# Patient Record
Sex: MALE | Race: WHITE | NOT HISPANIC OR LATINO | Employment: OTHER | URBAN - METROPOLITAN AREA
[De-identification: names, ages, dates, MRNs, and addresses within clinical notes are randomized per-mention and may not be internally consistent; named-entity substitution may affect disease eponyms.]

---

## 2019-03-12 PROBLEM — M81.8 IDIOPATHIC OSTEOPOROSIS: Status: ACTIVE | Noted: 2019-03-12

## 2019-08-06 PROBLEM — R26.9 ABNORMAL GAIT: Chronic | Status: ACTIVE | Noted: 2019-01-01

## 2019-08-06 PROBLEM — R74.8 ELEVATED ALKALINE PHOSPHATASE LEVEL: Status: ACTIVE | Noted: 2019-01-01

## 2019-08-06 PROBLEM — R07.89 MID STERNAL CHEST PAIN: Status: ACTIVE | Noted: 2019-01-01

## 2019-08-06 PROBLEM — R55 SYNCOPE: Status: ACTIVE | Noted: 2019-01-01

## 2019-09-30 PROBLEM — R07.89 MID STERNAL CHEST PAIN: Status: RESOLVED | Noted: 2019-01-01 | Resolved: 2019-01-01

## 2019-09-30 PROBLEM — S43.005A DISLOCATION OF LEFT SHOULDER JOINT: Status: ACTIVE | Noted: 2019-01-01

## 2019-09-30 PROBLEM — R25.1 TREMOR: Status: ACTIVE | Noted: 2019-01-01

## 2019-09-30 PROBLEM — M48.02 CERVICAL STENOSIS OF SPINAL CANAL: Status: ACTIVE | Noted: 2019-01-01

## 2019-09-30 PROBLEM — R29.6 FALLS FREQUENTLY: Status: ACTIVE | Noted: 2019-01-01

## 2019-09-30 PROBLEM — S06.5XAA SUBDURAL HEMATOMA, POST-TRAUMATIC (HCC): Status: ACTIVE | Noted: 2019-01-01

## 2019-10-01 PROBLEM — R09.02 HYPOXIA: Status: ACTIVE | Noted: 2019-01-01

## 2019-10-01 PROBLEM — R09.89 CHOKING EPISODE: Status: ACTIVE | Noted: 2019-01-01

## 2019-10-01 PROBLEM — N35.919 URETHRAL STRICTURE: Status: ACTIVE | Noted: 2019-01-01

## 2019-10-01 PROBLEM — B19.20 HEPATITIS C: Status: ACTIVE | Noted: 2019-01-01

## 2019-10-29 PROBLEM — R35.0 FREQUENCY OF MICTURITION: Status: ACTIVE | Noted: 2019-01-01

## 2020-01-01 ENCOUNTER — HOSPITAL ENCOUNTER (OUTPATIENT)
Dept: RADIOLOGY | Facility: MEDICAL CENTER | Age: 55
End: 2020-06-04
Attending: INTERNAL MEDICINE
Payer: MEDICARE

## 2020-01-01 ENCOUNTER — HOSPITAL ENCOUNTER (OUTPATIENT)
Dept: RADIOLOGY | Facility: MEDICAL CENTER | Age: 55
End: 2020-05-28
Attending: RADIOLOGY
Payer: MEDICARE

## 2020-01-01 ENCOUNTER — APPOINTMENT (OUTPATIENT)
Dept: RADIOLOGY | Facility: MEDICAL CENTER | Age: 55
DRG: 003 | End: 2020-01-01
Attending: INTERNAL MEDICINE
Payer: MEDICARE

## 2020-01-01 ENCOUNTER — ANESTHESIA (OUTPATIENT)
Dept: SURGERY | Facility: MEDICAL CENTER | Age: 55
DRG: 003 | End: 2020-01-01
Payer: MEDICARE

## 2020-01-01 ENCOUNTER — HOSPITAL ENCOUNTER (OUTPATIENT)
Facility: MEDICAL CENTER | Age: 55
DRG: 003 | End: 2020-05-31
Payer: MEDICARE

## 2020-01-01 ENCOUNTER — HOSPITAL ENCOUNTER (OUTPATIENT)
Dept: RADIOLOGY | Facility: MEDICAL CENTER | Age: 55
End: 2020-06-09
Attending: INTERNAL MEDICINE
Payer: MEDICARE

## 2020-01-01 ENCOUNTER — ANESTHESIA EVENT (OUTPATIENT)
Dept: SURGERY | Facility: MEDICAL CENTER | Age: 55
DRG: 003 | End: 2020-01-01
Payer: MEDICARE

## 2020-01-01 ENCOUNTER — APPOINTMENT (OUTPATIENT)
Dept: CARDIOLOGY | Facility: MEDICAL CENTER | Age: 55
DRG: 003 | End: 2020-01-01
Attending: INTERNAL MEDICINE
Payer: MEDICARE

## 2020-01-01 ENCOUNTER — HOSPITAL ENCOUNTER (OUTPATIENT)
Dept: RADIOLOGY | Facility: MEDICAL CENTER | Age: 55
End: 2020-06-01
Attending: PSYCHIATRY & NEUROLOGY
Payer: MEDICARE

## 2020-01-01 ENCOUNTER — HOSPITAL ENCOUNTER (INPATIENT)
Facility: MEDICAL CENTER | Age: 55
LOS: 15 days | DRG: 003 | End: 2020-06-15
Admitting: HOSPITALIST
Payer: MEDICARE

## 2020-01-01 VITALS
BODY MASS INDEX: 29.88 KG/M2 | WEIGHT: 175.04 LBS | HEIGHT: 64 IN | SYSTOLIC BLOOD PRESSURE: 59 MMHG | OXYGEN SATURATION: 61 % | HEART RATE: 87 BPM | DIASTOLIC BLOOD PRESSURE: 33 MMHG | RESPIRATION RATE: 25 BRPM | TEMPERATURE: 98.8 F

## 2020-01-01 DIAGNOSIS — J96.01 ACUTE RESPIRATORY FAILURE WITH HYPOXIA (HCC): ICD-10-CM

## 2020-01-01 DIAGNOSIS — J90 RECURRENT RIGHT PLEURAL EFFUSION: ICD-10-CM

## 2020-01-01 LAB
ABO GROUP BLD: NORMAL
ACTION RANGE TRIGGERED IACRT: NO
ACTION RANGE TRIGGERED IACRT: YES
ALBUMIN SERPL BCP-MCNC: 1.8 G/DL (ref 3.2–4.9)
ALBUMIN SERPL BCP-MCNC: 1.8 G/DL (ref 3.2–4.9)
ALBUMIN/GLOB SERPL: 0.5 G/DL
ALBUMIN/GLOB SERPL: 0.5 G/DL
ALP SERPL-CCNC: 65 U/L (ref 30–99)
ALP SERPL-CCNC: 66 U/L (ref 30–99)
ALT SERPL-CCNC: 10 U/L (ref 2–50)
ALT SERPL-CCNC: 12 U/L (ref 2–50)
AMYLASE FLD-CCNC: 44 U/L
AMYLASE FLD-CCNC: 82 U/L
ANION GAP SERPL CALC-SCNC: 10 MMOL/L (ref 7–16)
ANION GAP SERPL CALC-SCNC: 11 MMOL/L (ref 7–16)
ANION GAP SERPL CALC-SCNC: 12 MMOL/L (ref 7–16)
ANION GAP SERPL CALC-SCNC: 13 MMOL/L (ref 7–16)
ANION GAP SERPL CALC-SCNC: 13 MMOL/L (ref 7–16)
ANION GAP SERPL CALC-SCNC: 16 MMOL/L (ref 7–16)
ANION GAP SERPL CALC-SCNC: 5 MMOL/L (ref 7–16)
ANION GAP SERPL CALC-SCNC: 6 MMOL/L (ref 7–16)
ANION GAP SERPL CALC-SCNC: 7 MMOL/L (ref 7–16)
ANION GAP SERPL CALC-SCNC: 7 MMOL/L (ref 7–16)
ANION GAP SERPL CALC-SCNC: 9 MMOL/L (ref 7–16)
ANISOCYTOSIS BLD QL SMEAR: ABNORMAL
APPEARANCE FLD: NORMAL
APPEARANCE FLD: NORMAL
APTT PPP: 30.4 SEC (ref 24.7–36)
AST SERPL-CCNC: 16 U/L (ref 12–45)
AST SERPL-CCNC: 20 U/L (ref 12–45)
BACTERIA BLD CULT: NORMAL
BACTERIA BLD CULT: NORMAL
BACTERIA FLD AEROBE CULT: NORMAL
BACTERIA SPEC ANAEROBE CULT: NORMAL
BACTERIA SPEC RESP CULT: NORMAL
BACTERIA TISS AEROBE CULT: ABNORMAL
BARCODED ABORH UBTYP: 5100
BARCODED ABORH UBTYP: 9500
BARCODED PRD CODE UBPRD: NORMAL
BARCODED UNIT NUM UBUNT: NORMAL
BASE EXCESS BLDA CALC-SCNC: 1 MMOL/L (ref -4–3)
BASE EXCESS BLDA CALC-SCNC: 1 MMOL/L (ref -4–3)
BASE EXCESS BLDA CALC-SCNC: 13 MMOL/L (ref -4–3)
BASE EXCESS BLDA CALC-SCNC: 2 MMOL/L (ref -4–3)
BASE EXCESS BLDA CALC-SCNC: 3 MMOL/L (ref -4–3)
BASE EXCESS BLDA CALC-SCNC: 4 MMOL/L (ref -4–3)
BASE EXCESS BLDA CALC-SCNC: 4 MMOL/L (ref -4–3)
BASE EXCESS BLDA CALC-SCNC: 6 MMOL/L (ref -4–3)
BASE EXCESS BLDA CALC-SCNC: 6 MMOL/L (ref -4–3)
BASE EXCESS BLDA CALC-SCNC: 7 MMOL/L (ref -4–3)
BASE EXCESS BLDA CALC-SCNC: 8 MMOL/L (ref -4–3)
BASE EXCESS BLDA CALC-SCNC: 9 MMOL/L (ref -4–3)
BASE EXCESS BLDA CALC-SCNC: 9 MMOL/L (ref -4–3)
BASE EXCESS BLDV CALC-SCNC: 12 MMOL/L (ref -4–3)
BASO STIPL BLD QL SMEAR: NORMAL
BASOPHILS # BLD AUTO: 0.3 % (ref 0–1.8)
BASOPHILS # BLD AUTO: 0.4 % (ref 0–1.8)
BASOPHILS # BLD AUTO: 0.5 % (ref 0–1.8)
BASOPHILS # BLD AUTO: 0.6 % (ref 0–1.8)
BASOPHILS # BLD AUTO: 0.9 % (ref 0–1.8)
BASOPHILS # BLD AUTO: 1.7 % (ref 0–1.8)
BASOPHILS # BLD AUTO: 1.7 % (ref 0–1.8)
BASOPHILS # BLD AUTO: 2.6 % (ref 0–1.8)
BASOPHILS # BLD AUTO: 2.6 % (ref 0–1.8)
BASOPHILS # BLD: 0.03 K/UL (ref 0–0.12)
BASOPHILS # BLD: 0.05 K/UL (ref 0–0.12)
BASOPHILS # BLD: 0.06 K/UL (ref 0–0.12)
BASOPHILS # BLD: 0.07 K/UL (ref 0–0.12)
BASOPHILS # BLD: 0.08 K/UL (ref 0–0.12)
BASOPHILS # BLD: 0.1 K/UL (ref 0–0.12)
BASOPHILS # BLD: 0.11 K/UL (ref 0–0.12)
BASOPHILS # BLD: 0.11 K/UL (ref 0–0.12)
BASOPHILS # BLD: 0.13 K/UL (ref 0–0.12)
BASOPHILS # BLD: 0.18 K/UL (ref 0–0.12)
BASOPHILS # BLD: 0.2 K/UL (ref 0–0.12)
BASOPHILS NFR FLD: 1 %
BASOPHILS NFR FLD: 1 %
BILIRUB SERPL-MCNC: 0.3 MG/DL (ref 0.1–1.5)
BILIRUB SERPL-MCNC: 0.3 MG/DL (ref 0.1–1.5)
BLD GP AB SCN SERPL QL: NORMAL
BODY FLD TYPE: NORMAL
BODY TEMPERATURE: ABNORMAL DEGREES
BUN SERPL-MCNC: 11 MG/DL (ref 8–22)
BUN SERPL-MCNC: 12 MG/DL (ref 8–22)
BUN SERPL-MCNC: 15 MG/DL (ref 8–22)
BUN SERPL-MCNC: 19 MG/DL (ref 8–22)
BUN SERPL-MCNC: 25 MG/DL (ref 8–22)
BUN SERPL-MCNC: 31 MG/DL (ref 8–22)
BUN SERPL-MCNC: 31 MG/DL (ref 8–22)
BUN SERPL-MCNC: 33 MG/DL (ref 8–22)
BUN SERPL-MCNC: 33 MG/DL (ref 8–22)
BUN SERPL-MCNC: 34 MG/DL (ref 8–22)
BUN SERPL-MCNC: 37 MG/DL (ref 8–22)
BUN SERPL-MCNC: 37 MG/DL (ref 8–22)
BUN SERPL-MCNC: 39 MG/DL (ref 8–22)
BUN SERPL-MCNC: 43 MG/DL (ref 8–22)
BUN SERPL-MCNC: 8 MG/DL (ref 8–22)
BUN SERPL-MCNC: 8 MG/DL (ref 8–22)
BURR CELLS BLD QL SMEAR: NORMAL
CALCIUM SERPL-MCNC: 7.4 MG/DL (ref 8.5–10.5)
CALCIUM SERPL-MCNC: 7.6 MG/DL (ref 8.5–10.5)
CALCIUM SERPL-MCNC: 7.7 MG/DL (ref 8.5–10.5)
CALCIUM SERPL-MCNC: 7.8 MG/DL (ref 8.5–10.5)
CALCIUM SERPL-MCNC: 7.8 MG/DL (ref 8.5–10.5)
CALCIUM SERPL-MCNC: 8 MG/DL (ref 8.5–10.5)
CALCIUM SERPL-MCNC: 8.1 MG/DL (ref 8.5–10.5)
CALCIUM SERPL-MCNC: 8.2 MG/DL (ref 8.5–10.5)
CALCIUM SERPL-MCNC: 8.3 MG/DL (ref 8.5–10.5)
CALCIUM SERPL-MCNC: 8.3 MG/DL (ref 8.5–10.5)
CALCIUM SERPL-MCNC: 8.4 MG/DL (ref 8.5–10.5)
CALCIUM SERPL-MCNC: 8.4 MG/DL (ref 8.5–10.5)
CHLORIDE SERPL-SCNC: 104 MMOL/L (ref 96–112)
CHLORIDE SERPL-SCNC: 104 MMOL/L (ref 96–112)
CHLORIDE SERPL-SCNC: 105 MMOL/L (ref 96–112)
CHLORIDE SERPL-SCNC: 106 MMOL/L (ref 96–112)
CHLORIDE SERPL-SCNC: 109 MMOL/L (ref 96–112)
CHLORIDE SERPL-SCNC: 110 MMOL/L (ref 96–112)
CHLORIDE SERPL-SCNC: 111 MMOL/L (ref 96–112)
CHLORIDE SERPL-SCNC: 113 MMOL/L (ref 96–112)
CHLORIDE SERPL-SCNC: 113 MMOL/L (ref 96–112)
CO2 BLDA-SCNC: 26 MMOL/L (ref 20–33)
CO2 BLDA-SCNC: 28 MMOL/L (ref 20–33)
CO2 BLDA-SCNC: 29 MMOL/L (ref 20–33)
CO2 BLDA-SCNC: 30 MMOL/L (ref 20–33)
CO2 BLDA-SCNC: 31 MMOL/L (ref 20–33)
CO2 BLDA-SCNC: 33 MMOL/L (ref 20–33)
CO2 BLDA-SCNC: 35 MMOL/L (ref 20–33)
CO2 BLDA-SCNC: 35 MMOL/L (ref 20–33)
CO2 BLDA-SCNC: 41 MMOL/L (ref 20–33)
CO2 BLDV-SCNC: 37 MMOL/L (ref 20–33)
CO2 SERPL-SCNC: 25 MMOL/L (ref 20–33)
CO2 SERPL-SCNC: 26 MMOL/L (ref 20–33)
CO2 SERPL-SCNC: 27 MMOL/L (ref 20–33)
CO2 SERPL-SCNC: 27 MMOL/L (ref 20–33)
CO2 SERPL-SCNC: 28 MMOL/L (ref 20–33)
CO2 SERPL-SCNC: 29 MMOL/L (ref 20–33)
CO2 SERPL-SCNC: 30 MMOL/L (ref 20–33)
CO2 SERPL-SCNC: 30 MMOL/L (ref 20–33)
CO2 SERPL-SCNC: 32 MMOL/L (ref 20–33)
CO2 SERPL-SCNC: 33 MMOL/L (ref 20–33)
CO2 SERPL-SCNC: 35 MMOL/L (ref 20–33)
COLOR FLD: NORMAL
COLOR FLD: NORMAL
COMMENT 1642: NORMAL
COMPONENT R 8504R: NORMAL
CORTIS SERPL-MCNC: 11.4 UG/DL (ref 0–23)
COVID ORDER STATUS COVID19: NORMAL
CREAT SERPL-MCNC: 1 MG/DL (ref 0.5–1.4)
CREAT SERPL-MCNC: 1.06 MG/DL (ref 0.5–1.4)
CREAT SERPL-MCNC: 1.12 MG/DL (ref 0.5–1.4)
CREAT SERPL-MCNC: 1.14 MG/DL (ref 0.5–1.4)
CREAT SERPL-MCNC: 1.14 MG/DL (ref 0.5–1.4)
CREAT SERPL-MCNC: 1.3 MG/DL (ref 0.5–1.4)
CREAT SERPL-MCNC: 1.35 MG/DL (ref 0.5–1.4)
CREAT SERPL-MCNC: 1.35 MG/DL (ref 0.5–1.4)
CREAT SERPL-MCNC: 1.37 MG/DL (ref 0.5–1.4)
CREAT SERPL-MCNC: 1.49 MG/DL (ref 0.5–1.4)
CREAT SERPL-MCNC: 1.53 MG/DL (ref 0.5–1.4)
CREAT SERPL-MCNC: 1.58 MG/DL (ref 0.5–1.4)
CREAT SERPL-MCNC: 1.62 MG/DL (ref 0.5–1.4)
CREAT SERPL-MCNC: 1.63 MG/DL (ref 0.5–1.4)
CREAT SERPL-MCNC: 1.7 MG/DL (ref 0.5–1.4)
CREAT SERPL-MCNC: 1.86 MG/DL (ref 0.5–1.4)
CRP SERPL HS-MCNC: 23.02 MG/DL (ref 0–0.75)
CRP SERPL HS-MCNC: 9.31 MG/DL (ref 0–0.75)
CSF COMMENTS 1658: NORMAL
CSF COMMENTS 1658: NORMAL
CYTOLOGY REG CYTOL: NORMAL
CYTOLOGY REG CYTOL: NORMAL
EKG IMPRESSION: NORMAL
EKG IMPRESSION: NORMAL
EOSINOPHIL # BLD AUTO: 0.01 K/UL (ref 0–0.51)
EOSINOPHIL # BLD AUTO: 0.06 K/UL (ref 0–0.51)
EOSINOPHIL # BLD AUTO: 0.07 K/UL (ref 0–0.51)
EOSINOPHIL # BLD AUTO: 0.1 K/UL (ref 0–0.51)
EOSINOPHIL # BLD AUTO: 0.13 K/UL (ref 0–0.51)
EOSINOPHIL # BLD AUTO: 0.18 K/UL (ref 0–0.51)
EOSINOPHIL # BLD AUTO: 0.22 K/UL (ref 0–0.51)
EOSINOPHIL # BLD AUTO: 0.24 K/UL (ref 0–0.51)
EOSINOPHIL # BLD AUTO: 0.24 K/UL (ref 0–0.51)
EOSINOPHIL # BLD AUTO: 0.27 K/UL (ref 0–0.51)
EOSINOPHIL # BLD AUTO: 0.48 K/UL (ref 0–0.51)
EOSINOPHIL # BLD AUTO: 0.64 K/UL (ref 0–0.51)
EOSINOPHIL # BLD AUTO: 0.69 K/UL (ref 0–0.51)
EOSINOPHIL NFR BLD: 0.1 % (ref 0–6.9)
EOSINOPHIL NFR BLD: 0.8 % (ref 0–6.9)
EOSINOPHIL NFR BLD: 0.9 % (ref 0–6.9)
EOSINOPHIL NFR BLD: 0.9 % (ref 0–6.9)
EOSINOPHIL NFR BLD: 1.4 % (ref 0–6.9)
EOSINOPHIL NFR BLD: 1.5 % (ref 0–6.9)
EOSINOPHIL NFR BLD: 1.7 % (ref 0–6.9)
EOSINOPHIL NFR BLD: 1.9 % (ref 0–6.9)
EOSINOPHIL NFR BLD: 2.6 % (ref 0–6.9)
EOSINOPHIL NFR BLD: 2.7 % (ref 0–6.9)
EOSINOPHIL NFR BLD: 3.8 % (ref 0–6.9)
EOSINOPHIL NFR BLD: 3.9 % (ref 0–6.9)
EOSINOPHIL NFR FLD: 1 %
EOSINOPHIL NFR FLD: 3 %
ERYTHROCYTE [DISTWIDTH] IN BLOOD BY AUTOMATED COUNT: 60.7 FL (ref 35.9–50)
ERYTHROCYTE [DISTWIDTH] IN BLOOD BY AUTOMATED COUNT: 62.1 FL (ref 35.9–50)
ERYTHROCYTE [DISTWIDTH] IN BLOOD BY AUTOMATED COUNT: 63.5 FL (ref 35.9–50)
ERYTHROCYTE [DISTWIDTH] IN BLOOD BY AUTOMATED COUNT: 63.7 FL (ref 35.9–50)
ERYTHROCYTE [DISTWIDTH] IN BLOOD BY AUTOMATED COUNT: 63.8 FL (ref 35.9–50)
ERYTHROCYTE [DISTWIDTH] IN BLOOD BY AUTOMATED COUNT: 63.8 FL (ref 35.9–50)
ERYTHROCYTE [DISTWIDTH] IN BLOOD BY AUTOMATED COUNT: 63.9 FL (ref 35.9–50)
ERYTHROCYTE [DISTWIDTH] IN BLOOD BY AUTOMATED COUNT: 64.6 FL (ref 35.9–50)
ERYTHROCYTE [DISTWIDTH] IN BLOOD BY AUTOMATED COUNT: 65.1 FL (ref 35.9–50)
ERYTHROCYTE [DISTWIDTH] IN BLOOD BY AUTOMATED COUNT: 65.1 FL (ref 35.9–50)
ERYTHROCYTE [DISTWIDTH] IN BLOOD BY AUTOMATED COUNT: 65.5 FL (ref 35.9–50)
ERYTHROCYTE [DISTWIDTH] IN BLOOD BY AUTOMATED COUNT: 65.7 FL (ref 35.9–50)
ERYTHROCYTE [DISTWIDTH] IN BLOOD BY AUTOMATED COUNT: 65.9 FL (ref 35.9–50)
ERYTHROCYTE [DISTWIDTH] IN BLOOD BY AUTOMATED COUNT: 74.3 FL (ref 35.9–50)
ERYTHROCYTE [DISTWIDTH] IN BLOOD BY AUTOMATED COUNT: 77.3 FL (ref 35.9–50)
GLOBULIN SER CALC-MCNC: 3.5 G/DL (ref 1.9–3.5)
GLOBULIN SER CALC-MCNC: 3.7 G/DL (ref 1.9–3.5)
GLUCOSE BLD-MCNC: 103 MG/DL (ref 65–99)
GLUCOSE BLD-MCNC: 105 MG/DL (ref 65–99)
GLUCOSE BLD-MCNC: 105 MG/DL (ref 65–99)
GLUCOSE BLD-MCNC: 111 MG/DL (ref 65–99)
GLUCOSE BLD-MCNC: 115 MG/DL (ref 65–99)
GLUCOSE BLD-MCNC: 115 MG/DL (ref 65–99)
GLUCOSE BLD-MCNC: 119 MG/DL (ref 65–99)
GLUCOSE BLD-MCNC: 122 MG/DL (ref 65–99)
GLUCOSE BLD-MCNC: 134 MG/DL (ref 65–99)
GLUCOSE BLD-MCNC: 143 MG/DL (ref 65–99)
GLUCOSE BLD-MCNC: 147 MG/DL (ref 65–99)
GLUCOSE BLD-MCNC: 151 MG/DL (ref 65–99)
GLUCOSE BLD-MCNC: 157 MG/DL (ref 65–99)
GLUCOSE BLD-MCNC: 166 MG/DL (ref 65–99)
GLUCOSE BLD-MCNC: 219 MG/DL (ref 65–99)
GLUCOSE BLD-MCNC: 69 MG/DL (ref 65–99)
GLUCOSE BLD-MCNC: 81 MG/DL (ref 65–99)
GLUCOSE BLD-MCNC: 89 MG/DL (ref 65–99)
GLUCOSE BLD-MCNC: 98 MG/DL (ref 65–99)
GLUCOSE FLD-MCNC: 136 MG/DL
GLUCOSE FLD-MCNC: 84 MG/DL
GLUCOSE SERPL-MCNC: 102 MG/DL (ref 65–99)
GLUCOSE SERPL-MCNC: 106 MG/DL (ref 65–99)
GLUCOSE SERPL-MCNC: 109 MG/DL (ref 65–99)
GLUCOSE SERPL-MCNC: 109 MG/DL (ref 65–99)
GLUCOSE SERPL-MCNC: 115 MG/DL (ref 65–99)
GLUCOSE SERPL-MCNC: 116 MG/DL (ref 65–99)
GLUCOSE SERPL-MCNC: 119 MG/DL (ref 65–99)
GLUCOSE SERPL-MCNC: 120 MG/DL (ref 65–99)
GLUCOSE SERPL-MCNC: 128 MG/DL (ref 65–99)
GLUCOSE SERPL-MCNC: 140 MG/DL (ref 65–99)
GLUCOSE SERPL-MCNC: 142 MG/DL (ref 65–99)
GLUCOSE SERPL-MCNC: 149 MG/DL (ref 65–99)
GLUCOSE SERPL-MCNC: 168 MG/DL (ref 65–99)
GLUCOSE SERPL-MCNC: 196 MG/DL (ref 65–99)
GLUCOSE SERPL-MCNC: 201 MG/DL (ref 65–99)
GLUCOSE SERPL-MCNC: 85 MG/DL (ref 65–99)
GLUCOSE SERPL-MCNC: 95 MG/DL (ref 65–99)
GRAM STN SPEC: ABNORMAL
GRAM STN SPEC: NORMAL
HCO3 BLDA-SCNC: 25.3 MMOL/L (ref 17–25)
HCO3 BLDA-SCNC: 26.3 MMOL/L (ref 17–25)
HCO3 BLDA-SCNC: 27.1 MMOL/L (ref 17–25)
HCO3 BLDA-SCNC: 27.1 MMOL/L (ref 17–25)
HCO3 BLDA-SCNC: 27.8 MMOL/L (ref 17–25)
HCO3 BLDA-SCNC: 28.3 MMOL/L (ref 17–25)
HCO3 BLDA-SCNC: 29.3 MMOL/L (ref 17–25)
HCO3 BLDA-SCNC: 29.6 MMOL/L (ref 17–25)
HCO3 BLDA-SCNC: 29.8 MMOL/L (ref 17–25)
HCO3 BLDA-SCNC: 31.3 MMOL/L (ref 17–25)
HCO3 BLDA-SCNC: 32.1 MMOL/L (ref 17–25)
HCO3 BLDA-SCNC: 32.3 MMOL/L (ref 17–25)
HCO3 BLDA-SCNC: 33.4 MMOL/L (ref 17–25)
HCO3 BLDA-SCNC: 34 MMOL/L (ref 17–25)
HCO3 BLDA-SCNC: 39.2 MMOL/L (ref 17–25)
HCO3 BLDV-SCNC: 36.1 MMOL/L (ref 24–28)
HCT VFR BLD AUTO: 23.3 % (ref 42–52)
HCT VFR BLD AUTO: 23.9 % (ref 42–52)
HCT VFR BLD AUTO: 24 % (ref 42–52)
HCT VFR BLD AUTO: 24.5 % (ref 42–52)
HCT VFR BLD AUTO: 24.8 % (ref 42–52)
HCT VFR BLD AUTO: 25 % (ref 42–52)
HCT VFR BLD AUTO: 25.3 % (ref 42–52)
HCT VFR BLD AUTO: 25.6 % (ref 42–52)
HCT VFR BLD AUTO: 25.8 % (ref 42–52)
HCT VFR BLD AUTO: 26 % (ref 42–52)
HCT VFR BLD AUTO: 26.5 % (ref 42–52)
HCT VFR BLD AUTO: 27.6 % (ref 42–52)
HCT VFR BLD AUTO: 30.1 % (ref 42–52)
HCT VFR BLD AUTO: 31.7 % (ref 42–52)
HCT VFR BLD AUTO: 31.8 % (ref 42–52)
HGB BLD-MCNC: 10 G/DL (ref 14–18)
HGB BLD-MCNC: 10.2 G/DL (ref 14–18)
HGB BLD-MCNC: 6.9 G/DL (ref 14–18)
HGB BLD-MCNC: 7.1 G/DL (ref 14–18)
HGB BLD-MCNC: 7.2 G/DL (ref 14–18)
HGB BLD-MCNC: 7.3 G/DL (ref 14–18)
HGB BLD-MCNC: 7.4 G/DL (ref 14–18)
HGB BLD-MCNC: 7.5 G/DL (ref 14–18)
HGB BLD-MCNC: 7.5 G/DL (ref 14–18)
HGB BLD-MCNC: 7.6 G/DL (ref 14–18)
HGB BLD-MCNC: 7.7 G/DL (ref 14–18)
HGB BLD-MCNC: 7.9 G/DL (ref 14–18)
HGB BLD-MCNC: 7.9 G/DL (ref 14–18)
HGB BLD-MCNC: 8.3 G/DL (ref 14–18)
HGB BLD-MCNC: 9.6 G/DL (ref 14–18)
HISTIOCYTES NFR FLD: 1 %
HOROWITZ INDEX BLDA+IHG-RTO: 180 MM[HG]
HOROWITZ INDEX BLDA+IHG-RTO: 193 MM[HG]
HOROWITZ INDEX BLDA+IHG-RTO: 195 MM[HG]
HOROWITZ INDEX BLDA+IHG-RTO: 200 MM[HG]
HOROWITZ INDEX BLDA+IHG-RTO: 207 MM[HG]
HOROWITZ INDEX BLDA+IHG-RTO: 208 MM[HG]
HOROWITZ INDEX BLDA+IHG-RTO: 220 MM[HG]
HOROWITZ INDEX BLDA+IHG-RTO: 225 MM[HG]
HOROWITZ INDEX BLDA+IHG-RTO: 240 MM[HG]
HOROWITZ INDEX BLDA+IHG-RTO: 243 MM[HG]
HOROWITZ INDEX BLDA+IHG-RTO: 247 MM[HG]
HOROWITZ INDEX BLDA+IHG-RTO: 250 MM[HG]
HOROWITZ INDEX BLDA+IHG-RTO: 257 MM[HG]
HOROWITZ INDEX BLDA+IHG-RTO: 263 MM[HG]
HOROWITZ INDEX BLDA+IHG-RTO: 290 MM[HG]
HOROWITZ INDEX BLDV+IHG-RTO: 93 MM[HG]
HYPOCHROMIA BLD QL SMEAR: ABNORMAL
IMM GRANULOCYTES # BLD AUTO: 0.05 K/UL (ref 0–0.11)
IMM GRANULOCYTES # BLD AUTO: 0.08 K/UL (ref 0–0.11)
IMM GRANULOCYTES # BLD AUTO: 0.08 K/UL (ref 0–0.11)
IMM GRANULOCYTES # BLD AUTO: 0.09 K/UL (ref 0–0.11)
IMM GRANULOCYTES # BLD AUTO: 0.09 K/UL (ref 0–0.11)
IMM GRANULOCYTES # BLD AUTO: 0.11 K/UL (ref 0–0.11)
IMM GRANULOCYTES # BLD AUTO: 0.16 K/UL (ref 0–0.11)
IMM GRANULOCYTES # BLD AUTO: 0.16 K/UL (ref 0–0.11)
IMM GRANULOCYTES NFR BLD AUTO: 0.5 % (ref 0–0.9)
IMM GRANULOCYTES NFR BLD AUTO: 0.6 % (ref 0–0.9)
IMM GRANULOCYTES NFR BLD AUTO: 0.7 % (ref 0–0.9)
IMM GRANULOCYTES NFR BLD AUTO: 0.7 % (ref 0–0.9)
IMM GRANULOCYTES NFR BLD AUTO: 0.9 % (ref 0–0.9)
IMM GRANULOCYTES NFR BLD AUTO: 0.9 % (ref 0–0.9)
INR PPP: 1.03 (ref 0.87–1.13)
INST. QUALIFIED PATIENT IIQPT: YES
LDH FLD L TO P-CCNC: 123 U/L
LDH FLD L TO P-CCNC: 126 U/L
LV EJECT FRACT  99904: 65
LV EJECT FRACT MOD 2C 99903: 62.23
LV EJECT FRACT MOD 4C 99902: 63.77
LV EJECT FRACT MOD BP 99901: 62.86
LYMPHOCYTES # BLD AUTO: 0.59 K/UL (ref 1–4.8)
LYMPHOCYTES # BLD AUTO: 0.74 K/UL (ref 1–4.8)
LYMPHOCYTES # BLD AUTO: 0.88 K/UL (ref 1–4.8)
LYMPHOCYTES # BLD AUTO: 1.07 K/UL (ref 1–4.8)
LYMPHOCYTES # BLD AUTO: 1.08 K/UL (ref 1–4.8)
LYMPHOCYTES # BLD AUTO: 1.22 K/UL (ref 1–4.8)
LYMPHOCYTES # BLD AUTO: 1.25 K/UL (ref 1–4.8)
LYMPHOCYTES # BLD AUTO: 1.36 K/UL (ref 1–4.8)
LYMPHOCYTES # BLD AUTO: 1.37 K/UL (ref 1–4.8)
LYMPHOCYTES # BLD AUTO: 1.44 K/UL (ref 1–4.8)
LYMPHOCYTES # BLD AUTO: 1.5 K/UL (ref 1–4.8)
LYMPHOCYTES # BLD AUTO: 1.57 K/UL (ref 1–4.8)
LYMPHOCYTES # BLD AUTO: 1.68 K/UL (ref 1–4.8)
LYMPHOCYTES # BLD AUTO: 1.7 K/UL (ref 1–4.8)
LYMPHOCYTES # BLD AUTO: 1.77 K/UL (ref 1–4.8)
LYMPHOCYTES NFR BLD: 10.4 % (ref 22–41)
LYMPHOCYTES NFR BLD: 11.3 % (ref 22–41)
LYMPHOCYTES NFR BLD: 11.4 % (ref 22–41)
LYMPHOCYTES NFR BLD: 13.9 % (ref 22–41)
LYMPHOCYTES NFR BLD: 13.9 % (ref 22–41)
LYMPHOCYTES NFR BLD: 15.7 % (ref 22–41)
LYMPHOCYTES NFR BLD: 16 % (ref 22–41)
LYMPHOCYTES NFR BLD: 16.5 % (ref 22–41)
LYMPHOCYTES NFR BLD: 18.4 % (ref 22–41)
LYMPHOCYTES NFR BLD: 7.9 % (ref 22–41)
LYMPHOCYTES NFR BLD: 8.7 % (ref 22–41)
LYMPHOCYTES NFR BLD: 9.3 % (ref 22–41)
LYMPHOCYTES NFR BLD: 9.3 % (ref 22–41)
LYMPHOCYTES NFR BLD: 9.4 % (ref 22–41)
LYMPHOCYTES NFR BLD: 9.6 % (ref 22–41)
LYMPHOCYTES NFR FLD: 59 %
LYMPHOCYTES NFR FLD: 89 %
MACROCYTES BLD QL SMEAR: ABNORMAL
MAGNESIUM SERPL-MCNC: 1.8 MG/DL (ref 1.5–2.5)
MAGNESIUM SERPL-MCNC: 1.9 MG/DL (ref 1.5–2.5)
MAGNESIUM SERPL-MCNC: 2 MG/DL (ref 1.5–2.5)
MAGNESIUM SERPL-MCNC: 2.1 MG/DL (ref 1.5–2.5)
MAGNESIUM SERPL-MCNC: 2.1 MG/DL (ref 1.5–2.5)
MAGNESIUM SERPL-MCNC: 2.2 MG/DL (ref 1.5–2.5)
MAGNESIUM SERPL-MCNC: 2.2 MG/DL (ref 1.5–2.5)
MANUAL DIFF BLD: NORMAL
MCH RBC QN AUTO: 31.4 PG (ref 27–33)
MCH RBC QN AUTO: 31.5 PG (ref 27–33)
MCH RBC QN AUTO: 31.7 PG (ref 27–33)
MCH RBC QN AUTO: 31.7 PG (ref 27–33)
MCH RBC QN AUTO: 31.8 PG (ref 27–33)
MCH RBC QN AUTO: 31.9 PG (ref 27–33)
MCH RBC QN AUTO: 31.9 PG (ref 27–33)
MCH RBC QN AUTO: 32.1 PG (ref 27–33)
MCH RBC QN AUTO: 32.3 PG (ref 27–33)
MCH RBC QN AUTO: 32.4 PG (ref 27–33)
MCH RBC QN AUTO: 32.5 PG (ref 27–33)
MCH RBC QN AUTO: 32.6 PG (ref 27–33)
MCH RBC QN AUTO: 32.9 PG (ref 27–33)
MCHC RBC AUTO-ENTMCNC: 29.1 G/DL (ref 33.7–35.3)
MCHC RBC AUTO-ENTMCNC: 29.2 G/DL (ref 33.7–35.3)
MCHC RBC AUTO-ENTMCNC: 29.6 G/DL (ref 33.7–35.3)
MCHC RBC AUTO-ENTMCNC: 29.7 G/DL (ref 33.7–35.3)
MCHC RBC AUTO-ENTMCNC: 29.8 G/DL (ref 33.7–35.3)
MCHC RBC AUTO-ENTMCNC: 29.8 G/DL (ref 33.7–35.3)
MCHC RBC AUTO-ENTMCNC: 30 G/DL (ref 33.7–35.3)
MCHC RBC AUTO-ENTMCNC: 30 G/DL (ref 33.7–35.3)
MCHC RBC AUTO-ENTMCNC: 30.1 G/DL (ref 33.7–35.3)
MCHC RBC AUTO-ENTMCNC: 30.1 G/DL (ref 33.7–35.3)
MCHC RBC AUTO-ENTMCNC: 30.4 G/DL (ref 33.7–35.3)
MCHC RBC AUTO-ENTMCNC: 30.6 G/DL (ref 33.7–35.3)
MCHC RBC AUTO-ENTMCNC: 31.5 G/DL (ref 33.7–35.3)
MCHC RBC AUTO-ENTMCNC: 31.9 G/DL (ref 33.7–35.3)
MCHC RBC AUTO-ENTMCNC: 32.1 G/DL (ref 33.7–35.3)
MCV RBC AUTO: 105.6 FL (ref 81.4–97.8)
MCV RBC AUTO: 105.6 FL (ref 81.4–97.8)
MCV RBC AUTO: 105.7 FL (ref 81.4–97.8)
MCV RBC AUTO: 105.7 FL (ref 81.4–97.8)
MCV RBC AUTO: 106.9 FL (ref 81.4–97.8)
MCV RBC AUTO: 108 FL (ref 81.4–97.8)
MCV RBC AUTO: 108.3 FL (ref 81.4–97.8)
MCV RBC AUTO: 108.4 FL (ref 81.4–97.8)
MCV RBC AUTO: 108.6 FL (ref 81.4–97.8)
MCV RBC AUTO: 108.7 FL (ref 81.4–97.8)
MCV RBC AUTO: 109.1 FL (ref 81.4–97.8)
MCV RBC AUTO: 109.3 FL (ref 81.4–97.8)
MCV RBC AUTO: 99.4 FL (ref 81.4–97.8)
MCV RBC AUTO: 99.7 FL (ref 81.4–97.8)
MCV RBC AUTO: 99.7 FL (ref 81.4–97.8)
MONOCYTES # BLD AUTO: 0.18 K/UL (ref 0–0.85)
MONOCYTES # BLD AUTO: 0.29 K/UL (ref 0–0.85)
MONOCYTES # BLD AUTO: 0.34 K/UL (ref 0–0.85)
MONOCYTES # BLD AUTO: 0.36 K/UL (ref 0–0.85)
MONOCYTES # BLD AUTO: 0.38 K/UL (ref 0–0.85)
MONOCYTES # BLD AUTO: 0.5 K/UL (ref 0–0.85)
MONOCYTES # BLD AUTO: 0.59 K/UL (ref 0–0.85)
MONOCYTES # BLD AUTO: 0.6 K/UL (ref 0–0.85)
MONOCYTES # BLD AUTO: 0.88 K/UL (ref 0–0.85)
MONOCYTES # BLD AUTO: 0.93 K/UL (ref 0–0.85)
MONOCYTES # BLD AUTO: 1 K/UL (ref 0–0.85)
MONOCYTES # BLD AUTO: 1 K/UL (ref 0–0.85)
MONOCYTES # BLD AUTO: 1.07 K/UL (ref 0–0.85)
MONOCYTES # BLD AUTO: 1.09 K/UL (ref 0–0.85)
MONOCYTES # BLD AUTO: 1.14 K/UL (ref 0–0.85)
MONOCYTES NFR BLD AUTO: 2.6 % (ref 0–13.4)
MONOCYTES NFR BLD AUTO: 4.3 % (ref 0–13.4)
MONOCYTES NFR BLD AUTO: 4.3 % (ref 0–13.4)
MONOCYTES NFR BLD AUTO: 5.2 % (ref 0–13.4)
MONOCYTES NFR BLD AUTO: 5.3 % (ref 0–13.4)
MONOCYTES NFR BLD AUTO: 5.3 % (ref 0–13.4)
MONOCYTES NFR BLD AUTO: 5.4 % (ref 0–13.4)
MONOCYTES NFR BLD AUTO: 5.9 % (ref 0–13.4)
MONOCYTES NFR BLD AUTO: 6 % (ref 0–13.4)
MONOCYTES NFR BLD AUTO: 6 % (ref 0–13.4)
MONOCYTES NFR BLD AUTO: 6.9 % (ref 0–13.4)
MONOCYTES NFR BLD AUTO: 7 % (ref 0–13.4)
MONOCYTES NFR BLD AUTO: 7.3 % (ref 0–13.4)
MONOCYTES NFR BLD AUTO: 8.3 % (ref 0–13.4)
MONOCYTES NFR BLD AUTO: 9.6 % (ref 0–13.4)
MONONUC CELLS NFR FLD: 4 %
MONONUC CELLS NFR FLD: 7 %
MORPHOLOGY BLD-IMP: NORMAL
MRSA DNA SPEC QL NAA+PROBE: NORMAL
MYELOCYTES NFR BLD MANUAL: 0.9 %
NEUTROPHILS # BLD AUTO: 11.44 K/UL (ref 1.82–7.42)
NEUTROPHILS # BLD AUTO: 12.27 K/UL (ref 1.82–7.42)
NEUTROPHILS # BLD AUTO: 12.7 K/UL (ref 1.82–7.42)
NEUTROPHILS # BLD AUTO: 13.01 K/UL (ref 1.82–7.42)
NEUTROPHILS # BLD AUTO: 14.34 K/UL (ref 1.82–7.42)
NEUTROPHILS # BLD AUTO: 14.55 K/UL (ref 1.82–7.42)
NEUTROPHILS # BLD AUTO: 4.66 K/UL (ref 1.82–7.42)
NEUTROPHILS # BLD AUTO: 4.77 K/UL (ref 1.82–7.42)
NEUTROPHILS # BLD AUTO: 5.32 K/UL (ref 1.82–7.42)
NEUTROPHILS # BLD AUTO: 5.47 K/UL (ref 1.82–7.42)
NEUTROPHILS # BLD AUTO: 5.62 K/UL (ref 1.82–7.42)
NEUTROPHILS # BLD AUTO: 6.04 K/UL (ref 1.82–7.42)
NEUTROPHILS # BLD AUTO: 6.5 K/UL (ref 1.82–7.42)
NEUTROPHILS # BLD AUTO: 7.83 K/UL (ref 1.82–7.42)
NEUTROPHILS # BLD AUTO: 9.43 K/UL (ref 1.82–7.42)
NEUTROPHILS NFR BLD: 70.2 % (ref 44–72)
NEUTROPHILS NFR BLD: 73.9 % (ref 44–72)
NEUTROPHILS NFR BLD: 73.9 % (ref 44–72)
NEUTROPHILS NFR BLD: 75.7 % (ref 44–72)
NEUTROPHILS NFR BLD: 75.8 % (ref 44–72)
NEUTROPHILS NFR BLD: 78.3 % (ref 44–72)
NEUTROPHILS NFR BLD: 78.6 % (ref 44–72)
NEUTROPHILS NFR BLD: 78.9 % (ref 44–72)
NEUTROPHILS NFR BLD: 79.5 % (ref 44–72)
NEUTROPHILS NFR BLD: 79.6 % (ref 44–72)
NEUTROPHILS NFR BLD: 80 % (ref 44–72)
NEUTROPHILS NFR BLD: 80.4 % (ref 44–72)
NEUTROPHILS NFR BLD: 82.5 % (ref 44–72)
NEUTROPHILS NFR BLD: 82.6 % (ref 44–72)
NEUTROPHILS NFR BLD: 83.3 % (ref 44–72)
NEUTROPHILS NFR FLD: 3 %
NEUTROPHILS NFR FLD: 30 %
NEUTS BAND NFR BLD MANUAL: 1.7 % (ref 0–10)
NRBC # BLD AUTO: 0 K/UL
NRBC # BLD AUTO: 0.02 K/UL
NRBC BLD-RTO: 0 /100 WBC
NRBC BLD-RTO: 0.2 /100 WBC
O2/TOTAL GAS SETTING VFR VENT: 100 %
O2/TOTAL GAS SETTING VFR VENT: 30 %
O2/TOTAL GAS SETTING VFR VENT: 40 %
OVALOCYTES BLD QL SMEAR: NORMAL
PCO2 BLDA: 38.3 MMHG (ref 26–37)
PCO2 BLDA: 38.6 MMHG (ref 26–37)
PCO2 BLDA: 38.8 MMHG (ref 26–37)
PCO2 BLDA: 41.2 MMHG (ref 26–37)
PCO2 BLDA: 41.4 MMHG (ref 26–37)
PCO2 BLDA: 43 MMHG (ref 26–37)
PCO2 BLDA: 43.1 MMHG (ref 26–37)
PCO2 BLDA: 43.1 MMHG (ref 26–37)
PCO2 BLDA: 43.4 MMHG (ref 26–37)
PCO2 BLDA: 46.8 MMHG (ref 26–37)
PCO2 BLDA: 48.1 MMHG (ref 26–37)
PCO2 BLDA: 49.1 MMHG (ref 26–37)
PCO2 BLDA: 55.6 MMHG (ref 26–37)
PCO2 BLDA: 57.6 MMHG (ref 26–37)
PCO2 BLDA: 64.3 MMHG (ref 26–37)
PCO2 BLDV: 45.7 MMHG (ref 41–51)
PCO2 TEMP ADJ BLDA: 35.4 MMHG (ref 26–37)
PCO2 TEMP ADJ BLDA: 38.1 MMHG (ref 26–37)
PCO2 TEMP ADJ BLDA: 38.9 MMHG (ref 26–37)
PCO2 TEMP ADJ BLDA: 39.7 MMHG (ref 26–37)
PCO2 TEMP ADJ BLDA: 40.7 MMHG (ref 26–37)
PCO2 TEMP ADJ BLDA: 41.2 MMHG (ref 26–37)
PCO2 TEMP ADJ BLDA: 41.5 MMHG (ref 26–37)
PCO2 TEMP ADJ BLDA: 43.6 MMHG (ref 26–37)
PCO2 TEMP ADJ BLDA: 43.6 MMHG (ref 26–37)
PCO2 TEMP ADJ BLDA: 45.6 MMHG (ref 26–37)
PCO2 TEMP ADJ BLDA: 46.8 MMHG (ref 26–37)
PCO2 TEMP ADJ BLDA: 49.1 MMHG (ref 26–37)
PCO2 TEMP ADJ BLDA: 56.8 MMHG (ref 26–37)
PCO2 TEMP ADJ BLDA: 58.9 MMHG (ref 26–37)
PCO2 TEMP ADJ BLDA: 63.6 MMHG (ref 26–37)
PCO2 TEMP ADJ BLDV: 46.5 MMHG (ref 41–51)
PH BLDA: 7.32 [PH] (ref 7.4–7.5)
PH BLDA: 7.38 [PH] (ref 7.4–7.5)
PH BLDA: 7.39 [PH] (ref 7.4–7.5)
PH BLDA: 7.41 [PH] (ref 7.4–7.5)
PH BLDA: 7.42 [PH] (ref 7.4–7.5)
PH BLDA: 7.43 [PH] (ref 7.4–7.5)
PH BLDA: 7.45 [PH] (ref 7.4–7.5)
PH BLDA: 7.47 [PH] (ref 7.4–7.5)
PH BLDA: 7.47 [PH] (ref 7.4–7.5)
PH BLDA: 7.5 [PH] (ref 7.4–7.5)
PH BLDA: 7.53 [PH] (ref 7.4–7.5)
PH BLDV: 7.5 [PH] (ref 7.31–7.45)
PH FLD: 7 [PH]
PH FLD: 7 [PH]
PH TEMP ADJ BLDA: 7.31 [PH] (ref 7.4–7.5)
PH TEMP ADJ BLDA: 7.38 [PH] (ref 7.4–7.5)
PH TEMP ADJ BLDA: 7.38 [PH] (ref 7.4–7.5)
PH TEMP ADJ BLDA: 7.39 [PH] (ref 7.4–7.5)
PH TEMP ADJ BLDA: 7.4 [PH] (ref 7.4–7.5)
PH TEMP ADJ BLDA: 7.4 [PH] (ref 7.4–7.5)
PH TEMP ADJ BLDA: 7.42 [PH] (ref 7.4–7.5)
PH TEMP ADJ BLDA: 7.43 [PH] (ref 7.4–7.5)
PH TEMP ADJ BLDA: 7.44 [PH] (ref 7.4–7.5)
PH TEMP ADJ BLDA: 7.44 [PH] (ref 7.4–7.5)
PH TEMP ADJ BLDA: 7.47 [PH] (ref 7.4–7.5)
PH TEMP ADJ BLDA: 7.47 [PH] (ref 7.4–7.5)
PH TEMP ADJ BLDA: 7.48 [PH] (ref 7.4–7.5)
PH TEMP ADJ BLDA: 7.51 [PH] (ref 7.4–7.5)
PH TEMP ADJ BLDA: 7.54 [PH] (ref 7.4–7.5)
PH TEMP ADJ BLDV: 7.5 [PH] (ref 7.31–7.45)
PHENYTOIN SERPL-MCNC: 26.2 UG/ML (ref 10–20)
PHENYTOIN SERPL-MCNC: 27.2 UG/ML (ref 10–20)
PHENYTOIN SERPL-MCNC: 29.4 UG/ML (ref 10–20)
PHOSPHATE SERPL-MCNC: 2 MG/DL (ref 2.5–4.5)
PHOSPHATE SERPL-MCNC: 2.1 MG/DL (ref 2.5–4.5)
PHOSPHATE SERPL-MCNC: 2.2 MG/DL (ref 2.5–4.5)
PHOSPHATE SERPL-MCNC: 2.4 MG/DL (ref 2.5–4.5)
PHOSPHATE SERPL-MCNC: 2.6 MG/DL (ref 2.5–4.5)
PHOSPHATE SERPL-MCNC: 2.7 MG/DL (ref 2.5–4.5)
PHOSPHATE SERPL-MCNC: 2.9 MG/DL (ref 2.5–4.5)
PHOSPHATE SERPL-MCNC: 3.2 MG/DL (ref 2.5–4.5)
PHOSPHATE SERPL-MCNC: 3.2 MG/DL (ref 2.5–4.5)
PHOSPHATE SERPL-MCNC: 3.4 MG/DL (ref 2.5–4.5)
PHOSPHATE SERPL-MCNC: 4 MG/DL (ref 2.5–4.5)
PLATELET # BLD AUTO: 128 K/UL (ref 164–446)
PLATELET # BLD AUTO: 143 K/UL (ref 164–446)
PLATELET # BLD AUTO: 163 K/UL (ref 164–446)
PLATELET # BLD AUTO: 165 K/UL (ref 164–446)
PLATELET # BLD AUTO: 178 K/UL (ref 164–446)
PLATELET # BLD AUTO: 182 K/UL (ref 164–446)
PLATELET # BLD AUTO: 233 K/UL (ref 164–446)
PLATELET # BLD AUTO: 298 K/UL (ref 164–446)
PLATELET # BLD AUTO: 306 K/UL (ref 164–446)
PLATELET # BLD AUTO: 314 K/UL (ref 164–446)
PLATELET # BLD AUTO: 316 K/UL (ref 164–446)
PLATELET # BLD AUTO: 323 K/UL (ref 164–446)
PLATELET # BLD AUTO: 374 K/UL (ref 164–446)
PLATELET BLD QL SMEAR: NORMAL
PMV BLD AUTO: 10 FL (ref 9–12.9)
PMV BLD AUTO: 10.1 FL (ref 9–12.9)
PMV BLD AUTO: 10.3 FL (ref 9–12.9)
PMV BLD AUTO: 10.4 FL (ref 9–12.9)
PMV BLD AUTO: 10.5 FL (ref 9–12.9)
PMV BLD AUTO: 10.6 FL (ref 9–12.9)
PMV BLD AUTO: 10.6 FL (ref 9–12.9)
PMV BLD AUTO: 9.4 FL (ref 9–12.9)
PMV BLD AUTO: 9.6 FL (ref 9–12.9)
PMV BLD AUTO: 9.8 FL (ref 9–12.9)
PMV BLD AUTO: 9.8 FL (ref 9–12.9)
PMV BLD AUTO: 9.9 FL (ref 9–12.9)
PO2 BLDA: 105 MMHG (ref 64–87)
PO2 BLDA: 257 MMHG (ref 64–87)
PO2 BLDA: 58 MMHG (ref 64–87)
PO2 BLDA: 62 MMHG (ref 64–87)
PO2 BLDA: 72 MMHG (ref 64–87)
PO2 BLDA: 72 MMHG (ref 64–87)
PO2 BLDA: 74 MMHG (ref 64–87)
PO2 BLDA: 75 MMHG (ref 64–87)
PO2 BLDA: 78 MMHG (ref 64–87)
PO2 BLDA: 80 MMHG (ref 64–87)
PO2 BLDA: 83 MMHG (ref 64–87)
PO2 BLDA: 87 MMHG (ref 64–87)
PO2 BLDA: 88 MMHG (ref 64–87)
PO2 BLDA: 90 MMHG (ref 64–87)
PO2 BLDA: 97 MMHG (ref 64–87)
PO2 BLDV: 28 MMHG (ref 25–40)
PO2 TEMP ADJ BLDA: 255 MMHG (ref 64–87)
PO2 TEMP ADJ BLDA: 55 MMHG (ref 64–87)
PO2 TEMP ADJ BLDA: 62 MMHG (ref 64–87)
PO2 TEMP ADJ BLDA: 70 MMHG (ref 64–87)
PO2 TEMP ADJ BLDA: 70 MMHG (ref 64–87)
PO2 TEMP ADJ BLDA: 73 MMHG (ref 64–87)
PO2 TEMP ADJ BLDA: 73 MMHG (ref 64–87)
PO2 TEMP ADJ BLDA: 74 MMHG (ref 64–87)
PO2 TEMP ADJ BLDA: 80 MMHG (ref 64–87)
PO2 TEMP ADJ BLDA: 81 MMHG (ref 64–87)
PO2 TEMP ADJ BLDA: 83 MMHG (ref 64–87)
PO2 TEMP ADJ BLDA: 89 MMHG (ref 64–87)
PO2 TEMP ADJ BLDA: 91 MMHG (ref 64–87)
PO2 TEMP ADJ BLDA: 97 MMHG (ref 64–87)
PO2 TEMP ADJ BLDA: 97 MMHG (ref 64–87)
PO2 TEMP ADJ BLDV: 29 MMHG (ref 25–40)
POIKILOCYTOSIS BLD QL SMEAR: NORMAL
POLYCHROMASIA BLD QL SMEAR: NORMAL
POTASSIUM SERPL-SCNC: 2.7 MMOL/L (ref 3.6–5.5)
POTASSIUM SERPL-SCNC: 2.8 MMOL/L (ref 3.6–5.5)
POTASSIUM SERPL-SCNC: 3.2 MMOL/L (ref 3.6–5.5)
POTASSIUM SERPL-SCNC: 3.2 MMOL/L (ref 3.6–5.5)
POTASSIUM SERPL-SCNC: 3.4 MMOL/L (ref 3.6–5.5)
POTASSIUM SERPL-SCNC: 3.5 MMOL/L (ref 3.6–5.5)
POTASSIUM SERPL-SCNC: 3.5 MMOL/L (ref 3.6–5.5)
POTASSIUM SERPL-SCNC: 3.6 MMOL/L (ref 3.6–5.5)
POTASSIUM SERPL-SCNC: 3.7 MMOL/L (ref 3.6–5.5)
POTASSIUM SERPL-SCNC: 3.8 MMOL/L (ref 3.6–5.5)
POTASSIUM SERPL-SCNC: 3.9 MMOL/L (ref 3.6–5.5)
POTASSIUM SERPL-SCNC: 4 MMOL/L (ref 3.6–5.5)
POTASSIUM SERPL-SCNC: 4.1 MMOL/L (ref 3.6–5.5)
PREALB SERPL-MCNC: 3.8 MG/DL (ref 18–38)
PRODUCT TYPE UPROD: NORMAL
PROMYELOCYTES NFR BLD MANUAL: 0.9 %
PROT FLD-MCNC: 1.6 G/DL
PROT FLD-MCNC: 2.1 G/DL
PROT SERPL-MCNC: 5.3 G/DL (ref 6–8.2)
PROT SERPL-MCNC: 5.5 G/DL (ref 6–8.2)
PROTHROMBIN TIME: 13.7 SEC (ref 12–14.6)
RBC # BLD AUTO: 2.15 M/UL (ref 4.7–6.1)
RBC # BLD AUTO: 2.19 M/UL (ref 4.7–6.1)
RBC # BLD AUTO: 2.27 M/UL (ref 4.7–6.1)
RBC # BLD AUTO: 2.3 M/UL (ref 4.7–6.1)
RBC # BLD AUTO: 2.32 M/UL (ref 4.7–6.1)
RBC # BLD AUTO: 2.32 M/UL (ref 4.7–6.1)
RBC # BLD AUTO: 2.33 M/UL (ref 4.7–6.1)
RBC # BLD AUTO: 2.36 M/UL (ref 4.7–6.1)
RBC # BLD AUTO: 2.37 M/UL (ref 4.7–6.1)
RBC # BLD AUTO: 2.4 M/UL (ref 4.7–6.1)
RBC # BLD AUTO: 2.51 M/UL (ref 4.7–6.1)
RBC # BLD AUTO: 2.61 M/UL (ref 4.7–6.1)
RBC # BLD AUTO: 3.02 M/UL (ref 4.7–6.1)
RBC # BLD AUTO: 3.18 M/UL (ref 4.7–6.1)
RBC # BLD AUTO: 3.2 M/UL (ref 4.7–6.1)
RBC # FLD: NORMAL CELLS/UL
RBC # FLD: NORMAL CELLS/UL
RBC BLD AUTO: PRESENT
RH BLD: NORMAL
RHODAMINE-AURAMINE STN SPEC: NORMAL
SAO2 % BLDA: 100 % (ref 93–99)
SAO2 % BLDA: 90 % (ref 93–99)
SAO2 % BLDA: 92 % (ref 93–99)
SAO2 % BLDA: 94 % (ref 93–99)
SAO2 % BLDA: 94 % (ref 93–99)
SAO2 % BLDA: 95 % (ref 93–99)
SAO2 % BLDA: 96 % (ref 93–99)
SAO2 % BLDA: 97 % (ref 93–99)
SAO2 % BLDA: 98 % (ref 93–99)
SAO2 % BLDV: 59 %
SARS-COV-2 RNA RESP QL NAA+PROBE: NOTDETECTED
SIGNIFICANT IND 70042: ABNORMAL
SIGNIFICANT IND 70042: NORMAL
SITE SITE: ABNORMAL
SITE SITE: NORMAL
SODIUM SERPL-SCNC: 141 MMOL/L (ref 135–145)
SODIUM SERPL-SCNC: 142 MMOL/L (ref 135–145)
SODIUM SERPL-SCNC: 143 MMOL/L (ref 135–145)
SODIUM SERPL-SCNC: 143 MMOL/L (ref 135–145)
SODIUM SERPL-SCNC: 144 MMOL/L (ref 135–145)
SODIUM SERPL-SCNC: 146 MMOL/L (ref 135–145)
SODIUM SERPL-SCNC: 146 MMOL/L (ref 135–145)
SODIUM SERPL-SCNC: 148 MMOL/L (ref 135–145)
SODIUM SERPL-SCNC: 149 MMOL/L (ref 135–145)
SODIUM SERPL-SCNC: 150 MMOL/L (ref 135–145)
SOURCE SOURCE: ABNORMAL
SOURCE SOURCE: NORMAL
SPECIMEN DRAWN FROM PATIENT: ABNORMAL
SPECIMEN SOURCE: NORMAL
TARGETS BLD QL SMEAR: NORMAL
TRIGL SERPL-MCNC: 125 MG/DL (ref 0–149)
TRIGL SERPL-MCNC: 139 MG/DL (ref 0–149)
UNIT STATUS USTAT: NORMAL
VANCOMYCIN SERPL-MCNC: 32.9 UG/ML
VANCOMYCIN TROUGH SERPL-MCNC: 29.1 UG/ML (ref 10–20)
WBC # BLD AUTO: 12 K/UL (ref 4.8–10.8)
WBC # BLD AUTO: 14.4 K/UL (ref 4.8–10.8)
WBC # BLD AUTO: 15.4 K/UL (ref 4.8–10.8)
WBC # BLD AUTO: 15.6 K/UL (ref 4.8–10.8)
WBC # BLD AUTO: 16.3 K/UL (ref 4.8–10.8)
WBC # BLD AUTO: 18.1 K/UL (ref 4.8–10.8)
WBC # BLD AUTO: 18.1 K/UL (ref 4.8–10.8)
WBC # BLD AUTO: 6.3 K/UL (ref 4.8–10.8)
WBC # BLD AUTO: 6.8 K/UL (ref 4.8–10.8)
WBC # BLD AUTO: 7.4 K/UL (ref 4.8–10.8)
WBC # BLD AUTO: 7.8 K/UL (ref 4.8–10.8)
WBC # BLD AUTO: 8.6 K/UL (ref 4.8–10.8)
WBC # BLD AUTO: 9.4 K/UL (ref 4.8–10.8)
WBC # FLD: 121 CELLS/UL
WBC # FLD: 138 CELLS/UL
WBC OTHER NFR FLD: 1 %

## 2020-01-01 PROCEDURE — 94003 VENT MGMT INPAT SUBQ DAY: CPT

## 2020-01-01 PROCEDURE — 87040 BLOOD CULTURE FOR BACTERIA: CPT | Mod: 91

## 2020-01-01 PROCEDURE — 0W993ZZ DRAINAGE OF RIGHT PLEURAL CAVITY, PERCUTANEOUS APPROACH: ICD-10-PCS | Performed by: RADIOLOGY

## 2020-01-01 PROCEDURE — 770022 HCHG ROOM/CARE - ICU (200)

## 2020-01-01 PROCEDURE — 94640 AIRWAY INHALATION TREATMENT: CPT

## 2020-01-01 PROCEDURE — 700102 HCHG RX REV CODE 250 W/ 637 OVERRIDE(OP): Performed by: INTERNAL MEDICINE

## 2020-01-01 PROCEDURE — 82803 BLOOD GASES ANY COMBINATION: CPT

## 2020-01-01 PROCEDURE — 31500 INSERT EMERGENCY AIRWAY: CPT

## 2020-01-01 PROCEDURE — 700105 HCHG RX REV CODE 258: Performed by: INTERNAL MEDICINE

## 2020-01-01 PROCEDURE — 85007 BL SMEAR W/DIFF WBC COUNT: CPT

## 2020-01-01 PROCEDURE — 93930 UPPER EXTREMITY STUDY: CPT | Mod: GZ | Performed by: INTERNAL MEDICINE

## 2020-01-01 PROCEDURE — A9270 NON-COVERED ITEM OR SERVICE: HCPCS | Performed by: INTERNAL MEDICINE

## 2020-01-01 PROCEDURE — 95714 VEEG EA 12-26 HR UNMNTR: CPT | Performed by: PSYCHIATRY & NEUROLOGY

## 2020-01-01 PROCEDURE — 83735 ASSAY OF MAGNESIUM: CPT

## 2020-01-01 PROCEDURE — 82945 GLUCOSE OTHER FLUID: CPT

## 2020-01-01 PROCEDURE — 94770 HCHG CO2 EXPIRED GAS DETERMINATION: CPT

## 2020-01-01 PROCEDURE — 87102 FUNGUS ISOLATION CULTURE: CPT

## 2020-01-01 PROCEDURE — 80048 BASIC METABOLIC PNL TOTAL CA: CPT

## 2020-01-01 PROCEDURE — 84100 ASSAY OF PHOSPHORUS: CPT

## 2020-01-01 PROCEDURE — 700101 HCHG RX REV CODE 250: Performed by: INTERNAL MEDICINE

## 2020-01-01 PROCEDURE — 82947 ASSAY GLUCOSE BLOOD QUANT: CPT

## 2020-01-01 PROCEDURE — 80185 ASSAY OF PHENYTOIN TOTAL: CPT

## 2020-01-01 PROCEDURE — 3E0T3BZ INTRODUCTION OF ANESTHETIC AGENT INTO PERIPHERAL NERVES AND PLEXI, PERCUTANEOUS APPROACH: ICD-10-PCS | Performed by: SURGERY

## 2020-01-01 PROCEDURE — 160041 HCHG SURGERY MINUTES - EA ADDL 1 MIN LEVEL 4: Performed by: SURGERY

## 2020-01-01 PROCEDURE — 85025 COMPLETE CBC W/AUTO DIFF WBC: CPT

## 2020-01-01 PROCEDURE — 700111 HCHG RX REV CODE 636 W/ 250 OVERRIDE (IP): Performed by: INTERNAL MEDICINE

## 2020-01-01 PROCEDURE — 99152 MOD SED SAME PHYS/QHP 5/>YRS: CPT

## 2020-01-01 PROCEDURE — 0BH18EZ INSERTION OF ENDOTRACHEAL AIRWAY INTO TRACHEA, VIA NATURAL OR ARTIFICIAL OPENING ENDOSCOPIC: ICD-10-PCS | Performed by: INTERNAL MEDICINE

## 2020-01-01 PROCEDURE — 93010 ELECTROCARDIOGRAM REPORT: CPT | Performed by: INTERNAL MEDICINE

## 2020-01-01 PROCEDURE — 71045 X-RAY EXAM CHEST 1 VIEW: CPT

## 2020-01-01 PROCEDURE — 700117 HCHG RX CONTRAST REV CODE 255: Performed by: INTERNAL MEDICINE

## 2020-01-01 PROCEDURE — 93306 TTE W/DOPPLER COMPLETE: CPT

## 2020-01-01 PROCEDURE — 0B968ZZ DRAINAGE OF RIGHT LOWER LOBE BRONCHUS, VIA NATURAL OR ARTIFICIAL OPENING ENDOSCOPIC: ICD-10-PCS | Performed by: SURGERY

## 2020-01-01 PROCEDURE — 0NP004Z REMOVAL OF INTERNAL FIXATION DEVICE FROM SKULL, OPEN APPROACH: ICD-10-PCS | Performed by: NEUROLOGICAL SURGERY

## 2020-01-01 PROCEDURE — 83986 ASSAY PH BODY FLUID NOS: CPT

## 2020-01-01 PROCEDURE — 36430 TRANSFUSION BLD/BLD COMPNT: CPT

## 2020-01-01 PROCEDURE — 0B113F4 BYPASS TRACHEA TO CUTANEOUS WITH TRACHEOSTOMY DEVICE, PERCUTANEOUS APPROACH: ICD-10-PCS | Performed by: INTERNAL MEDICINE

## 2020-01-01 PROCEDURE — 87186 SC STD MICRODIL/AGAR DIL: CPT

## 2020-01-01 PROCEDURE — 87070 CULTURE OTHR SPECIMN AEROBIC: CPT

## 2020-01-01 PROCEDURE — A9576 INJ PROHANCE MULTIPACK: HCPCS | Performed by: INTERNAL MEDICINE

## 2020-01-01 PROCEDURE — 99233 SBSQ HOSP IP/OBS HIGH 50: CPT | Performed by: INTERNAL MEDICINE

## 2020-01-01 PROCEDURE — 160009 HCHG ANES TIME/MIN: Performed by: NEUROLOGICAL SURGERY

## 2020-01-01 PROCEDURE — 501488 HCHG SUCTION CANN, WOUNDVAC TRAC: Performed by: NEUROLOGICAL SURGERY

## 2020-01-01 PROCEDURE — 99291 CRITICAL CARE FIRST HOUR: CPT | Performed by: INTERNAL MEDICINE

## 2020-01-01 PROCEDURE — 5A1955Z RESPIRATORY VENTILATION, GREATER THAN 96 CONSECUTIVE HOURS: ICD-10-PCS | Performed by: INTERNAL MEDICINE

## 2020-01-01 PROCEDURE — 86850 RBC ANTIBODY SCREEN: CPT

## 2020-01-01 PROCEDURE — 87205 SMEAR GRAM STAIN: CPT

## 2020-01-01 PROCEDURE — 84132 ASSAY OF SERUM POTASSIUM: CPT

## 2020-01-01 PROCEDURE — 4A10X4Z MONITORING OF CENTRAL NERVOUS ELECTRICAL ACTIVITY, EXTERNAL APPROACH: ICD-10-PCS | Performed by: PSYCHIATRY & NEUROLOGY

## 2020-01-01 PROCEDURE — 83735 ASSAY OF MAGNESIUM: CPT | Mod: 91

## 2020-01-01 PROCEDURE — 99232 SBSQ HOSP IP/OBS MODERATE 35: CPT | Performed by: PSYCHIATRY & NEUROLOGY

## 2020-01-01 PROCEDURE — A6550 NEG PRES WOUND THER DRSG SET: HCPCS | Performed by: NEUROLOGICAL SURGERY

## 2020-01-01 PROCEDURE — 84157 ASSAY OF PROTEIN OTHER: CPT

## 2020-01-01 PROCEDURE — 70553 MRI BRAIN STEM W/O & W/DYE: CPT

## 2020-01-01 PROCEDURE — 71250 CT THORAX DX C-: CPT

## 2020-01-01 PROCEDURE — 500331 HCHG COTTONOID, SURG PATTIE: Performed by: NEUROLOGICAL SURGERY

## 2020-01-01 PROCEDURE — 94150 VITAL CAPACITY TEST: CPT

## 2020-01-01 PROCEDURE — 501837 HCHG SUTURE CV: Performed by: SURGERY

## 2020-01-01 PROCEDURE — 86140 C-REACTIVE PROTEIN: CPT

## 2020-01-01 PROCEDURE — 31600 PLANNED TRACHEOSTOMY: CPT

## 2020-01-01 PROCEDURE — 700105 HCHG RX REV CODE 258: Performed by: PSYCHIATRY & NEUROLOGY

## 2020-01-01 PROCEDURE — 32555 ASPIRATE PLEURA W/ IMAGING: CPT | Mod: RT | Performed by: INTERNAL MEDICINE

## 2020-01-01 PROCEDURE — 89051 BODY FLUID CELL COUNT: CPT

## 2020-01-01 PROCEDURE — 500075 HCHG BLADE, CLIPPER NEURO: Performed by: NEUROLOGICAL SURGERY

## 2020-01-01 PROCEDURE — 86901 BLOOD TYPING SEROLOGIC RH(D): CPT

## 2020-01-01 PROCEDURE — 85027 COMPLETE CBC AUTOMATED: CPT

## 2020-01-01 PROCEDURE — 306802 SYSTEM FECAL MANAGEMENT CATHETER KIT FLEXI - SEAL: Performed by: INTERNAL MEDICINE

## 2020-01-01 PROCEDURE — 82533 TOTAL CORTISOL: CPT

## 2020-01-01 PROCEDURE — 4410569 US-THORACENTESIS PUNCTURE

## 2020-01-01 PROCEDURE — 36600 WITHDRAWAL OF ARTERIAL BLOOD: CPT

## 2020-01-01 PROCEDURE — 99292 CRITICAL CARE ADDL 30 MIN: CPT | Performed by: INTERNAL MEDICINE

## 2020-01-01 PROCEDURE — 86900 BLOOD TYPING SEROLOGIC ABO: CPT

## 2020-01-01 PROCEDURE — 86923 COMPATIBILITY TEST ELECTRIC: CPT | Mod: 91

## 2020-01-01 PROCEDURE — 80202 ASSAY OF VANCOMYCIN: CPT

## 2020-01-01 PROCEDURE — 97605 NEG PRS WND THER DME<=50SQCM: CPT

## 2020-01-01 PROCEDURE — 700101 HCHG RX REV CODE 250: Performed by: ANESTHESIOLOGY

## 2020-01-01 PROCEDURE — 82962 GLUCOSE BLOOD TEST: CPT | Mod: 91

## 2020-01-01 PROCEDURE — 80053 COMPREHEN METABOLIC PANEL: CPT | Mod: 91

## 2020-01-01 PROCEDURE — 32554 ASPIRATE PLEURA W/O IMAGING: CPT

## 2020-01-01 PROCEDURE — 85730 THROMBOPLASTIN TIME PARTIAL: CPT

## 2020-01-01 PROCEDURE — 31624 DX BRONCHOSCOPE/LAVAGE: CPT | Performed by: SURGERY

## 2020-01-01 PROCEDURE — 88305 TISSUE EXAM BY PATHOLOGIST: CPT

## 2020-01-01 PROCEDURE — 99292 CRITICAL CARE ADDL 30 MIN: CPT | Mod: 25 | Performed by: INTERNAL MEDICINE

## 2020-01-01 PROCEDURE — 93005 ELECTROCARDIOGRAM TRACING: CPT | Performed by: INTERNAL MEDICINE

## 2020-01-01 PROCEDURE — 82803 BLOOD GASES ANY COMBINATION: CPT | Mod: 91

## 2020-01-01 PROCEDURE — 88112 CYTOPATH CELL ENHANCE TECH: CPT

## 2020-01-01 PROCEDURE — 87015 SPECIMEN INFECT AGNT CONCNTJ: CPT

## 2020-01-01 PROCEDURE — 82150 ASSAY OF AMYLASE: CPT

## 2020-01-01 PROCEDURE — 83615 LACTATE (LD) (LDH) ENZYME: CPT

## 2020-01-01 PROCEDURE — 700111 HCHG RX REV CODE 636 W/ 250 OVERRIDE (IP): Performed by: ANESTHESIOLOGY

## 2020-01-01 PROCEDURE — 99291 CRITICAL CARE FIRST HOUR: CPT | Mod: 25 | Performed by: INTERNAL MEDICINE

## 2020-01-01 PROCEDURE — 304255 HCHG KIT,PERC TRACHE

## 2020-01-01 PROCEDURE — 87077 CULTURE AEROBIC IDENTIFY: CPT

## 2020-01-01 PROCEDURE — 30233N1 TRANSFUSION OF NONAUTOLOGOUS RED BLOOD CELLS INTO PERIPHERAL VEIN, PERCUTANEOUS APPROACH: ICD-10-PCS | Performed by: NEUROLOGICAL SURGERY

## 2020-01-01 PROCEDURE — A4314 CATH W/DRAINAGE 2-WAY LATEX: HCPCS | Performed by: NEUROLOGICAL SURGERY

## 2020-01-01 PROCEDURE — 87116 MYCOBACTERIA CULTURE: CPT

## 2020-01-01 PROCEDURE — 700111 HCHG RX REV CODE 636 W/ 250 OVERRIDE (IP): Performed by: PSYCHIATRY & NEUROLOGY

## 2020-01-01 PROCEDURE — 87075 CULTR BACTERIA EXCEPT BLOOD: CPT

## 2020-01-01 PROCEDURE — 500445 HCHG HEMOSTAT, SURGICEL 4X8: Performed by: NEUROLOGICAL SURGERY

## 2020-01-01 PROCEDURE — 0W993ZZ DRAINAGE OF RIGHT PLEURAL CAVITY, PERCUTANEOUS APPROACH: ICD-10-PCS | Performed by: INTERNAL MEDICINE

## 2020-01-01 PROCEDURE — 700105 HCHG RX REV CODE 258: Performed by: ANESTHESIOLOGY

## 2020-01-01 PROCEDURE — 74018 RADEX ABDOMEN 1 VIEW: CPT

## 2020-01-01 PROCEDURE — 80053 COMPREHEN METABOLIC PANEL: CPT

## 2020-01-01 PROCEDURE — 94002 VENT MGMT INPAT INIT DAY: CPT

## 2020-01-01 PROCEDURE — 87206 SMEAR FLUORESCENT/ACID STAI: CPT

## 2020-01-01 PROCEDURE — 500452 HCHG DRESSING, WOUND VAC MED.: Performed by: NEUROLOGICAL SURGERY

## 2020-01-01 PROCEDURE — 700102 HCHG RX REV CODE 250 W/ 637 OVERRIDE(OP): Performed by: NEUROLOGICAL SURGERY

## 2020-01-01 PROCEDURE — 0W9930Z DRAINAGE OF RIGHT PLEURAL CAVITY WITH DRAINAGE DEVICE, PERCUTANEOUS APPROACH: ICD-10-PCS | Performed by: SURGERY

## 2020-01-01 PROCEDURE — 82962 GLUCOSE BLOOD TEST: CPT

## 2020-01-01 PROCEDURE — 84134 ASSAY OF PREALBUMIN: CPT

## 2020-01-01 PROCEDURE — P9045 ALBUMIN (HUMAN), 5%, 250 ML: HCPCS | Mod: JG | Performed by: INTERNAL MEDICINE

## 2020-01-01 PROCEDURE — 700111 HCHG RX REV CODE 636 W/ 250 OVERRIDE (IP): Mod: JG | Performed by: INTERNAL MEDICINE

## 2020-01-01 PROCEDURE — 95700 EEG CONT REC W/VID EEG TECH: CPT | Performed by: PSYCHIATRY & NEUROLOGY

## 2020-01-01 PROCEDURE — 160048 HCHG OR STATISTICAL LEVEL 1-5: Performed by: SURGERY

## 2020-01-01 PROCEDURE — A6222 GAUZE <=16 IN NO W/SAL W/O B: HCPCS | Performed by: NEUROLOGICAL SURGERY

## 2020-01-01 PROCEDURE — 86923 COMPATIBILITY TEST ELECTRIC: CPT

## 2020-01-01 PROCEDURE — 501838 HCHG SUTURE GENERAL: Performed by: SURGERY

## 2020-01-01 PROCEDURE — 85027 COMPLETE CBC AUTOMATED: CPT | Mod: 91

## 2020-01-01 PROCEDURE — 110454 HCHG SHELL REV 250: Performed by: NEUROLOGICAL SURGERY

## 2020-01-01 PROCEDURE — C9803 HOPD COVID-19 SPEC COLLECT: HCPCS | Performed by: NEUROLOGICAL SURGERY

## 2020-01-01 PROCEDURE — A9270 NON-COVERED ITEM OR SERVICE: HCPCS | Performed by: NEUROLOGICAL SURGERY

## 2020-01-01 PROCEDURE — 501445 HCHG STAPLER, SKIN DISP: Performed by: SURGERY

## 2020-01-01 PROCEDURE — 160041 HCHG SURGERY MINUTES - EA ADDL 1 MIN LEVEL 4: Performed by: NEUROLOGICAL SURGERY

## 2020-01-01 PROCEDURE — 302214 INTUBATION BOX: Performed by: INTERNAL MEDICINE

## 2020-01-01 PROCEDURE — 84478 ASSAY OF TRIGLYCERIDES: CPT

## 2020-01-01 PROCEDURE — 87641 MR-STAPH DNA AMP PROBE: CPT

## 2020-01-01 PROCEDURE — P9016 RBC LEUKOCYTES REDUCED: HCPCS

## 2020-01-01 PROCEDURE — 87015 SPECIMEN INFECT AGNT CONCNTJ: CPT | Mod: 91

## 2020-01-01 PROCEDURE — 99153 MOD SED SAME PHYS/QHP EA: CPT

## 2020-01-01 PROCEDURE — 31500 INSERT EMERGENCY AIRWAY: CPT | Performed by: INTERNAL MEDICINE

## 2020-01-01 PROCEDURE — 700101 HCHG RX REV CODE 250: Performed by: NEUROLOGICAL SURGERY

## 2020-01-01 PROCEDURE — 31600 PLANNED TRACHEOSTOMY: CPT | Performed by: INTERNAL MEDICINE

## 2020-01-01 PROCEDURE — 160029 HCHG SURGERY MINUTES - 1ST 30 MINS LEVEL 4: Performed by: NEUROLOGICAL SURGERY

## 2020-01-01 PROCEDURE — 0ND00ZZ EXTRACTION OF SKULL, OPEN APPROACH: ICD-10-PCS | Performed by: NEUROLOGICAL SURGERY

## 2020-01-01 PROCEDURE — 74176 CT ABD & PELVIS W/O CONTRAST: CPT

## 2020-01-01 PROCEDURE — 70450 CT HEAD/BRAIN W/O DYE: CPT

## 2020-01-01 PROCEDURE — 99215 OFFICE O/P EST HI 40 MIN: CPT | Performed by: INTERNAL MEDICINE

## 2020-01-01 PROCEDURE — 94760 N-INVAS EAR/PLS OXIMETRY 1: CPT

## 2020-01-01 PROCEDURE — 160048 HCHG OR STATISTICAL LEVEL 1-5: Performed by: NEUROLOGICAL SURGERY

## 2020-01-01 PROCEDURE — 0BNF0ZZ RELEASE RIGHT LOWER LUNG LOBE, OPEN APPROACH: ICD-10-PCS | Performed by: SURGERY

## 2020-01-01 PROCEDURE — 160029 HCHG SURGERY MINUTES - 1ST 30 MINS LEVEL 4: Performed by: SURGERY

## 2020-01-01 PROCEDURE — 501838 HCHG SUTURE GENERAL: Performed by: NEUROLOGICAL SURGERY

## 2020-01-01 PROCEDURE — 0BNC0ZZ RELEASE RIGHT UPPER LUNG LOBE, OPEN APPROACH: ICD-10-PCS | Performed by: SURGERY

## 2020-01-01 PROCEDURE — 160009 HCHG ANES TIME/MIN: Performed by: SURGERY

## 2020-01-01 PROCEDURE — 85610 PROTHROMBIN TIME: CPT

## 2020-01-01 PROCEDURE — 93306 TTE W/DOPPLER COMPLETE: CPT | Mod: 26 | Performed by: INTERNAL MEDICINE

## 2020-01-01 PROCEDURE — 500889 HCHG PACK, NEURO: Performed by: NEUROLOGICAL SURGERY

## 2020-01-01 PROCEDURE — 0BND0ZZ RELEASE RIGHT MIDDLE LUNG LOBE, OPEN APPROACH: ICD-10-PCS | Performed by: SURGERY

## 2020-01-01 PROCEDURE — 95720 EEG PHY/QHP EA INCR W/VEEG: CPT | Performed by: PSYCHIATRY & NEUROLOGY

## 2020-01-01 PROCEDURE — U0004 COV-19 TEST NON-CDC HGH THRU: HCPCS

## 2020-01-01 PROCEDURE — 99223 1ST HOSP IP/OBS HIGH 75: CPT | Performed by: INTERNAL MEDICINE

## 2020-01-01 PROCEDURE — 93930 UPPER EXTREMITY STUDY: CPT

## 2020-01-01 PROCEDURE — 302977 HCHG BRONCHOSCOPY PROC-THERAPEUTIC

## 2020-01-01 PROCEDURE — 99221 1ST HOSP IP/OBS SF/LOW 40: CPT | Mod: 25 | Performed by: PSYCHIATRY & NEUROLOGY

## 2020-01-01 PROCEDURE — 92950 HEART/LUNG RESUSCITATION CPR: CPT

## 2020-01-01 RX ORDER — ETOMIDATE 2 MG/ML
20 INJECTION INTRAVENOUS ONCE
Status: COMPLETED | OUTPATIENT
Start: 2020-01-01 | End: 2020-01-01

## 2020-01-01 RX ORDER — METRONIDAZOLE 500 MG/1
500 TABLET ORAL EVERY 8 HOURS
Status: DISCONTINUED | OUTPATIENT
Start: 2020-01-01 | End: 2020-01-01

## 2020-01-01 RX ORDER — ACETAMINOPHEN 325 MG/1
650 TABLET ORAL EVERY 6 HOURS PRN
Status: DISCONTINUED | OUTPATIENT
Start: 2020-01-01 | End: 2020-01-01 | Stop reason: HOSPADM

## 2020-01-01 RX ORDER — ALBUMIN, HUMAN INJ 5% 5 %
25 SOLUTION INTRAVENOUS ONCE
Status: COMPLETED | OUTPATIENT
Start: 2020-01-01 | End: 2020-01-01

## 2020-01-01 RX ORDER — MIDAZOLAM HYDROCHLORIDE 1 MG/ML
5 INJECTION INTRAMUSCULAR; INTRAVENOUS ONCE
Status: ACTIVE | OUTPATIENT
Start: 2020-01-01 | End: 2020-01-01

## 2020-01-01 RX ORDER — MIDAZOLAM HYDROCHLORIDE 1 MG/ML
INJECTION INTRAMUSCULAR; INTRAVENOUS
Status: ACTIVE
Start: 2020-01-01 | End: 2020-01-01

## 2020-01-01 RX ORDER — NOREPINEPHRINE BITARTRATE 0.03 MG/ML
0-30 INJECTION, SOLUTION INTRAVENOUS CONTINUOUS
Status: DISCONTINUED | OUTPATIENT
Start: 2020-01-01 | End: 2020-01-01

## 2020-01-01 RX ORDER — DIPHENHYDRAMINE HYDROCHLORIDE 50 MG/ML
12.5 INJECTION INTRAMUSCULAR; INTRAVENOUS
Status: DISCONTINUED | OUTPATIENT
Start: 2020-01-01 | End: 2020-01-01

## 2020-01-01 RX ORDER — POLYETHYLENE GLYCOL 3350 17 G/17G
1 POWDER, FOR SOLUTION ORAL
Status: DISCONTINUED | OUTPATIENT
Start: 2020-01-01 | End: 2020-01-01

## 2020-01-01 RX ORDER — POTASSIUM CHLORIDE 20 MEQ/1
40 TABLET, EXTENDED RELEASE ORAL ONCE
Status: COMPLETED | OUTPATIENT
Start: 2020-01-01 | End: 2020-01-01

## 2020-01-01 RX ORDER — HALOPERIDOL 5 MG/ML
1 INJECTION INTRAMUSCULAR
Status: DISCONTINUED | OUTPATIENT
Start: 2020-01-01 | End: 2020-01-01

## 2020-01-01 RX ORDER — SODIUM CHLORIDE 9 MG/ML
INJECTION, SOLUTION INTRAVENOUS CONTINUOUS
Status: DISCONTINUED | OUTPATIENT
Start: 2020-01-01 | End: 2020-01-01

## 2020-01-01 RX ORDER — METOCLOPRAMIDE 5 MG/1
5 TABLET ORAL EVERY 12 HOURS
Status: DISCONTINUED | OUTPATIENT
Start: 2020-01-01 | End: 2020-01-01

## 2020-01-01 RX ORDER — PHENYTOIN SODIUM 50 MG/ML
300 INJECTION INTRAMUSCULAR; INTRAVENOUS TWICE DAILY
Status: DISCONTINUED | OUTPATIENT
Start: 2020-01-01 | End: 2020-01-01

## 2020-01-01 RX ORDER — SODIUM CHLORIDE, SODIUM LACTATE, POTASSIUM CHLORIDE, CALCIUM CHLORIDE 600; 310; 30; 20 MG/100ML; MG/100ML; MG/100ML; MG/100ML
1000 INJECTION, SOLUTION INTRAVENOUS CONTINUOUS
Status: DISCONTINUED | OUTPATIENT
Start: 2020-01-01 | End: 2020-01-01

## 2020-01-01 RX ORDER — SODIUM CHLORIDE 450 MG/100ML
INJECTION, SOLUTION INTRAVENOUS ONCE
Status: COMPLETED | OUTPATIENT
Start: 2020-01-01 | End: 2020-01-01

## 2020-01-01 RX ORDER — SODIUM CHLORIDE, SODIUM LACTATE, POTASSIUM CHLORIDE, CALCIUM CHLORIDE 600; 310; 30; 20 MG/100ML; MG/100ML; MG/100ML; MG/100ML
INJECTION, SOLUTION INTRAVENOUS CONTINUOUS
Status: DISCONTINUED | OUTPATIENT
Start: 2020-01-01 | End: 2020-01-01

## 2020-01-01 RX ORDER — POTASSIUM CHLORIDE 29.8 MG/ML
40 INJECTION INTRAVENOUS ONCE
Status: COMPLETED | OUTPATIENT
Start: 2020-01-01 | End: 2020-01-01

## 2020-01-01 RX ORDER — MIDODRINE HYDROCHLORIDE 5 MG/1
10 TABLET ORAL 3 TIMES DAILY
Status: DISCONTINUED | OUTPATIENT
Start: 2020-01-01 | End: 2020-01-01

## 2020-01-01 RX ORDER — PHENYTOIN SODIUM 50 MG/ML
100 INJECTION INTRAMUSCULAR; INTRAVENOUS EVERY 8 HOURS
Status: DISCONTINUED | OUTPATIENT
Start: 2020-01-01 | End: 2020-01-01

## 2020-01-01 RX ORDER — IPRATROPIUM BROMIDE AND ALBUTEROL SULFATE 2.5; .5 MG/3ML; MG/3ML
3 SOLUTION RESPIRATORY (INHALATION)
Status: DISCONTINUED | OUTPATIENT
Start: 2020-01-01 | End: 2020-01-01 | Stop reason: HOSPADM

## 2020-01-01 RX ORDER — SODIUM CHLORIDE 9 MG/ML
INJECTION, SOLUTION INTRAVENOUS CONTINUOUS
Status: ACTIVE | OUTPATIENT
Start: 2020-01-01 | End: 2020-01-01

## 2020-01-01 RX ORDER — SODIUM CHLORIDE, SODIUM LACTATE, POTASSIUM CHLORIDE, CALCIUM CHLORIDE 600; 310; 30; 20 MG/100ML; MG/100ML; MG/100ML; MG/100ML
INJECTION, SOLUTION INTRAVENOUS
Status: DISCONTINUED | OUTPATIENT
Start: 2020-01-01 | End: 2020-01-01 | Stop reason: SURG

## 2020-01-01 RX ORDER — LEVOTHYROXINE SODIUM 0.05 MG/1
50 TABLET ORAL
Status: DISCONTINUED | OUTPATIENT
Start: 2020-01-01 | End: 2020-01-01

## 2020-01-01 RX ORDER — OXYCODONE HYDROCHLORIDE 5 MG/1
5-10 TABLET ORAL EVERY 4 HOURS PRN
Status: DISCONTINUED | OUTPATIENT
Start: 2020-01-01 | End: 2020-01-01

## 2020-01-01 RX ORDER — GABAPENTIN 100 MG/1
100 CAPSULE ORAL 3 TIMES DAILY
Status: DISCONTINUED | OUTPATIENT
Start: 2020-01-01 | End: 2020-01-01

## 2020-01-01 RX ORDER — MIDODRINE HYDROCHLORIDE 5 MG/1
15 TABLET ORAL EVERY 8 HOURS
Status: DISCONTINUED | OUTPATIENT
Start: 2020-01-01 | End: 2020-01-01

## 2020-01-01 RX ORDER — BISACODYL 10 MG
10 SUPPOSITORY, RECTAL RECTAL
Status: DISCONTINUED | OUTPATIENT
Start: 2020-01-01 | End: 2020-01-01

## 2020-01-01 RX ORDER — ROCURONIUM BROMIDE 10 MG/ML
50 INJECTION, SOLUTION INTRAVENOUS ONCE
Status: COMPLETED | OUTPATIENT
Start: 2020-01-01 | End: 2020-01-01

## 2020-01-01 RX ORDER — BACITRACIN ZINC 500 [USP'U]/G
OINTMENT TOPICAL
Status: DISCONTINUED | OUTPATIENT
Start: 2020-01-01 | End: 2020-01-01 | Stop reason: HOSPADM

## 2020-01-01 RX ORDER — ROCURONIUM BROMIDE 10 MG/ML
INJECTION, SOLUTION INTRAVENOUS PRN
Status: DISCONTINUED | OUTPATIENT
Start: 2020-01-01 | End: 2020-01-01 | Stop reason: SURG

## 2020-01-01 RX ORDER — OXYCODONE HYDROCHLORIDE 10 MG/1
10-15 TABLET ORAL EVERY 4 HOURS PRN
Status: DISCONTINUED | OUTPATIENT
Start: 2020-01-01 | End: 2020-01-01 | Stop reason: HOSPADM

## 2020-01-01 RX ORDER — BUPIVACAINE HYDROCHLORIDE AND EPINEPHRINE 5; 5 MG/ML; UG/ML
INJECTION, SOLUTION EPIDURAL; INTRACAUDAL; PERINEURAL
Status: DISCONTINUED | OUTPATIENT
Start: 2020-01-01 | End: 2020-01-01 | Stop reason: HOSPADM

## 2020-01-01 RX ORDER — NALOXONE HYDROCHLORIDE 1 MG/ML
2 INJECTION PARENTERAL EVERY 8 HOURS
Status: DISCONTINUED | OUTPATIENT
Start: 2020-01-01 | End: 2020-01-01

## 2020-01-01 RX ORDER — ONDANSETRON 2 MG/ML
4 INJECTION INTRAMUSCULAR; INTRAVENOUS
Status: DISCONTINUED | OUTPATIENT
Start: 2020-01-01 | End: 2020-01-01

## 2020-01-01 RX ORDER — MIDAZOLAM HYDROCHLORIDE 1 MG/ML
5 INJECTION INTRAMUSCULAR; INTRAVENOUS ONCE
Status: COMPLETED | OUTPATIENT
Start: 2020-01-01 | End: 2020-01-01

## 2020-01-01 RX ORDER — LINEZOLID 2 MG/ML
600 INJECTION, SOLUTION INTRAVENOUS EVERY 12 HOURS
Status: DISCONTINUED | OUTPATIENT
Start: 2020-01-01 | End: 2020-01-01

## 2020-01-01 RX ORDER — LORAZEPAM 2 MG/ML
4 INJECTION INTRAMUSCULAR
Status: DISCONTINUED | OUTPATIENT
Start: 2020-01-01 | End: 2020-01-01

## 2020-01-01 RX ORDER — LORAZEPAM 2 MG/ML
1-2 INJECTION INTRAMUSCULAR
Status: DISCONTINUED | OUTPATIENT
Start: 2020-01-01 | End: 2020-01-01 | Stop reason: HOSPADM

## 2020-01-01 RX ORDER — NEOMYCIN SULFATE, POLYMYXIN B SULFATE, BACITRACIN ZINC, HYDROCORTISONE 3.5; 10000; 400; 1 MG/G; [USP'U]/G; [USP'U]/G; MG/G
OINTMENT OPHTHALMIC
Status: DISCONTINUED | OUTPATIENT
Start: 2020-01-01 | End: 2020-01-01 | Stop reason: HOSPADM

## 2020-01-01 RX ORDER — PHENYTOIN SODIUM 50 MG/ML
200 INJECTION INTRAMUSCULAR; INTRAVENOUS DAILY
Status: DISCONTINUED | OUTPATIENT
Start: 2020-01-01 | End: 2020-01-01

## 2020-01-01 RX ORDER — SODIUM CHLORIDE, SODIUM LACTATE, POTASSIUM CHLORIDE, AND CALCIUM CHLORIDE .6; .31; .03; .02 G/100ML; G/100ML; G/100ML; G/100ML
500 INJECTION, SOLUTION INTRAVENOUS ONCE
Status: COMPLETED | OUTPATIENT
Start: 2020-01-01 | End: 2020-01-01

## 2020-01-01 RX ORDER — MIDODRINE HYDROCHLORIDE 5 MG/1
10 TABLET ORAL EVERY 8 HOURS
Status: DISCONTINUED | OUTPATIENT
Start: 2020-01-01 | End: 2020-01-01

## 2020-01-01 RX ORDER — AMOXICILLIN 250 MG
2 CAPSULE ORAL 2 TIMES DAILY
Status: DISCONTINUED | OUTPATIENT
Start: 2020-01-01 | End: 2020-01-01

## 2020-01-01 RX ORDER — LEVETIRACETAM 500 MG/1
1000 TABLET ORAL 2 TIMES DAILY
Status: DISCONTINUED | OUTPATIENT
Start: 2020-01-01 | End: 2020-01-01

## 2020-01-01 RX ORDER — FUROSEMIDE 10 MG/ML
20 INJECTION INTRAMUSCULAR; INTRAVENOUS
Status: DISCONTINUED | OUTPATIENT
Start: 2020-01-01 | End: 2020-01-01

## 2020-01-01 RX ORDER — POTASSIUM CHLORIDE 7.45 MG/ML
10 INJECTION INTRAVENOUS
Status: COMPLETED | OUTPATIENT
Start: 2020-01-01 | End: 2020-01-01

## 2020-01-01 RX ORDER — PHENYTOIN SODIUM 100 MG/1
200-300 CAPSULE, EXTENDED RELEASE ORAL 3 TIMES DAILY
COMMUNITY

## 2020-01-01 RX ORDER — MAGNESIUM SULFATE HEPTAHYDRATE 40 MG/ML
2 INJECTION, SOLUTION INTRAVENOUS ONCE
Status: COMPLETED | OUTPATIENT
Start: 2020-01-01 | End: 2020-01-01

## 2020-01-01 RX ORDER — LABETALOL HYDROCHLORIDE 5 MG/ML
10 INJECTION, SOLUTION INTRAVENOUS EVERY 4 HOURS PRN
Status: DISCONTINUED | OUTPATIENT
Start: 2020-01-01 | End: 2020-01-01

## 2020-01-01 RX ORDER — LINEZOLID 600 MG/1
600 TABLET, FILM COATED ORAL EVERY 12 HOURS
Status: DISCONTINUED | OUTPATIENT
Start: 2020-01-01 | End: 2020-01-01

## 2020-01-01 RX ORDER — THIAMINE MONONITRATE (VIT B1) 100 MG
100 TABLET ORAL DAILY
Status: DISCONTINUED | OUTPATIENT
Start: 2020-01-01 | End: 2020-01-01

## 2020-01-01 RX ORDER — ATROPINE SULFATE 10 MG/ML
2 SOLUTION/ DROPS OPHTHALMIC EVERY 4 HOURS PRN
Status: DISCONTINUED | OUTPATIENT
Start: 2020-01-01 | End: 2020-01-01 | Stop reason: HOSPADM

## 2020-01-01 RX ORDER — GABAPENTIN 300 MG/1
600-900 CAPSULE ORAL 3 TIMES DAILY
COMMUNITY

## 2020-01-01 RX ORDER — METOCLOPRAMIDE HYDROCHLORIDE 5 MG/5ML
10 SOLUTION ORAL ONCE
Status: COMPLETED | OUTPATIENT
Start: 2020-01-01 | End: 2020-01-01

## 2020-01-01 RX ORDER — PHENYTOIN SODIUM 50 MG/ML
300 INJECTION INTRAMUSCULAR; INTRAVENOUS EVERY 12 HOURS
Status: DISCONTINUED | OUTPATIENT
Start: 2020-01-01 | End: 2020-01-01

## 2020-01-01 RX ORDER — DEXMEDETOMIDINE HYDROCHLORIDE 4 UG/ML
.1-1.5 INJECTION, SOLUTION INTRAVENOUS CONTINUOUS
Status: DISCONTINUED | OUTPATIENT
Start: 2020-01-01 | End: 2020-01-01

## 2020-01-01 RX ORDER — DEXTROSE MONOHYDRATE 50 MG/ML
INJECTION, SOLUTION INTRAVENOUS CONTINUOUS
Status: DISCONTINUED | OUTPATIENT
Start: 2020-01-01 | End: 2020-01-01

## 2020-01-01 RX ORDER — FAMOTIDINE 20 MG/1
20 TABLET, FILM COATED ORAL EVERY 12 HOURS
Status: DISCONTINUED | OUTPATIENT
Start: 2020-01-01 | End: 2020-01-01

## 2020-01-01 RX ORDER — FLUCONAZOLE 2 MG/ML
200 INJECTION, SOLUTION INTRAVENOUS EVERY 24 HOURS
Status: DISCONTINUED | OUTPATIENT
Start: 2020-01-01 | End: 2020-01-01

## 2020-01-01 RX ORDER — DEXTROSE MONOHYDRATE 25 G/50ML
50 INJECTION, SOLUTION INTRAVENOUS
Status: DISCONTINUED | OUTPATIENT
Start: 2020-01-01 | End: 2020-01-01

## 2020-01-01 RX ADMIN — SODIUM CHLORIDE: 9 INJECTION, SOLUTION INTRAVENOUS at 07:45

## 2020-01-01 RX ADMIN — POTASSIUM BICARBONATE 25 MEQ: 978 TABLET, EFFERVESCENT ORAL at 12:07

## 2020-01-01 RX ADMIN — PHENYTOIN SODIUM 300 MG: 50 INJECTION INTRAMUSCULAR; INTRAVENOUS at 04:55

## 2020-01-01 RX ADMIN — DEXMEDETOMIDINE HYDROCHLORIDE 0.3 MCG/KG/HR: 100 INJECTION, SOLUTION INTRAVENOUS at 08:53

## 2020-01-01 RX ADMIN — FENTANYL CITRATE 50 MCG: 50 INJECTION INTRAMUSCULAR; INTRAVENOUS at 17:00

## 2020-01-01 RX ADMIN — FENTANYL CITRATE 50 MCG: 50 INJECTION INTRAMUSCULAR; INTRAVENOUS at 15:43

## 2020-01-01 RX ADMIN — PHENYTOIN SODIUM 300 MG: 50 INJECTION INTRAMUSCULAR; INTRAVENOUS at 21:04

## 2020-01-01 RX ADMIN — Medication 100 MG: at 04:38

## 2020-01-01 RX ADMIN — HYDROCORTISONE SODIUM SUCCINATE 50 MG: 100 INJECTION, POWDER, FOR SOLUTION INTRAMUSCULAR; INTRAVENOUS at 04:40

## 2020-01-01 RX ADMIN — PHENYTOIN SODIUM 300 MG: 50 INJECTION INTRAMUSCULAR; INTRAVENOUS at 17:32

## 2020-01-01 RX ADMIN — CEFEPIME 2 G: 2 INJECTION, POWDER, FOR SOLUTION INTRAVENOUS at 21:53

## 2020-01-01 RX ADMIN — POTASSIUM CHLORIDE 40 MEQ: 29.8 INJECTION, SOLUTION INTRAVENOUS at 10:32

## 2020-01-01 RX ADMIN — NOREPINEPHRINE BITARTRATE 4 MCG/MIN: 1 INJECTION INTRAVENOUS at 01:32

## 2020-01-01 RX ADMIN — PROPOFOL 20 MCG/KG/MIN: 10 INJECTION, EMULSION INTRAVENOUS at 04:30

## 2020-01-01 RX ADMIN — LEVETIRACETAM 1000 MG: 100 INJECTION, SOLUTION INTRAVENOUS at 05:30

## 2020-01-01 RX ADMIN — Medication 100 MG: at 04:50

## 2020-01-01 RX ADMIN — HYDROCORTISONE SODIUM SUCCINATE 50 MG: 100 INJECTION, POWDER, FOR SOLUTION INTRAMUSCULAR; INTRAVENOUS at 04:49

## 2020-01-01 RX ADMIN — METOCLOPRAMIDE HYDROCHLORIDE 5 MG: 5 TABLET ORAL at 06:58

## 2020-01-01 RX ADMIN — Medication 100 MG: at 05:09

## 2020-01-01 RX ADMIN — HYDROCORTISONE SODIUM SUCCINATE 50 MG: 100 INJECTION, POWDER, FOR SOLUTION INTRAMUSCULAR; INTRAVENOUS at 18:12

## 2020-01-01 RX ADMIN — HYDROCORTISONE SODIUM SUCCINATE 50 MG: 100 INJECTION, POWDER, FOR SOLUTION INTRAMUSCULAR; INTRAVENOUS at 04:08

## 2020-01-01 RX ADMIN — POTASSIUM CHLORIDE 40 MEQ: 29.8 INJECTION, SOLUTION INTRAVENOUS at 09:27

## 2020-01-01 RX ADMIN — THERA TABS 1 TABLET: TAB at 04:40

## 2020-01-01 RX ADMIN — LINEZOLID 600 MG: 600 TABLET, FILM COATED ORAL at 04:55

## 2020-01-01 RX ADMIN — PHENYTOIN SODIUM 300 MG: 50 INJECTION INTRAMUSCULAR; INTRAVENOUS at 23:15

## 2020-01-01 RX ADMIN — LINEZOLID 600 MG: 600 TABLET, FILM COATED ORAL at 04:50

## 2020-01-01 RX ADMIN — MIDODRINE HYDROCHLORIDE 10 MG: 5 TABLET ORAL at 23:03

## 2020-01-01 RX ADMIN — CEFEPIME 2 G: 2 INJECTION, POWDER, FOR SOLUTION INTRAVENOUS at 05:54

## 2020-01-01 RX ADMIN — GABAPENTIN 100 MG: 100 CAPSULE ORAL at 09:27

## 2020-01-01 RX ADMIN — POTASSIUM BICARBONATE 25 MEQ: 978 TABLET, EFFERVESCENT ORAL at 17:47

## 2020-01-01 RX ADMIN — Medication 100 MG: at 05:50

## 2020-01-01 RX ADMIN — METRONIDAZOLE 500 MG: 500 INJECTION, SOLUTION INTRAVENOUS at 20:59

## 2020-01-01 RX ADMIN — NALOXONE HYDROCHLORIDE 2 MG: 1 INJECTION PARENTERAL at 22:35

## 2020-01-01 RX ADMIN — PHENYTOIN SODIUM 300 MG: 50 INJECTION INTRAMUSCULAR; INTRAVENOUS at 04:46

## 2020-01-01 RX ADMIN — MIDODRINE HYDROCHLORIDE 15 MG: 5 TABLET ORAL at 20:59

## 2020-01-01 RX ADMIN — SODIUM CHLORIDE, POTASSIUM CHLORIDE, SODIUM LACTATE AND CALCIUM CHLORIDE 50 ML: 600; 310; 30; 20 INJECTION, SOLUTION INTRAVENOUS at 08:24

## 2020-01-01 RX ADMIN — THERA TABS 1 TABLET: TAB at 05:14

## 2020-01-01 RX ADMIN — POTASSIUM BICARBONATE 25 MEQ: 978 TABLET, EFFERVESCENT ORAL at 05:10

## 2020-01-01 RX ADMIN — OMEPRAZOLE 40 MG: KIT at 05:50

## 2020-01-01 RX ADMIN — DOCUSATE SODIUM 50 MG AND SENNOSIDES 8.6 MG 2 TABLET: 8.6; 5 TABLET, FILM COATED ORAL at 04:45

## 2020-01-01 RX ADMIN — POTASSIUM BICARBONATE 25 MEQ: 978 TABLET, EFFERVESCENT ORAL at 09:35

## 2020-01-01 RX ADMIN — FENTANYL CITRATE 50 MCG: 50 INJECTION INTRAMUSCULAR; INTRAVENOUS at 15:00

## 2020-01-01 RX ADMIN — ROCURONIUM BROMIDE 50 MG: 10 INJECTION, SOLUTION INTRAVENOUS at 13:59

## 2020-01-01 RX ADMIN — PROPOFOL 50 MCG/KG/MIN: 10 INJECTION, EMULSION INTRAVENOUS at 23:52

## 2020-01-01 RX ADMIN — PROPOFOL 50 MCG/KG/MIN: 10 INJECTION, EMULSION INTRAVENOUS at 18:50

## 2020-01-01 RX ADMIN — FAMOTIDINE 20 MG: 10 INJECTION, SOLUTION INTRAVENOUS at 16:47

## 2020-01-01 RX ADMIN — Medication 100 MG: at 04:08

## 2020-01-01 RX ADMIN — OMEPRAZOLE 40 MG: KIT at 04:46

## 2020-01-01 RX ADMIN — PHENYTOIN SODIUM 300 MG: 50 INJECTION INTRAMUSCULAR; INTRAVENOUS at 21:35

## 2020-01-01 RX ADMIN — METRONIDAZOLE 500 MG: 500 INJECTION, SOLUTION INTRAVENOUS at 04:48

## 2020-01-01 RX ADMIN — OMEPRAZOLE 40 MG: KIT at 04:40

## 2020-01-01 RX ADMIN — LEVETIRACETAM 1000 MG: 500 TABLET ORAL at 20:36

## 2020-01-01 RX ADMIN — ENOXAPARIN SODIUM 40 MG: 100 INJECTION SUBCUTANEOUS at 04:45

## 2020-01-01 RX ADMIN — SODIUM CHLORIDE, POTASSIUM CHLORIDE, SODIUM LACTATE AND CALCIUM CHLORIDE: 600; 310; 30; 20 INJECTION, SOLUTION INTRAVENOUS at 09:00

## 2020-01-01 RX ADMIN — PIPERACILLIN AND TAZOBACTAM 4.5 G: 4; .5 INJECTION, POWDER, LYOPHILIZED, FOR SOLUTION INTRAVENOUS; PARENTERAL at 05:30

## 2020-01-01 RX ADMIN — PIPERACILLIN AND TAZOBACTAM 4.5 G: 4; .5 INJECTION, POWDER, LYOPHILIZED, FOR SOLUTION INTRAVENOUS; PARENTERAL at 21:23

## 2020-01-01 RX ADMIN — CEFEPIME 2 G: 2 INJECTION, POWDER, FOR SOLUTION INTRAVENOUS at 05:20

## 2020-01-01 RX ADMIN — FENTANYL CITRATE 50 MCG: 50 INJECTION INTRAMUSCULAR; INTRAVENOUS at 04:48

## 2020-01-01 RX ADMIN — PIPERACILLIN AND TAZOBACTAM 4.5 G: 4; .5 INJECTION, POWDER, LYOPHILIZED, FOR SOLUTION INTRAVENOUS; PARENTERAL at 21:42

## 2020-01-01 RX ADMIN — MIDODRINE HYDROCHLORIDE 10 MG: 5 TABLET ORAL at 23:15

## 2020-01-01 RX ADMIN — DEXMEDETOMIDINE HYDROCHLORIDE 1 MCG/KG/HR: 100 INJECTION, SOLUTION INTRAVENOUS at 02:38

## 2020-01-01 RX ADMIN — DOCUSATE SODIUM 50 MG AND SENNOSIDES 8.6 MG 2 TABLET: 8.6; 5 TABLET, FILM COATED ORAL at 17:30

## 2020-01-01 RX ADMIN — METRONIDAZOLE 500 MG: 500 INJECTION, SOLUTION INTRAVENOUS at 14:03

## 2020-01-01 RX ADMIN — GABAPENTIN 100 MG: 100 CAPSULE ORAL at 04:51

## 2020-01-01 RX ADMIN — MIDODRINE HYDROCHLORIDE 15 MG: 5 TABLET ORAL at 05:52

## 2020-01-01 RX ADMIN — PHENYTOIN SODIUM 300 MG: 50 INJECTION INTRAMUSCULAR; INTRAVENOUS at 06:00

## 2020-01-01 RX ADMIN — METRONIDAZOLE 500 MG: 500 INJECTION, SOLUTION INTRAVENOUS at 13:20

## 2020-01-01 RX ADMIN — LEVETIRACETAM 1000 MG: 500 TABLET ORAL at 04:47

## 2020-01-01 RX ADMIN — CEFEPIME 2 G: 2 INJECTION, POWDER, FOR SOLUTION INTRAVENOUS at 05:00

## 2020-01-01 RX ADMIN — PROPOFOL 30 MCG/KG/MIN: 10 INJECTION, EMULSION INTRAVENOUS at 21:22

## 2020-01-01 RX ADMIN — LINEZOLID 600 MG: 600 TABLET, FILM COATED ORAL at 17:41

## 2020-01-01 RX ADMIN — DEXTROSE MONOHYDRATE: 50 INJECTION, SOLUTION INTRAVENOUS at 07:12

## 2020-01-01 RX ADMIN — FENTANYL CITRATE 50 MCG: 50 INJECTION INTRAMUSCULAR; INTRAVENOUS at 17:30

## 2020-01-01 RX ADMIN — MIDODRINE HYDROCHLORIDE 10 MG: 5 TABLET ORAL at 21:03

## 2020-01-01 RX ADMIN — POTASSIUM BICARBONATE 25 MEQ: 978 TABLET, EFFERVESCENT ORAL at 09:01

## 2020-01-01 RX ADMIN — HYDROCORTISONE SODIUM SUCCINATE 50 MG: 100 INJECTION, POWDER, FOR SOLUTION INTRAMUSCULAR; INTRAVENOUS at 17:47

## 2020-01-01 RX ADMIN — GABAPENTIN 100 MG: 100 CAPSULE ORAL at 17:57

## 2020-01-01 RX ADMIN — PROPOFOL 40 MCG/KG/MIN: 10 INJECTION, EMULSION INTRAVENOUS at 10:00

## 2020-01-01 RX ADMIN — CEFEPIME 2 G: 2 INJECTION, POWDER, FOR SOLUTION INTRAVENOUS at 22:35

## 2020-01-01 RX ADMIN — LEVETIRACETAM 1000 MG: 100 INJECTION, SOLUTION INTRAVENOUS at 22:11

## 2020-01-01 RX ADMIN — HYDROCORTISONE SODIUM SUCCINATE 50 MG: 100 INJECTION, POWDER, FOR SOLUTION INTRAMUSCULAR; INTRAVENOUS at 04:48

## 2020-01-01 RX ADMIN — DOCUSATE SODIUM 50 MG AND SENNOSIDES 8.6 MG 2 TABLET: 8.6; 5 TABLET, FILM COATED ORAL at 04:40

## 2020-01-01 RX ADMIN — PHENYTOIN SODIUM 300 MG: 50 INJECTION INTRAMUSCULAR; INTRAVENOUS at 21:37

## 2020-01-01 RX ADMIN — PROPOFOL 50 MCG/KG/MIN: 10 INJECTION, EMULSION INTRAVENOUS at 17:53

## 2020-01-01 RX ADMIN — POTASSIUM BICARBONATE 50 MEQ: 978 TABLET, EFFERVESCENT ORAL at 08:32

## 2020-01-01 RX ADMIN — POTASSIUM BICARBONATE 25 MEQ: 978 TABLET, EFFERVESCENT ORAL at 04:45

## 2020-01-01 RX ADMIN — NOREPINEPHRINE BITARTRATE 9 MCG/MIN: 1 INJECTION INTRAVENOUS at 03:19

## 2020-01-01 RX ADMIN — OXYCODONE HYDROCHLORIDE 10 MG: 10 TABLET ORAL at 17:41

## 2020-01-01 RX ADMIN — ETOMIDATE 20 MG: 2 INJECTION INTRAVENOUS at 12:01

## 2020-01-01 RX ADMIN — PHENYTOIN SODIUM 300 MG: 50 INJECTION INTRAMUSCULAR; INTRAVENOUS at 05:15

## 2020-01-01 RX ADMIN — HYDROCORTISONE SODIUM SUCCINATE 50 MG: 100 INJECTION, POWDER, FOR SOLUTION INTRAMUSCULAR; INTRAVENOUS at 23:15

## 2020-01-01 RX ADMIN — LEVETIRACETAM 1000 MG: 500 TABLET ORAL at 04:50

## 2020-01-01 RX ADMIN — OXYCODONE HYDROCHLORIDE 5 MG: 5 TABLET ORAL at 21:09

## 2020-01-01 RX ADMIN — MIDODRINE HYDROCHLORIDE 10 MG: 5 TABLET ORAL at 04:21

## 2020-01-01 RX ADMIN — ENOXAPARIN SODIUM 40 MG: 100 INJECTION SUBCUTANEOUS at 05:10

## 2020-01-01 RX ADMIN — OXYCODONE HYDROCHLORIDE 10 MG: 10 TABLET ORAL at 10:00

## 2020-01-01 RX ADMIN — METRONIDAZOLE 500 MG: 500 INJECTION, SOLUTION INTRAVENOUS at 05:00

## 2020-01-01 RX ADMIN — POTASSIUM CHLORIDE 10 MEQ: 7.46 INJECTION, SOLUTION INTRAVENOUS at 23:52

## 2020-01-01 RX ADMIN — ALBUMIN (HUMAN) 25 G: 2.5 SOLUTION INTRAVENOUS at 16:40

## 2020-01-01 RX ADMIN — THERA TABS 1 TABLET: TAB at 06:53

## 2020-01-01 RX ADMIN — PHENYTOIN SODIUM 300 MG: 50 INJECTION INTRAMUSCULAR; INTRAVENOUS at 17:57

## 2020-01-01 RX ADMIN — CEFEPIME 2 G: 2 INJECTION, POWDER, FOR SOLUTION INTRAVENOUS at 13:00

## 2020-01-01 RX ADMIN — OMEPRAZOLE 40 MG: KIT at 04:49

## 2020-01-01 RX ADMIN — OXYCODONE HYDROCHLORIDE 10 MG: 10 TABLET ORAL at 22:38

## 2020-01-01 RX ADMIN — NALOXONE HYDROCHLORIDE 2 MG: 1 INJECTION PARENTERAL at 13:36

## 2020-01-01 RX ADMIN — SODIUM CHLORIDE: 9 INJECTION, SOLUTION INTRAVENOUS at 06:18

## 2020-01-01 RX ADMIN — OXYCODONE HYDROCHLORIDE 10 MG: 10 TABLET ORAL at 04:40

## 2020-01-01 RX ADMIN — OXYCODONE HYDROCHLORIDE 10 MG: 10 TABLET ORAL at 13:04

## 2020-01-01 RX ADMIN — LEVETIRACETAM 1000 MG: 100 INJECTION, SOLUTION INTRAVENOUS at 19:26

## 2020-01-01 RX ADMIN — MIDODRINE HYDROCHLORIDE 15 MG: 5 TABLET ORAL at 21:52

## 2020-01-01 RX ADMIN — ENOXAPARIN SODIUM 40 MG: 100 INJECTION SUBCUTANEOUS at 04:40

## 2020-01-01 RX ADMIN — NOREPINEPHRINE BITARTRATE 8 MCG/MIN: 1 INJECTION INTRAVENOUS at 17:00

## 2020-01-01 RX ADMIN — INSULIN HUMAN 1 UNITS: 100 INJECTION, SOLUTION PARENTERAL at 00:07

## 2020-01-01 RX ADMIN — POTASSIUM BICARBONATE 25 MEQ: 978 TABLET, EFFERVESCENT ORAL at 17:31

## 2020-01-01 RX ADMIN — MIDODRINE HYDROCHLORIDE 15 MG: 5 TABLET ORAL at 14:24

## 2020-01-01 RX ADMIN — THERA TABS 1 TABLET: TAB at 05:21

## 2020-01-01 RX ADMIN — FUROSEMIDE 20 MG: 10 INJECTION, SOLUTION INTRAMUSCULAR; INTRAVENOUS at 05:10

## 2020-01-01 RX ADMIN — METRONIDAZOLE 500 MG: 500 INJECTION, SOLUTION INTRAVENOUS at 21:04

## 2020-01-01 RX ADMIN — POTASSIUM BICARBONATE 25 MEQ: 978 TABLET, EFFERVESCENT ORAL at 09:18

## 2020-01-01 RX ADMIN — POTASSIUM PHOSPHATE, MONOBASIC AND POTASSIUM PHOSPHATE, DIBASIC 15 MMOL: 224; 236 INJECTION, SOLUTION, CONCENTRATE INTRAVENOUS at 08:23

## 2020-01-01 RX ADMIN — OXYCODONE HYDROCHLORIDE 10 MG: 10 TABLET ORAL at 20:59

## 2020-01-01 RX ADMIN — HYDROCORTISONE SODIUM SUCCINATE 50 MG: 100 INJECTION, POWDER, FOR SOLUTION INTRAMUSCULAR; INTRAVENOUS at 09:27

## 2020-01-01 RX ADMIN — OXYCODONE 5 MG: 5 TABLET ORAL at 23:42

## 2020-01-01 RX ADMIN — LEVETIRACETAM 1000 MG: 500 TABLET ORAL at 17:31

## 2020-01-01 RX ADMIN — CEFEPIME 2 G: 2 INJECTION, POWDER, FOR SOLUTION INTRAVENOUS at 17:22

## 2020-01-01 RX ADMIN — PHENYTOIN SODIUM 300 MG: 50 INJECTION INTRAMUSCULAR; INTRAVENOUS at 04:49

## 2020-01-01 RX ADMIN — LEVETIRACETAM 1000 MG: 500 TABLET ORAL at 05:52

## 2020-01-01 RX ADMIN — POTASSIUM BICARBONATE 25 MEQ: 978 TABLET, EFFERVESCENT ORAL at 16:59

## 2020-01-01 RX ADMIN — SODIUM CHLORIDE: 4.5 INJECTION, SOLUTION INTRAVENOUS at 05:08

## 2020-01-01 RX ADMIN — POTASSIUM BICARBONATE 25 MEQ: 978 TABLET, EFFERVESCENT ORAL at 11:03

## 2020-01-01 RX ADMIN — METOCLOPRAMIDE 10 MG: 5 SOLUTION ORAL at 15:21

## 2020-01-01 RX ADMIN — Medication 100 MG: at 05:14

## 2020-01-01 RX ADMIN — OMEPRAZOLE 40 MG: KIT at 05:21

## 2020-01-01 RX ADMIN — LEVETIRACETAM 1000 MG: 500 TABLET ORAL at 04:45

## 2020-01-01 RX ADMIN — LINEZOLID 600 MG: 600 TABLET, FILM COATED ORAL at 17:30

## 2020-01-01 RX ADMIN — PHENYTOIN SODIUM 300 MG: 50 INJECTION INTRAMUSCULAR; INTRAVENOUS at 17:23

## 2020-01-01 RX ADMIN — FAMOTIDINE 20 MG: 10 INJECTION, SOLUTION INTRAVENOUS at 05:29

## 2020-01-01 RX ADMIN — MIDAZOLAM HYDROCHLORIDE 5 MG: 1 INJECTION, SOLUTION INTRAMUSCULAR; INTRAVENOUS at 12:36

## 2020-01-01 RX ADMIN — HYDROCORTISONE SODIUM SUCCINATE 50 MG: 100 INJECTION, POWDER, FOR SOLUTION INTRAMUSCULAR; INTRAVENOUS at 17:57

## 2020-01-01 RX ADMIN — THERA TABS 1 TABLET: TAB at 04:47

## 2020-01-01 RX ADMIN — ENOXAPARIN SODIUM 40 MG: 100 INJECTION SUBCUTANEOUS at 05:15

## 2020-01-01 RX ADMIN — PROPOFOL 15 MCG/KG/MIN: 10 INJECTION, EMULSION INTRAVENOUS at 03:19

## 2020-01-01 RX ADMIN — LINEZOLID 600 MG: 600 TABLET, FILM COATED ORAL at 17:56

## 2020-01-01 RX ADMIN — FUROSEMIDE 20 MG: 10 INJECTION, SOLUTION INTRAMUSCULAR; INTRAVENOUS at 09:38

## 2020-01-01 RX ADMIN — MIDODRINE HYDROCHLORIDE 15 MG: 5 TABLET ORAL at 04:47

## 2020-01-01 RX ADMIN — METOCLOPRAMIDE HYDROCHLORIDE 5 MG: 5 TABLET ORAL at 09:18

## 2020-01-01 RX ADMIN — PIPERACILLIN AND TAZOBACTAM 4.5 G: 4; .5 INJECTION, POWDER, LYOPHILIZED, FOR SOLUTION INTRAVENOUS; PARENTERAL at 03:27

## 2020-01-01 RX ADMIN — LINEZOLID 600 MG: 600 TABLET, FILM COATED ORAL at 18:34

## 2020-01-01 RX ADMIN — POTASSIUM CHLORIDE 10 MEQ: 7.46 INJECTION, SOLUTION INTRAVENOUS at 22:36

## 2020-01-01 RX ADMIN — OXYCODONE HYDROCHLORIDE 15 MG: 10 TABLET ORAL at 02:38

## 2020-01-01 RX ADMIN — LEVETIRACETAM 1000 MG: 100 INJECTION, SOLUTION INTRAVENOUS at 05:15

## 2020-01-01 RX ADMIN — THERA TABS 1 TABLET: TAB at 05:10

## 2020-01-01 RX ADMIN — PHENYTOIN SODIUM 200 MG: 50 INJECTION INTRAMUSCULAR; INTRAVENOUS at 15:20

## 2020-01-01 RX ADMIN — LINEZOLID 600 MG: 600 TABLET, FILM COATED ORAL at 05:52

## 2020-01-01 RX ADMIN — PHENYTOIN SODIUM 100 MG: 50 INJECTION INTRAMUSCULAR; INTRAVENOUS at 12:54

## 2020-01-01 RX ADMIN — DEXMEDETOMIDINE HYDROCHLORIDE 1 MCG/KG/HR: 100 INJECTION, SOLUTION INTRAVENOUS at 14:13

## 2020-01-01 RX ADMIN — OXYCODONE HYDROCHLORIDE 15 MG: 10 TABLET ORAL at 22:17

## 2020-01-01 RX ADMIN — LEVETIRACETAM 1000 MG: 500 TABLET ORAL at 18:32

## 2020-01-01 RX ADMIN — Medication 100 MG: at 04:45

## 2020-01-01 RX ADMIN — LEVOTHYROXINE SODIUM 50 MCG: 50 TABLET ORAL at 04:56

## 2020-01-01 RX ADMIN — OMEPRAZOLE 40 MG: KIT at 05:15

## 2020-01-01 RX ADMIN — LEVETIRACETAM 1000 MG: 500 TABLET ORAL at 06:52

## 2020-01-01 RX ADMIN — GABAPENTIN 100 MG: 100 CAPSULE ORAL at 06:00

## 2020-01-01 RX ADMIN — THERA TABS 1 TABLET: TAB at 04:48

## 2020-01-01 RX ADMIN — FENTANYL CITRATE 100 MCG: 50 INJECTION, SOLUTION INTRAMUSCULAR; INTRAVENOUS at 15:20

## 2020-01-01 RX ADMIN — POTASSIUM CHLORIDE 40 MEQ: 1500 TABLET, EXTENDED RELEASE ORAL at 08:36

## 2020-01-01 RX ADMIN — ROCURONIUM BROMIDE 50 MG: 10 INJECTION, SOLUTION INTRAVENOUS at 12:36

## 2020-01-01 RX ADMIN — METRONIDAZOLE 500 MG: 500 INJECTION, SOLUTION INTRAVENOUS at 21:50

## 2020-01-01 RX ADMIN — LEVETIRACETAM 1000 MG: 500 TABLET ORAL at 17:03

## 2020-01-01 RX ADMIN — PIPERACILLIN SODIUM AND TAZOBACTAM SODIUM 3.38 G: 3; .375 INJECTION, POWDER, FOR SOLUTION INTRAVENOUS at 05:11

## 2020-01-01 RX ADMIN — PIPERACILLIN AND TAZOBACTAM 4.5 G: 4; .5 INJECTION, POWDER, LYOPHILIZED, FOR SOLUTION INTRAVENOUS; PARENTERAL at 05:15

## 2020-01-01 RX ADMIN — PIPERACILLIN SODIUM AND TAZOBACTAM SODIUM 3.38 G: 3; .375 INJECTION, POWDER, FOR SOLUTION INTRAVENOUS at 13:54

## 2020-01-01 RX ADMIN — LINEZOLID 600 MG: 600 TABLET, FILM COATED ORAL at 05:21

## 2020-01-01 RX ADMIN — PIPERACILLIN AND TAZOBACTAM 4.5 G: 4; .5 INJECTION, POWDER, LYOPHILIZED, FOR SOLUTION INTRAVENOUS; PARENTERAL at 23:16

## 2020-01-01 RX ADMIN — PROPOFOL 10 MCG/KG/MIN: 10 INJECTION, EMULSION INTRAVENOUS at 07:53

## 2020-01-01 RX ADMIN — PHENYTOIN SODIUM 300 MG: 50 INJECTION INTRAMUSCULAR; INTRAVENOUS at 05:21

## 2020-01-01 RX ADMIN — ENOXAPARIN SODIUM 40 MG: 40 INJECTION SUBCUTANEOUS at 08:44

## 2020-01-01 RX ADMIN — CEFTRIAXONE SODIUM 1 G: 1 INJECTION, POWDER, FOR SOLUTION INTRAMUSCULAR; INTRAVENOUS at 05:52

## 2020-01-01 RX ADMIN — DEXTROSE MONOHYDRATE 50 ML: 25 INJECTION, SOLUTION INTRAVENOUS at 17:22

## 2020-01-01 RX ADMIN — INSULIN HUMAN 1 UNITS: 100 INJECTION, SOLUTION PARENTERAL at 00:54

## 2020-01-01 RX ADMIN — LEVETIRACETAM 1500 MG: 100 INJECTION, SOLUTION INTRAVENOUS at 21:42

## 2020-01-01 RX ADMIN — SODIUM CHLORIDE, POTASSIUM CHLORIDE, SODIUM LACTATE AND CALCIUM CHLORIDE: 600; 310; 30; 20 INJECTION, SOLUTION INTRAVENOUS at 09:58

## 2020-01-01 RX ADMIN — MAGNESIUM SULFATE IN WATER 2 G: 40 INJECTION, SOLUTION INTRAVENOUS at 09:56

## 2020-01-01 RX ADMIN — MIDODRINE HYDROCHLORIDE 15 MG: 5 TABLET ORAL at 13:36

## 2020-01-01 RX ADMIN — POTASSIUM PHOSPHATE, MONOBASIC AND POTASSIUM PHOSPHATE, DIBASIC 15 MMOL: 224; 236 INJECTION, SOLUTION, CONCENTRATE INTRAVENOUS at 08:44

## 2020-01-01 RX ADMIN — FAMOTIDINE 20 MG: 10 INJECTION, SOLUTION INTRAVENOUS at 20:50

## 2020-01-01 RX ADMIN — GABAPENTIN 100 MG: 100 CAPSULE ORAL at 18:12

## 2020-01-01 RX ADMIN — HYDROCORTISONE SODIUM SUCCINATE 50 MG: 100 INJECTION, POWDER, FOR SOLUTION INTRAMUSCULAR; INTRAVENOUS at 17:21

## 2020-01-01 RX ADMIN — PHENYTOIN SODIUM 200 MG: 50 INJECTION INTRAMUSCULAR; INTRAVENOUS at 14:23

## 2020-01-01 RX ADMIN — PIPERACILLIN AND TAZOBACTAM 4.5 G: 4; .5 INJECTION, POWDER, LYOPHILIZED, FOR SOLUTION INTRAVENOUS; PARENTERAL at 12:54

## 2020-01-01 RX ADMIN — LEVETIRACETAM 1000 MG: 100 INJECTION, SOLUTION INTRAVENOUS at 05:11

## 2020-01-01 RX ADMIN — LEVETIRACETAM 1000 MG: 500 TABLET ORAL at 17:20

## 2020-01-01 RX ADMIN — MIDODRINE HYDROCHLORIDE 15 MG: 5 TABLET ORAL at 14:03

## 2020-01-01 RX ADMIN — VANCOMYCIN HYDROCHLORIDE 1000 MG: 500 INJECTION, POWDER, LYOPHILIZED, FOR SOLUTION INTRAVENOUS at 10:28

## 2020-01-01 RX ADMIN — DEXMEDETOMIDINE HYDROCHLORIDE 0.4 MCG/KG/HR: 100 INJECTION, SOLUTION INTRAVENOUS at 23:32

## 2020-01-01 RX ADMIN — POTASSIUM BICARBONATE 25 MEQ: 978 TABLET, EFFERVESCENT ORAL at 08:53

## 2020-01-01 RX ADMIN — OXYCODONE 5 MG: 5 TABLET ORAL at 16:34

## 2020-01-01 RX ADMIN — LEVETIRACETAM 1000 MG: 500 TABLET ORAL at 17:21

## 2020-01-01 RX ADMIN — OXYCODONE HYDROCHLORIDE 10 MG: 10 TABLET ORAL at 08:53

## 2020-01-01 RX ADMIN — SODIUM CHLORIDE, POTASSIUM CHLORIDE, SODIUM LACTATE AND CALCIUM CHLORIDE 1000 ML: 600; 310; 30; 20 INJECTION, SOLUTION INTRAVENOUS at 19:08

## 2020-01-01 RX ADMIN — MIDODRINE HYDROCHLORIDE 10 MG: 5 TABLET ORAL at 21:41

## 2020-01-01 RX ADMIN — LEVETIRACETAM 1000 MG: 100 INJECTION, SOLUTION INTRAVENOUS at 16:48

## 2020-01-01 RX ADMIN — NALOXONE HYDROCHLORIDE 2 MG: 1 INJECTION PARENTERAL at 14:25

## 2020-01-01 RX ADMIN — MIDODRINE HYDROCHLORIDE 15 MG: 5 TABLET ORAL at 22:38

## 2020-01-01 RX ADMIN — METRONIDAZOLE 500 MG: 500 TABLET ORAL at 13:36

## 2020-01-01 RX ADMIN — OXYCODONE HYDROCHLORIDE 15 MG: 10 TABLET ORAL at 16:12

## 2020-01-01 RX ADMIN — OXYCODONE HYDROCHLORIDE 10 MG: 10 TABLET ORAL at 04:50

## 2020-01-01 RX ADMIN — PHENYTOIN SODIUM 300 MG: 50 INJECTION INTRAMUSCULAR; INTRAVENOUS at 23:04

## 2020-01-01 RX ADMIN — OMEPRAZOLE 40 MG: KIT at 04:48

## 2020-01-01 RX ADMIN — PROPOFOL 50 MCG/KG/MIN: 10 INJECTION, EMULSION INTRAVENOUS at 04:30

## 2020-01-01 RX ADMIN — LEVETIRACETAM 1000 MG: 500 TABLET ORAL at 17:30

## 2020-01-01 RX ADMIN — POTASSIUM PHOSPHATE, MONOBASIC AND POTASSIUM PHOSPHATE, DIBASIC 15 MMOL: 224; 236 INJECTION, SOLUTION, CONCENTRATE INTRAVENOUS at 07:50

## 2020-01-01 RX ADMIN — MIDODRINE HYDROCHLORIDE 10 MG: 5 TABLET ORAL at 04:48

## 2020-01-01 RX ADMIN — DOCUSATE SODIUM 50 MG AND SENNOSIDES 8.6 MG 2 TABLET: 8.6; 5 TABLET, FILM COATED ORAL at 17:21

## 2020-01-01 RX ADMIN — DOCUSATE SODIUM 50 MG AND SENNOSIDES 8.6 MG 2 TABLET: 8.6; 5 TABLET, FILM COATED ORAL at 17:16

## 2020-01-01 RX ADMIN — THERA TABS 1 TABLET: TAB at 04:38

## 2020-01-01 RX ADMIN — CEFEPIME 2 G: 2 INJECTION, POWDER, FOR SOLUTION INTRAVENOUS at 19:43

## 2020-01-01 RX ADMIN — OXYCODONE HYDROCHLORIDE 10 MG: 10 TABLET ORAL at 15:44

## 2020-01-01 RX ADMIN — Medication 100 MG: at 04:48

## 2020-01-01 RX ADMIN — NALOXONE HYDROCHLORIDE 2 MG: 1 INJECTION PARENTERAL at 09:19

## 2020-01-01 RX ADMIN — MIDODRINE HYDROCHLORIDE 10 MG: 5 TABLET ORAL at 13:05

## 2020-01-01 RX ADMIN — SODIUM CHLORIDE, POTASSIUM CHLORIDE, SODIUM LACTATE AND CALCIUM CHLORIDE: 600; 310; 30; 20 INJECTION, SOLUTION INTRAVENOUS at 13:54

## 2020-01-01 RX ADMIN — LEVETIRACETAM 1000 MG: 500 TABLET ORAL at 04:41

## 2020-01-01 RX ADMIN — METRONIDAZOLE 500 MG: 500 TABLET ORAL at 05:54

## 2020-01-01 RX ADMIN — CEFEPIME 2 G: 2 INJECTION, POWDER, FOR SOLUTION INTRAVENOUS at 14:22

## 2020-01-01 RX ADMIN — DOCUSATE SODIUM 50 MG AND SENNOSIDES 8.6 MG 2 TABLET: 8.6; 5 TABLET, FILM COATED ORAL at 17:57

## 2020-01-01 RX ADMIN — GABAPENTIN 100 MG: 100 CAPSULE ORAL at 11:06

## 2020-01-01 RX ADMIN — Medication 100 MG: at 05:21

## 2020-01-01 RX ADMIN — THERA TABS 1 TABLET: TAB at 04:45

## 2020-01-01 RX ADMIN — LEVETIRACETAM 1000 MG: 500 TABLET ORAL at 17:13

## 2020-01-01 RX ADMIN — POTASSIUM BICARBONATE 25 MEQ: 978 TABLET, EFFERVESCENT ORAL at 17:20

## 2020-01-01 RX ADMIN — GABAPENTIN 100 MG: 100 CAPSULE ORAL at 04:21

## 2020-01-01 RX ADMIN — MIDODRINE HYDROCHLORIDE 10 MG: 5 TABLET ORAL at 15:20

## 2020-01-01 RX ADMIN — OMEPRAZOLE 40 MG: KIT at 05:10

## 2020-01-01 RX ADMIN — LEVETIRACETAM 1000 MG: 500 TABLET ORAL at 05:21

## 2020-01-01 RX ADMIN — ENOXAPARIN SODIUM 40 MG: 100 INJECTION SUBCUTANEOUS at 04:09

## 2020-01-01 RX ADMIN — LEVETIRACETAM 1000 MG: 500 TABLET ORAL at 05:40

## 2020-01-01 RX ADMIN — ACETAZOLAMIDE 500 MG: 500 INJECTION, POWDER, LYOPHILIZED, FOR SOLUTION INTRAVENOUS at 09:12

## 2020-01-01 RX ADMIN — DEXMEDETOMIDINE HYDROCHLORIDE 0.4 MCG/KG/HR: 100 INJECTION, SOLUTION INTRAVENOUS at 20:07

## 2020-01-01 RX ADMIN — PHENYTOIN SODIUM 200 MG: 50 INJECTION INTRAMUSCULAR; INTRAVENOUS at 14:09

## 2020-01-01 RX ADMIN — POTASSIUM CHLORIDE 10 MEQ: 7.46 INJECTION, SOLUTION INTRAVENOUS at 02:26

## 2020-01-01 RX ADMIN — PHENYTOIN SODIUM 300 MG: 50 INJECTION INTRAMUSCULAR; INTRAVENOUS at 17:22

## 2020-01-01 RX ADMIN — VANCOMYCIN HYDROCHLORIDE 1900 MG: 500 INJECTION, POWDER, LYOPHILIZED, FOR SOLUTION INTRAVENOUS at 00:10

## 2020-01-01 RX ADMIN — PHENYTOIN SODIUM 300 MG: 50 INJECTION INTRAMUSCULAR; INTRAVENOUS at 20:58

## 2020-01-01 RX ADMIN — MAGNESIUM SULFATE IN WATER 2 G: 40 INJECTION, SOLUTION INTRAVENOUS at 06:15

## 2020-01-01 RX ADMIN — CEFTRIAXONE SODIUM 2 G: 2 INJECTION, POWDER, FOR SOLUTION INTRAMUSCULAR; INTRAVENOUS at 17:03

## 2020-01-01 RX ADMIN — FUROSEMIDE 20 MG: 10 INJECTION, SOLUTION INTRAMUSCULAR; INTRAVENOUS at 16:33

## 2020-01-01 RX ADMIN — ROCURONIUM BROMIDE 50 MG: 10 INJECTION, SOLUTION INTRAVENOUS at 15:20

## 2020-01-01 RX ADMIN — PHENYTOIN SODIUM 300 MG: 50 INJECTION INTRAMUSCULAR; INTRAVENOUS at 05:30

## 2020-01-01 RX ADMIN — FENTANYL CITRATE 50 MCG: 50 INJECTION, SOLUTION INTRAMUSCULAR; INTRAVENOUS at 16:43

## 2020-01-01 RX ADMIN — PHENYTOIN SODIUM 100 MG: 50 INJECTION INTRAMUSCULAR; INTRAVENOUS at 05:19

## 2020-01-01 RX ADMIN — DEXMEDETOMIDINE HYDROCHLORIDE 1.2 MCG/KG/HR: 100 INJECTION, SOLUTION INTRAVENOUS at 21:45

## 2020-01-01 RX ADMIN — HYDROCORTISONE SODIUM SUCCINATE 50 MG: 100 INJECTION, POWDER, FOR SOLUTION INTRAMUSCULAR; INTRAVENOUS at 17:16

## 2020-01-01 RX ADMIN — LINEZOLID 600 MG: 600 TABLET, FILM COATED ORAL at 06:55

## 2020-01-01 RX ADMIN — MIDODRINE HYDROCHLORIDE 10 MG: 5 TABLET ORAL at 14:33

## 2020-01-01 RX ADMIN — POTASSIUM CHLORIDE 10 MEQ: 7.46 INJECTION, SOLUTION INTRAVENOUS at 01:18

## 2020-01-01 RX ADMIN — LEVOTHYROXINE SODIUM 50 MCG: 50 TABLET ORAL at 06:55

## 2020-01-01 RX ADMIN — NOREPINEPHRINE BITARTRATE 4 MCG/MIN: 1 INJECTION INTRAVENOUS at 18:31

## 2020-01-01 RX ADMIN — PHENYTOIN SODIUM 300 MG: 50 INJECTION INTRAMUSCULAR; INTRAVENOUS at 04:20

## 2020-01-01 RX ADMIN — METRONIDAZOLE 500 MG: 500 TABLET ORAL at 14:25

## 2020-01-01 RX ADMIN — DEXMEDETOMIDINE HYDROCHLORIDE 1.5 MCG/KG/HR: 100 INJECTION, SOLUTION INTRAVENOUS at 02:51

## 2020-01-01 RX ADMIN — OXYCODONE HYDROCHLORIDE 15 MG: 10 TABLET ORAL at 21:52

## 2020-01-01 RX ADMIN — MIDODRINE HYDROCHLORIDE 10 MG: 5 TABLET ORAL at 04:43

## 2020-01-01 RX ADMIN — CEFEPIME 2 G: 2 INJECTION, POWDER, FOR SOLUTION INTRAVENOUS at 17:57

## 2020-01-01 RX ADMIN — FENTANYL CITRATE 100 MCG: 50 INJECTION INTRAMUSCULAR; INTRAVENOUS at 12:36

## 2020-01-01 RX ADMIN — OXYCODONE HYDROCHLORIDE 10 MG: 10 TABLET ORAL at 14:04

## 2020-01-01 RX ADMIN — OMEPRAZOLE 40 MG: KIT at 04:38

## 2020-01-01 RX ADMIN — LINEZOLID 600 MG: 600 TABLET, FILM COATED ORAL at 17:49

## 2020-01-01 RX ADMIN — PHENYTOIN SODIUM 300 MG: 50 INJECTION INTRAMUSCULAR; INTRAVENOUS at 05:53

## 2020-01-01 RX ADMIN — MIDODRINE HYDROCHLORIDE 10 MG: 5 TABLET ORAL at 14:23

## 2020-01-01 RX ADMIN — PIPERACILLIN SODIUM AND TAZOBACTAM SODIUM 3.38 G: 3; .375 INJECTION, POWDER, FOR SOLUTION INTRAVENOUS at 13:22

## 2020-01-01 RX ADMIN — LEVOTHYROXINE SODIUM 50 MCG: 50 TABLET ORAL at 05:21

## 2020-01-01 RX ADMIN — MIDODRINE HYDROCHLORIDE 15 MG: 5 TABLET ORAL at 06:53

## 2020-01-01 RX ADMIN — NALOXONE HYDROCHLORIDE 2 MG: 1 INJECTION PARENTERAL at 08:36

## 2020-01-01 RX ADMIN — THERA TABS 1 TABLET: TAB at 04:09

## 2020-01-01 RX ADMIN — OMEPRAZOLE 40 MG: KIT at 06:53

## 2020-01-01 RX ADMIN — GABAPENTIN 100 MG: 100 CAPSULE ORAL at 12:47

## 2020-01-01 RX ADMIN — MIDODRINE HYDROCHLORIDE 10 MG: 5 TABLET ORAL at 04:50

## 2020-01-01 RX ADMIN — LEVOTHYROXINE SODIUM 50 MCG: 50 TABLET ORAL at 05:51

## 2020-01-01 RX ADMIN — INSULIN HUMAN 1 UNITS: 100 INJECTION, SOLUTION PARENTERAL at 00:50

## 2020-01-01 RX ADMIN — THERA TABS 1 TABLET: TAB at 04:51

## 2020-01-01 RX ADMIN — METRONIDAZOLE 500 MG: 500 TABLET ORAL at 21:53

## 2020-01-01 RX ADMIN — HYDROCORTISONE SODIUM SUCCINATE 50 MG: 100 INJECTION, POWDER, FOR SOLUTION INTRAMUSCULAR; INTRAVENOUS at 05:22

## 2020-01-01 RX ADMIN — ENOXAPARIN SODIUM 40 MG: 100 INJECTION SUBCUTANEOUS at 04:39

## 2020-01-01 RX ADMIN — DOCUSATE SODIUM 50 MG AND SENNOSIDES 8.6 MG 2 TABLET: 8.6; 5 TABLET, FILM COATED ORAL at 05:21

## 2020-01-01 RX ADMIN — METRONIDAZOLE 500 MG: 500 INJECTION, SOLUTION INTRAVENOUS at 14:39

## 2020-01-01 RX ADMIN — METOCLOPRAMIDE HYDROCHLORIDE 5 MG: 5 TABLET ORAL at 17:04

## 2020-01-01 RX ADMIN — PROPOFOL 100 MG: 10 INJECTION, EMULSION INTRAVENOUS at 15:19

## 2020-01-01 RX ADMIN — HYDROCORTISONE SODIUM SUCCINATE 50 MG: 100 INJECTION, POWDER, FOR SOLUTION INTRAMUSCULAR; INTRAVENOUS at 00:00

## 2020-01-01 RX ADMIN — OXYCODONE HYDROCHLORIDE 10 MG: 10 TABLET ORAL at 21:41

## 2020-01-01 RX ADMIN — LEVETIRACETAM 1000 MG: 500 TABLET ORAL at 05:14

## 2020-01-01 RX ADMIN — LEVOTHYROXINE SODIUM 50 MCG: 50 TABLET ORAL at 08:24

## 2020-01-01 RX ADMIN — POTASSIUM BICARBONATE 25 MEQ: 978 TABLET, EFFERVESCENT ORAL at 09:56

## 2020-01-01 RX ADMIN — PHENYTOIN SODIUM 300 MG: 50 INJECTION INTRAMUSCULAR; INTRAVENOUS at 04:54

## 2020-01-01 RX ADMIN — POTASSIUM BICARBONATE 25 MEQ: 978 TABLET, EFFERVESCENT ORAL at 05:14

## 2020-01-01 RX ADMIN — DOCUSATE SODIUM 50 MG AND SENNOSIDES 8.6 MG 2 TABLET: 8.6; 5 TABLET, FILM COATED ORAL at 04:50

## 2020-01-01 RX ADMIN — LINEZOLID 600 MG: 600 TABLET, FILM COATED ORAL at 08:24

## 2020-01-01 RX ADMIN — METRONIDAZOLE 500 MG: 500 TABLET ORAL at 22:35

## 2020-01-01 RX ADMIN — OMEPRAZOLE 40 MG: KIT at 04:07

## 2020-01-01 RX ADMIN — MIDODRINE HYDROCHLORIDE 10 MG: 5 TABLET ORAL at 04:38

## 2020-01-01 RX ADMIN — GABAPENTIN 100 MG: 100 CAPSULE ORAL at 17:21

## 2020-01-01 RX ADMIN — POTASSIUM BICARBONATE 25 MEQ: 978 TABLET, EFFERVESCENT ORAL at 13:05

## 2020-01-01 RX ADMIN — FUROSEMIDE 20 MG: 10 INJECTION, SOLUTION INTRAMUSCULAR; INTRAVENOUS at 05:14

## 2020-01-01 RX ADMIN — METRONIDAZOLE 500 MG: 500 TABLET ORAL at 06:54

## 2020-01-01 RX ADMIN — ALTEPLASE 2 MG: 2.2 INJECTION, POWDER, LYOPHILIZED, FOR SOLUTION INTRAVENOUS at 08:58

## 2020-01-01 RX ADMIN — METRONIDAZOLE 500 MG: 500 INJECTION, SOLUTION INTRAVENOUS at 05:22

## 2020-01-01 RX ADMIN — DEXMEDETOMIDINE HYDROCHLORIDE 1.2 MCG/KG/HR: 100 INJECTION, SOLUTION INTRAVENOUS at 07:40

## 2020-01-01 RX ADMIN — FUROSEMIDE 20 MG: 10 INJECTION, SOLUTION INTRAMUSCULAR; INTRAVENOUS at 04:45

## 2020-01-01 RX ADMIN — MIDODRINE HYDROCHLORIDE 10 MG: 5 TABLET ORAL at 09:12

## 2020-01-01 RX ADMIN — DEXMEDETOMIDINE HYDROCHLORIDE 0.4 MCG/KG/HR: 100 INJECTION, SOLUTION INTRAVENOUS at 10:29

## 2020-01-01 RX ADMIN — GABAPENTIN 100 MG: 100 CAPSULE ORAL at 12:09

## 2020-01-01 RX ADMIN — POTASSIUM BICARBONATE 25 MEQ: 978 TABLET, EFFERVESCENT ORAL at 06:57

## 2020-01-01 RX ADMIN — DEXMEDETOMIDINE HYDROCHLORIDE 0.5 MCG/KG/HR: 100 INJECTION, SOLUTION INTRAVENOUS at 19:41

## 2020-01-01 RX ADMIN — OXYCODONE HYDROCHLORIDE 15 MG: 10 TABLET ORAL at 11:53

## 2020-01-01 RX ADMIN — Medication 100 MG: at 06:53

## 2020-01-01 RX ADMIN — GABAPENTIN 100 MG: 100 CAPSULE ORAL at 17:49

## 2020-01-01 RX ADMIN — VANCOMYCIN HYDROCHLORIDE 1000 MG: 500 INJECTION, POWDER, LYOPHILIZED, FOR SOLUTION INTRAVENOUS at 21:47

## 2020-01-01 RX ADMIN — PHENYTOIN SODIUM 200 MG: 50 INJECTION INTRAMUSCULAR; INTRAVENOUS at 13:54

## 2020-01-01 RX ADMIN — PROPOFOL 20 MCG/KG/MIN: 10 INJECTION, EMULSION INTRAVENOUS at 16:54

## 2020-01-01 RX ADMIN — PHENYTOIN SODIUM 300 MG: 50 INJECTION INTRAMUSCULAR; INTRAVENOUS at 05:18

## 2020-01-01 RX ADMIN — CEFEPIME 2 G: 2 INJECTION, POWDER, FOR SOLUTION INTRAVENOUS at 05:16

## 2020-01-01 RX ADMIN — NOREPINEPHRINE BITARTRATE 6 MCG/MIN: 1 INJECTION INTRAVENOUS at 21:06

## 2020-01-01 RX ADMIN — LEVETIRACETAM 1000 MG: 500 TABLET ORAL at 18:12

## 2020-01-01 RX ADMIN — FUROSEMIDE 20 MG: 10 INJECTION, SOLUTION INTRAMUSCULAR; INTRAVENOUS at 15:20

## 2020-01-01 RX ADMIN — POTASSIUM BICARBONATE 25 MEQ: 978 TABLET, EFFERVESCENT ORAL at 08:51

## 2020-01-01 RX ADMIN — THERA TABS 1 TABLET: TAB at 05:52

## 2020-01-01 RX ADMIN — OXYCODONE HYDROCHLORIDE 15 MG: 10 TABLET ORAL at 19:41

## 2020-01-01 RX ADMIN — SODIUM CHLORIDE, POTASSIUM CHLORIDE, SODIUM LACTATE AND CALCIUM CHLORIDE: 600; 310; 30; 20 INJECTION, SOLUTION INTRAVENOUS at 06:12

## 2020-01-01 RX ADMIN — POTASSIUM PHOSPHATE, MONOBASIC AND POTASSIUM PHOSPHATE, DIBASIC 15 MMOL: 224; 236 INJECTION, SOLUTION, CONCENTRATE INTRAVENOUS at 09:24

## 2020-01-01 RX ADMIN — CEFEPIME 2 G: 2 INJECTION, POWDER, FOR SOLUTION INTRAVENOUS at 13:36

## 2020-01-01 RX ADMIN — CEFEPIME 2 G: 2 INJECTION, POWDER, FOR SOLUTION INTRAVENOUS at 04:49

## 2020-01-01 RX ADMIN — MIDODRINE HYDROCHLORIDE 15 MG: 5 TABLET ORAL at 22:35

## 2020-01-01 RX ADMIN — OXYCODONE HYDROCHLORIDE 5 MG: 5 TABLET ORAL at 15:20

## 2020-01-01 RX ADMIN — POTASSIUM CHLORIDE 40 MEQ: 29.8 INJECTION, SOLUTION INTRAVENOUS at 11:16

## 2020-01-01 RX ADMIN — SODIUM CHLORIDE, POTASSIUM CHLORIDE, SODIUM LACTATE AND CALCIUM CHLORIDE 500 ML: 600; 310; 30; 20 INJECTION, SOLUTION INTRAVENOUS at 14:30

## 2020-01-01 RX ADMIN — Medication 100 MG: at 04:40

## 2020-01-01 RX ADMIN — METRONIDAZOLE 500 MG: 500 INJECTION, SOLUTION INTRAVENOUS at 22:17

## 2020-01-01 RX ADMIN — HYDROCORTISONE SODIUM SUCCINATE 50 MG: 100 INJECTION, POWDER, FOR SOLUTION INTRAMUSCULAR; INTRAVENOUS at 12:36

## 2020-01-01 RX ADMIN — PHENYTOIN SODIUM 200 MG: 50 INJECTION INTRAMUSCULAR; INTRAVENOUS at 13:39

## 2020-01-01 RX ADMIN — DOCUSATE SODIUM 50 MG AND SENNOSIDES 8.6 MG 2 TABLET: 8.6; 5 TABLET, FILM COATED ORAL at 05:14

## 2020-01-01 RX ADMIN — OXYCODONE HYDROCHLORIDE 10 MG: 10 TABLET ORAL at 02:34

## 2020-01-01 RX ADMIN — LEVETIRACETAM 1000 MG: 500 TABLET ORAL at 04:38

## 2020-01-01 RX ADMIN — GADOTERIDOL 15 ML: 279.3 INJECTION, SOLUTION INTRAVENOUS at 17:25

## 2020-01-01 RX ADMIN — FENTANYL CITRATE 50 MCG: 50 INJECTION INTRAMUSCULAR; INTRAVENOUS at 02:41

## 2020-01-01 RX ADMIN — LEVETIRACETAM 1000 MG: 500 TABLET ORAL at 17:57

## 2020-01-01 RX ADMIN — GABAPENTIN 100 MG: 100 CAPSULE ORAL at 17:16

## 2020-01-01 RX ADMIN — PIPERACILLIN SODIUM AND TAZOBACTAM SODIUM 3.38 G: 3; .375 INJECTION, POWDER, FOR SOLUTION INTRAVENOUS at 21:05

## 2020-01-01 RX ADMIN — GABAPENTIN 100 MG: 100 CAPSULE ORAL at 04:55

## 2020-01-01 RX ADMIN — ROCURONIUM BROMIDE 50 MG: 10 INJECTION, SOLUTION INTRAVENOUS at 12:02

## 2020-01-01 RX ADMIN — ENOXAPARIN SODIUM 40 MG: 100 INJECTION SUBCUTANEOUS at 04:49

## 2020-01-01 RX ADMIN — LEVOTHYROXINE SODIUM 50 MCG: 50 TABLET ORAL at 10:08

## 2020-01-01 RX ADMIN — MIDODRINE HYDROCHLORIDE 10 MG: 5 TABLET ORAL at 15:44

## 2020-01-01 RX ADMIN — METRONIDAZOLE 500 MG: 500 INJECTION, SOLUTION INTRAVENOUS at 04:49

## 2020-01-01 RX ADMIN — NOREPINEPHRINE BITARTRATE 5 MCG/MIN: 1 INJECTION INTRAVENOUS at 23:32

## 2020-01-01 RX ADMIN — IPRATROPIUM BROMIDE AND ALBUTEROL SULFATE 3 ML: .5; 3 SOLUTION RESPIRATORY (INHALATION) at 18:37

## 2020-01-01 RX ADMIN — FENTANYL CITRATE 25 MCG: 50 INJECTION INTRAMUSCULAR; INTRAVENOUS at 12:22

## 2020-01-01 RX ADMIN — THERA TABS 1 TABLET: TAB at 13:54

## 2020-01-01 RX ADMIN — DEXMEDETOMIDINE HYDROCHLORIDE 0.3 MCG/KG/HR: 100 INJECTION, SOLUTION INTRAVENOUS at 23:17

## 2020-01-01 RX ADMIN — Medication 100 MG: at 13:54

## 2020-01-01 RX ADMIN — LEVETIRACETAM 1000 MG: 500 TABLET ORAL at 04:48

## 2020-01-01 RX ADMIN — FAMOTIDINE 20 MG: 10 INJECTION, SOLUTION INTRAVENOUS at 05:21

## 2020-01-01 RX ADMIN — FENTANYL CITRATE 50 MCG: 50 INJECTION INTRAMUSCULAR; INTRAVENOUS at 10:14

## 2020-01-01 RX ADMIN — SODIUM CHLORIDE, POTASSIUM CHLORIDE, SODIUM LACTATE AND CALCIUM CHLORIDE: 600; 310; 30; 20 INJECTION, SOLUTION INTRAVENOUS at 21:40

## 2020-01-01 RX ADMIN — CEFEPIME 2 G: 2 INJECTION, POWDER, FOR SOLUTION INTRAVENOUS at 17:40

## 2020-01-01 RX ADMIN — LEVOTHYROXINE SODIUM 50 MCG: 50 TABLET ORAL at 04:43

## 2020-01-01 RX ADMIN — LEVOTHYROXINE SODIUM 50 MCG: 50 TABLET ORAL at 04:49

## 2020-01-01 RX ADMIN — MICAFUNGIN SODIUM 100 MG: 20 INJECTION, POWDER, LYOPHILIZED, FOR SOLUTION INTRAVENOUS at 05:47

## 2020-01-01 RX ADMIN — SODIUM CHLORIDE, POTASSIUM CHLORIDE, SODIUM LACTATE AND CALCIUM CHLORIDE: 600; 310; 30; 20 INJECTION, SOLUTION INTRAVENOUS at 21:23

## 2020-01-01 RX ADMIN — LINEZOLID 600 MG: 600 TABLET, FILM COATED ORAL at 17:03

## 2020-01-01 RX ADMIN — LEVETIRACETAM 1000 MG: 500 TABLET ORAL at 17:41

## 2020-01-01 RX ADMIN — MIDODRINE HYDROCHLORIDE 10 MG: 5 TABLET ORAL at 22:16

## 2020-01-01 RX ADMIN — LINEZOLID 600 MG: 600 TABLET, FILM COATED ORAL at 17:16

## 2020-01-01 RX ADMIN — FUROSEMIDE 20 MG: 10 INJECTION, SOLUTION INTRAMUSCULAR; INTRAVENOUS at 16:59

## 2020-01-01 RX ADMIN — PIPERACILLIN AND TAZOBACTAM 4.5 G: 4; .5 INJECTION, POWDER, LYOPHILIZED, FOR SOLUTION INTRAVENOUS; PARENTERAL at 12:50

## 2020-01-01 RX ADMIN — FENTANYL CITRATE 100 MCG: 50 INJECTION, SOLUTION INTRAMUSCULAR; INTRAVENOUS at 16:31

## 2020-01-01 RX ADMIN — DOCUSATE SODIUM 50 MG AND SENNOSIDES 8.6 MG 2 TABLET: 8.6; 5 TABLET, FILM COATED ORAL at 04:07

## 2020-01-01 RX ADMIN — ACETAMINOPHEN 650 MG: 325 TABLET, FILM COATED ORAL at 16:44

## 2020-01-01 RX ADMIN — MIDODRINE HYDROCHLORIDE 10 MG: 5 TABLET ORAL at 14:03

## 2020-01-01 RX ADMIN — MIDODRINE HYDROCHLORIDE 15 MG: 5 TABLET ORAL at 05:21

## 2020-01-01 RX ADMIN — SODIUM CHLORIDE, POTASSIUM CHLORIDE, SODIUM LACTATE AND CALCIUM CHLORIDE: 600; 310; 30; 20 INJECTION, SOLUTION INTRAVENOUS at 21:16

## 2020-01-01 RX ADMIN — METRONIDAZOLE 500 MG: 500 INJECTION, SOLUTION INTRAVENOUS at 04:42

## 2020-01-01 RX ADMIN — PHENYTOIN SODIUM 100 MG: 50 INJECTION INTRAMUSCULAR; INTRAVENOUS at 14:24

## 2020-01-01 RX ADMIN — LINEZOLID 600 MG: 600 TABLET, FILM COATED ORAL at 04:41

## 2020-01-01 RX ADMIN — METRONIDAZOLE 500 MG: 500 INJECTION, SOLUTION INTRAVENOUS at 22:38

## 2020-01-01 ASSESSMENT — FIBROSIS 4 INDEX
FIB4 SCORE: 0.88
FIB4 SCORE: 0.93
FIB4 SCORE: 1.74
FIB4 SCORE: 0.95
FIB4 SCORE: 0.88
FIB4 SCORE: 1.96
FIB4 SCORE: 0.9
FIB4 SCORE: 0.76
FIB4 SCORE: 2.213594362117865532
FIB4 SCORE: 1.56
FIB4 SCORE: 1.59
FIB4 SCORE: 0.88
FIB4 SCORE: 2.52
FIB4 SCORE: 1.56
FIB4 SCORE: 0.93
FIB4 SCORE: 0.88
FIB4 SCORE: 1.22

## 2020-01-01 ASSESSMENT — PAIN SCALES - WONG BAKER
WONGBAKER_NUMERICALRESPONSE: DOESN'T HURT AT ALL
WONGBAKER_NUMERICALRESPONSE: DOESN'T HURT AT ALL
WONGBAKER_NUMERICALRESPONSE: HURTS JUST A LITTLE BIT
WONGBAKER_NUMERICALRESPONSE: DOESN'T HURT AT ALL
WONGBAKER_NUMERICALRESPONSE: HURTS JUST A LITTLE BIT

## 2020-01-01 ASSESSMENT — PULMONARY FUNCTION TESTS
FVC: .7
FVC: .7
FVC: .6
FVC: .8

## 2020-01-01 ASSESSMENT — ENCOUNTER SYMPTOMS
FEVER: 0

## 2020-01-01 ASSESSMENT — PAIN SCALES - GENERAL
PAIN_LEVEL: 0
PAIN_LEVEL: 0

## 2020-01-01 ASSESSMENT — PATIENT HEALTH QUESTIONNAIRE - PHQ9
1. LITTLE INTEREST OR PLEASURE IN DOING THINGS: NOT AT ALL
SUM OF ALL RESPONSES TO PHQ9 QUESTIONS 1 AND 2: 0
2. FEELING DOWN, DEPRESSED, IRRITABLE, OR HOPELESS: NOT AT ALL

## 2020-05-07 PROBLEM — J44.1 COPD WITH ACUTE EXACERBATION (HCC): Status: ACTIVE | Noted: 2020-01-01

## 2020-05-07 PROBLEM — Z20.822 SUSPECTED COVID-19 VIRUS INFECTION: Status: ACTIVE | Noted: 2020-01-01

## 2020-05-07 PROBLEM — R06.09 DYSPNEA ON EXERTION: Status: ACTIVE | Noted: 2020-01-01

## 2020-05-07 PROBLEM — Z78.9 ALCOHOL USE: Status: ACTIVE | Noted: 2020-01-01

## 2020-05-07 PROBLEM — J90 RECURRENT RIGHT PLEURAL EFFUSION: Status: ACTIVE | Noted: 2020-01-01

## 2020-05-11 PROBLEM — J44.1 COPD WITH ACUTE EXACERBATION (HCC): Status: RESOLVED | Noted: 2020-01-01 | Resolved: 2020-01-01

## 2020-05-11 PROBLEM — R06.09 DYSPNEA ON EXERTION: Status: RESOLVED | Noted: 2020-01-01 | Resolved: 2020-01-01

## 2020-05-11 PROBLEM — Z20.822 SUSPECTED COVID-19 VIRUS INFECTION: Status: RESOLVED | Noted: 2020-01-01 | Resolved: 2020-01-01

## 2020-05-11 PROBLEM — R07.89 OTHER CHEST PAIN: Status: RESOLVED | Noted: 2019-01-01 | Resolved: 2020-01-01

## 2020-05-23 PROBLEM — J44.9 COPD (CHRONIC OBSTRUCTIVE PULMONARY DISEASE) (HCC): Status: ACTIVE | Noted: 2020-01-01

## 2020-05-23 PROBLEM — R63.4 UNINTENDED WEIGHT LOSS: Status: ACTIVE | Noted: 2020-01-01

## 2020-05-23 PROBLEM — N30.00 ACUTE CYSTITIS WITHOUT HEMATURIA: Status: ACTIVE | Noted: 2020-01-01

## 2020-05-24 PROBLEM — R56.9 SEIZURE (HCC): Status: ACTIVE | Noted: 2020-01-01

## 2020-05-24 PROBLEM — J96.01 ACUTE RESPIRATORY FAILURE WITH HYPOXIA (HCC): Status: ACTIVE | Noted: 2020-01-01

## 2020-05-25 PROBLEM — J96.02 ACUTE RESPIRATORY FAILURE WITH HYPOXIA AND HYPERCAPNIA (HCC): Status: ACTIVE | Noted: 2020-01-01

## 2020-05-25 PROBLEM — J96.01 ACUTE RESPIRATORY FAILURE WITH HYPOXIA AND HYPERCAPNIA (HCC): Status: ACTIVE | Noted: 2020-01-01

## 2020-05-27 PROBLEM — R09.89 CHOKING EPISODE: Status: RESOLVED | Noted: 2019-01-01 | Resolved: 2020-01-01

## 2020-05-27 PROBLEM — J96.01 ACUTE RESPIRATORY FAILURE WITH HYPOXIA AND HYPERCAPNIA (HCC): Status: RESOLVED | Noted: 2020-01-01 | Resolved: 2020-01-01

## 2020-05-27 PROBLEM — R35.0 FREQUENCY OF MICTURITION: Status: RESOLVED | Noted: 2019-01-01 | Resolved: 2020-01-01

## 2020-05-27 PROBLEM — J96.02 ACUTE RESPIRATORY FAILURE WITH HYPOXIA AND HYPERCAPNIA (HCC): Status: RESOLVED | Noted: 2020-01-01 | Resolved: 2020-01-01

## 2020-05-31 PROBLEM — I46.9 PEA (PULSELESS ELECTRICAL ACTIVITY) (HCC): Status: ACTIVE | Noted: 2020-01-01

## 2020-05-31 PROBLEM — R57.9 SHOCK (HCC): Status: ACTIVE | Noted: 2020-01-01

## 2020-05-31 NOTE — PROGRESS NOTES
Transfer Center:    Received inpt to Direct Admit transfer request from [facility] @ [phone]  Sending Physician:  Arnoldo   Specialist consulted:  Nandini   Diagnosis:  Hypoxic respiratory failure   Patient accepted by:  Cassandra     Patient coming via:  air  ETA:  TBD   Medical records from sending facility scanned into Media tab.  Nursing to notify Direct Admit On-Call hospitalist and/or Specialist when patient arrives.     Plan:  Transfer Center to assist if needed.

## 2020-05-31 NOTE — PROGRESS NOTES
Transfer from Dr Martel, from [facility] ICU     54M, PMHx seizure disorder, COPD, alcohol dependence, and chronic pleural effusion hospitalized for hypoxemic respiratory failure. Re-intubated last night, being transferred for a higher levels of care. Dr Delatorre accepted the patient, wanted hospitalist team to admit and intensive care to consult.     Initially admitted with UTI, COVID Neg 5/6, got a seizure on day two and was intubated for worsening respiratory failure, he was then extubated.     Had thoracentesis on May 13, & 14 reportedly serous fluid.    Yesterday, he coded from bradicardia induced by hypoxemia and was re-intubed, and started on pressors      Imaging shows B/L PNA w loculated pleural effusion.  Intensive care / Pulmonary consulted Dr Delatorre accepted to consulted    Currently on on pressors levo, weaning off   On vancco, & zosyn     Please notify on call hospitalist & intensive care on patient's arrival.

## 2020-05-31 NOTE — PROGRESS NOTES
[facility] Pt intubated and sedated, witg OG and powers in place.     HR 96  BP 91/51  RR 8, vented  Spo2 - 100&+%  Temp 97.5F    2 RN Skin assessment with Valeria RN  - Bilateral lowers red and flaky  - bilateral heels red, blanching  - L knee red and blanching  - L forearm redness and skin tears  - L forearm abrasions  - ROBERT bruise  - L chest abrasion  - R lower lip abrasion  - R forehead abrasion  - R forearm skin tears  - TRAVON bruising  - Bilateral red blanching boggy elbows   - R 2nd toe pink and blanching  - Entire back red and blanching  - Sacrum/coccxy red and blanching    MD notified of pt's arrival

## 2020-06-01 NOTE — PROGRESS NOTES
Neurology Progress Note  Neurohospitalist Service, [facility]    Referring Physician: CASEY Olson Jr.O.    HPI: Refer to initial documented Neurology H&P, as detailed in the patient's chart.    Interval History 6/1/2020 No acute events overnight.  Pending disconnection from [facility] this AM.    Review of systems: In addition to what is detailed in the HPI and/or updated in the interval history, all other systems reviewed and are negative.  Intubated.    Past Medical History:    has a past medical history of Alcoholism ([facility]) (9/24/2012), Allergy, Anemia, Arthritis, At risk for sleep apnea (6/21/16), Back pain, C. difficile colitis (10/1/2016), C. difficile colitis, Chronic cholecystitis (6/2/2015), Closed fracture of right olecranon process (6/21/2016), COPD (chronic obstructive pulmonary disease) ([facility]), Decubitus ulcer of back, stage 3 ([facility]) (4/7/2016), Dental disorder, Depression, Encounter for screening colonoscopy (02/19/2018), Encounter for screening colonoscopy (02/19/2018), Fall, Fracture of right olecranon process (6/2016), Fracture, sternum closed (1/30/2017), GERD (gastroesophageal reflux disease), Head ache, Hepatitis C antibody test positive (3/6/2015), History of subdural hematoma (10/2/2016), Hypertension (9/24/2012), Indigestion, IVDU (intravenous drug user) (9/24/2012), Neuropathy ([facility]) (9/24/2012), Osteoporosis, Pneumonia, Recurrent vertebral fractures (2015), Renal disorder, Seizure disorder ([facility]), Snoring, Stroke ([facility]), Urinary bladder disorder, and Vitamin D deficiency. He also has no past medical history of Addisons disease ([facility]), Adrenal disorder ([facility]), Anxiety, Arrhythmia, Asthma, Blood transfusion without reported diagnosis, Cancer ([facility]), Cataract, CHF (congestive heart failure) ([facility]), Clotting disorder ([facility]), Cushings syndrome ([facility]), Diabetes ([facility]), Diabetic neuropathy ([facility]), Glaucoma, Goiter, Heart attack ([facility]), Heart murmur, HIV (human immunodeficiency virus  infection) (HCC), Hyperlipidemia, Meningitis, Migraine, Muscle disorder, Parathyroid disorder (HCC), Pituitary disease (HCC), Pulmonary emphysema (HCC), Sickle cell disease (HCC), Thyroid disease, Tuberculosis, or Urinary tract infection.    FHx:  family history includes Arthritis in his mother and sister; Cancer in his mother; Diabetes in his mother; Heart Disease in his mother and sister; Hyperlipidemia in his mother; Hypertension in his mother and sister; Other in his father; Other (age of onset: 49) in his sister; Other (age of onset: 60) in his sister.    SHx:   reports that he has been smoking cigarettes. He has a 15.00 pack-year smoking history. He has never used smokeless tobacco. He reports current alcohol use of about 4.8 oz of alcohol per week. He reports that he does not use drugs.    Medications:    Current Facility-Administered Medications:   •  norepinephrine (Levophed) infusion 8 mg/250 mL (premix), 0-30 mcg/min, Intravenous, Continuous, Saurabh Delatorre M.D., Last Rate: 7.5 mL/hr at 05/31/20 2115, 4 mcg/min at 05/31/20 2115  •  propofol (DIPRIVAN) injection, 0-80 mcg/kg/min, Intravenous, Continuous, Last Rate: 21.1 mL/hr at 06/01/20 0430, 50 mcg/kg/min at 06/01/20 0430 **AND** Triglycerides Starting now and then Every 3 Days, , , Every 3 Days (0300), Saurabh Delatorre M.D.  •  senna-docusate (PERICOLACE or SENOKOT S) 8.6-50 MG per tablet 2 Tab, 2 Tab, Enteral Tube, BID, Stopped at 05/31/20 1815 **AND** polyethylene glycol/lytes (MIRALAX) PACKET 1 Packet, 1 Packet, Enteral Tube, QDAY PRN **AND** magnesium hydroxide (MILK OF MAGNESIA) suspension 30 mL, 30 mL, Enteral Tube, QDAY PRN **AND** bisacodyl (DULCOLAX) suppository 10 mg, 10 mg, Rectal, QDAY PRN, Anisa Hernández M.D.  •  lactated ringers infusion, , Intravenous, Continuous, RONNA DooleyD., Last Rate: 100 mL/hr at 05/31/20 0516  •  acetaminophen (TYLENOL) tablet 650 mg, 650 mg, Enteral Tube, Q6HRS PRN, Anisa Hernández,  M.D.  •  labetalol (NORMODYNE/TRANDATE) injection 10 mg, 10 mg, Intravenous, Q4HRS PRN, Anisa Hernández M.D.  •  [DISCONTINUED] piperacillin-tazobactam (ZOSYN) 4.5 g in  mL IVPB, 4.5 g, Intravenous, Once, Stopped at 05/31/20 1815 **AND** piperacillin-tazobactam (ZOSYN) 4.5 g in  mL IVPB, 4.5 g, Intravenous, Q8HRS, Anisa Hernández M.D., Stopped at 06/01/20 0915  •  MD Alert...Vancomycin per Pharmacy, , Other, PHARMACY TO DOSE, Anisa Hernández M.D.  •  Pharmacy Consult: Enteral tube insertion - review meds/change route/product selection, , Other, PHARMACY TO DOSE, Anisa Hernández M.D.  •  micafungin (MYCAMINE) 100 mg in D5W 100 mL ivpb, 100 mg, Intravenous, Q24HRS, Alexia Khan M.D., Stopped at 06/01/20 0647  •  Respiratory Therapy Consult, , Nebulization, Continuous RT, Alexia Khan M.D.  •  ipratropium-albuterol (DUONEB) nebulizer solution, 3 mL, Nebulization, Q2HRS PRN (RT), Alexia Khan M.D.  •  famotidine (PEPCID) tablet 20 mg, 20 mg, Enteral Tube, Q12HRS **OR** famotidine (PEPCID) injection 20 mg, 20 mg, Intravenous, Q12HRS, Alexia Khan M.D., 20 mg at 06/01/20 0521  •  MD Alert...ICU Electrolyte Replacement per Pharmacy, , Other, PHARMACY TO DOSE, Alexia Khan M.D.  •  lidocaine (XYLOCAINE) 1 % injection 1-2 mL, 1-2 mL, Tracheal Tube, Q30 MIN PRN, Alexia Khan M.D.  •  vancomycin (VANCOCIN) 1,000 mg in  mL IVPB, 1,000 mg, Intravenous, Q12HR, Anisa Hernández M.D., Stopped at 05/31/20 0247  •  levETIRAcetam (KEPPRA) 1,000 mg in  mL IVPB, 1,000 mg, Intravenous, Q12HRS, Juan Polk M.D., Stopped at 06/01/20 0530    Physical Examination:     Vitals:    06/01/20 0600 06/01/20 0615 06/01/20 0630 06/01/20 0658   BP: 103/60 104/62 104/64    Pulse: (!) 54 (!) 54 (!) 54 (!) 55   Resp: 16 16 16 13   Temp:       TempSrc: Bladder      SpO2: 100% 100% 100% 100%   Weight:       Height:           General: Patient is obtunded  Eyes:  examination of optic disks not indicated at this time  CV: RRR    NEUROLOGICAL EXAM:     Mental status: does not open eyes to noxious stimulus, does not follow commands  Speech and language: intubated  Cranial nerve exam: Pupils are equal, round and reactive to light, yet sluggishly, bilaterally. Visual fields are not intact; doesn't blink to threat b/l. corneals and VOR intact. Face is symmetric. Cough reflex intact.  Motor exam: does not participate in formal strength testing. Tone is decreased moreso on the right compared to left. No abnormal movements were seen on exam.  Sensory exam: withdraw and grimace to noxious stimuli x4 extremities  Deep tendon reflexes:  1+ and symmetric. Toes mute bilaterally.  Coordination: nonparticipatory  Gait: deferred due to ICU status    Objective Data:    Labs:  Lab Results   Component Value Date/Time    PROTHROMBTM 11.5 05/29/2020 01:54 PM    INR 1.07 05/29/2020 01:54 PM      Lab Results   Component Value Date/Time    WBC 6.3 06/01/2020 05:00 AM    RBC 2.40 (L) 06/01/2020 05:00 AM    HEMOGLOBIN 7.9 (L) 06/01/2020 05:00 AM    HEMATOCRIT 26.0 (L) 06/01/2020 05:00 AM    .3 (H) 06/01/2020 05:00 AM    MCH 32.9 06/01/2020 05:00 AM    MCHC 30.4 (L) 06/01/2020 05:00 AM    MPV 9.6 06/01/2020 05:00 AM    NEUTSPOLYS 73.90 (H) 06/01/2020 05:00 AM    LYMPHOCYTES 13.90 (L) 06/01/2020 05:00 AM    MONOCYTES 9.60 06/01/2020 05:00 AM    EOSINOPHILS 0.90 06/01/2020 05:00 AM    BASOPHILS 1.70 06/01/2020 05:00 AM    HYPOCHROMIA 1+ 06/01/2020 05:00 AM    ANISOCYTOSIS 1+ 06/01/2020 05:00 AM      Lab Results   Component Value Date/Time    SODIUM 141 06/01/2020 05:00 AM    POTASSIUM 3.2 (L) 06/01/2020 05:00 AM    CHLORIDE 105 06/01/2020 05:00 AM    CO2 30 06/01/2020 05:00 AM    GLUCOSE 95 06/01/2020 05:00 AM    BUN 8 06/01/2020 05:00 AM    CREATININE 1.00 06/01/2020 05:00 AM      Lab Results   Component Value Date/Time    CHOLSTRLTOT 114 10/01/2019 05:40 AM    LDL 21 10/01/2019 05:40 AM    HDL  81 10/01/2019 05:40 AM    TRIGLYCERIDE 125 05/31/2020 09:00 PM       Lab Results   Component Value Date/Time    ALKPHOSPHAT 66 06/01/2020 05:00 AM    ASTSGOT 16 06/01/2020 05:00 AM    ALTSGPT 10 06/01/2020 05:00 AM    TBILIRUBIN 0.3 06/01/2020 05:00 AM        Imaging/Testing:    I interpreted and/or reviewed the patient's neuroimaging    DX-CHEST-PORTABLE (1 VIEW)   Final Result         1.  Pulmonary edema and/or infiltrates.   2.  Bilateral pleural effusions, right greater than left   3.  Cardiomegaly      CT-HEAD W/O   Final Result         1.  No acute intracranial abnormality is identified, there are nonspecific white matter changes, commonly associated with small vessel ischemic disease.  Associated mild cerebral atrophy is noted.   2.  Atherosclerosis.        EEG as read by Dr. Momin as of 6/1/20: abnormal video EEG recording in the awake, drowsy, and sleep state(s). The patient was mildly sedated at some point during the study. A mild, toxic / metabolic encephalopathy is suggested. Intermittent, mild bifrontal slowing noted, which suggest underlying structural abnormalities. No seizures captured during this prolonged study.    Assessment and Plan:    Vishnu Lechuga Sr. is a 54 y.o. man with history of traumatic brain injury and subsequent seizure disorder who was transferred from [facility] after he was admitted on 5/23/2020 for evaluation of UTI and pneumonia.  Patient was reported to have waxing and waning of his mentation and had some breakthrough seizure during his admission at [place].  His last brain CT dated 5/24/2020 did not reveal evidence of acute abnormalities however, there is postsurgical changes from his traumatic brain injury and craniotomy.  Continuous EEG monitoring is negative for active seizures; s/p load with Keppra 1500 mg and continuing with 1 g twice a day. Treat underlying infection(s) as this can lower seizure threshold.    Plan:    - discontinue EEG; continue seizure  precautions  - continue Keppra 1g BID  - may wean sedation    No further recommendations or further studies from a neurological standpoint at this time. Please re-consult if you have further questions or there is a change in status.    The evaluation of the patient, and recommended management, was discussed with the resident staff. I have performed a physical exam and reviewed and updated ROS and Plan today (6/1/2020). In review of yesterday's note (5/31/2020), there are no changes except as documented above.    Juan Arias MD  Director, Comprehensive Stroke Center, [facility]  Neurohospitalist, [facility]  Clinical  of Neurology, [facility] [employer]  t) [phone] (f) [phone]

## 2020-06-01 NOTE — ASSESSMENT & PLAN NOTE
Multifactorial:  PEA/CPR, propofol and infection  Continue antimicrobials  Echo: LVEF 65%. Mild concentric LVH. Grade II diastolic dysfunction. Mild mitral regurgitation. Estimated right ventricular systolic pressure is 55 mmHg  Ongoing titration of Levophed for MAP goals >65  Continue midodrine  Cortisol 11.4, continue Solu-Cortef weaned off steroids 6/12  Resolved

## 2020-06-01 NOTE — ASSESSMENT & PLAN NOTE
? Etiology from [place], likely hypoxia  Continue supportive care  Optimize electrolytes  EKG without ischemic changes  Echocardiogram: LVEF 65%. Mild concentric LVH. Grade II diastolic dysfunction. Mild mitral regurgitation. Estimated right ventricular systolic pressure is 55 mmHg

## 2020-06-01 NOTE — ASSESSMENT & PLAN NOTE
Chronic, recurrent  Thoracenteses multiple, Cx negative exudate  Follow chest x-rays, Ct chest reviewed  Discussed with Dr Lance 6/9 s/p decortication  S/p decortication by Dr Lance for 3 lobe trap lung 6/11  May need left sided preformed as well will continue to follow  CT's on on [facility]

## 2020-06-01 NOTE — PROGRESS NOTES
Patient transported to CT with ACLS RN, RT, and CCT. Patient on monitor. EEG wires unhooked as instructed by EEG tech.

## 2020-06-01 NOTE — CARE PLAN
Problem: Respiratory:  Goal: Respiratory status will improve  Outcome: PROGRESSING AS EXPECTED  Intervention: Assess and monitor pulmonary status  Note: Pt tolerating vent well, sedation appropriate,on appropriate antibiotics. Q2 hour oral care in place     Problem: Urinary Elimination:  Goal: Ability to reestablish a normal urinary elimination pattern will improve  Outcome: PROGRESSING AS EXPECTED  Intervention: Evaluate need to continue indwelling urinary catheter  Note: Outside facility powers changed per charge RN, UO low but adequate, yellow with sediment.

## 2020-06-01 NOTE — DISCHARGE PLANNING
"Care Transition Team Assessment     LSW reviewed chart and called pt's sister, Jess Santiago [phone] and obtained the following information used in this assessment. Jess verified information on face sheet.     Per Jess, pt's marital status is single and he has two adult children. Jess states that pt's son, Vishnu Lechuga JR. Was sentenced to life in retirement at the age of 20. USP unknown. Jess reports pt's daughter is Neva Nguyen who lives in [place]. Jess has no contact information for either child. LSW conducted a NOK search which resulted in no records found. Jess to assist LSW with contacting daughter. Jess aware of NOK/decision making role. Pt has a limited support system with his sisters.     No ACP. Booklet inappropriate to give at this time however pt has a POLST which states pt wishes to be DNAR/DNR and wants selective treatment. POLST signed & completed on 08/27/2015. LSW informed bedside RAJAT Alcantar text sent to Dr. Orlando notifying of POLST form. At this time, pt is a full code.     Pt lives in an apartment alone. Prior to current hospitalization, pt was \"kind of independent\" with his ADLS/IADLS. Jess reports pt has a caregiver come 3-5 hrs a day to provide in home care/housekeeping. Jess reports the services are through [employer] (in home support services through the department of  in [place]). Pt has home O2 through [facility] [place]. No financial barriers to dc at this time. Hx of substance abuse. From chart review pt has declined resources in the past. No hx of MH. Pt has a PCP through [facility]. Pt has Medicare A&B and [employer].     Barriers to dc: medical clearance, complex medical needs, unknown dc needs at this time.     Plan: Continue to assist with social/dc needs.     Care Transition Team Assessment    Information Source  Orientation : Unable to Assess  Information Given By: Relative, Other (Comments)(Chart Review)  Who is responsible for making decisions " for patient? : Legal next of kin  Name(s) of Primary Decision Maker: At this time, sisters    Readmission Evaluation  Is this a readmission?: No    Elopement Risk  Legal Hold: No  Ambulatory or Self Mobile in Wheelchair: No-Not an Elopement Risk  Elopement Risk: Not at Risk for Elopement    Discharge Preparedness  What is your plan after discharge?: Uncertain - pending medical team collaboration  What are your discharge supports?: Child, Sibling  Prior Functional Level: Ambulatory, Needs Assist with Activities of Daily Living, Independent with Medication Management, Needs Assist with Medication Management    Functional Assesment  Prior Functional Level: Ambulatory, Needs Assist with Activities of Daily Living, Independent with Medication Management, Needs Assist with Medication Management    Finances  Financial Barriers to Discharge: No  Prescription Coverage: Yes(Medicare A&B - [employer])    Advance Directive  Advance Directive?: None, Clinically incapacitated / Inappropriate  Advance Directive offered?: AD Booklet refused    Psychological Assessment  History of Substance Abuse: Alcohol  Substance Abuse Comments: Chronic ETOH abuse.   History of Psychiatric Problems: No  Non-compliant with Treatment: No  Newly Diagnosed Illness: Yes    Discharge Risks or Barriers  Discharge risks or barriers?: Substance abuse, Complex medical needs  Patient risk factors: Complex medical needs    Anticipated Discharge Information  Anticipated discharge disposition: Acute rehab, Discharge needs currently unknown, LTAC

## 2020-06-01 NOTE — ASSESSMENT & PLAN NOTE
Intubated at [place] on 5/22/2020 after seizure then again on 5/30/20 following cardiac arrest, he was extubated 6/5/2020 here and failed due to encephalopathy and airway protection and addition failed extubation trial requiring trach 6/7  RT/O2 protocols  Daily CXR w/ chest tubes in place  HOB >30 degrees and peridex for VAP prevention  Pepcid for GI prophylaxis  SAT/SBT when able (ABCDEF Bundle)  Early mobility  s/p tracheostomy 6/7   T-piece trials on going

## 2020-06-01 NOTE — CARE PLAN
Ventilator Daily Summary    Vent Day # 2    Ventilator settings changed this shift:  Adjusted Vt to 6cc/kg-360mL    APVCMV  16  360  +8  40%    7.5@25      AM ABG    7.43  45  97  31      Plan: Continue current ventilator settings and wean mechanical ventilation as tolerated per physician orders.

## 2020-06-01 NOTE — CONSULTS
"Referring Physician: Dr. Blue Hernández    Referral Reason: Seizure disorder in light of history of traumatic brain injury    HPI:  Mr. Vishnu Lechuga Sr. is a 54 y.o. male with multiple medical problem as outlined below and in particular history of traumatic brain injury with subdural hematoma and subsequent seizure disorder who was transferred from [facility] after he was initially admitted on 5/23/2020 for evaluation of difficulty urinating and low blood pressure.  He was diagnosed with UTI and hypoxic respiratory failure and apparently was intubated due to encephalopathy and breakthrough seizure and subsequently extubated but again was reintubated due to hypoxic respiratory failure with chest CT revealing bilateral pneumonia with loculated pleural effusion.  At present time he is intubated and not able to provide meaningful history and this history obtained from reviewing his medical record and discharge summary from [place].  It appears he has had breakthrough seizure as well as waxing and waning of his mentation throughout his hospitalization.  His last brain CT dated 5/24/20 revealed postsurgical changes of bilateral craniotomy with subcutaneous air surrounds the right muscle of mastication, however, there was no evidence of mass, hemorrhage or extra-axial fluid collection and no midline shift.  It appears he is on Dilantin and Neurontin for seizure.    ROS:   Unable to obtain    Past Medical History:   Past Medical History:   Diagnosis Date   • Alcoholism (Formerly [facility]) 9/24/2012   • Allergy    • Anemia    • Arthritis     back and knees   • At risk for sleep apnea 6/21/16    stopbang 4, intermediate risk   • Back pain    • C. difficile colitis 10/1/2016   • C. difficile colitis    • Chronic cholecystitis 6/2/2015   • Closed fracture of right olecranon process 6/21/2016   • COPD (chronic obstructive pulmonary disease) (Formerly [facility])    • Decubitus ulcer of back, stage 3 (Formerly [facility]) 4/7/2016   • Dental disorder     \"i have a " "bunch of decayed teeth and a cracked tooth\"   • Depression    • Encounter for screening colonoscopy 02/19/2018   • Encounter for screening colonoscopy 02/19/2018    Polyp   • Fall     Standing and fell backwards-elbow injury(2016), Fell down flight of stairs-took 10 mins to get back up (Nov. 2015), standing on box and fell onto deck (2015)    • Fracture of right olecranon process 6/2016    tripped in home   • Fracture, sternum closed 1/30/2017    fell in home & landed on arm of chair   • GERD (gastroesophageal reflux disease)    • Head ache    • Hepatitis C antibody test positive 3/6/2015   • History of subdural hematoma 10/2/2016   • Hypertension 9/24/2012    Past, pt states he does not have HTN   • Indigestion     intermittently   • IVDU (intravenous drug user) 9/24/2012   • Neuropathy (HCC) 9/24/2012   • Osteoporosis    • Pneumonia     2015   • Recurrent vertebral fractures 2015   • Renal disorder    • Seizure disorder (HCC)     Last 2014-grand mal states still has petit mal seizures, 2017 petit mal seizure   • Snoring    • Stroke ([facility])    • Urinary bladder disorder     urinary retention. had a powers at home for 1 month.   • Vitamin D deficiency        Past Surgical History:   Past Surgical History:   Procedure Laterality Date   • CYSTOSCOPY N/A 12/9/2019    Procedure: CYSTOSCOPY WITH URETHRAL DILATION;  Surgeon: Eulalio Pichardo M.D.;  Location: [facility];  Service: Urology   • CERVICAL DECOMPRESSION POSTERIOR  10/14/2019    Procedure: POSTERIOR CERVICAL LAMINECTOMY, C3-7;  Surgeon: Du Li M.D.;  Location: SURGERY [facility];  Service: Spine   • ENDOSCOPY PROCEDURE  10/11/2019    Procedure: ENDOSCOPY;  Surgeon: Baljinder Goncalves M.D.;  Location: SURGERY [facility];  Service: Gastroenterology   • ENDOSCOPY PROCEDURE  3/1/2019    Procedure: ENDOSCOPY PROCEDURE;  Surgeon: Baljinder Goncalves M.D.;  Location: SURGERY [facility];  Service: Gastroenterology   • COLONOSCOPY - ENDO  2/19/2018 "    Procedure: COLONOSCOPY - ENDO;  Surgeon: Feng Douglas M.D.;  Location: SURGERY [facility];  Service: Gastroenterology   • EVACUATION OF HEMATOMA Bilateral 10/1/2016    Procedure: EVACUATION OF SUBDURAL HEMATOMA;  Surgeon: Mian Vallejo M.D.;  Location: SURGERY [facility];  Service:    • CRANIOTOMY Bilateral 10/1/2016    Procedure: CRANIOTOMY;  Surgeon: Mian Vallejo M.D.;  Location: SURGERY [facility];  Service:    • ELBOW ORIF Right 6/21/2016    Procedure: OPEN REDUCTION INTERNAL FIXATION RIGHT OLECRANON FRACTURE ;  Surgeon: Keny Arana M.D.;  Location: SURGERY [facility];  Service:    • LUMBAR FUSION POSTERIOR  8/7/2015    Procedure: T7-8-9 ANTERIOR CORPECTOMIES ABSCESS REMOVAL CAGE/ALLOGRAFT, POSTERIIOR THORACIC FUSION, INSTRUMENTATION ALLOGRAFT;  Surgeon: Vishnu Mallory M.D.;  Location: [facility];  Service:    • THORACOTOMY Left 8/7/2015    Procedure: THORACOTOMY, LEFT WITH REMOVAL OF 6TH RIB;  Surgeon: Atilio Latham M.D.;  Location: SURGERY [facility];  Service:    • RIB RESECTION  8/7/2015    Procedure: NA;  Surgeon: Atilio Latham M.D.;  Location: SURGERY [facility];  Service:    • IR RECOVERY ROOM  7/24/2015    Procedure: IR RECOVERY ROOM;  Surgeon: Ir-Recovery Surgery;  Location: [facility];  Service:    • BONI BY LAPAROSCOPY  6/2/2015    Procedure: LAPAROSCOPIC CHOLECYSTECTOMY ;  Surgeon: Atilio Latham M.D.;  Location: [facility];  Service:    • ENDOSCOPY PROCEDURE  3/24/2014    Performed by Feng Douglas M.D. at [facility]   • OTHER ORTHOPEDIC SURGERY     • PB ECHO,SCROTUM & CONTENTS      when i was 15 years old       Social History:   Social History     Socioeconomic History   • Marital status: Single     Spouse name: Not on file   • Number of children: Not on file   • Years of education: Not on file   • Highest education level: Not on file   Occupational History   • Not on file   Social Needs   • Financial resource  strain: Not on file   • Food insecurity     Worry: Never true     Inability: Never true   • Transportation needs     Medical: No     Non-medical: No   Tobacco Use   • Smoking status: Current Every Day Smoker     Packs/day: 0.50     Years: 30.00     Pack years: 15.00     Types: Cigarettes   • Smokeless tobacco: Never Used   Substance and Sexual Activity   • Alcohol use: Yes     Alcohol/week: 4.8 oz     Types: 8 Cans of beer per week     Frequency: 4 or more times a week     Drinks per session: 5 or 6     Binge frequency: Daily or almost daily     Comment: 5-6 beers a day reported   • Drug use: No     Comment: Hx IVDU   • Sexual activity: Not Currently   Lifestyle   • Physical activity     Days per week: Not on file     Minutes per session: Not on file   • Stress: Not on file   Relationships   • Social connections     Talks on phone: Not on file     Gets together: Not on file     Attends [employer] service: Not on file     Active member of club or organization: Not on file     Attends meetings of clubs or organizations: Not on file     Relationship status: Not on file   • Intimate partner violence     Fear of current or ex partner: Not on file     Emotionally abused: Not on file     Physically abused: Not on file     Forced sexual activity: Not on file   Other Topics Concern   • Not on file   Social History Narrative    Was living with his sister but is now staying at his mom's house while his mom stays with his sister so that he can be isolated from her during the coronavirus, has been isolating himself and the only contact he has is that his family brings him the items that he needs       Family Hx:   Family History   Problem Relation Age of Onset   • Diabetes Mother    • Hypertension Mother    • Hyperlipidemia Mother    • Cancer Mother         skin   • Arthritis Mother    • Heart Disease Mother    • Other Father         CAR ACCIDENT   • Other Sister 49        AIDS   • Arthritis Sister    • Heart Disease Sister    •  Hypertension Sister    • Other Sister 60        2 MI's with stent placement       Current Medications:   Current Facility-Administered Medications   Medication Dose Route Frequency Provider Last Rate Last Dose   • norepinephrine (Levophed) infusion 8 mg/250 mL (premix)  0-30 mcg/min Intravenous Continuous Saurabh Delatorre M.D. 15 mL/hr at 05/31/20 1700 8 mcg/min at 05/31/20 1700   • propofol (DIPRIVAN) injection  0-80 mcg/kg/min Intravenous Continuous Saurabh Delatorre M.D. 21.1 mL/hr at 05/31/20 1753 50 mcg/kg/min at 05/31/20 1753   • senna-docusate (PERICOLACE or SENOKOT S) 8.6-50 MG per tablet 2 Tab  2 Tab Enteral Tube BID Anisa Hernández M.D.   Stopped at 05/31/20 1815    And   • polyethylene glycol/lytes (MIRALAX) PACKET 1 Packet  1 Packet Enteral Tube QDAY PRN Anisa Hernández M.D.        And   • magnesium hydroxide (MILK OF MAGNESIA) suspension 30 mL  30 mL Enteral Tube QDAY PRN Anisa Hernández M.D.        And   • bisacodyl (DULCOLAX) suppository 10 mg  10 mg Rectal QDAY PRN Anisa Hernández M.D.       • Respiratory Therapy Consult   Nebulization Continuous RT Anisa Hernández M.D.       • lactated ringers infusion   Intravenous Continuous Anisa Hernández M.D.       • acetaminophen (TYLENOL) tablet 650 mg  650 mg Enteral Tube Q6HRS PRN Anisa Hernández M.D.       • labetalol (NORMODYNE/TRANDATE) injection 10 mg  10 mg Intravenous Q4HRS PRN Anisa Hernández M.D.       • piperacillin-tazobactam (ZOSYN) 4.5 g in  mL IVPB  4.5 g Intravenous Q8HRS Anisa Hernández M.D.       • MD Alert...Vancomycin per Pharmacy   Other PHARMACY TO DOSE Anisa Hernández M.D.       • Pharmacy Consult: Enteral tube insertion - review meds/change route/product selection   Other PHARMACY TO DOSE Anisa Hernández M.D.       • [START ON 6/1/2020] micafungin (MYCAMINE) 100 mg in D5W 100 mL ivpb  100 mg Intravenous Q24HRS Alexia Khan M.D.       • Respiratory  Therapy Consult   Nebulization Continuous RT Alexia Khan M.D.       • ipratropium-albuterol (DUONEB) nebulizer solution  3 mL Nebulization Q2HRS PRN (RT) Alexia Khan M.D.       • famotidine (PEPCID) tablet 20 mg  20 mg Enteral Tube Q12HRS Alexia Khan M.D.        Or   • famotidine (PEPCID) injection 20 mg  20 mg Intravenous Q12HRS Alexia Khan M.D.       • MD Alert...ICU Electrolyte Replacement per Pharmacy   Other PHARMACY TO DOSE Alexia Khan M.D.       • lidocaine (XYLOCAINE) 1 % injection 1-2 mL  1-2 mL Tracheal Tube Q30 MIN PRN Alexia Khan M.D.       • MD Alert...PROPOFOL CRITICAL CARE PROTOCOL 1 Each  1 Each Other PRN Alexia Khan M.D.           Allergies:   Allergies   Allergen Reactions   • Codeine Nausea     Tolerates Dilaudid  Sweat and cold       Physical Exam:   Vitals:    05/31/20 1700 05/31/20 1715 05/31/20 1800 05/31/20 1815   BP:  111/72 131/66 128/74   Pulse:  76 72 71   Resp:  (!) 5 16 16   Temp:  36.4 °C (97.5 °F)     TempSrc:  Temporal     SpO2:  98% 100% 100%   Weight: 70.4 kg (155 lb 3.3 oz)          Physical Exam   GENERAL:  Lying in the hospital bed, sedated and intubated  Head: Normocephalic and with evidence of craniotomy in the past.  Eyes: Pupils are equal, round, and reactive to light.   Cardiovascular: Normal rate and regular rhythm.    Pulmonary/Chest: Breath sounds normal.   Abdominal: Soft. Bowel sounds are normal. He exhibits no distension.  Skin: Skin is warm and dry. No rash noted. No erythema.  Neuro Exam  His neurological examination is limited as patient is intubated and sedated.  His pupils are equal and sluggishly reactive.  I do not appreciate facial asymmetry, facial sensation cannot be tested.  He has significant rigidity in all 4 extremity and passive movement and with a minor stimulation he has tremulousness of both upper extremity.  He withdraws to physical stimulation in all 4 extremity.  His deep tendon reflexes are exaggerated in  all 4 extremity.  Sensory and coordination cannot be tested.  GAIT:  Deferred     Labs:  Recent Labs     05/29/20 1354 05/30/20 2015 05/31/20  0525   WBC 8.7 15.2* 9.9   RBC 2.57* 3.03* 2.51*   HEMOGLOBIN 8.3* 9.9* 8.2*   HEMATOCRIT 27.7* 34.3* 27.4*   .8* 113.2* 109.2*   MCH 32.3 32.7 32.7   MCHC 30.0* 28.9* 29.9*   RDW 16.7* 17.0* 17.0*   PLATELETCT 152 177 149   MPV 9.4 10.0 9.5     Recent Labs     05/30/20 0447 05/30/20 2015 05/31/20  0525   SODIUM 142 145 143   POTASSIUM 3.0* 3.4* 2.8*   CHLORIDE 108* 109* 109*   CO2 33* 31* 34*   GLUCOSE 140* 166* 167*   BUN 4* 5* 6*   CREATININE 0.9 1.0 1.0   CALCIUM 8.3* 8.9 8.4*     Recent Labs     05/29/20  1354   INR 1.07         Recent Labs     05/30/20 2015   TROPONINI <0.01     Recent Labs     05/29/20  1354   TRIGLYCERIDE 168*     Recent Labs     05/30/20 0447 05/30/20 2015 05/31/20  0525   SODIUM 142 145 143   POTASSIUM 3.0* 3.4* 2.8*   CHLORIDE 108* 109* 109*   CO2 33* 31* 34*   GLUCOSE 140* 166* 167*   BUN 4* 5* 6*     Recent Labs     05/29/20  1354 05/30/20 0447 05/30/20 2015 05/31/20  0525   SODIUM 139 142 145 143   POTASSIUM 3.2* 3.0* 3.4* 2.8*   CHLORIDE 106 108* 109* 109*   CO2 32* 33* 31* 34*   BUN 5* 4* 5* 6*   CREATININE 0.7 0.9 1.0 1.0   MAGNESIUM 1.6 2.0 2.6* 2.4*   PHOSPHORUS 2.8 2.0*  --  3.0   CALCIUM 8.2* 8.3* 8.9 8.4*     Recent Labs     05/29/20  1354   INR 1.07     No results found for this or any previous visit.      Imaging reviewed:    No orders to display          Assessment/Plan:  54 y.o. male with history of traumatic brain injury and subsequent seizure disorder, currently on Dilantin who was transferred from [facility] after he was admitted on 5/23/2020 for evaluation of UTI and pneumonia.  Patient is reported to have waxing and waning of his mentation and has had some breakthrough seizure throughout his admission at [place].  His last brain CT dated 5/24/2020 did not reveal evidence of acute abnormalities however, there  is postsurgical changes from his traumatic brain injury and craniotomy.  At present time he does not have any clinical seizure, however, is not clear whether he has subclinical seizure for which I will order continuous EEG monitoring.  I will load him with Keppra 1500 mg and continue with 1 g twice a day.  For now we will continue with Dilantin.  I will order a repeat brain CT.  Continue with seizure precaution.  Continue with ventilatory support.  Further recommendation after reviewing EEG and brain CT.  Neurology will continue to follow.  Total critical care time spent was 50 minutes.

## 2020-06-01 NOTE — PROGRESS NOTES
Patient transported back to [address] ACLS RN, RT, and CCT. Patient on monitor. EEG wires hooked back up as instructed by eeg tech.

## 2020-06-01 NOTE — ASSESSMENT & PLAN NOTE
He underwent CT thorax and he found to have finding of pulmonary  I continue vancomycin and dose adjustment as per vancomycin trough level.  Continue Zosyn.  Admit to ICU for close monitoring.  For acute respiratory failure with hypoxia continue ventilator management.

## 2020-06-01 NOTE — ASSESSMENT & PLAN NOTE
He found to have acute respiratory failure with hypoxia.  He had bradycardia at outside facility and he was reintubated again.  Admit to ICU for ventilator management  I discussed with intensivist.

## 2020-06-01 NOTE — PROGRESS NOTES
"Critical Care Progress Note    Date of admission  5/31/2020    Chief Complaint  54 y.o. male admitted 5/31/2020 with Respiratory failure, seizure    Hospital Course    \"All history was obtained from old notes/charts from [facility] as the patient is intubated at the time of my evaluation.     Mr. Lechuga is a 54 year old male with the past medical history of urethral stricture with urinary retention, recurrent right-sided pleural effusion, alcohol abuse, COPD on home O2, hepatitis C with cirrhosis, chronic pain syndrome on narcotics, traumatic brain injury, history of SDH with subsequent seizure disorder who presented to [facility] on 5/22 with the complaint of difficulty urinary for 2 days and low blood pressures.  He apparently was recently hospitalized at [facility] from 5/6 to 5/11/2020 for a COPD exacerbation and reported that he was doing well for until 2 days prior to 5/22 when he developed difficulty urinating and draining his bladder.  He has worsening abdominal pain in the suprapubic region with chills.  No fevers.  No nausea or vomiting.  He has a negative COVID test on 5/6.  The patient was admitted for acute urinary retention, UTI, and hypoxia.  Over the course of the next week, the patient required intubation for seizure activity, but was then extubated.  However, the patient then developed aspiration pneumonia with AMS and was on BiPAP for a night on 5/29.  All during the patient's hospital stay, he has been battling with delirium.  Unfortunately, around 2000 last night the patient the patient became unresponsive with a bradycardic episode and no pulses.  A code was called and he received 3 rounds of CPR and 2 doses of epi before ROSC was achieved.  He was intubated again and stabilized.  He was sent to [facility] for higher level of care due to the growing complexity of his condition as well as critical care management since [facility] does not have critical care specialists on staff.\"      Interval " Problem Update  Reviewed last 24 hour events:   - Admitted yesterday afternoon from [place]   - Neuro: heavily sedated   - HR: 50s-70s   - SBP: 80s-120s on norepinephrine   - GI: clamp NGT   - UOP: 650 mL out since admision   - Irby: yes   - Tm: 37.3   - Lines: PICC, PIV   - PPx: GI pepcid, DVT SCDs   - ABG: looks good   - CXR (personally reviewed and compared to prior): recurrent right effusion    Review of Systems  Review of Systems   Unable to perform ROS: Intubated        Vital Signs for last 24 hours   Temp:  [35.5 °C (95.9 °F)-37.2 °C (99 °F)] 35.5 °C (95.9 °F)  Pulse:  [] 55  Resp:  [5-54] 13  BP: ()/(51-91) 104/64  SpO2:  [96 %-100 %] 100 %    Hemodynamic parameters for last 24 hours       Respiratory Information for the last 24 hours  Vent Mode: APVCMV  Rate (breaths/min): 16  Vt Target (mL): 360  PEEP/CPAP: 8  FiO2: 40  MAP: 12  Control VTE (exp VT): 345    Physical Exam   Physical Exam  Vitals signs and nursing note reviewed.   Constitutional:       General: He is in acute distress.      Appearance: He is well-developed. He is ill-appearing.      Interventions: He is intubated.   HENT:      Head: Normocephalic and atraumatic.      Comments: Scalp abnormalities consistent with prior neurosurgical procedure     Right Ear: External ear normal.      Left Ear: External ear normal.      Nose: Nose normal.      Mouth/Throat:      Mouth: Mucous membranes are dry.      Pharynx: Oropharynx is clear.      Comments: ET tube in place  Eyes:      Conjunctiva/sclera: Conjunctivae normal.      Pupils: Pupils are equal, round, and reactive to light.   Neck:      Musculoskeletal: Neck supple.      Vascular: No JVD.      Trachea: No tracheal deviation.   Cardiovascular:      Rate and Rhythm: Regular rhythm. Bradycardia present.      Pulses: Normal pulses.   Pulmonary:      Effort: He is intubated.      Breath sounds: Rales present. No wheezing.   Abdominal:      General: Bowel sounds are normal. There is no  distension.      Palpations: Abdomen is soft.   Musculoskeletal:         General: No tenderness.   Skin:     General: Skin is warm and dry.      Capillary Refill: Capillary refill takes less than 2 seconds.      Findings: No rash.      Comments: Multiple tattoos  Venous stasis dermatitis   Neurological:      General: No focal deficit present.      Mental Status: He is alert.      Comments: Opens eyes to voice however does not follow commands.  Moving all 4 extremities   Psychiatric:      Comments: Unable to assess         Medications  Current Facility-Administered Medications   Medication Dose Route Frequency Provider Last Rate Last Dose   • norepinephrine (Levophed) infusion 8 mg/250 mL (premix)  0-30 mcg/min Intravenous Continuous Saurabh Delatorre M.D. 7.5 mL/hr at 05/31/20 2115 4 mcg/min at 05/31/20 2115   • propofol (DIPRIVAN) injection  0-80 mcg/kg/min Intravenous Continuous Saurabh Delatorre M.D. 21.1 mL/hr at 06/01/20 0430 50 mcg/kg/min at 06/01/20 0430   • senna-docusate (PERICOLACE or SENOKOT S) 8.6-50 MG per tablet 2 Tab  2 Tab Enteral Tube BID Anisa Hernández M.D.   Stopped at 05/31/20 1815    And   • polyethylene glycol/lytes (MIRALAX) PACKET 1 Packet  1 Packet Enteral Tube QDAY PRN Anisa Hernández M.D.        And   • magnesium hydroxide (MILK OF MAGNESIA) suspension 30 mL  30 mL Enteral Tube QDAY PRN Anisa Hernández M.D.        And   • bisacodyl (DULCOLAX) suppository 10 mg  10 mg Rectal QDAY PRN Anisa Hernández M.D.       • lactated ringers infusion   Intravenous Continuous Anisa Hernández M.D. 100 mL/hr at 05/31/20 2140     • acetaminophen (TYLENOL) tablet 650 mg  650 mg Enteral Tube Q6HRS PRN Anisa Hernández M.D.       • labetalol (NORMODYNE/TRANDATE) injection 10 mg  10 mg Intravenous Q4HRS PRN Anisa Hernández M.D.       • piperacillin-tazobactam (ZOSYN) 4.5 g in  mL IVPB  4.5 g Intravenous Q8HRS Anisa Hernández M.D.   Stopped at 06/01/20 0951    • MD Alert...Vancomycin per Pharmacy   Other PHARMACY TO DOSE Anisa Hernández M.D.       • Pharmacy Consult: Enteral tube insertion - review meds/change route/product selection   Other PHARMACY TO DOSE nAisa Hernández M.D.       • micafungin (MYCAMINE) 100 mg in D5W 100 mL ivpb  100 mg Intravenous Q24HRS Alexia Khan M.D.   Stopped at 06/01/20 0647   • Respiratory Therapy Consult   Nebulization Continuous RT Alexia Khan M.D.       • ipratropium-albuterol (DUONEB) nebulizer solution  3 mL Nebulization Q2HRS PRN (RT) Alexia Khan M.D.       • famotidine (PEPCID) tablet 20 mg  20 mg Enteral Tube Q12HRS Alexia Khan M.D.        Or   • famotidine (PEPCID) injection 20 mg  20 mg Intravenous Q12HRS Alexia Khan M.D.   20 mg at 06/01/20 0521   • MD Alert...ICU Electrolyte Replacement per Pharmacy   Other PHARMACY TO DOSE Alexia Khan M.D.       • lidocaine (XYLOCAINE) 1 % injection 1-2 mL  1-2 mL Tracheal Tube Q30 MIN PRN Alexia Khan M.D.       • vancomycin (VANCOCIN) 1,000 mg in  mL IVPB  1,000 mg Intravenous Q12HR Anisa Hernández M.D.   Stopped at 05/31/20 2347   • levETIRAcetam (KEPPRA) 1,000 mg in  mL IVPB  1,000 mg Intravenous Q12HRS Juan Polk M.D.   Stopped at 06/01/20 0530       Fluids    Intake/Output Summary (Last 24 hours) at 6/1/2020 0833  Last data filed at 6/1/2020 0600  Gross per 24 hour   Intake 1466.9 ml   Output 655 ml   Net 811.9 ml       Laboratory  Recent Labs     05/30/20  2109 05/31/20  0520 05/31/20  1219 05/31/20  1719 06/01/20  0529   AALQW20A 7.23* 7.40 7.45  --   --    [id number] 77.9* 53.9* 47.0*  --   --    NPKVF295M 68.3 104.1* 63.4  --   --    GBTM3PUI 89.8 96.9* 91.8  --   --    ARTHCO3 32* 33* 32*  --   --    FIO2 80% 65 40  --   --    ARTBE 2 7* 7*  --   --    ISTATAPH  --   --   --  7.386* 7.421   ISTATAPCO2  --   --   --  55.6* 48.1*   ISTATAPO2  --   --   --  88* 105*   ISTATATCO2  --   --   --  35* 33    [id number]  --   --   --  96 98   ISTATARTHCO3  --   --   --  33.4* 31.3*   ISTATARTBE  --   --   --  7* 6*   ISTATTEMP  --   --   --  37.5 C 96.4 F   ISTATFIO2  --   --   --  40 40   ISTATSPEC  --   --   --  Arterial Arterial   ISTATAPHTC  --   --   --  7.379* 7.439   [id number]  --   --   --  91* 97*     Recent Labs     05/30/20 2015   TROPONINI <0.01     Recent Labs     05/30/20 0447 05/31/20 0525 05/31/20 2100 06/01/20  0500   SODIUM 142   < > 143 149* 141   POTASSIUM 3.0*   < > 2.8* 2.8* 3.2*   CHLORIDE 108*   < > 109* 109 105   CO2 33*   < > 34* 30 30   BUN 4*   < > 6* 8 8   CREATININE 0.9   < > 1.0 1.12 1.00   MAGNESIUM 2.0   < > 2.4* 2.0 2.0   PHOSPHORUS 2.0*  --  3.0  --  2.6   CALCIUM 8.3*   < > 8.4* 8.0* 7.8*    < > = values in this interval not displayed.     Recent Labs     05/29/20 1354 05/30/20 0447 05/31/20 0525 05/31/20 2100 06/01/20  0500   ALTSGPT 14  --    < > 15 12 10   ASTSGOT 22  --    < > 26 20 16   ALKPHOSPHAT 87  --    < > 69 65 66   TBILIRUBIN 0.3  --    < > 0.2 0.3 0.3   PREALBUMIN 3.0* <3.0*  --   --   --   --    GLUCOSE 152* 140*   < > 167* 115* 95    < > = values in this interval not displayed.     Recent Labs     05/30/20 2015 05/31/20 0525 05/31/20 2100 06/01/20  0500   WBC 15.2* 9.9 7.8 6.3   NEUTSPOLYS 61  --  75.70* 73.90*   LYMPHOCYTES 26  --  13.90* 13.90*   MONOCYTES 6  --  4.30 9.60   EOSINOPHILS 2  --  1.70 0.90   BASOPHILS 1  --  2.60* 1.70   ASTSGOT 35 26 20 16   ALTSGPT 21 15 12 10   ALKPHOSPHAT 81 69 65 66   TBILIRUBIN 0.3 0.2 0.3 0.3     Recent Labs     05/29/20  1354  05/31/20  0525 05/31/20  2100 06/01/20  0500   RBC 2.57*   < > 2.51* 2.33* 2.40*   HEMOGLOBIN 8.3*   < > 8.2* 7.6* 7.9*   HEMATOCRIT 27.7*   < > 27.4* 25.3* 26.0*   PLATELETCT 152   < > 149 165 128*   PROTHROMBTM 11.5  --   --   --   --    INR 1.07  --   --   --   --     < > = values in this interval not displayed.       Imaging  X-Ray:  I have personally reviewed the images and compared  with prior images.  CT:    Reviewed    Assessment/Plan  Shock (HCC)- (present on admission)  Assessment & Plan  Multifactorial:  PEA/CPR, propofol and ?  Pneumonia  Continue zosyn/vanco  Echo pending   Continue titrating levophed for MAP goals >65  AM cortisol    PEA (Pulseless electrical activity) (HCC)- (present on admission)  Assessment & Plan  ? Etiology from [place], possibly hypoxia  Continue supportive care  Correct electrolyte disturbances  EKG without ischemic changes  Echocardiogram ordered    Acute respiratory failure with hypoxia (HCC)- (present on admission)  Assessment & Plan  Intubated at [place] on 5/30/20 following cardiac arrest  RT/O2 protocols  Daily and PRN ABGs  Titration of ventilator therapy based on ABGs and patient's status  Sedation as tolerated/indicated  Daily CXR  HOB >30 degrees and peridex for VAP prevention  Pepcid for GI prophylaxis  SAT/SBT when able (ABCDEF Bundle)  Early mobility    Recurrent right pleural effusion- (present on admission)  Assessment & Plan  Noted on chest x-ray  Thoracenteses performed on 5/13 and 5/14  Will likely require repeat thoracentesis  Follow chest x-ray    Pneumonia- (present on admission)  Assessment & Plan  ?aspiration  Continue vanco/zosyn  Sputum with Candida--?clinical significance  MRSA Nares pending    Ileus ([facility])  Assessment & Plan  Found on imaging at [place]  Clamp NG tube  Progress to tube feeding tomorrow if tolerated    Hypokalemia- (present on admission)  Assessment & Plan  Continue to replete to goal > 4    Seizure disorder (HCC)- (present on admission)  Assessment & Plan  cEEG without evidence of seizure  Neurology consulted  Continue Keppra 1g BID and Dilantin  Per neurology okay to wean sedation       VTE:  Contraindicated  Ulcer: H2 Antagonist  Lines: Irby Catheter  Ongoing indication addressed    I have performed a physical exam and reviewed and updated ROS and Plan today (6/1/2020). In review of yesterday's note (5/31/2020), there  are no changes except as documented above.     Discussed patient condition and risk of morbidity and/or mortality with RN, RT, Pharmacy, , Code status disscussed and Charge nurse / hot rounds     The patient remains critically ill.  Critical care time = 39 minutes in directly providing and coordinating critical care and extensive data review.  No time overlap and excludes procedures.

## 2020-06-01 NOTE — PROGRESS NOTES
Med Rec complete per pt's pharmacy  UNABLE to reach pt by phone, last doses unknown    No ABX filled in the last 14 days

## 2020-06-01 NOTE — ASSESSMENT & PLAN NOTE
Patient has been having seizure.  I requested consult with neurology and I discussed with Dr. Polk requested consult  Seizure precautions   Keppra 1 g every 12 hourly  Admit to ICU for close monitoring.

## 2020-06-01 NOTE — PROCEDURES
VIDEO ELECTROENCEPHALOGRAM REPORT      Referring provider: Dr. Polk.     DOS: 5/31/2020 (total recording of 12 hours and 6 minutes).     INDICATION:  Vishnu Lechuga Sr. 54 y.o. male presenting with h/o TBI, bilateral craniotomies, seizure.     CURRENT ANTIEPILEPTIC REGIMEN: Levetiracetam 1000 mg bid.     TECHNIQUE: 30 channel video electroencephalogram (EEG) was performed in accordance with the international 10-20 system. The study was reviewed in bipolar and referential montages. The recording examined the patient during wakeful and drowsy/sleep state(s). The patient was mildly sedated at some point during the study.      DESCRIPTION OF THE RECORD:  During the wakefulness, the background showed a symmetrical 7 Hz theta activity posteriorly with amplitude of 70 mV.  There was no reactivity to eye closure/opening.  A normal anterior-posterior gradient was noted with faster beta frequencies seen anteriorly.  During drowsiness, theta/delta frequencies were seen.    During the sleep state, background shows diffuse high-amplitude 4-5 Hz delta activity.  Symmetrical high-amplitude sleep spindles and vertex sharps were seen in the leads over the central regions.     A mild burst suppression was seen with use of sedative at some point during the study.     ACTIVATION PROCEDURES:   Not performed.       ICTAL AND/OR INTERICTAL FINDINGS:   Intermittent, mild bifrontal slowing noted. Rare triphasic waves. No seizures captured.     EKG: sampling of the EKG recording demonstrated sinus rhythm.     EVENTS:  None.     INTERPRETATION:  This is an abnormal video EEG recording in the awake, drowsy, and sleep state(s). The patient was mildly sedated at some point during the study. A mild, toxic / metabolic encephalopathy is suggested. Intermittent, mild bifrontal slowing noted, which suggest underlying structural abnormalities. No seizures captured during this prolonged study. Clinical and radiological correlation is  recommended.    Updates provided to Dr. Polk and Dr. Arias.         Kory Momin MD   Epilepsy and Neurodiagnostics.   Clinical  of Neurology [facility].   Diplomate in Neurology, Epilepsy, and Electrodiagnostic Medicine.   Office: [phone]  Fax: [phone]

## 2020-06-01 NOTE — ASSESSMENT & PLAN NOTE
cEEG without evidence of seizure  Neurology has signed off  Continue Keppra 1g BID  Phenytoin level elevated continue to monitor level and adjust  MRI shows skull osteomyelitis and possible epidural phlegmon without leptomeningeal enhancement  Neuro and ID following

## 2020-06-01 NOTE — CONSULTS
Critical Care Consultation    Date of consult: 5/31/2020    Referring Physician  Dr. Blue Hernández    Reason for Consultation  Ventilator management for acute hypoxic respiratory failure    History of Presenting Illness  All history was obtained from old notes/charts from [facility] as the patient is intubated at the time of my evaluation.    Mr. Lechuga is a 54 year old male with the past medical history of urethral stricture with urinary retention, recurrent right-sided pleural effusion, alcohol abuse, COPD on home O2, hepatitis C with cirrhosis, chronic pain syndrome on narcotics, traumatic brain injury, history of SDH with subsequent seizure disorder who presented to [facility] on 5/22 with the complaint of difficulty urinary for 2 days and low blood pressures.  He apparently was recently hospitalized at [facility] from 5/6 to 5/11/2020 for a COPD exacerbation and reported that he was doing well for until 2 days prior to 5/22 when he developed difficulty urinating and draining his bladder.  He has worsening abdominal pain in the suprapubic region with chills.  No fevers.  No nausea or vomiting.  He has a negative COVID test on 5/6.  The patient was admitted for acute urinary retention, UTI, and hypoxia.  Over the course of the next week, the patient required intubation for seizure activity, but was then extubated.  However, the patient then developed aspiration pneumonia with AMS and was on BiPAP for a night on 5/29.  All during the patient's hospital stay, he has been battling with delirium.  Unfortunately, around 2000 last night the patient the patient became unresponsive with a bradycardic episode and no pulses.  A code was called and he received 3 rounds of CPR and 2 doses of epi before ROSC was achieved.  He was intubated again and stabilized.  He was sent to [facility] for higher level of care due to the growing complexity of his condition as well as critical care management since [facility] does not have  critical care specialists on staff.    Code Status  Full Code    Review of Systems  Review of Systems   Unable to perform ROS: Intubated       Past Medical History   has a past medical history of Alcoholism (Formerly [facility]) (9/24/2012), Allergy, Anemia, Arthritis, At risk for sleep apnea (6/21/16), Back pain, C. difficile colitis (10/1/2016), C. difficile colitis, Chronic cholecystitis (6/2/2015), Closed fracture of right olecranon process (6/21/2016), COPD (chronic obstructive pulmonary disease) (Formerly [facility]), Decubitus ulcer of back, stage 3 (Formerly [facility]) (4/7/2016), Dental disorder, Depression, Encounter for screening colonoscopy (02/19/2018), Encounter for screening colonoscopy (02/19/2018), Fall, Fracture of right olecranon process (6/2016), Fracture, sternum closed (1/30/2017), GERD (gastroesophageal reflux disease), Head ache, Hepatitis C antibody test positive (3/6/2015), History of subdural hematoma (10/2/2016), Hypertension (9/24/2012), Indigestion, IVDU (intravenous drug user) (9/24/2012), Neuropathy (Formerly [facility]) (9/24/2012), Osteoporosis, Pneumonia, Recurrent vertebral fractures (2015), Renal disorder, Seizure disorder ([facility]), Snoring, Stroke ([facility]), Urinary bladder disorder, and Vitamin D deficiency. He also has no past medical history of Addisons disease ([facility]ly [facility]), Adrenal disorder (HCC), Anxiety, Arrhythmia, Asthma, Blood transfusion without reported diagnosis, Cancer ([facility]), Cataract, CHF (congestive heart failure) (HCC), Clotting disorder (HCC), Cushings syndrome (HCC), Diabetes (HCC), Diabetic neuropathy (HCC), Glaucoma, Goiter, Heart attack (HCC), Heart murmur, HIV (human immunodeficiency virus infection) (HCC), Hyperlipidemia, Meningitis, Migraine, Muscle disorder, Parathyroid disorder (HCC), Pituitary disease (HCC), Pulmonary emphysema (HCC), Sickle cell disease (HCC), Thyroid disease, Tuberculosis, or Urinary tract infection.    Surgical History   has a past surgical history that includes endoscopy procedure (3/24/2014); pr  echo,scrotum & contents; major by laparoscopy (6/2/2015); ir recovery room (7/24/2015); lumbar fusion posterior (8/7/2015); thoracotomy (Left, 8/7/2015); rib resection (8/7/2015); elbow orif (Right, 6/21/2016); other orthopedic surgery; evacuation of hematoma (Bilateral, 10/1/2016); craniotomy (Bilateral, 10/1/2016); colonoscopy - endo (2/19/2018); endoscopy procedure (3/1/2019); endoscopy procedure (10/11/2019); cervical decompression posterior (10/14/2019); and cystoscopy (N/A, 12/9/2019).    Family History  family history includes Arthritis in his mother and sister; Cancer in his mother; Diabetes in his mother; Heart Disease in his mother and sister; Hyperlipidemia in his mother; Hypertension in his mother and sister; Other in his father; Other (age of onset: 49) in his sister; Other (age of onset: 60) in his sister.    Social History   reports that he has been smoking cigarettes. He has a 15.00 pack-year smoking history. He has never used smokeless tobacco. He reports current alcohol use of about 4.8 oz of alcohol per week. He reports that he does not use drugs.    Medications  Home Medications     Reviewed by Catalino Healy (Pharmacy Tech) on 05/31/20 at 1914  Med List Status: Unable to Obtain   Medication Last Dose Status   albuterol (VENTOLIN HFA) 108 (90 Base) MCG/ACT Aero Soln inhalation aerosol  Active   cyclobenzaprine (FLEXERIL) 10 MG Tab  Active   fluticasone-salmeterol (ADVAIR) 250-50 MCG/DOSE AEROSOL POWDER, BREATH ACTIVATED  Active   folic acid (FOLVITE) 1 MG Tab  Active   gabapentin (NEURONTIN) 300 MG Cap  Active   Home Care Oxygen  Active   HYDROcodone/acetaminophen (NORCO)  MG Tab  Active   levothyroxine (SYNTHROID) 50 MCG Tab  Active   magnesium oxide (MAG-OX) 400 MG Tab  Active   Naloxone (NARCAN) 4 MG/0.1ML Liquid  Active   omeprazole (PRILOSEC) 40 MG delayed-release capsule  Active   phenytoin ER (DILANTIN) 100 MG Cap  Active   sennosides (SENOKOT) 8.6 MG Tab  Active               Current Facility-Administered Medications   Medication Dose Route Frequency Provider Last Rate Last Dose   • norepinephrine (Levophed) infusion 8 mg/250 mL (premix)  0-30 mcg/min Intravenous Continuous Saurabh Delatorre M.D. 9.4 mL/hr at 05/31/20 1900 5 mcg/min at 05/31/20 1900   • propofol (DIPRIVAN) injection  0-80 mcg/kg/min Intravenous Continuous Saurabh Delatorre M.D. 21.1 mL/hr at 05/31/20 1900 50 mcg/kg/min at 05/31/20 1900   • senna-docusate (PERICOLACE or SENOKOT S) 8.6-50 MG per tablet 2 Tab  2 Tab Enteral Tube BID Anisa Hernández M.D.   Stopped at 05/31/20 1815    And   • polyethylene glycol/lytes (MIRALAX) PACKET 1 Packet  1 Packet Enteral Tube QDAY PRN Anisa Hernández M.D.        And   • magnesium hydroxide (MILK OF MAGNESIA) suspension 30 mL  30 mL Enteral Tube QDAY PRN Anisa Hernández M.D.        And   • bisacodyl (DULCOLAX) suppository 10 mg  10 mg Rectal QDAY PRN Anisa Hernández M.D.       • lactated ringers infusion   Intravenous Continuous Anisa Hernández M.D.       • acetaminophen (TYLENOL) tablet 650 mg  650 mg Enteral Tube Q6HRS PRN Anisa Hernández M.D.       • labetalol (NORMODYNE/TRANDATE) injection 10 mg  10 mg Intravenous Q4HRS PRN Anisa Hernández M.D.       • piperacillin-tazobactam (ZOSYN) 4.5 g in  mL IVPB  4.5 g Intravenous Q8HRS Anisa Hernández M.D.       • MD Alert...Vancomycin per Pharmacy   Other PHARMACY TO DOSE Anisa Hernández M.D.       • Pharmacy Consult: Enteral tube insertion - review meds/change route/product selection   Other PHARMACY TO DOSE Anisa Hernández M.D.       • [START ON 6/1/2020] micafungin (MYCAMINE) 100 mg in D5W 100 mL ivpb  100 mg Intravenous Q24HRS Alexia Khan M.D.       • Respiratory Therapy Consult   Nebulization Continuous RT Alexia Khan M.D.       • ipratropium-albuterol (DUONEB) nebulizer solution  3 mL Nebulization Q2HRS PRN (RT) Alexia Khan M.D.       •  famotidine (PEPCID) tablet 20 mg  20 mg Enteral Tube Q12HRS Alexia Khan M.D.        Or   • famotidine (PEPCID) injection 20 mg  20 mg Intravenous Q12HRS Alexia Khan M.D.       • MD Alert...ICU Electrolyte Replacement per Pharmacy   Other PHARMACY TO DOSE Alexia Khan M.D.       • lidocaine (XYLOCAINE) 1 % injection 1-2 mL  1-2 mL Tracheal Tube Q30 MIN PRN Alexia Khan M.D.       • MD Alert...PROPOFOL CRITICAL CARE PROTOCOL 1 Each  1 Each Other PRN Alexia Khan M.D.       • vancomycin (VANCOCIN) 1,000 mg in  mL IVPB  1,000 mg Intravenous Q12HR Anisa Hernández M.D.       • levETIRAcetam (KEPPRA) 1,500 mg in  mL IVPB  1,500 mg Intravenous Once Juan Polk M.D.       • levETIRAcetam (KEPPRA) 1,000 mg in  mL IVPB  1,000 mg Intravenous Q12HRS Juan Polk M.D.       • propofol (DIPRIVAN) injection  0-80 mcg/kg/min Intravenous Continuous Alexia Khan M.D.           Allergies  Allergies   Allergen Reactions   • Codeine Nausea     Tolerates Dilaudid  Sweat and cold       Vital Signs last 24 hours  Temp:  [36.4 °C (97.5 °F)-37.2 °C (99 °F)] 36.4 °C (97.5 °F)  Pulse:  [] 71  Resp:  [0-54] 16  BP: ()/(50-89) 128/74  SpO2:  [96 %-100 %] 100 %    Physical Exam  Physical Exam  Vitals signs and nursing note reviewed.   Constitutional:       General: He is not in acute distress.     Appearance: He is normal weight. He is ill-appearing. He is not toxic-appearing.      Comments: Sedated, appears older than stated age, chronically ill in appearance, tattoos to chest and upper arms   HENT:      Head: Normocephalic and atraumatic.      Right Ear: External ear normal.      Left Ear: External ear normal.      Nose: Nose normal. No rhinorrhea.      Mouth/Throat:      Mouth: Mucous membranes are moist.      Comments: ETT in place  Eyes:      General: No scleral icterus.     Extraocular Movements: Extraocular movements intact.      Conjunctiva/sclera: Conjunctivae  normal.      Pupils: Pupils are equal, round, and reactive to light.   Neck:      Musculoskeletal: Normal range of motion and neck supple.   Cardiovascular:      Rate and Rhythm: Regular rhythm. Tachycardia present.      Pulses: Normal pulses.      Heart sounds: Normal heart sounds. No murmur.   Pulmonary:      Breath sounds: No wheezing.      Comments: Diminished throughout  Chest:      Chest wall: No tenderness.   Abdominal:      General: Abdomen is flat. Bowel sounds are normal. There is no distension.      Palpations: Abdomen is soft.      Tenderness: There is no abdominal tenderness. There is no guarding or rebound.   Musculoskeletal:      Right lower leg: No edema.      Left lower leg: No edema.      Comments: Chronic venous stasis changes   Lymphadenopathy:      Cervical: No cervical adenopathy.   Skin:     General: Skin is warm and dry.      Capillary Refill: Capillary refill takes 2 to 3 seconds.      Findings: No rash.   Neurological:      Cranial Nerves: No cranial nerve deficit.      Sensory: No sensory deficit.      Motor: No weakness.      Comments: Withdraws to noxious stimuli, no response to verbal stimuli, intermittent tremors to bilateral upper extremities   Psychiatric:      Comments: Unable to assess         Fluids    Intake/Output Summary (Last 24 hours) at 5/31/2020 1939  Last data filed at 5/31/2020 1700  Gross per 24 hour   Intake 0 ml   Output --   Net 0 ml       Laboratory  Recent Results (from the past 48 hour(s))   POC glucose    Collection Time: 05/30/20 12:15 AM   Result Value Ref Range    Glucose - Accu-Ck 201 (A) 70 - 100 mg/dL   BASIC METABOLIC PANEL    Collection Time: 05/30/20  4:47 AM   Result Value Ref Range    Sodium 142 137 - 145 mmol/L    Potassium 3.0 (L) 3.5 - 5.1 mmol/L    Chloride 108 (H) 98 - 107 mmol/L    Co2 33 (H) 22 - 30 mmol/L    Glucose 140 (H) 70 - 100 mg/dL    Bun 4 (L) 9 - 20 mg/dL    Creatinine 0.9 0.7 - 1.3 mg/dL    Calcium 8.3 (L) 8.7 - 10.5 mg/dL    Anion Gap  1 (L) 4 - 12 mmol/L   MAGNESIUM    Collection Time: 05/30/20  4:47 AM   Result Value Ref Range    Magnesium 2.0 1.6 - 2.3 mg/dL   PHOSPHORUS    Collection Time: 05/30/20  4:47 AM   Result Value Ref Range    Phosphorus 2.0 (L) 2.5 - 4.5 mg/dL   ESTIMATED GFR    Collection Time: 05/30/20  4:47 AM   Result Value Ref Range    GFR If African American >60 >60 mL/min/1.73 m 2    GFR If Non African American >60 >60 mL/min/1.73 m 2   PREALBUMIN    Collection Time: 05/30/20  4:47 AM   Result Value Ref Range    Pre-Albumin <3.0 (L) 17.6 - 36.0 mg/dL   POC glucose    Collection Time: 05/30/20  6:27 AM   Result Value Ref Range    Glucose - Accu-Ck 99 70 - 100 mg/dL   POC glucose    Collection Time: 05/30/20 12:00 PM   Result Value Ref Range    Glucose - Accu-Ck 117 (A) 70 - 100 mg/dL   POC glucose    Collection Time: 05/30/20  6:00 PM   Result Value Ref Range    Glucose - Accu-Ck 119 (A) 70 - 100 mg/dL   CBC WITH DIFFERENTIAL    Collection Time: 05/30/20  8:15 PM   Result Value Ref Range    WBC 15.2 (H) 4.8 - 10.8 K/uL    RBC 3.03 (L) 4.70 - 6.10 M/uL    Hemoglobin 9.9 (L) 14.0 - 18.0 g/dL    Hematocrit 34.3 (L) 42.0 - 52.0 %    .2 (H) 80.0 - 94.0 fL    MCH 32.7 28.7 - 33.1 pg    MCHC 28.9 (L) 33.0 - 37.0 g/dL    RDW 17.0 (H) 11.5 - 14.5 %    Platelet Count 177 130 - 400 K/uL    MPV 10.0 7.4 - 10.4 fL    Neutrophils Automated 64.0 39.0 - 70.0 %    Neutrophils-Polys 61 39 - 70 %    Lymphocytes Automated 27.1 21.0 - 50.0 %    Lymphocytes 26 21 - 50 %    Monocytes Automated 7.0 1.7 - 10.0 %    Monocytes 6 2 - 9 %    Eosinophils Automated 1.4 0.0 - 5.0 %    Eosinophils 2 0 - 5 %    Basophils Automated 0.5 0.0 - 3.0 %    Basophils 1 0 - 3 %    Abs Neutrophils Automated 9.7 (H) 1.8 - 7.7 K/uL    Neutrophils (Absolute) 9.7 (H) 1.8 - 7.7 K/uL    Abs Lymph Automated 4.1 1.2 - 4.8 K/uL    Lymphs (Absolute) 4.1 1.2 - 4.8 K/uL    Monos (Absolute) 1.1 (H) 0.2 - 0.9 K/uL   TROPONIN    Collection Time: 05/30/20  8:15 PM   Result Value  Ref Range    Troponin I <0.01 0.00 - 0.04 ng/mL   Comp Metabolic Panel    Collection Time: 05/30/20  8:15 PM   Result Value Ref Range    Sodium 145 137 - 145 mmol/L    Potassium 3.4 (L) 3.5 - 5.1 mmol/L    Chloride 109 (H) 98 - 107 mmol/L    Co2 31 (H) 22 - 30 mmol/L    Anion Gap 5 4 - 12 mmol/L    Glucose 166 (H) 70 - 100 mg/dL    Bun 5 (L) 9 - 20 mg/dL    Creatinine 1.0 0.7 - 1.3 mg/dL    Calcium 8.9 8.7 - 10.5 mg/dL    AST(SGOT) 35 15 - 46 U/L    ALT(SGPT) 21 13 - 69 U/L    Alkaline Phosphatase 81 38 - 126 U/L    Total Bilirubin 0.3 0.2 - 1.3 mg/dL    Albumin 2.8 (L) 3.5 - 5.0 g/dL    Total Protein 6.5 6.3 - 8.2 g/dL    A-G Ratio 0.8    ESTIMATED GFR    Collection Time: 05/30/20  8:15 PM   Result Value Ref Range    GFR If African American >60 >60 mL/min/1.73 m 2    GFR If Non African American >60 >60 mL/min/1.73 m 2   DIFFERENTIAL MANUAL    Collection Time: 05/30/20  8:15 PM   Result Value Ref Range    Bands-Stabs 3 0 - 6 %    Reactive Lymphocytes 1 0 - 5 %   MAGNESIUM    Collection Time: 05/30/20  8:15 PM   Result Value Ref Range    Magnesium 2.6 (H) 1.6 - 2.3 mg/dL   ARTERIAL BLOOD GAS    Collection Time: 05/30/20  9:09 PM   Result Value Ref Range    Ph 7.23 (LL) 7.40 - 7.50    Pco2 77.9 (HH) 25.0 - 35.0 mmHg    Po2 68.3 55.0 - 70.0 mmHg    Hco3 32 (H) 20 - 29 mmol/L    Base Excess 2 -3 - 4 mmol/L    O2 Content 15.1 mg/dL    O2 Saturation 89.8 89.0 - 94.0 %    FIO2 80%     Respiratory Rate 22 /min.    Minute Volume 6 /L    PAO2/FIO2 Ratio 85    INTUBATION CHECK    Collection Time: 05/31/20  5:15 AM    Specimen: Endotracheal Aspiration; Sputum   Result Value Ref Range    Intubation Check Negative Negative   ARTERIAL BLOOD GAS    Collection Time: 05/31/20  5:20 AM   Result Value Ref Range    Ph 7.40 7.40 - 7.50    Pco2 53.9 (H) 25.0 - 35.0 mmHg    Po2 104.1 (H) 55.0 - 70.0 mmHg    Hco3 33 (H) 20 - 29 mmol/L    Base Excess 7 (H) -3 - 4 mmol/L    O2 Content 10.4 mg/dL    O2 Saturation 96.9 (H) 89.0 - 94.0 %     Anion Gap 6 (L) 10 - 18 mmol/L    FIO2 65     Respiratory Rate 13 /min.    Minute Volume 5 /L    PAO2/FIO2 Ratio 160    MAGNESIUM    Collection Time: 05/31/20  5:25 AM   Result Value Ref Range    Magnesium 2.4 (H) 1.6 - 2.3 mg/dL   PHOSPHORUS    Collection Time: 05/31/20  5:25 AM   Result Value Ref Range    Phosphorus 3.0 2.5 - 4.5 mg/dL   ESTIMATED GFR    Collection Time: 05/31/20  5:25 AM   Result Value Ref Range    GFR If African American >60 >60 mL/min/1.73 m 2    GFR If Non African American >60 >60 mL/min/1.73 m 2   DILANTIN    Collection Time: 05/31/20  5:25 AM   Result Value Ref Range    Phenytoin 10.2 10.0 - 20.0 ug/mL   Comp Metabolic Panel    Collection Time: 05/31/20  5:25 AM   Result Value Ref Range    Sodium 143 137 - 145 mmol/L    Potassium 2.8 (LL) 3.5 - 5.1 mmol/L    Chloride 109 (H) 98 - 107 mmol/L    Co2 34 (H) 22 - 30 mmol/L    Anion Gap 0 (L) 4 - 12 mmol/L    Glucose 167 (H) 70 - 100 mg/dL    Bun 6 (L) 9 - 20 mg/dL    Creatinine 1.0 0.7 - 1.3 mg/dL    Calcium 8.4 (L) 8.7 - 10.5 mg/dL    AST(SGOT) 26 15 - 46 U/L    ALT(SGPT) 15 13 - 69 U/L    Alkaline Phosphatase 69 38 - 126 U/L    Total Bilirubin 0.2 0.2 - 1.3 mg/dL    Albumin 2.4 (L) 3.5 - 5.0 g/dL    Total Protein 5.6 (L) 6.3 - 8.2 g/dL    A-G Ratio 0.7    CBC WITHOUT DIFFERENTIAL    Collection Time: 05/31/20  5:25 AM   Result Value Ref Range    WBC 9.9 4.8 - 10.8 K/uL    RBC 2.51 (L) 4.70 - 6.10 M/uL    Hemoglobin 8.2 (L) 14.0 - 18.0 g/dL    Hematocrit 27.4 (L) 42.0 - 52.0 %    .2 (H) 80.0 - 94.0 fL    MCH 32.7 28.7 - 33.1 pg    MCHC 29.9 (L) 33.0 - 37.0 g/dL    RDW 17.0 (H) 11.5 - 14.5 %    Platelet Count 149 130 - 400 K/uL    MPV 9.5 7.4 - 10.4 fL   POC glucose    Collection Time: 05/31/20 12:04 PM   Result Value Ref Range    Glucose - Accu-Ck 129 (A) 70 - 100 mg/dL   ARTERIAL BLOOD GAS    Collection Time: 05/31/20 12:19 PM   Result Value Ref Range    Ph 7.45 7.40 - 7.50    Pco2 47.0 (H) 25.0 - 35.0 mmHg    Po2 63.4 55.0 - 70.0 mmHg     Hco3 32 (H) 20 - 29 mmol/L    Base Excess 7 (H) -3 - 4 mmol/L    O2 Content 11.5 mg/dL    O2 Saturation 91.8 89.0 - 94.0 %    Anion Gap 5 (L) 10 - 18 mmol/L    FIO2 40     Respiratory Rate 14 /min.    Minute Volume 6 /L    PAO2/FIO2 Ratio 159    POC glucose    Collection Time: 20  1:15 PM   Result Value Ref Range    Glucose - Accu-Ck 163 (A) 70 - 100 mg/dL   ISTAT ARTERIAL BLOOD GAS    Collection Time: 20  5:19 PM   Result Value Ref Range    Ph 7.386 (L) 7.400 - 7.500    Pco2 55.6 (HH) 26.0 - 37.0 mmHg    Po2 88 (H) 64 - 87 mmHg    Tco2 35 (H) 20 - 33 mmol/L    S02 96 93 - 99 %    Hco3 33.4 (H) 17.0 - 25.0 mmol/L    BE 7 (H) -4 - 3 mmol/L    Body Temp 37.5 C degrees    O2 Therapy 40 %    iPF Ratio 220     Ph Temp Felicia 7.379 (L) 7.400 - 7.500    Pco2 Temp Co 56.8 (HH) 26.0 - 37.0 mmHg    Po2 Temp Cor 91 (H) 64 - 87 mmHg    Specimen Arterial     Action Range Triggered YES     Inst. Qualified Patient YES    EKG    Collection Time: 20  5:31 PM   Result Value Ref Range    Report       [facility]    Test Date:  2020  Pt Name:    LARISSA DURBIN               Department: 19  MRN:        [id number]                      Room:       [facility]  Gender:     Male                         Technician: CHACORTA  :        1965                   Requested By:CHANDAN PRICE  Order #:    [id number]                    Reading MD:    Measurements  Intervals                                Axis  Rate:       70                           P:          59  DC:         163                          QRS:        44  QRSD:       88                           T:          34  QT:         424  QTc:        458    Interpretive Statements  SINUS RHYTHM  VENTRICULAR PREMATURE COMPLEX  LOW VOLTAGE IN FRONTAL LEADS  Compared to ECG 10/05/2016 07:26:34  Ventricular premature complex(es) now present  Low QRS voltage now present         Imaging  CTA chest:     Extensive bilateral airspace opacities of developed in the interval from the  previous study, including large areas of consolidation involving the right and left lower lobes.     The findings suggest severe bilateral pneumonia.     The distribution is uncharacteristic for Covid pneumonia, although the exam should not be considered a rule out for Covid pneumonia.     There is no evidence of pulmonary embolus.    CXR(reviewed): ETT in proper position, right pleural effusion noted ? Right pleural thickening, bilateral opacities noted    Assessment/Plan  Shock (HCC)- (present on admission)  Assessment & Plan  Multifactorial:  PEA/CPR and likely C albicans in urine and sputum, blood cultures so far negative  Continue zosyn/vanco  Caspofungin started at [facility] and changed to micafungin  Continue titrating levophed for MAP goals >65    PEA (Pulseless electrical activity) (HCC)- (present on admission)  Assessment & Plan  ? Etiology from [facility]  Continue supportive care  Correct electrolyte disturbances    Acute respiratory failure with hypoxia (HCC)- (present on admission)  Assessment & Plan  Intubated at [facility] on 5/30  Continue full vent support  RT/O2 protocols  Vent bundle protocols  I am actively adjusting vent settings based on ABGs-->increase rate  SBTs in the morning if pt stable    Recurrent right pleural effusion- (present on admission)  Assessment & Plan  Noted on chest x-ray  Thoracenteses performed on 5/13 and 5/14  May required additional thorac  Follow chest x-ray    Pneumonia  Assessment & Plan  ?aspiration  Continue vanco/zosyn  Sputum with Candida--?started caspofungin, changed to micafungin in [facility]    Hypokalemia  Assessment & Plan  Replete to goal > 4    Seizure disorder (HCC)- (present on admission)  Assessment & Plan  ? If patient continues to actively seize  Neurology consulted  Continue Keppra 1g BID  EEG tonight      Discussed patient condition and risk of morbidity and/or mortality with RN, RT and Pharmacy.    The patient remains critically ill.  Critical care time = 78  minutes in directly providing and coordinating critical care and extensive data review.  No time overlap and excludes procedures.

## 2020-06-01 NOTE — H&P
Hospital Medicine History & Physical Note    Date of Service  5/31/2020    Primary Care Physician  Lis Germain M.D.    Code Status  Full Code    Chief Complaint    Transferred from outside facility for higher level of care due to respiratory failure and seizure    History of Presenting Illness    I was unable to obtain information from patient as he was intubated at the time of evaluation.  I obtained below information by talking to healthcare providers including intensivist, neurologist and performing chart review      54 y.o. male with past medical history of alcoholism, seizure disorder and COPD who presented as a transfer from [facility] on 5/31/2020 for higher level of care.  He was admitted to outside facility on May 22, 2020 with admission diagnosis of acute retention, UTI and hypoxia.  He underwent COVID-19 testing on May 6, 2020 and it came back negative.  Patient has chronic pleural effusion and he was hospitalized for hypoxemic respiratory failure. After he developed bradycardia and he was coded and he was reintubated yesterday.   He underwent thoracentesis on May 13 and May 14.    He has been receiving vancomycin and Zosyn.      History is limited as patient is intubated at the time of evaluation.    I discussed with intensivist Dr. Khan and I also discussed with neurologist Dr. Polk neurology consult.            Review of Systems  Review of Systems   Unable to perform ROS: Intubated       Past Medical History   has a past medical history of Alcoholism ([facility]) (9/24/2012), Allergy, Anemia, Arthritis, At risk for sleep apnea (6/21/16), Back pain, C. difficile colitis (10/1/2016), C. difficile colitis, Chronic cholecystitis (6/2/2015), Closed fracture of right olecranon process (6/21/2016), COPD (chronic obstructive pulmonary disease) ([facility]), Decubitus ulcer of back, stage 3 ([facility]) (4/7/2016), Dental disorder, Depression, Encounter for screening colonoscopy (02/19/2018), Encounter for  screening colonoscopy (02/19/2018), Fall, Fracture of right olecranon process (6/2016), Fracture, sternum closed (1/30/2017), GERD (gastroesophageal reflux disease), Head ache, Hepatitis C antibody test positive (3/6/2015), History of subdural hematoma (10/2/2016), Hypertension (9/24/2012), Indigestion, IVDU (intravenous drug user) (9/24/2012), Neuropathy (HCC) (9/24/2012), Osteoporosis, Pneumonia, Recurrent vertebral fractures (2015), Renal disorder, Seizure disorder ([facility]), Snoring, Stroke ([facility]), Urinary bladder disorder, and Vitamin D deficiency.    Surgical History   has a past surgical history that includes endoscopy procedure (3/24/2014); pr echo,scrotum & contents; major by laparoscopy (6/2/2015); ir recovery room (7/24/2015); lumbar fusion posterior (8/7/2015); thoracotomy (Left, 8/7/2015); rib resection (8/7/2015); elbow orif (Right, 6/21/2016); other orthopedic surgery; evacuation of hematoma (Bilateral, 10/1/2016); craniotomy (Bilateral, 10/1/2016); colonoscopy - endo (2/19/2018); endoscopy procedure (3/1/2019); endoscopy procedure (10/11/2019); cervical decompression posterior (10/14/2019); and cystoscopy (N/A, 12/9/2019).     Family History  family history includes Arthritis in his mother and sister; Cancer in his mother; Diabetes in his mother; Heart Disease in his mother and sister; Hyperlipidemia in his mother; Hypertension in his mother and sister; Other in his father; Other (age of onset: 49) in his sister; Other (age of onset: 60) in his sister.     Social History   reports that he has been smoking cigarettes. He has a 15.00 pack-year smoking history. He has never used smokeless tobacco. He reports current alcohol use of about 4.8 oz of alcohol per week. He reports that he does not use drugs.    Allergies  Allergies   Allergen Reactions   • Codeine Nausea     Tolerates Dilaudid  Sweat and cold       Medications  Prior to Admission Medications   Prescriptions Last Dose Informant Patient Reported?  Taking?   HYDROcodone/acetaminophen (NORCO)  MG Tab   Yes No   Sig: Take 1-2 Tabs by mouth every 6 hours as needed.   Home Care Oxygen  Patient Yes No   Sig: Inhale 2 L/min by mouth Continuous.   Naloxone (NARCAN) 4 MG/0.1ML Liquid   No No   Sig: Spray one actuation in one nostril for overdose. Call 911. May repeat in 2 minutes if needed.   albuterol (VENTOLIN HFA) 108 (90 Base) MCG/ACT Aero Soln inhalation aerosol   Yes No   Sig: Inhale 2 Puffs by mouth every four hours as needed for Shortness of Breath.   cyclobenzaprine (FLEXERIL) 10 MG Tab   No No   Sig: Take 1 Tab by mouth every 8 hours as needed for Muscle Spasms.   fluticasone-salmeterol (ADVAIR) 250-50 MCG/DOSE AEROSOL POWDER, BREATH ACTIVATED   Yes No   Sig: Inhale 1 Puff by mouth every 12 hours.   folic acid (FOLVITE) 1 MG Tab  Patient No No   Sig: Take 1 Tab by mouth every day.   gabapentin (NEURONTIN) 300 MG Cap   No No   Si tabs am, 2 tabs midday, 3 tabs evening   Patient taking differently: 600 mg 3 times a day. 2 tabs am, 2 tabs midday, 3 tabs evening   levothyroxine (SYNTHROID) 50 MCG Tab   Yes No   Sig: Take 50 mcg by mouth Every morning on an empty stomach.   magnesium oxide (MAG-OX) 400 MG Tab   Yes No   Sig: Take 400 mg by mouth every day.   omeprazole (PRILOSEC) 40 MG delayed-release capsule  Patient's Home Pharmacy Yes No   Sig: Take 40 mg by mouth every day.   phenytoin ER (DILANTIN) 100 MG Cap   No No   Sig: Take 1 Cap by mouth 3 times a day.   sennosides (SENOKOT) 8.6 MG Tab   Yes No   Sig: Take 1 Tab by mouth 2 Times a Day.      Facility-Administered Medications: None       Physical Exam  Temp:  [36.4 °C (97.5 °F)-37.2 °C (99 °F)] 36.4 °C (97.5 °F)  Pulse:  [] 71  Resp:  [0-54] 16  BP: ()/(50-89) 128/74  SpO2:  [96 %-100 %] 100 %    Physical Exam  Vitals signs reviewed.   Constitutional:       General: He is not in acute distress.     Interventions: He is intubated.   HENT:      Head: Normocephalic and atraumatic. No  contusion.      Right Ear: External ear normal.      Left Ear: External ear normal.      Nose: Nose normal.      Mouth/Throat:      Mouth: Mucous membranes are dry.      Pharynx: No oropharyngeal exudate.   Eyes:      General:         Right eye: No discharge.         Left eye: No discharge.      Pupils: Pupils are equal, round, and reactive to light.   Neck:      Musculoskeletal: No neck rigidity or muscular tenderness.   Cardiovascular:      Rate and Rhythm: Normal rate and regular rhythm.      Heart sounds: No murmur. No friction rub. No gallop.    Pulmonary:      Effort: Pulmonary effort is normal. He is intubated.      Breath sounds: No wheezing or rhonchi.   Abdominal:      General: Bowel sounds are normal. There is no distension.      Palpations: Abdomen is soft.      Tenderness: There is no abdominal tenderness.   Musculoskeletal: Normal range of motion.         General: No swelling or tenderness.   Skin:     General: Skin is warm and dry.      Coloration: Skin is not jaundiced.   Neurological:      Mental Status: He is alert.      Coordination: Coordination abnormal.      Comments: Unable to perform complete neuro exam as patient is intubated and sedated.   Psychiatric:      Comments: Unable to assess         Laboratory:  Recent Labs     05/29/20  1354 05/30/20 2015 05/31/20  0525   WBC 8.7 15.2* 9.9   RBC 2.57* 3.03* 2.51*   HEMOGLOBIN 8.3* 9.9* 8.2*   HEMATOCRIT 27.7* 34.3* 27.4*   .8* 113.2* 109.2*   MCH 32.3 32.7 32.7   MCHC 30.0* 28.9* 29.9*   RDW 16.7* 17.0* 17.0*   PLATELETCT 152 177 149   MPV 9.4 10.0 9.5     Recent Labs     05/30/20 0447 05/30/20 2015 05/31/20  0525   SODIUM 142 145 143   POTASSIUM 3.0* 3.4* 2.8*   CHLORIDE 108* 109* 109*   CO2 33* 31* 34*   GLUCOSE 140* 166* 167*   BUN 4* 5* 6*   CREATININE 0.9 1.0 1.0   CALCIUM 8.3* 8.9 8.4*     Recent Labs     05/29/20  1354 05/30/20 0447 05/30/20 2015 05/31/20  0525   ALTSGPT 14  --  21 15   ASTSGOT 22  --  35 26   ALKPHOSPHAT 87  --   81 69   TBILIRUBIN 0.3  --  0.3 0.2   PREALBUMIN 3.0* <3.0*  --   --    GLUCOSE 152* 140* 166* 167*     Recent Labs     05/29/20  1354   INR 1.07     No results for input(s): NTPROBNP in the last 72 hours.  Recent Labs     05/29/20  1354   TRIGLYCERIDE 168*     No results for input(s): TROPONINT in the last 72 hours.    Imaging:  DX-CHEST-PORTABLE (1 VIEW)    (Results Pending)   CT-HEAD W/O    (Results Pending)         Assessment/Plan:      Acute respiratory failure with hypoxia (HCC)- (present on admission)  Assessment & Plan  He found to have acute respiratory failure with hypoxia.  He had bradycardia at outside facility and he was reintubated again.  Admit to ICU for ventilator management  I discussed with intensivist.    Pneumonia  Assessment & Plan  He underwent CT thorax and he found to have finding of pulmonary  I continue vancomycin and dose adjustment as per vancomycin trough level.  Continue Zosyn.  Admit to ICU for close monitoring.  For acute respiratory failure with hypoxia continue ventilator management.    Hypokalemia  Assessment & Plan  Patient found to have hypokalemia.  Replace as needed.  Continue to monitor.    Seizure disorder (HCC)- (present on admission)  Assessment & Plan  Patient has been having seizure.  I requested consult with neurology and I discussed with Dr. Polk requested consult  Seizure precautions   Keppra 1 g every 12 hourly  Admit to ICU for close monitoring.    I discussed about this admission with intensivist Dr. Khan as well as with neurologist.

## 2020-06-01 NOTE — PROGRESS NOTES
"Pharmacy Kinetics 54 y.o. male on vancomycin day # 5 (started at [place]) 2020    Currently on Vancomycin 1000 mg IV q12h  Provider specified end date: TBD    Indication for Treatment: PNA    Pertinent history per medical record: Admitted on 2020 for respiratory failure from [facility]. At the other facility, he started vancomycin on  for PNA. This is being continued here. Little is known about pt at this time - per RN pt coded yesterday and there is concern for aspiration from that event. Pt also growing a \"fungus\" in cx. She also noted that pt had been on TPN.    Other antibiotics: Zosyn 4.5 g IV q8h, micafungin 100 mg IV q24h    Allergies: Codeine     Concerns for renal function: concomitant Zosyn, code blue , rising SCr    Pertinent cultures to date:    Respiratory cx with Candida albicans   Urine cx with Candida albicans    MRSA nares swab if pneumonia is a concern: ordered since not done at OSH    Recent Labs     20  0533 20  1354 20  0525   WBC 6.6 8.7 15.2* 9.9   NEUTSPOLYS  --   --  61  --    BANDSSTABS  --   --  3  --      Recent Labs     20  0528 20  1354 20  0447 20  0525   BUN 5* 5* 4* 5* 6*   CREATININE 0.6* 0.7 0.9 1.0 1.0   ALBUMIN 2.4* 2.5*  --  2.8* 2.4*     Recent Labs     20  1354   VANCOTROUGH 20.7*       Intake/Output Summary (Last 24 hours) at 2020 1845  Last data filed at 2020 1700  Gross per 24 hour   Intake 0 ml   Output --   Net 0 ml      /74   Pulse 71   Temp 36.4 °C (97.5 °F) (Temporal)   Resp 16   Wt 70.4 kg (155 lb 3.3 oz)   SpO2 100%  Temp (24hrs), Av.4 °C (97.5 °F), Min:36.4 °C (97.5 °F), Max:36.4 °C (97.5 °F)      A/P   1. Vancomycin dose change: Continue 1000 mg IV q12h (last dose at OSH at 0958 this AM)  2. Next vancomycin level: Tomorrow at 0930  3. Goal trough: 16-20 mcg/mL  4. Comments: Pt's renal function appears to be declining, which would be " expected after an arrest with ROSC. Will continue the dosing from OSH, but check a trough tomorrow AM. Suspect that pt may be supratherapeutic, particularly if SCr bumps again tomorrow. Cx only with Candida growth - ordered MRSA nares to help narrow therapy. Would recommend ID consult, as pt has been on abx therapy for at least 5 days at this point    Anel Caro, HadleyD, BCPS

## 2020-06-02 PROBLEM — Z71.89 GOALS OF CARE, COUNSELING/DISCUSSION: Status: ACTIVE | Noted: 2020-01-01

## 2020-06-02 NOTE — ASSESSMENT & PLAN NOTE
Worsening 6/11-6/12 will stop TF  Check KUB start scheduled reglan x2 days -> findings of ileus vs pSBO monitor need for ctabdomen if not improving  Correct electrolyte serial monitor abdominal exam  Limit narcotic will give PO narcan    Ct abdomen today with mechanical obstruction seen stat surgery consultation

## 2020-06-02 NOTE — FLOWSHEET NOTE
06/02/20 0646   Weaning Trial   Weaning Trial Initiated Yes   Weaning Trial Stopped due to: RSBI >105 (Rapid Shallow Breathing Index)   Length of Weaning Trial (Hours) 5   Weaning Parameters   Rapid Shallow Breathing Index (RR/VT) 300

## 2020-06-02 NOTE — DIETARY
"Nutrition Support Assessment     Nutrition services:   Day 2 of admit.  55 yo male with admitting diagnosis: respiratory failure    Current problem list:  1. Shock  2. PEA  3. Acute respiratory failure with hypoxia - on vent  4. Hypokalemia  5. Seizure disorder     Assessment:  Estimated Nutritional Needs: based on: height 5'4\", weight 70.4 kg, IBW 58,968 kg, BMI 26.64    Calculation/Equation MSJ x 1.2 = 1735 kcals  Calories/day: 1700 - 1900 kcals (24 - 27 kcals/kg)  Protein/day: 84 - 99 g (1.2 - 1.4 g/kg)     Evaluation:   1. Pt was on TPN at [facility] secondary to inability to pass NGT with Bipap. Poor intake x 1 week in hospital before TPN was started and ran 1-2 days. Was to transition to tube feedings the day he was transferred here.  Now on vent. Orders for TF to begin.  2. Weight on admit to [facility] 5/22 was 59 kg, pt is now 69.4 kg. Pt is currently 4 liters fluid positive since admit.   3. History of traumatic brain injury and seizure disorder, COPD - O2 dependent, Hep C, cirrhosis, ETOH abuse, recurrent right sided pleural effusion.   4. Pt is at risk for refeeding secondary to inadequate intake for >1 week.  Will order refeeding labs and will start and advance TF slowly.  5. Levophed at 4 mcg/min. Propofol discontinued.  6. Cortrak to be placed for enteral access  7. Pt will benefit from moderate protein and omega 3 fatty acid containing TF formula.     Malnutrition Risk: na    Recommendations/Plan:  1. When feeding tube is placed and confirmed start at 10 ml/hr and advance slowly by 10 ml q 12 hours to full goal.   2. Diabetisource with full goal 65 ml/hr will provide 1872 kcals, 94 g protein, 1266 ml H20/day.   3. Monitor refeeding labs and replace electrolytes as appropriate.. pt would benefit from thiamine supplementation with refeeding risk.     "

## 2020-06-02 NOTE — CARE PLAN
Problem: Venous Thromboembolism (VTW)/Deep Vein Thrombosis (DVT) Prevention:  Goal: Patient will participate in Venous Thrombosis (VTE)/Deep Vein Thrombosis (DVT)Prevention Measures  Intervention: Ensure patient wears graduated elastic stockings (DWAYNE hose) and/or SCDs, if ordered, when in bed or chair (Remove at least once per shift for skin check)  Note: SCDs will remain in place and on for the extent of time patient remains in bed except when performing 2 RN skin check     Problem: Respiratory:  Goal: Respiratory status will improve  Intervention: Collaborate with respiratory therapist and Interdisciplinary Team on treatment measures to improve respiratory function  Note: Collaborating w/ RT to titrate ventilator settings to meet O2 demands

## 2020-06-02 NOTE — PROGRESS NOTES
"Critical Care Progress Note    Date of admission  5/31/2020    Chief Complaint  54 y.o. male admitted 5/31/2020 with Respiratory failure, seizure    Hospital Course    \"All history was obtained from old notes/charts from [facility] as the patient is intubated at the time of my evaluation.     Mr. Lechuga is a 54 year old male with the past medical history of urethral stricture with urinary retention, recurrent right-sided pleural effusion, alcohol abuse, COPD on home O2, hepatitis C with cirrhosis, chronic pain syndrome on narcotics, traumatic brain injury, history of SDH with subsequent seizure disorder who presented to [facility] on 5/22 with the complaint of difficulty urinary for 2 days and low blood pressures.  He apparently was recently hospitalized at [facility] from 5/6 to 5/11/2020 for a COPD exacerbation and reported that he was doing well for until 2 days prior to 5/22 when he developed difficulty urinating and draining his bladder.  He has worsening abdominal pain in the suprapubic region with chills.  No fevers.  No nausea or vomiting.  He has a negative COVID test on 5/6.  The patient was admitted for acute urinary retention, UTI, and hypoxia.  Over the course of the next week, the patient required intubation for seizure activity, but was then extubated.  However, the patient then developed aspiration pneumonia with AMS and was on BiPAP for a night on 5/29.  All during the patient's hospital stay, he has been battling with delirium.  Unfortunately, around 2000 last night the patient the patient became unresponsive with a bradycardic episode and no pulses.  A code was called and he received 3 rounds of CPR and 2 doses of epi before ROSC was achieved.  He was intubated again and stabilized.  He was sent to [facility] for higher level of care due to the growing complexity of his condition as well as critical care management since [facility] does not have critical care specialists on staff.\"      Interval " "Problem Update  Reviewed last 24 hour events:   - Discussed Code Status with LEFTY (sister) she states he did not want to be DNR/DNI but had never had the chance to change his POLST.  She states he misunderstood the form.  In review of the patient's medical record this is collaborated by Dr. Pimentel' H&P on 6/21/16:     \"Code status is full.  He had signed POLST form in the past, saying he did not want resuscitation and only wanted limited interventions; however, in a discussion with the nurse today at surgery, he clarified that he had misunderstood the form, what he meant was that he did not want to be kept alive on life support, but he would want a trial of  full resuscitation efforts.  Therefore, his code status is full.\"     - Neuro: withdraws x4    - HR: 40s-60s   - SBP: 100s-120s, NE at 5   - GI: TF to start today   - UOP: 855 mL/24 hrs   - Irby: yes   - Tm: 37.3   - Lines: PICC, PIV   - PPx: GI pepcid, DVT SCDs   - ABG: Mild hypoxia   - VD #5   - SBT: RSBI 300   - CXR (personally reviewed and compared to prior): edema and right sided effusion   - thoracentesis ordered   -Cortisol 11.4, consider hydrocortisone if unable to wean off of vasopressors today    Yesterday   - Admitted yesterday afternoon from [place]   - Neuro: heavily sedated   - HR: 50s-70s   - SBP: 80s-120s on norepinephrine   - GI: clamp NGT   - UOP: 650 mL out since admision   - Irby: yes   - Tm: 37.3   - Lines: PICC, PIV   - PPx: GI pepcid, DVT SCDs   - ABG: looks good   - CXR (personally reviewed and compared to prior): recurrent right effusion    Review of Systems  Review of Systems   Unable to perform ROS: Intubated        Vital Signs for last 24 hours   Pulse:  [43-89] 56  Resp:  [4-49] 24  BP: ()/(47-84) 102/64  SpO2:  [84 %-100 %] 97 %    Hemodynamic parameters for last 24 hours       Respiratory Information for the last 24 hours  Vent Mode: APVCMV  Rate (breaths/min): 16  Vt Target (mL): 360  PEEP/CPAP: 8  P Support: 5  MAP: " 14  Length of Weaning Trial (Hours): 5  Control VTE (exp VT): 409    Physical Exam   Physical Exam  Vitals signs and nursing note reviewed.   Constitutional:       General: He is in acute distress.      Appearance: He is well-developed. He is ill-appearing.      Interventions: He is intubated.   HENT:      Head: Normocephalic and atraumatic.      Comments: Scalp abnormalities consistent with prior neurosurgical procedure.  Surgical hardware is seen extruding from the skin     Right Ear: External ear normal.      Left Ear: External ear normal.      Nose: Nose normal.      Mouth/Throat:      Mouth: Mucous membranes are dry.      Pharynx: Oropharynx is clear.      Comments: ET tube in place  Eyes:      Conjunctiva/sclera: Conjunctivae normal.      Pupils: Pupils are equal, round, and reactive to light.   Neck:      Musculoskeletal: Neck supple.      Vascular: No JVD.      Trachea: No tracheal deviation.   Cardiovascular:      Rate and Rhythm: Regular rhythm. Bradycardia present.      Pulses: Normal pulses.   Pulmonary:      Effort: He is intubated.      Breath sounds: Rales present. No wheezing.   Abdominal:      General: Bowel sounds are normal. There is no distension.      Palpations: Abdomen is soft.   Musculoskeletal:         General: No tenderness.   Skin:     General: Skin is warm and dry.      Capillary Refill: Capillary refill takes less than 2 seconds.      Findings: No rash.      Comments: Multiple tattoos  Venous stasis dermatitis   Neurological:      General: No focal deficit present.      Mental Status: He is alert.      Comments: Opens eyes to voice, follows simple commands.  Moving all 4 extremities   Psychiatric:      Comments: Unable to assess         Medications  Current Facility-Administered Medications   Medication Dose Route Frequency Provider Last Rate Last Dose   • LORazepam (ATIVAN) injection 4 mg  4 mg Intravenous Q10 MIN PRN Pavel Orlando Jr., D.O.       • phenytoin (DILANTIN) injection 300  mg  300 mg Intravenous TWICE DAILY Pavel Orlando Jr. D.O.   300 mg at 06/02/20 0530    And   • phenytoin (DILANTIN) injection 200 mg  200 mg Intravenous DAILY Pavel Orlando Jr., D.O.       • norepinephrine (Levophed) infusion 8 mg/250 mL (premix)  0-30 mcg/min Intravenous Continuous Saurabh Delatorre M.D. 9.4 mL/hr at 06/02/20 0600 5 mcg/min at 06/02/20 0600   • propofol (DIPRIVAN) injection  0-80 mcg/kg/min Intravenous Continuous Saurabh Delatorre M.D. 8.4 mL/hr at 06/02/20 0430 20 mcg/kg/min at 06/02/20 0430   • senna-docusate (PERICOLACE or SENOKOT S) 8.6-50 MG per tablet 2 Tab  2 Tab Enteral Tube BID Anisa Hernández M.D.   Stopped at 05/31/20 1815    And   • polyethylene glycol/lytes (MIRALAX) PACKET 1 Packet  1 Packet Enteral Tube QDAY PRN Anisa Hernández M.D.        And   • magnesium hydroxide (MILK OF MAGNESIA) suspension 30 mL  30 mL Enteral Tube QDAY PRN Anisa Hernández M.D.        And   • bisacodyl (DULCOLAX) suppository 10 mg  10 mg Rectal QDAY PRN Anisa Hernández M.D.       • lactated ringers infusion   Intravenous Continuous Anisa Hernández M.D. 100 mL/hr at 06/01/20 2123     • acetaminophen (TYLENOL) tablet 650 mg  650 mg Enteral Tube Q6HRS PRN Anisa Hernández M.D.       • labetalol (NORMODYNE/TRANDATE) injection 10 mg  10 mg Intravenous Q4HRS PRN Anisa Hernández M.D.       • piperacillin-tazobactam (ZOSYN) 4.5 g in  mL IVPB  4.5 g Intravenous Q8HRS Anisa Hernández M.D. 25 mL/hr at 06/02/20 0530 4.5 g at 06/02/20 0530   • Pharmacy Consult: Enteral tube insertion - review meds/change route/product selection   Other PHARMACY TO DOSE Anisa Hernández M.D.       • Respiratory Therapy Consult   Nebulization Continuous RT Alexia Khan M.D.       • ipratropium-albuterol (DUONEB) nebulizer solution  3 mL Nebulization Q2HRS PRN (RT) Alexia Khan M.D.       • famotidine (PEPCID) tablet 20 mg  20 mg Enteral Tube Q12HRS Alexia Khan,  M.D.        Or   • famotidine (PEPCID) injection 20 mg  20 mg Intravenous Q12HRS Alexia Khan M.D.   20 mg at 06/02/20 0529   • MD Alert...ICU Electrolyte Replacement per Pharmacy   Other PHARMACY TO DOSE Alexia Khan M.D.       • lidocaine (XYLOCAINE) 1 % injection 1-2 mL  1-2 mL Tracheal Tube Q30 MIN PRN Alexia Khan M.D.       • levETIRAcetam (KEPPRA) 1,000 mg in  mL IVPB  1,000 mg Intravenous Q12HRS Juan Polk M.D.   Stopped at 06/02/20 0545       Fluids    Intake/Output Summary (Last 24 hours) at 6/2/2020 0705  Last data filed at 6/2/2020 0600  Gross per 24 hour   Intake 3777.79 ml   Output 855 ml   Net 2922.79 ml       Laboratory  Recent Labs     05/30/20  2109 05/31/20  0520 05/31/20  1219 05/31/20  1719 06/01/20  0529   BIGID00A 7.23* 7.40 7.45  --   --    [id number] 77.9* 53.9* 47.0*  --   --    RXOGN624H 68.3 104.1* 63.4  --   --    PORL6YOQ 89.8 96.9* 91.8  --   --    ARTHCO3 32* 33* 32*  --   --    FIO2 80% 65 40  --   --    ARTBE 2 7* 7*  --   --    ISTATAPH  --   --   --  7.386* 7.421   ISTATAPCO2  --   --   --  55.6* 48.1*   ISTATAPO2  --   --   --  88* 105*   ISTATATCO2  --   --   --  35* 33   [id number]  --   --   --  96 98   ISTATARTHCO3  --   --   --  33.4* 31.3*   ISTATARTBE  --   --   --  7* 6*   ISTATTEMP  --   --   --  37.5 C 96.4 F   ISTATFIO2  --   --   --  40 40   ISTATSPEC  --   --   --  Arterial Arterial   ISTATAPHTC  --   --   --  7.379* 7.439   CEAFYUWG4CD  --   --   --  91* 97*     Recent Labs     05/30/20 2015   TROPONINI <0.01     Recent Labs     05/31/20 0525 05/31/20 2100 06/01/20  0500 06/01/20  1600 06/02/20  0530   SODIUM 143 149* 141  --  148*   POTASSIUM 2.8* 2.8* 3.2* 3.9 3.4*   CHLORIDE 109* 109 105  --  111   CO2 34* 30 30  --  28   BUN 6* 8 8  --  11   CREATININE 1.0 1.12 1.00  --  1.06   MAGNESIUM 2.4* 2.0 2.0  --   --    PHOSPHORUS 3.0  --  2.6  --  2.9   CALCIUM 8.4* 8.0* 7.8*  --  7.7*     Recent Labs     05/31/20 0525 05/31/20 2100  06/01/20  0500 06/02/20  0530   ALTSGPT 15 12 10  --    ASTSGOT 26 20 16  --    ALKPHOSPHAT 69 65 66  --    TBILIRUBIN 0.2 0.3 0.3  --    GLUCOSE 167* 115* 95 85     Recent Labs     05/30/20 2015 05/31/20  0525 05/31/20 2100 06/01/20  0500 06/02/20  0530   WBC 15.2* 9.9 7.8 6.3 6.8   NEUTSPOLYS 61  --  75.70* 73.90*  --    LYMPHOCYTES 26  --  13.90* 13.90*  --    MONOCYTES 6  --  4.30 9.60  --    EOSINOPHILS 2  --  1.70 0.90  --    BASOPHILS 1  --  2.60* 1.70  --    ASTSGOT 35 26 20 16  --    ALTSGPT 21 15 12 10  --    ALKPHOSPHAT 81 69 65 66  --    TBILIRUBIN 0.3 0.2 0.3 0.3  --      Recent Labs     05/31/20 2100 06/01/20  0500 06/02/20  0530   RBC 2.33* 2.40* 2.32*   HEMOGLOBIN 7.6* 7.9* 7.5*   HEMATOCRIT 25.3* 26.0* 24.5*   PLATELETCT 165 128* 143*       Imaging  X-Ray:  I have personally reviewed the images and compared with prior images.  CT:    Reviewed    Assessment/Plan  Shock (HCC)- (present on admission)  Assessment & Plan  Multifactorial:  PEA/CPR, propofol and ?  Pneumonia  Continue zosyn/vanco  Echo pending   Continue titrating levophed for MAP goals >65  cortisol 11.4, consider steroids if unable to wean vasopressors    PEA (Pulseless electrical activity) (HCC)- (present on admission)  Assessment & Plan  ? Etiology from [place], possibly hypoxia  Continue supportive care  Correct electrolyte disturbances  EKG without ischemic changes  Echocardiogram: LVEF 65%. Mild concentric LVH. Grade II diastolic dysfunction. Mild mitral regurgitation. Estimated right ventricular systolic pressure is 55 mmHg    Goals of care, counseling/discussion  Assessment & Plan  Discussed at length with patient's sister regarding his goals of care given his POLST completed in 2015 says DNR/DNI.  In reviewing the chart, the patient actually stated that he misunderstood the form and this CODE STATUS is not correct.    Acute respiratory failure with hypoxia (HCC)- (present on admission)  Assessment & Plan  Intubated at [facility] on  5/30/20 following cardiac arrest  RT/O2 protocols  Daily and PRN ABGs  Titration of ventilator therapy based on ABGs and patient's status  Sedation as tolerated/indicated  Daily CXR  HOB >30 degrees and peridex for VAP prevention  Pepcid for GI prophylaxis  SAT/SBT when able (ABCDEF Bundle)  Early mobility  Failed SBT today, thoracentesis to be completed and retry SBT's abdomen    Recurrent right pleural effusion- (present on admission)  Assessment & Plan  Noted on chest x-ray  Thoracenteses performed on 5/13 and 5/14  Thoracentesis ordered today, follow-up fluid analysis  Follow chest x-ray    Pneumonia- (present on admission)  Assessment & Plan  ?aspiration  Continue zosyn  Stop vancomycin given negative MRSA nares  Sputum with Candida--?clinical significance    Ileus (HCC)  Assessment & Plan  Resolved, start tube feeding    Hypokalemia- (present on admission)  Assessment & Plan  Continue to replete to goal > 4    Seizure disorder (HCC)- (present on admission)  Assessment & Plan  cEEG without evidence of seizure  Neurology consulted  Continue Keppra 1g BID and Dilantin  Per neurology okay to wean sedation       VTE:  Contraindicated  Ulcer: H2 Antagonist  Lines: Irby Catheter  Ongoing indication addressed    I have performed a physical exam and reviewed and updated ROS and Plan today (6/2/2020). In review of yesterday's note (6/1/2020), there are no changes except as documented above.     Treating ventilator and vasopressors; I am currently treating pneumonia with acute hypoxic respiratory failure and shock. This patient is critically ill, at high risk for decompensation leading to worsening vital organ dysfunction and death without critical care interventions.    Discussed patient condition and risk of morbidity and/or mortality with RN, RT, Pharmacy, , Code status disscussed and Charge nurse / hot rounds     The patient remains critically ill.  Critical care time = 37 minutes in directly providing  and coordinating critical care and extensive data review.  No time overlap and excludes procedures.

## 2020-06-02 NOTE — PROGRESS NOTES
Cortrak Placement    Tube Team verified patient name and medical record number prior to tube placement.  Cortrak tube (43 inches, 10 [place]) placed at 60 cm in right nare.  Per Cortrak picture, tube appears to be in the stomach.  Nursing Instructions: Awaiting KUB to confirm placement before use for medications or feeding. Once placement confirmed, flush tube with 30 ml of water, and then remove and save stylet, in patient medication drawer.

## 2020-06-02 NOTE — CARE PLAN
Problem: Safety  Goal: Will remain free from injury  Outcome: PROGRESSING AS EXPECTED  Intervention: Provide assistance with mobility  Note: Bedrest orders in place, turns being done q2 hours, restraints in place being assessed q2 hours.     Problem: Skin Integrity  Goal: Risk for impaired skin integrity will decrease  Outcome: PROGRESSING AS EXPECTED  Intervention: Assess risk factors for impaired skin integrity and/or pressure ulcers  Note: Moisturizer given to dry areas, pics taken of head hardware, interventions as outlined in skin note.

## 2020-06-02 NOTE — PROGRESS NOTES
Physician: Dr Foss  Tech: Thu  Procedure: Ultrasound Guided Thoracentesis Rt     Pt's procedure was done at bedside in his room. 800 ml of fluid was removed from pt Rt Thorax. 800 ml of fluid was sent to lab. Pt appeared to be in a stable condition for his procedure. Vitals were monitored at bedside during procedure with pt rn present. Xray post thora was ordered after procedure to be done at bedside.

## 2020-06-02 NOTE — PROGRESS NOTES
2 RN skin assessment performed with MERY Paulino.     - Sacrum - pink but intact and blanches.  Sacral Mepelix replaced.   -  Abrasion/scab to Right chest - open to air   -  Skin tear/scab to Left forearm - no drainage noted and left open to air  -  Bruise to left upper extremity - open to air  -  Skin tear/scab to right forearm - open to air  -  Visible hardware noted to top of head from prior brain surgery - Shown to Dr. Orlando.  Closely monitoring and left open to air.   -  Bilateral heels reddened but blanching - Mepelix placed  -  Scattered bruising and abrasions to extremities

## 2020-06-02 NOTE — PROGRESS NOTES
2 RN skin check completed with MERY Raza     Devices in place:  · Cardiac leads  · Pulse ox  · BP cuff  · SCDs  · PIVs  · Picc line  · Irby catheter  · ET tube  · Cortrak  · Wrist restraints     Assessment   · Hardware poking out of right frontal/parietal region of head. Scabbed indents noted.   · Ears are intact and blacnhing  · Lips are dry and abrasions noted, moisturizer applied and oral care  · Left chest healing abrasions  · Left hip bruising and and scabbing/abrasions   · Scattered bruising and abrasions noted to Bilateral UE  · Left wrist abrasion  · Scattered abrasions and bruising noted to Bilateral LE.  · Sacral region and perineal area is red but blanching  · Extremities are very dry and flaky, moisturizer applied   · Bilateral toes/toenails are dirty, attempted to clean with soap and water  · Heels are calloused         Interventions in place:  · q2h turns  · Pillows used to float elbows and heels  · Low air loss mattress  · Preventative mepilex on sacral region  · Preventative mepilex on heels   · Assessment under all medical devices listed above  · Moisturizer applied to lips and extremities  · Barrier cream applied to sacral and perineal area

## 2020-06-02 NOTE — RESPIRATORY CARE
Ventilator Daily Summary    Vent Day # 3      Weaning trials: SBT failed, 2nd Sbt 5 minutes, decreased oxygen destauration        Plan: Continue current ventilator settings and wean mechanical ventilation as tolerated per physician orders.

## 2020-06-03 PROBLEM — S01.00XA SCALP WOUND: Status: ACTIVE | Noted: 2020-01-01

## 2020-06-03 NOTE — CONSULTS
Reason for PC Consult: Advance Care Planning    Consulted by: Dr. Orlando    Assessment:  General:   Mr. Lechuga is a 54 year old male with the past medical history of urethral stricture with urinary retention, recurrent right-sided pleural effusion, alcohol abuse, COPD on home O2, hepatitis C with cirrhosis, chronic pain syndrome on narcotics, traumatic brain injury, history of SDH with subsequent seizure disorder who presented to [facility] on 5/22 with the complaint of difficulty urinary for 2 days and low blood pressures. Over the course of the next week, the patient required intubation for seizure activity, but was then extubated.  However, the patient then developed aspiration pneumonia with AMS and was on BiPAP for a night on 5/29. At [place] pt became unresponsive with a bradycardic episode and no pulses.  A code was called and he received 3 rounds of CPR and 2 doses of epi before ROSC was achieved.  He was intubated again and stabilized.  He was sent to [facility] for higher level of care. Currently in intubated in [facility].       Dyspnea: (intubated)  Last BM: 06/03/20  Pain: Unable to determine  Depression: Unable to determine  Dementia: No    Spiritual:  Is Caodaism or spirituality important for coping with this illness? No  Has a  or spiritual provider visit been requested? No    Palliative Performance Scale: 10%    Advance Directive: None  DPOA: No  POLST: No    Code Status: Full-discussed at this encounter    Social: Pt lives alone in [place]. Pt's nephew Fabrizio Beltrán is his caretaker and helps with ADLs. Pt has two adult children, his son's whereabouts are unknown by pt's sister Gayatri (she believes he is in correction) and pt's dtr Neva is also not a part of his life. Pt has three sisters, Gayatri and Jess in [place] and Meli in [place]. Pt's mother is 83 years old, she lives with Jess and is currently undergoing cancer treatments.     Outcome:  Introduced self and role of  "Palliative Care to pt's sister Gayatri in SICU conference room.  Assessed sister's understanding of current medical status, overall health picture, and options for future care. Gayatri states that pt was at [facility] for a UTI, he had a seizure and required a ventilator, then was transferred here after his code. Pt's family is very involved in his life. Gayatri's son Pavel is pt's caregiver, helps him with meals and some ADLs. Pt uses a walker at home and a wheel chair when he is in public. Pt doesn't have much of an appetite according to Gayatri, but he will eat late at night. Pt's cognition is still intact after his TBI and surgery 2 years ago, pt's main deficits are physical. Gayatri states that pt currently smokes and drinks.     Explored pt's values, beliefs, and preferences in order to identify GOC. Gayatri and the rest of pt's family are hopeful that he will recover. PC RN explains next of kin laws and that pt's children will need to be contacted and given opportunity to make healthcare decisions. Gayatri states that Jess is attempting to reach pt's dtr Neva however they are estranged. PC RN explained that if children are not available or unwilling then pt's mother is legal decision maker. Discussed code status in detail including mechanical ventilation and what resuscitation looks like. Gayatri states that she and her family feel full code is best, they would want the team to \"try\" and resuscitate, but if the pt didn't look like he would do well after resuscitation they would withdraw care. PC RN discusses trach placement and the potential of extended stay in nursing facility if trach is placed. Gayatri feels that pt would not want trach placement or long term stay in SNF. Pt told his family that he wouldn't consider it a good quality of life if he weren't able to go home and do things on his own.     Spiritual visit offered, per Gayatri pt is not Scientologist or spiritual.     Active listening, reflection, " reminiscing, validation & normalization, and empathic support utilized throughout this encounter.  All questions answered.  PC contact information given.         Updated: Dr. Orlando    Plan: continue full code and full treatment. SW attempting to contact pt's dtr/NOK. PC RN will follow and support as clinical picture evolves.       Thank you for allowing Palliative Care to participate in this patient's care. Please feel free to call [phone] with any questions or concerns.

## 2020-06-03 NOTE — FLOWSHEET NOTE
06/03/20 0719   Weaning Trial   Weaning Trial Initiated Yes   Weaning Trial Stopped due to: RSBI >105 (Rapid Shallow Breathing Index)   Length of Weaning Trial (Hours) 5 min   Weaning Parameters   Rapid Shallow Breathing Index (RR/VT) 200

## 2020-06-03 NOTE — DISCHARGE PLANNING
"LSW called pt's sister, Jess Santiago [phone] to discuss NOK and pt's POLST. Jess states she is aware pt has a POLST but stated, \"it should have been thrown out. My brother wants to be resuscitated.\" \"He wants to live just not be on life support longer than 60 days.\" LSW provided emotional support & education on pt's POLST & what pt's wishes tell the care team. POLST states pt was DNAR/DNR & selective treatment.     LSW again, educated Jess on NOK/decision making role, which would be pt's children. Jess is to reach out to pt's daughter, Neva Nguyen, via face book messenger and get her contact information for LSW. Jess reports that pt hasn't seen his children in over 10 years and he would not want them to make decisions on his behalf. Education again was provided. Jess understands and is agreeable with assisting LSW getting in touch with Neva.     Plan: LSW to follow up with Jess   "

## 2020-06-03 NOTE — ASSESSMENT & PLAN NOTE
"Discussed at length with patient's sister regarding his goals of care given his POLST completed in 2015 says DNR/DNI.   In reviewing the chart, the patient actually stated that he misunderstood the form and this CODE STATUS is not correct, he wanted to be FULL CODE.    Dr. Pimentel' H&P on 6/21/16:      \"Code status is full.  He had signed POLST form in the past, saying he did not want resuscitation and only wanted limited interventions; however, in a discussion with the nurse today at surgery, he clarified that he had misunderstood the form, what he meant was that he did not want to be kept alive on life support, but he would want a trial of  full resuscitation efforts.  Therefore, his code status is full.\"    "

## 2020-06-03 NOTE — CARE PLAN
Problem: Safety  Goal: Will remain free from injury  Outcome: PROGRESSING AS EXPECTED  Intervention: Provide assistance with mobility  Note: Pt has bedrest orders, be in low locked position, 3 bed rails up, exit alarm on.     Problem: Bowel/Gastric:  Goal: Normal bowel function is maintained or improved  Outcome: PROGRESSING AS EXPECTED  Intervention: Educate patient and significant other/support system about diet, fluid intake, medications and activity to promote bowel function  Note: Cortrak inserted this AM, pt tolerating TF diabetisource @ 10 ml at this time.

## 2020-06-03 NOTE — FLOWSHEET NOTE
06/03/20 1340   Weaning Trial   Weaning Trial Initiated Yes   Weaning Trial Stopped due to: RSBI >105 (Rapid Shallow Breathing Index)   Weaning Parameters   Rapid Shallow Breathing Index (RR/VT) 150

## 2020-06-03 NOTE — CARE PLAN
Problem: Bowel/Gastric:  Goal: Normal bowel function is maintained or improved  Intervention: Educate patient and significant other/support system about diet, fluid intake, medications and activity to promote bowel function  Note: Advancing diet as tolerated. Diabetisource currently at 20 mL/hr, advancing by 10 q12hrs. Pt having multiple bowel movements per day, watery     Problem: Skin Integrity  Goal: Risk for impaired skin integrity will decrease  Intervention: Assess and monitor skin integrity, appearance and/or temperature  Note: Skin integrity monitored continuously, 2rn skin checks performed qshift

## 2020-06-03 NOTE — OR SURGEON
Immediate Post- Operative Note        PostOp Diagnosis: pleural effusion      Procedure(s): thoracentesis      Estimated Blood Loss: Less than 5 ml        Complications: None            6/3/2020     4:57 PM     Syed Foss M.D.

## 2020-06-03 NOTE — CARE PLAN
Ventilator Daily Summary    Vent Day #4    Ventilator settings changed this shift:No    Weaning trials:No    Respiratory Procedures:No    Plan: Continue current ventilator settings and wean mechanical ventilation as tolerated per physician orders.

## 2020-06-03 NOTE — PROGRESS NOTES
"Critical Care Progress Note    Date of admission  5/31/2020    Chief Complaint  54 y.o. male admitted 5/31/2020 with Respiratory failure, seizure    Hospital Course    \"All history was obtained from old notes/charts from [facility] as the patient is intubated at the time of my evaluation.     Mr. Lechuga is a 54 year old male with the past medical history of urethral stricture with urinary retention, recurrent right-sided pleural effusion, alcohol abuse, COPD on home O2, hepatitis C with cirrhosis, chronic pain syndrome on narcotics, traumatic brain injury, history of SDH with subsequent seizure disorder who presented to [facility] on 5/22 with the complaint of difficulty urinary for 2 days and low blood pressures.  He apparently was recently hospitalized at [facility] from 5/6 to 5/11/2020 for a COPD exacerbation and reported that he was doing well for until 2 days prior to 5/22 when he developed difficulty urinating and draining his bladder.  He has worsening abdominal pain in the suprapubic region with chills.  No fevers.  No nausea or vomiting.  He has a negative COVID test on 5/6.  The patient was admitted for acute urinary retention, UTI, and hypoxia.  Over the course of the next week, the patient required intubation for seizure activity, but was then extubated.  However, the patient then developed aspiration pneumonia with AMS and was on BiPAP for a night on 5/29.  All during the patient's hospital stay, he has been battling with delirium.  Unfortunately, around 2000 last night the patient the patient became unresponsive with a bradycardic episode and no pulses.  A code was called and he received 3 rounds of CPR and 2 doses of epi before ROSC was achieved.  He was intubated again and stabilized.  He was sent to [facility] ([place] for higher level of care due to the growing complexity of his condition as well as critical care management since [facility] does not have critical care specialists on staff.\"      Interval " "Problem Update  Reviewed last 24 hour events:   - No acute events overnight   - Neuro: follows, prop 15   - HR: 50s-60s   - SBP: levo at 9   - GI: TF advancing   - UOP: 3.4 L/24 hrs   - Irby: yes   - Tm: 37.7   - Lines: PICC, PIV   - PPx: GI omeprazole, DVT lovenox   - ABG: looks good   - VD #4   - SBT: RSBI >105   - CXR (personally reviewed and compared to prior): slightly worsened opacities, right effusion unchanged, cardiomegaly    Yesterday   - Discussed Code Status with LEFTY (sister) she states he did not want to be DNR/DNI but had never had the chance to change his POLST.  She states he misunderstood the form.  In review of the patient's medical record this is collaborated by Dr. Pimentel' H&P on 6/21/16:     \"Code status is full.  He had signed POLST form in the past, saying he did not want resuscitation and only wanted limited interventions; however, in a discussion with the nurse today at surgery, he clarified that he had misunderstood the form, what he meant was that he did not want to be kept alive on life support, but he would want a trial of  full resuscitation efforts.  Therefore, his code status is full.\"     - Neuro: withdraws x4    - HR: 40s-60s   - SBP: 100s-120s, NE at 5   - GI: TF to start today   - UOP: 855 mL/24 hrs   - Irby: yes   - Tm: 37.3   - Lines: PICC, PIV   - PPx: GI pepcid, DVT SCDs   - ABG: Mild hypoxia   - VD #5   - SBT: RSBI 300   - CXR (personally reviewed and compared to prior): edema and right sided effusion   - thoracentesis ordered   -Cortisol 11.4, consider hydrocortisone if unable to wean off of vasopressors today    Review of Systems  Review of Systems   Unable to perform ROS: Intubated        Vital Signs for last 24 hours   Temp:  [36.1 °C (97 °F)] 36.1 °C (97 °F)  Pulse:  [45-70] 62  Resp:  [0-45] 18  BP: ()/(50-88) 97/60  SpO2:  [97 %-100 %] 100 %    Hemodynamic parameters for last 24 hours       Respiratory Information for the last 24 hours  Vent Mode: APVCMV  Rate " (breaths/min): 16  Vt Target (mL): 360  PEEP/CPAP: 8  MAP: 16  Length of Weaning Trial (Hours): 5 min  Control VTE (exp VT): 350    Physical Exam   Physical Exam  Vitals signs and nursing note reviewed.   Constitutional:       General: He is in acute distress.      Appearance: He is well-developed. He is ill-appearing.      Interventions: He is intubated.   HENT:      Head: Normocephalic and atraumatic.      Comments: Scalp abnormalities consistent with prior neurosurgical procedure.  Surgical hardware is seen extruding from the skin     Right Ear: External ear normal.      Left Ear: External ear normal.      Nose: Nose normal.      Mouth/Throat:      Mouth: Mucous membranes are dry.      Pharynx: Oropharynx is clear.      Comments: ET tube in place  Eyes:      Conjunctiva/sclera: Conjunctivae normal.      Pupils: Pupils are equal, round, and reactive to light.   Neck:      Musculoskeletal: Neck supple.      Vascular: No JVD.      Trachea: No tracheal deviation.   Cardiovascular:      Rate and Rhythm: Normal rate and regular rhythm.      Pulses: Normal pulses.   Pulmonary:      Effort: He is intubated.      Breath sounds: Rales present. No wheezing.   Abdominal:      General: Bowel sounds are normal. There is no distension.      Palpations: Abdomen is soft.   Musculoskeletal:         General: No tenderness.   Skin:     General: Skin is warm and dry.      Capillary Refill: Capillary refill takes less than 2 seconds.      Findings: No rash.      Comments: Multiple tattoos  Venous stasis dermatitis   Neurological:      General: No focal deficit present.      Mental Status: He is alert.      Comments: Opens eyes to voice, follows commands, nodding appropriately.  Moving all 4 extremities   Psychiatric:      Comments: Unable to assess         Medications  Current Facility-Administered Medications   Medication Dose Route Frequency Provider Last Rate Last Dose   • potassium bicarbonate (KLYTE) effervescent tablet 25 mEq   25 mEq Enteral Tube Once Pavel Orlando Jr. D.O.       • magnesium sulfate IVPB premix 2 g  2 g Intravenous Once Pavel Orlando Jr. D.O.       • furosemide (LASIX) injection 20 mg  20 mg Intravenous BID DIURETIC Pavel Orlando Jr. D.O.   20 mg at 06/03/20 0510   • Pharmacy Consult: Enteral tube insertion - review meds/change route/product selection  1 Each Other PHARMACY TO DOSE Pavel Orlando Jr. D.O.       • omeprazole (FIRST-OMEPRAZOLE) 2 mg/mL oral susp 40 mg  40 mg Enteral Tube DAILY Pavel Orlando Jr. D.O.   40 mg at 06/03/20 0510   • potassium bicarbonate (KLYTE) effervescent tablet 25 mEq  25 mEq Enteral Tube BID Pavel Orlando Jr. D.O.   25 mEq at 06/03/20 0510   • piperacillin-tazobactam (ZOSYN) 3.375 g in  mL IVPB  3.375 g Intravenous Q8HRS Pavel Orlando Jr. D.O. 25 mL/hr at 06/03/20 0511 3.375 g at 06/03/20 0511   • enoxaparin (LOVENOX) inj 40 mg  40 mg Subcutaneous DAILY Pavel Orlando Jr. D.O.   40 mg at 06/03/20 0510   • thiamine tablet 100 mg  100 mg Enteral Tube DAILY Pavel Orlando Jr. D.O.   100 mg at 06/03/20 0509   • multivitamin (THERAGRAN) tablet 1 Tab  1 Tab Enteral Tube DAILY Pavel Orlando Jr. D.O.   1 Tab at 06/03/20 0510   • LORazepam (ATIVAN) injection 4 mg  4 mg Intravenous Q10 MIN PRN Pavel Orlando Jr. D.O.       • phenytoin (DILANTIN) injection 300 mg  300 mg Intravenous TWICE DAILY Pavel Orlando Jr. D.O.   300 mg at 06/03/20 0518    And   • phenytoin (DILANTIN) injection 200 mg  200 mg Intravenous DAILY Pavel Orlando Jr. D.O.   200 mg at 06/02/20 1354   • norepinephrine (Levophed) infusion 8 mg/250 mL (premix)  0-30 mcg/min Intravenous Continuous Saurabh Delatorre M.D. 16.9 mL/hr at 06/03/20 0319 9 mcg/min at 06/03/20 0319   • propofol (DIPRIVAN) injection  0-80 mcg/kg/min Intravenous Continuous Saurabh Delatorre M.D. 6.3 mL/hr at 06/03/20 0319 15 mcg/kg/min at 06/03/20 0319   • senna-docusate (PERICOLACE or SENOKOT S) 8.6-50 MG per tablet 2 Tab  2 Tab  Enteral Tube BID Anisa Hernández M.D.   Stopped at 05/31/20 1815    And   • polyethylene glycol/lytes (MIRALAX) PACKET 1 Packet  1 Packet Enteral Tube QDAY PRN Anisa Hernández M.D.        And   • magnesium hydroxide (MILK OF MAGNESIA) suspension 30 mL  30 mL Enteral Tube QDAY PRN Anisa Hernández M.D.        And   • bisacodyl (DULCOLAX) suppository 10 mg  10 mg Rectal QDAY PRN Anisa Hernández M.D.       • lactated ringers infusion   Intravenous Continuous Anisa Hernández M.D. 100 mL/hr at 06/02/20 2116     • acetaminophen (TYLENOL) tablet 650 mg  650 mg Enteral Tube Q6HRS PRN Anisa Hernández M.D.       • labetalol (NORMODYNE/TRANDATE) injection 10 mg  10 mg Intravenous Q4HRS PRN Anisa Hernández M.D.       • Respiratory Therapy Consult   Nebulization Continuous RT Alexia Khan M.D.       • ipratropium-albuterol (DUONEB) nebulizer solution  3 mL Nebulization Q2HRS PRN (RT) Alexia Khan M.D.       • MD Alert...ICU Electrolyte Replacement per Pharmacy   Other PHARMACY TO DOSE Alexia Khan M.D.       • lidocaine (XYLOCAINE) 1 % injection 1-2 mL  1-2 mL Tracheal Tube Q30 MIN PRN Alexia Khan M.D.       • levETIRAcetam (KEPPRA) 1,000 mg in  mL IVPB  1,000 mg Intravenous Q12HRS Juan Polk M.D.   Stopped at 06/03/20 0526       Fluids    Intake/Output Summary (Last 24 hours) at 6/3/2020 0828  Last data filed at 6/3/2020 0600  Gross per 24 hour   Intake 2998.01 ml   Output 3375 ml   Net -376.99 ml       Laboratory  Recent Labs     05/31/20  1219  06/01/20  0529 06/02/20  0451 06/03/20  0421   IVSPJ52R 7.45  --   --   --   --    [id number] 47.0*  --   --   --   --    MLMYF851J 63.4  --   --   --   --    CZGM8RUY 91.8  --   --   --   --    ARTHCO3 32*  --   --   --   --    FIO2 40  --   --   --   --    ARTBE 7*  --   --   --   --    ISTATAPH  --    < > 7.421 7.419 7.454   ISTATAPCO2  --    < > 48.1* 43.0* 38.6*   ISTATAPO2  --    < > 105* 58* 80    ISTATATCO2  --    < > 33 29 28   [id number]  --    < > 98 90* 96   ISTATARTHCO3  --    < > 31.3* 27.8* 27.1*   ISTATARTBE  --    < > 6* 3 3   ISTATTEMP  --    < > 96.4 F 97.2 F 95.0 F   ISTATFIO2  --    < > 40 30 40   ISTATSPEC  --    < > Arterial Arterial Arterial   ISTATAPHTC  --    < > 7.439 7.430 7.484   XAVLFOCK4BE  --    < > 97* 55* 70    < > = values in this interval not displayed.         Recent Labs     06/01/20  0500 06/01/20  1600 06/02/20  0530 06/03/20  0500   SODIUM 141  --  148* 142   POTASSIUM 3.2* 3.9 3.4* 3.7   CHLORIDE 105  --  111 104   CO2 30  --  28 28   BUN 8  --  11 12   CREATININE 1.00  --  1.06 1.30   MAGNESIUM 2.0  --  1.9 1.8   PHOSPHORUS 2.6  --  2.9 3.4   CALCIUM 7.8*  --  7.7* 8.4*     Recent Labs     05/31/20 2100 06/01/20  0500 06/02/20  0530 06/03/20  0500   ALTSGPT 12 10  --   --    ASTSGOT 20 16  --   --    ALKPHOSPHAT 65 66  --   --    TBILIRUBIN 0.3 0.3  --   --    PREALBUMIN  --   --   --  3.8*   GLUCOSE 115* 95 85 109*     Recent Labs     05/31/20 2100 06/01/20  0500 06/02/20  0530 06/02/20  1440 06/03/20  0500   WBC 7.8 6.3 6.8  --  9.4   NEUTSPOLYS 75.70* 73.90* 82.60*  --  83.30*   LYMPHOCYTES 13.90* 13.90* 8.70*  --  7.90*   MONOCYTES 4.30 9.60 4.30  --  5.30   EOSINOPHILS 1.70 0.90 2.60 3 2.60   BASOPHILS 2.60* 1.70 0.90  --  0.90   ASTSGOT 20 16  --   --   --    ALTSGPT 12 10  --   --   --    ALKPHOSPHAT 65 66  --   --   --    TBILIRUBIN 0.3 0.3  --   --   --      Recent Labs     06/01/20  0500 06/02/20  0530 06/03/20  0500   RBC 2.40* 2.32* 2.61*   HEMOGLOBIN 7.9* 7.5* 8.3*   HEMATOCRIT 26.0* 24.5* 27.6*   PLATELETCT 128* 143* 182       Imaging  X-Ray:  I have personally reviewed the images and compared with prior images.  CT:    Reviewed    Assessment/Plan  Shock (HCC)- (present on admission)  Assessment & Plan  Multifactorial:  PEA/CPR, propofol and ?  Pneumonia  Continue zosyn/vanco  Echo pending   Continue titrating levophed for MAP goals >65  Cortisol 11.4,  consider steroids if unable to wean vasopressors    PEA (Pulseless electrical activity) (HCC)- (present on admission)  Assessment & Plan  ? Etiology from [facility], possibly hypoxia  Continue supportive care  Correct electrolyte disturbances  EKG without ischemic changes  Echocardiogram: LVEF 65%. Mild concentric LVH. Grade II diastolic dysfunction. Mild mitral regurgitation. Estimated right ventricular systolic pressure is 55 mmHg    Scalp wound- (present on admission)  Assessment & Plan  Exposed hardware  Wound care consult  ?plastic surgery or neurosurgery consult for wound care?    Goals of care, counseling/discussion  Assessment & Plan  Discussed at length with patient's sister regarding his goals of care given his POLST completed in 2015 says DNR/DNI.   In reviewing the chart, the patient actually stated that he misunderstood the form and this CODE STATUS is not correct.    Acute respiratory failure with hypoxia (HCC)- (present on admission)  Assessment & Plan  Intubated at [facility] on 5/30/20 following cardiac arrest  RT/O2 protocols  Daily and PRN ABGs  Titration of ventilator therapy based on ABGs and patient's status  Sedation as tolerated/indicated  Daily CXR  HOB >30 degrees and peridex for VAP prevention  Pepcid for GI prophylaxis  SAT/SBT when able (ABCDEF Bundle)  Early mobility  Failed SBT today, continue to attempt SBTs    Recurrent right pleural effusion- (present on admission)  Assessment & Plan  Chronic  Thoracenteses performed on 5/13 and 5/14  Thoracentesis completed 6/2/2020, transudative  Follow chest x-ray    Pneumonia- (present on admission)  Assessment & Plan  ?aspiration  Finished course of Zosyn  Stop vancomycin given negative MRSA nares  Sputum with Candida--?clinical significance    Ileus (HCC)- (present on admission)  Assessment & Plan  Resolved, continue to feed    Hypokalemia- (present on admission)  Assessment & Plan  Continue to replete to goal > 4    Seizure disorder (HCC)- (present  on admission)  Assessment & Plan  cEEG without evidence of seizure  Neurology has signed off  Continue Keppra 1g BID and Dilantin  Per neurology okay to wean sedation       VTE:  Contraindicated  Ulcer: H2 Antagonist  Lines: Irby Catheter  Ongoing indication addressed    I have performed a physical exam and reviewed and updated ROS and Plan today (6/3/2020). In review of yesterday's note (6/2/2020), there are no changes except as documented above.     Titrating ventilator and vasopressors. This patient is critically ill, at high risk for decompensation leading to worsening vital organ dysfunction and death without critical care interventions.    Discussed patient condition and risk of morbidity and/or mortality with Family, RN, RT, Pharmacy, , Code status disscussed and Charge nurse / hot rounds     The patient remains critically ill.  Critical care time = 41 minutes in directly providing and coordinating critical care and extensive data review.  No time overlap and excludes procedures.

## 2020-06-04 NOTE — ASSESSMENT & PLAN NOTE
Scalp wound with exposed neurosurgical hardware -spoke with the patient's sisters they state that it has been exposed for several months  Bilateral craniotomy with subdural evacuation in October 2016 by Dr. Vallejo  S/p hardware removal by Dr Vallejo 6/10  Wound care consult and plastic surgery following for closure Dr Reye planned for OR on Monday  Wound with Staph Epi

## 2020-06-04 NOTE — CARE PLAN
Problem: Skin Integrity  Goal: Risk for impaired skin integrity will decrease  Note: Turn pt Q2H, assess skin under devices and pad bony prominences.      Problem: Pain Management  Goal: Pain level will decrease to patient's comfort goal  Note: Assess pts pain level and treat as needed.

## 2020-06-04 NOTE — CARE PLAN
Problem: Skin Integrity  Goal: Risk for impaired skin integrity will decrease  Intervention: Assess risk factors for impaired skin integrity and/or pressure ulcers  Note: Pt assessed for risk factors for impaired skin integrity and/or pressure ulcers.     Problem: Pain Management  Goal: Pain level will decrease to patient's comfort goal  Intervention: Follow pain managment plan developed in collaboration with patient and Interdisciplinary Team  Note: Pt assessed for pain and medications administered as needed per the MAR.

## 2020-06-04 NOTE — WOUND TEAM
Pt seen for wounds at crown of head.  3 smaller than a dime open areas with rolled edges and silver hardware at base.  No drainage, no erythema.  Recommend leaving area JACK and keep dry.  Definitive closure will require surgical management at some point.  Nursing orders for site care written.  Wound team signing off for now.

## 2020-06-04 NOTE — CARE PLAN
Adult Ventilation Update    Total Vent Days: 5    Patient Lines/Drains/Airways Status      Active Airway       Name: Placement date: Placement time: Site: Days:    Airway ETT Oral 7.5  05/30/20 2025   Oral  4                  APVCMV 16, 360, +8, 30%  SPONT for 1hr 2x today on 5/8 30%

## 2020-06-04 NOTE — PROGRESS NOTES
"Critical Care Progress Note    Date of admission  5/31/2020    Chief Complaint  54 y.o. male admitted 5/31/2020 with Respiratory failure, seizure    Hospital Course    \"All history was obtained from old notes/charts from [facility] as the patient is intubated at the time of my evaluation.     Mr. Lechuga is a 54 year old male with the past medical history of urethral stricture with urinary retention, recurrent right-sided pleural effusion, alcohol abuse, COPD on home O2, hepatitis C with cirrhosis, chronic pain syndrome on narcotics, traumatic brain injury, history of SDH with subsequent seizure disorder who presented to [facility] on 5/22 with the complaint of difficulty urinary for 2 days and low blood pressures.  He apparently was recently hospitalized at [facility] from 5/6 to 5/11/2020 for a COPD exacerbation and reported that he was doing well for until 2 days prior to 5/22 when he developed difficulty urinating and draining his bladder.  He has worsening abdominal pain in the suprapubic region with chills.  No fevers.  No nausea or vomiting.  He has a negative COVID test on 5/6.  The patient was admitted for acute urinary retention, UTI, and hypoxia.  Over the course of the next week, the patient required intubation for seizure activity, but was then extubated.  However, the patient then developed aspiration pneumonia with AMS and was on BiPAP for a night on 5/29.  All during the patient's hospital stay, he has been battling with delirium.  Unfortunately, around 2000 last night the patient the patient became unresponsive with a bradycardic episode and no pulses.  A code was called and he received 3 rounds of CPR and 2 doses of epi before ROSC was achieved.  He was intubated again and stabilized.  He was sent to [facility] for higher level of care due to the growing complexity of his condition as well as critical care management since [facility] does not have critical care specialists on staff.\"      Interval " Problem Update  Reviewed last 24 hour events:   - No acute events overnight   - Neuro: GCS 11T   - HR: 50s-60s   - SBP: 90s-100s, on 4 of levo   - GI: TF at 40, goal of 65, rectal tube in place   - UOP: 3L/24 hrs   - Irby: yes   - Tm: 37.8   - Lines: PICC, PIV   - PPx: GI omeprazole, DVT lovenox   - ABG: metabolic alkalosis   - VD #5   - SBT: VC: 0.7, NIF -17, RSBI 70   - CXR (personally reviewed and compared to prior): underpenetrated    Yesterday   - No acute events overnight   - Neuro: follows, prop 15   - HR: 50s-60s   - SBP: levo at 9   - GI: TF advancing   - UOP: 3.4 L/24 hrs   - Irby: yes   - Tm: 37.7   - Lines: PICC, PIV   - PPx: GI omeprazole, DVT lovenox   - ABG: looks good   - VD #4   - SBT: RSBI >105   - CXR (personally reviewed and compared to prior): slightly worsened opacities, right effusion unchanged, cardiomegaly    Review of Systems  Review of Systems   Unable to perform ROS: Intubated        Vital Signs for last 24 hours   Temp:  [36.8 °C (98.2 °F)] 36.8 °C (98.2 °F)  Pulse:  [55-70] 68  Resp:  [0-58] 20  BP: ()/(50-70) 105/62  SpO2:  [93 %-100 %] 93 %    Hemodynamic parameters for last 24 hours       Respiratory Information for the last 24 hours  Vent Mode: Spont  Rate (breaths/min): 16  Vt Target (mL): 360  PEEP/CPAP: 8  P Support: 5  MAP: 13  Length of Weaning Trial (Hours): 5 min  Control VTE (exp VT): 358    Physical Exam   Physical Exam  Vitals signs and nursing note reviewed.   Constitutional:       General: He is in acute distress.      Appearance: He is well-developed. He is ill-appearing.      Interventions: He is intubated.   HENT:      Head: Normocephalic and atraumatic.      Comments: Scalp abnormalities consistent with prior neurosurgical procedure.  Surgical hardware is seen extruding from the skin     Right Ear: External ear normal.      Left Ear: External ear normal.      Nose: Nose normal.      Mouth/Throat:      Mouth: Mucous membranes are dry.      Pharynx: Oropharynx  is clear.      Comments: ET tube in place  Eyes:      Conjunctiva/sclera: Conjunctivae normal.      Pupils: Pupils are equal, round, and reactive to light.   Neck:      Musculoskeletal: Neck supple.      Vascular: No JVD.      Trachea: No tracheal deviation.   Cardiovascular:      Rate and Rhythm: Normal rate and regular rhythm.      Pulses: Normal pulses.   Pulmonary:      Effort: He is intubated.      Breath sounds: Rhonchi and rales present. No wheezing.   Abdominal:      General: Bowel sounds are normal. There is no distension.      Palpations: Abdomen is soft.   Musculoskeletal:         General: No tenderness.   Skin:     General: Skin is warm and dry.      Capillary Refill: Capillary refill takes less than 2 seconds.      Findings: No rash.      Comments: Multiple tattoos  Venous stasis dermatitis   Neurological:      General: No focal deficit present.      Mental Status: He is alert.      Comments: Opens eyes to voice, follows commands, nodding appropriately.  Moving all 4 extremities   Psychiatric:      Comments: Unable to assess         Medications  Current Facility-Administered Medications   Medication Dose Route Frequency Provider Last Rate Last Dose   • potassium bicarbonate (KLYTE) effervescent tablet 25 mEq  25 mEq Enteral Tube Once Pavel Orlando Jr., D.O.       • levETIRAcetam (KEPPRA) tablet 1,000 mg  1,000 mg Enteral Tube BID Pavel Orlando Jr., D.O.   1,000 mg at 06/04/20 0514   • oxyCODONE immediate-release (ROXICODONE) tablet 5-10 mg  5-10 mg Oral Q4HRS PRN Pavel Orlando Jr. D.O.   5 mg at 06/03/20 2109   • furosemide (LASIX) injection 20 mg  20 mg Intravenous BID DIURETIC Pavel Orlando Jr., D.O.   20 mg at 06/04/20 0514   • Pharmacy Consult: Enteral tube insertion - review meds/change route/product selection  1 Each Other PHARMACY TO DOSE Pavel Orlando Jr., D.O.       • omeprazole (FIRST-OMEPRAZOLE) 2 mg/mL oral susp 40 mg  40 mg Enteral Tube DAILY MARRY Olson Jr..O.   40 mg at  06/04/20 0515   • potassium bicarbonate (KLYTE) effervescent tablet 25 mEq  25 mEq Enteral Tube BID Pavel Orlando Jr., D.O.   25 mEq at 06/04/20 0514   • enoxaparin (LOVENOX) inj 40 mg  40 mg Subcutaneous DAILY Pavel Orlando Jr., D.O.   40 mg at 06/04/20 0515   • thiamine tablet 100 mg  100 mg Enteral Tube DAILY Pavel Orlando Jr., D.O.   100 mg at 06/04/20 0514   • multivitamin (THERAGRAN) tablet 1 Tab  1 Tab Enteral Tube DAILY Pavel Orlando Jr., D.O.   1 Tab at 06/04/20 0514   • LORazepam (ATIVAN) injection 4 mg  4 mg Intravenous Q10 MIN PRN Pavel Orlando Jr., D.O.       • phenytoin (DILANTIN) injection 300 mg  300 mg Intravenous TWICE DAILY Pavel Orlando Jr. D.O.   300 mg at 06/04/20 0515    And   • phenytoin (DILANTIN) injection 200 mg  200 mg Intravenous DAILY Pavel Orlando Jr., D.O.   200 mg at 06/03/20 1339   • norepinephrine (Levophed) infusion 8 mg/250 mL (premix)  0-30 mcg/min Intravenous Continuous Saurabh Delatorre M.D. 7.5 mL/hr at 06/04/20 0602 4 mcg/min at 06/04/20 0602   • propofol (DIPRIVAN) injection  0-80 mcg/kg/min Intravenous Continuous Saurabh Delatorre M.D. 4.2 mL/hr at 06/04/20 0010 10 mcg/kg/min at 06/04/20 0010   • senna-docusate (PERICOLACE or SENOKOT S) 8.6-50 MG per tablet 2 Tab  2 Tab Enteral Tube BID Anisa Hernández M.D.   2 Tab at 06/04/20 0514    And   • polyethylene glycol/lytes (MIRALAX) PACKET 1 Packet  1 Packet Enteral Tube QDAY PRN Anisa Hernández M.D.        And   • magnesium hydroxide (MILK OF MAGNESIA) suspension 30 mL  30 mL Enteral Tube QDAY PRN Anisa Hernández M.D.        And   • bisacodyl (DULCOLAX) suppository 10 mg  10 mg Rectal QDAY PRN Anisa Hernández M.D.       • lactated ringers infusion   Intravenous Continuous Anisa Hernández M.D. 100 mL/hr at 06/03/20 0958     • acetaminophen (TYLENOL) tablet 650 mg  650 mg Enteral Tube Q6HRS PRN Anisa Hernández M.D.       • labetalol (NORMODYNE/TRANDATE) injection 10 mg  10 mg  Intravenous Q4HRS PRN Anisa Hernández M.D.       • Respiratory Therapy Consult   Nebulization Continuous RT Alexia Khan M.D.       • ipratropium-albuterol (DUONEB) nebulizer solution  3 mL Nebulization Q2HRS PRN (RT) Alexia Khan M.D.       • MD Alert...ICU Electrolyte Replacement per Pharmacy   Other PHARMACY TO DOSE Alexia Khan M.D.       • lidocaine (XYLOCAINE) 1 % injection 1-2 mL  1-2 mL Tracheal Tube Q30 MIN PRN Alexia Khan M.D.           Fluids    Intake/Output Summary (Last 24 hours) at 6/4/2020 0655  Last data filed at 6/4/2020 0600  Gross per 24 hour   Intake 4361.17 ml   Output 3040 ml   Net 1321.17 ml       Laboratory  Recent Labs     06/02/20  0451 06/03/20  0421 06/04/20  0449   ISTATAPH 7.419 7.454 7.497   ISTATAPCO2 43.0* 38.6* 41.4*   ISTATAPO2 58* 80 75   ISTATATCO2 29 28 33   [id number] 90* 96 96   ISTATARTHCO3 27.8* 27.1* 32.1*   ISTATARTBE 3 3 8*   ISTATTEMP 97.2 F 95.0 F 96.8 F   ISTATFIO2 30 40 30   ISTATSPEC Arterial Arterial Arterial   ISTATAPHTC 7.430 7.484 7.512*   [id number] 55* 70 70         Recent Labs     06/02/20  0530 06/03/20  0500 06/04/20  0512   SODIUM 148* 142 148*   POTASSIUM 3.4* 3.7 3.8   CHLORIDE 111 104 109   CO2 28 28 32   BUN 11 12 15   CREATININE 1.06 1.30 1.37   MAGNESIUM 1.9 1.8 2.1   PHOSPHORUS 2.9 3.4 2.7   CALCIUM 7.7* 8.4* 8.3*     Recent Labs     06/02/20  0530 06/03/20  0500 06/04/20  0512   PREALBUMIN  --  3.8*  --    GLUCOSE 85 109* 116*     Recent Labs     06/02/20  0530 06/02/20  1440 06/03/20  0500 06/04/20  0512   WBC 6.8  --  9.4 7.4   NEUTSPOLYS 82.60*  --  83.30*  --    LYMPHOCYTES 8.70*  --  7.90*  --    MONOCYTES 4.30  --  5.30  --    EOSINOPHILS 2.60 3 2.60  --    BASOPHILS 0.90  --  0.90  --      Recent Labs     06/02/20  0530 06/03/20  0500 06/04/20  0512   RBC 2.32* 2.61* 2.37*   HEMOGLOBIN 7.5* 8.3* 7.7*   HEMATOCRIT 24.5* 27.6* 25.6*   PLATELETCT 143* 182 163*       Imaging  X-Ray:  I have personally reviewed the  images and compared with prior images.  CT:    Reviewed    Assessment/Plan  Shock (HCC)- (present on admission)  Assessment & Plan  Multifactorial:  PEA/CPR, propofol and ?  Pneumonia  Finished Zosyn/vanco  Echo pending   Ongoing titration of Levophed for MAP goals >65  Cortisol 11.4, consider steroids if unable to wean vasopressors    PEA (Pulseless electrical activity) (HCC)- (present on admission)  Assessment & Plan  ? Etiology from [place], possibly hypoxia  Continue supportive care  Optimize electrolytes  No obvious neurologic complications  EKG without ischemic changes  Echocardiogram: LVEF 65%. Mild concentric LVH. Grade II diastolic dysfunction. Mild mitral regurgitation. Estimated right ventricular systolic pressure is 55 mmHg    Scalp wound- (present on admission)  Assessment & Plan  With exposed hardware -appears chronic  Wound care consult  plastic surgery or neurosurgery consult for wound care and more stable    Goals of care, counseling/discussion  Assessment & Plan  Discussed at length with patient's sister regarding his goals of care given his POLST completed in 2015 says DNR/DNI.   In reviewing the chart, the patient actually stated that he misunderstood the form and this CODE STATUS is not correct.    Acute respiratory failure with hypoxia (HCC)- (present on admission)  Assessment & Plan  Intubated at [facility] on 5/30/20 following cardiac arrest  RT/O2 protocols  Daily and PRN ABGs  Titration of ventilator therapy based on ABGs and patient's status  Sedation as tolerated/indicated  Daily CXR  HOB >30 degrees and peridex for VAP prevention  Pepcid for GI prophylaxis  SAT/SBT when able (ABCDEF Bundle)  Early mobility  Continues to fail spontaneous breathing trials, aggressively weaning sedation to optimize extubation opportunities    Recurrent right pleural effusion- (present on admission)  Assessment & Plan  Chronic, recurrent  Thoracenteses performed on 5/13 and 5/14  Thoracentesis completed 6/2/2020,  transitive  Follow chest x-rays    Pneumonia- (present on admission)  Assessment & Plan  ?aspiration  Finished course of Zosyn  Sputum with Jenae--unknown clinical significance    Ileus (HCC)- (present on admission)  Assessment & Plan  Resolved, continue tube feeding    Hypokalemia- (present on admission)  Assessment & Plan  Continue to replete to a goal potassium of greater than 4    Seizure disorder (HCC)- (present on admission)  Assessment & Plan  cEEG without evidence of seizure  Neurology has signed off  Continue Keppra 1g BID and Dilantin  Per neurology okay to wean sedation       VTE:  Lovenox  Ulcer: H2 Antagonist  Lines: Irby Catheter  Ongoing indication addressed    I have performed a physical exam and reviewed and updated ROS and Plan today (6/4/2020). In review of yesterday's note (6/3/2020), there are no changes except as documented above.     Titrating ventilator, addressing severe hypoxia. This patient is critically ill, at high risk for decompensation leading to worsening vital organ dysfunction and death without critical care interventions.    Discussed patient condition and risk of morbidity and/or mortality with Family, RN, RT, Pharmacy, , Code status disscussed and Charge nurse / hot rounds     The patient remains critically ill.  Critical care time = 42 minutes in directly providing and coordinating critical care and extensive data review.  No time overlap and excludes procedures.

## 2020-06-05 NOTE — PROCEDURES
Intubation    Date/Time: 6/5/2020 12:16 PM  Performed by: Pavel Orlando Jr., D.O.  Authorized by: Pavel Orlando Jr., D.O.     Consent:     Consent obtained:  Emergent situation  Pre-procedure details:     Patient status:  Altered mental status    Mallampati score:  II    Pretreatment meds: etomidate 20 mg.    Paralytics:  Rocuronium (50 mg)  Procedure details:     Preoxygenation:  Nonrebreather mask    CPR in progress: no      Intubation method:  Oral    Oral intubation technique:  Video-assisted    Laryngoscope type:  GlideScope    Laryngoscope blade:  Mac 3    Cormack-Lehane Classification:  Grade 1    Tube size (mm):  7.5    Tube type:  Cuffed    Number of attempts:  1    Cricoid pressure: no      Tube visualized through cords: yes    Placement assessment:     ETT to teeth:  26    Tube secured with:  ETT liz    Breath sounds:  Equal    Placement verification: chest rise, condensation, CXR verification, direct visualization, equal breath sounds, ETCO2 detector and tube exhalation      CXR findings:  ETT in proper place  Post-procedure details:     Patient tolerance of procedure:  Tolerated well, no immediate complications

## 2020-06-05 NOTE — CARE PLAN
Adult Ventilation Update    Total Vent Days: 6    Patient Lines/Drains/Airways Status      Active Airway       Name: Placement date: Placement time: Site: Days:    Airway ETT 8.0  06/05/20   1202   --  less than 1                  Pt on spont for 4 hrs this morning prior to extubation. Pt reintubated for respiratory failure.  APVCMV 18, 360, +8, 40%

## 2020-06-05 NOTE — PROGRESS NOTES
"RN updated Dr. Orlando on patients failure to safely manage oral secretions, increased work of breathing and increased lethargy. MD presented to bedside. Emergently re-intubated. Medicated per the MAR.    Patients sister Jess updated on failed extubation and successful re-intubation. Discussed next steps of trache. Per Jess she would like to pursue trache to allow her brother more time to \"heal his lungs.\" Discussed with Jess that patient has a daughter who should be NOK. Jess stated she has not been able to get a phone number for her and that Vishnu and his daughter have been estranged for years. Per Jess, Vishnu's daughter is an active drug user and she does not \"feel comfortable bringing her in to his life now to make decisions for a man she has had no relationship with for years.\" Jess acts as Vishnu's caretaker and would like to continue making care decisions for him. Kristy with social work updated. Dr. Orlando updated that sister would like to pursue trache for Vishnu.   "

## 2020-06-05 NOTE — CARE PLAN
Problem: Knowledge Deficit  Goal: Knowledge of the prescribed therapeutic regimen will improve  Outcome: PROGRESSING SLOWER THAN EXPECTED  Intervention: Discuss information regarding therpeutic regimen and document in education  Note: Updated sister Jess on POC and anticipated next steps of hospitalization.      Problem: Safety - Medical Restraint  Goal: Remains free of injury from restraints (Restraint for Interference with Medical Device)  Description: INTERVENTIONS:  1. Determine that other, less restrictive measures have been tried or would not be effective before applying the restraint  2. Evaluate the patient's condition at the time of restraint application  3. Inform patient/family regarding the reason for restraint  4. Q2H: Monitor safety, psychosocial status, comfort, nutrition and hydration  Outcome: PROGRESSING SLOWER THAN EXPECTED  Flowsheets (Taken 6/5/2020 1301)  Addressed this shift: Remains free of injury from restraints (restraint for interference with medical device):   Every 2 hours: Monitor safety, psychosocial status, comfort, nutrition and hydration   Inform patient/family regarding the reason for restraint   Evaluate the patient's condition at the time of restraint application   Determine that other, less restrictive measures have been tried or would not be effective before applying the restraint     Problem: Pain Management  Goal: Pain level will decrease to patient's comfort goal  Intervention: Follow pain managment plan developed in collaboration with patient and Interdisciplinary Team  6/5/2020 1542 by Leyla Zamora, R.N.  Note: Assessed for pain and medicated per the MAR  6/5/2020 1301 by Leyla Zamora R.N.  Note: Assessed for pain and medicated per the MAR

## 2020-06-05 NOTE — PROGRESS NOTES
"Critical Care Progress Note    Date of admission  5/31/2020    Chief Complaint  54 y.o. male admitted 5/31/2020 with Respiratory failure, seizure    Hospital Course    \"All history was obtained from old notes/charts from [facility] as the patient is intubated at the time of my evaluation.     Mr. Lechuga is a 54 year old male with the past medical history of urethral stricture with urinary retention, recurrent right-sided pleural effusion, alcohol abuse, COPD on home O2, hepatitis C with cirrhosis, chronic pain syndrome on narcotics, traumatic brain injury, history of SDH with subsequent seizure disorder who presented to [facility] on 5/22 with the complaint of difficulty urinary for 2 days and low blood pressures.  He apparently was recently hospitalized at [facility] from 5/6 to 5/11/2020 for a COPD exacerbation and reported that he was doing well for until 2 days prior to 5/22 when he developed difficulty urinating and draining his bladder.  He has worsening abdominal pain in the suprapubic region with chills.  No fevers.  No nausea or vomiting.  He has a negative COVID test on 5/6.  The patient was admitted for acute urinary retention, UTI, and hypoxia.  Over the course of the next week, the patient required intubation for seizure activity, but was then extubated.  However, the patient then developed aspiration pneumonia with AMS and was on BiPAP for a night on 5/29.  All during the patient's hospital stay, he has been battling with delirium.  Unfortunately, around 2000 last night the patient the patient became unresponsive with a bradycardic episode and no pulses.  A code was called and he received 3 rounds of CPR and 2 doses of epi before ROSC was achieved.  He was intubated again and stabilized.  He was sent to [facility] for higher level of care due to the growing complexity of his condition as well as critical care management since [facility] does not have critical care specialists on staff.\"      Interval " Problem Update  Reviewed last 24 hour events:   - No acute events overnight   - Neuro: GCS 11T   - HR: 50s-60s   - SBP: 90s-100s on 5 of levo -> add midodrine, stop lasix   - GI: TF at goal, rectal tube   - UOP: 3.3L/24 hrs   - Irby: yes   - Tm: 37.3   - Lines: PICC, PIV   - PPx: GI omeprazole, DVT lovenox   - VBG: metabolic alkalosis -> diamox   - VD #6   - SBT: VC: 0.7, NIF -22, RSBI 40s   - CXR (personally reviewed and compared to prior): continued effusion, edema    Updates: SBT parameter's acceptable for trial extubation, this was attempted this morning however the patient was unable to adequately clear his secretions leading to hypoxia therefore he was re-intubated.    Yesterday   - No acute events overnight   - Neuro: GCS 11T   - HR: 50s-60s   - SBP: 90s-100s, on 4 of levo   - GI: TF at 40, goal of 65, rectal tube in place   - UOP: 3L/24 hrs   - Irby: yes   - Tm: 37.8   - Lines: PICC, PIV   - PPx: GI omeprazole, DVT lovenox   - ABG: metabolic alkalosis   - VD #5   - SBT: VC: 0.7, NIF -17, RSBI 70   - CXR (personally reviewed and compared to prior): underpenetrated    Review of Systems  Review of Systems   Unable to perform ROS: Intubated        Vital Signs for last 24 hours   Pulse:  [52-69] 67  Resp:  [11-32] 25  BP: ()/(50-69) 91/60  SpO2:  [94 %-100 %] 94 %    Hemodynamic parameters for last 24 hours       Respiratory Information for the last 24 hours  Vent Mode: Spont  Rate (breaths/min): 16  Vt Target (mL): 360  PEEP/CPAP: 8  P Support: 5  MAP: 10  Length of Weaning Trial (Hours): 1  Control VTE (exp VT): 453    Physical Exam   Physical Exam  Vitals signs and nursing note reviewed.   Constitutional:       General: He is in acute distress.      Appearance: He is well-developed. He is ill-appearing.      Interventions: He is intubated.   HENT:      Head: Normocephalic and atraumatic.      Comments: Scalp abnormalities consistent with prior neurosurgical procedure.  Surgical hardware is seen  extruding from the skin     Right Ear: External ear normal.      Left Ear: External ear normal.      Nose: Nose normal.      Comments: Cortrak in place     Mouth/Throat:      Mouth: Mucous membranes are dry.      Pharynx: Oropharynx is clear.      Comments: ET tube in place  Eyes:      Conjunctiva/sclera: Conjunctivae normal.      Pupils: Pupils are equal, round, and reactive to light.   Neck:      Musculoskeletal: Neck supple.      Vascular: No JVD.      Trachea: No tracheal deviation.   Cardiovascular:      Rate and Rhythm: Normal rate and regular rhythm.      Pulses: Normal pulses.   Pulmonary:      Effort: He is intubated.      Breath sounds: Rhonchi and rales present. No wheezing.   Abdominal:      General: Bowel sounds are normal. There is no distension.      Palpations: Abdomen is soft.   Musculoskeletal:         General: No tenderness.   Skin:     General: Skin is warm and dry.      Capillary Refill: Capillary refill takes less than 2 seconds.      Findings: No rash.      Comments: Multiple tattoos  Venous stasis dermatitis   Neurological:      General: No focal deficit present.      Mental Status: He is alert.      Comments: Opens eyes to voice, intermittently follows commands.    Psychiatric:      Comments: Unable to assess         Medications  Current Facility-Administered Medications   Medication Dose Route Frequency Provider Last Rate Last Dose   • dexmedetomidine (PRECEDEX) 400 mcg/100mL NS premix infusion  0.1-1.5 mcg/kg/hr Intravenous Continuous Pavel Orlando Jr., D.O. 7.2 mL/hr at 06/04/20 2332 0.4 mcg/kg/hr at 06/04/20 2332   • oxyCODONE immediate-release (ROXICODONE) tablet 5-10 mg  5-10 mg Enteral Tube Q4HRS PRN Pavel Orlando Jr., D.O.   5 mg at 06/04/20 1634   • levETIRAcetam (KEPPRA) tablet 1,000 mg  1,000 mg Enteral Tube BID Pavel Orlando Jr., D.O.   1,000 mg at 06/05/20 0445   • furosemide (LASIX) injection 20 mg  20 mg Intravenous BID DIURETIC Pavel Orlando Jr., D.O.   20 mg at  06/05/20 0445   • Pharmacy Consult: Enteral tube insertion - review meds/change route/product selection  1 Each Other PHARMACY TO DOSE Pavel Orlando Jr., D.O.       • omeprazole (FIRST-OMEPRAZOLE) 2 mg/mL oral susp 40 mg  40 mg Enteral Tube DAILY Pavel Orlando Jr. D.O.   40 mg at 06/05/20 0446   • potassium bicarbonate (KLYTE) effervescent tablet 25 mEq  25 mEq Enteral Tube BID Pavel Orlando Jr., D.O.   25 mEq at 06/05/20 0445   • enoxaparin (LOVENOX) inj 40 mg  40 mg Subcutaneous DAILY Pavel Orlando Jr., D.O.   40 mg at 06/05/20 0445   • thiamine tablet 100 mg  100 mg Enteral Tube DAILY Pavel Orlando Jr. D.O.   100 mg at 06/05/20 0445   • multivitamin (THERAGRAN) tablet 1 Tab  1 Tab Enteral Tube DAILY Pavel Orlando Jr., D.O.   1 Tab at 06/05/20 0445   • LORazepam (ATIVAN) injection 4 mg  4 mg Intravenous Q10 MIN PRN Pavel Orlando Jr., D.O.       • phenytoin (DILANTIN) injection 300 mg  300 mg Intravenous TWICE DAILY Pavel Orlando Jr., D.O.   300 mg at 06/05/20 0446    And   • phenytoin (DILANTIN) injection 200 mg  200 mg Intravenous DAILY Pavel Orlando Jr., D.O.   200 mg at 06/04/20 1409   • norepinephrine (Levophed) infusion 8 mg/250 mL (premix)  0-30 mcg/min Intravenous Continuous Saurabh Delatorre M.D. 9.4 mL/hr at 06/04/20 2332 5 mcg/min at 06/04/20 2332   • senna-docusate (PERICOLACE or SENOKOT S) 8.6-50 MG per tablet 2 Tab  2 Tab Enteral Tube BID Anisa Hernández M.D.   2 Tab at 06/05/20 0445    And   • polyethylene glycol/lytes (MIRALAX) PACKET 1 Packet  1 Packet Enteral Tube QDAY PRN Anisa Hernández M.D.        And   • magnesium hydroxide (MILK OF MAGNESIA) suspension 30 mL  30 mL Enteral Tube QDAY PRN Anisa Hernández M.D.        And   • bisacodyl (DULCOLAX) suppository 10 mg  10 mg Rectal QDAY PRN Anisa Hernández M.D.       • acetaminophen (TYLENOL) tablet 650 mg  650 mg Enteral Tube Q6HRS PRN Anisa Hernández M.D.       • labetalol (NORMODYNE/TRANDATE)  injection 10 mg  10 mg Intravenous Q4HRS PRN Anisa Hernández M.D.       • Respiratory Therapy Consult   Nebulization Continuous RT Alexia Khan M.D.       • ipratropium-albuterol (DUONEB) nebulizer solution  3 mL Nebulization Q2HRS PRN (RT) Alexia Khan M.D.       • MD Alert...ICU Electrolyte Replacement per Pharmacy   Other PHARMACY TO DOSE Alexia Khan M.D.       • lidocaine (XYLOCAINE) 1 % injection 1-2 mL  1-2 mL Tracheal Tube Q30 MIN PRN Alexia Khan M.D.           Fluids    Intake/Output Summary (Last 24 hours) at 6/5/2020 0720  Last data filed at 6/5/2020 0600  Gross per 24 hour   Intake 2297 ml   Output 3325 ml   Net -1028 ml       Laboratory  Recent Labs     06/03/20  0421 06/04/20  0449 06/05/20  0533   ISTATAPH 7.454 7.497  --    ISTATAPCO2 38.6* 41.4*  --    ISTATAPO2 80 75  --    ISTATATCO2 28 33  --    PSYHYPL2COE 96 96  --    ISTATARTHCO3 27.1* 32.1*  --    ISTATARTBE 3 8*  --    ISTATTEMP 95.0 F 96.8 F 99.3 F   ISTATFIO2 40 30 30   ISTATSPEC Arterial Arterial Venous   ISTATAPHTC 7.484 7.512*  --    ROQRGEXQ8VC 70 70  --          Recent Labs     06/03/20  0500 06/04/20  0512 06/05/20  0435   SODIUM 142 148* 146*   POTASSIUM 3.7 3.8 4.1   CHLORIDE 104 109 106   CO2 28 32 33   BUN 12 15 19   CREATININE 1.30 1.37 1.49*   MAGNESIUM 1.8 2.1  --    PHOSPHORUS 3.4 2.7  --    CALCIUM 8.4* 8.3* 8.0*     Recent Labs     06/03/20  0500 06/04/20  0512 06/05/20  0435   PREALBUMIN 3.8*  --   --    GLUCOSE 109* 116* 106*     Recent Labs     06/03/20  0500 06/04/20  0512 06/05/20  0435   WBC 9.4 7.4 6.8   NEUTSPOLYS 83.30* 73.90* 78.30*   LYMPHOCYTES 7.90* 16.50* 15.70*   MONOCYTES 5.30 5.20 2.60   EOSINOPHILS 2.60 0.90 2.60   BASOPHILS 0.90 1.70 0.90     Recent Labs     06/03/20  0500 06/04/20  0512 06/05/20  0435   RBC 2.61* 2.37* 2.32*   HEMOGLOBIN 8.3* 7.7* 7.4*   HEMATOCRIT 27.6* 25.6* 24.8*   PLATELETCT 182 163* 178       Imaging  X-Ray:  I have personally reviewed the images and  compared with prior images.  CT:    Reviewed    Assessment/Plan  Shock (HCC)- (present on admission)  Assessment & Plan  Multifactorial:  PEA/CPR, propofol and ?  Pneumonia  Finished Zosyn/vanco  Echo pending   Ongoing titration of Levophed for MAP goals >65  Start midodrine  Cortisol 11.4, consider steroids if unable to wean vasopressors    PEA (Pulseless electrical activity) (HCC)- (present on admission)  Assessment & Plan  ? Etiology from [facility], possibly hypoxia  Continue supportive care  Optimize electrolytes  No obvious neurologic complications  EKG without ischemic changes  Echocardiogram: LVEF 65%. Mild concentric LVH. Grade II diastolic dysfunction. Mild mitral regurgitation. Estimated right ventricular systolic pressure is 55 mmHg    Scalp wound- (present on admission)  Assessment & Plan  With exposed hardware -appears chronic  Wound care consult  Plastic surgery or neurosurgery consult for wound care when more stable    Goals of care, counseling/discussion  Assessment & Plan  Discussed at length with patient's sister regarding his goals of care given his POLST completed in 2015 says DNR/DNI.   In reviewing the chart, the patient actually stated that he misunderstood the form and this CODE STATUS is not correct, he wanted to be FULL CODE    Acute respiratory failure with hypoxia (HCC)- (present on admission)  Assessment & Plan  Intubated at [facility] on 5/30/20 following cardiac arrest  RT/O2 protocols  Daily and PRN ABGs  Titration of ventilator therapy based on ABGs and patient's status  Sedation as tolerated/indicated  Daily CXR  HOB >30 degrees and peridex for VAP prevention  Pepcid for GI prophylaxis  SAT/SBT when able (ABCDEF Bundle)  Early mobility  Passed SBT today, attempted ventilator liberation and failed.   Will need tracheostomy for ventilator liberation. Plan to place this weekend    Recurrent right pleural effusion- (present on admission)  Assessment & Plan  Chronic, recurrent  Thoracenteses  performed on 5/13 and 5/14  Thoracentesis completed 6/2/2020, transitive. Culture NGTD  Follow chest x-rays    Pneumonia- (present on admission)  Assessment & Plan  ?aspiration  Finished course of Zosyn  Sputum with Jenae--unknown clinical significance    Ileus (HCC)- (present on admission)  Assessment & Plan  Resolved, continue tube feeding    Hypokalemia- (present on admission)  Assessment & Plan  Continue to replete to a goal potassium of greater than 4    Seizure disorder (HCC)- (present on admission)  Assessment & Plan  cEEG without evidence of seizure  Neurology has signed off  Continue Keppra 1g BID and Dilantin  Per neurology okay to wean sedation       VTE:  Lovenox  Ulcer: H2 Antagonist  Lines: Irby Catheter  Ongoing indication addressed    I have performed a physical exam and reviewed and updated ROS and Plan today (6/5/2020). In review of yesterday's note (6/4/2020), there are no changes except as documented above.     Titrating vent, sedation and pressors. Attempting ventilator liberation. This patient is critically ill, at high risk for decompensation leading to worsening vital organ dysfunction and death without critical care interventions.    Discussed patient condition and risk of morbidity and/or mortality with Family, RN, RT, Pharmacy, , Code status disscussed and Charge nurse / hot rounds     The patient remains critically ill.  Critical care time = 80 minutes in directly providing and coordinating critical care and extensive data review.  No time overlap and excludes procedures.

## 2020-06-05 NOTE — CARE PLAN
Problem: Safety - Medical Restraint  Goal: Remains free of injury from restraints (Restraint for Interference with Medical Device)  Description: INTERVENTIONS:  1. Determine that other, less restrictive measures have been tried or would not be effective before applying the restraint  2. Evaluate the patient's condition at the time of restraint application  3. Inform patient/family regarding the reason for restraint  4. Q2H: Monitor safety, psychosocial status, comfort, nutrition and hydration  Outcome: PROGRESSING SLOWER THAN EXPECTED  Flowsheets (Taken 6/5/2020 1301)  Addressed this shift: Remains free of injury from restraints (restraint for interference with medical device):   Every 2 hours: Monitor safety, psychosocial status, comfort, nutrition and hydration   Inform patient/family regarding the reason for restraint   Evaluate the patient's condition at the time of restraint application   Determine that other, less restrictive measures have been tried or would not be effective before applying the restraint     Problem: Pain Management  Goal: Pain level will decrease to patient's comfort goal  Intervention: Follow pain managment plan developed in collaboration with patient and Interdisciplinary Team  Note: Assessed for pain and medicated per the MAR

## 2020-06-05 NOTE — FLOWSHEET NOTE
06/05/20 1017   Events/Summary/Plan   Events/Summary/Plan pt extubated per MD to 5L oxymask. MD at bedside.    Ventiliation   Vent Clock Discontinued Yes

## 2020-06-05 NOTE — CARE PLAN
Problem: Ventilation Defect:  Goal: Ability to achieve and maintain unassisted ventilation or tolerate decreased levels of ventilator support  Outcome: NOT MET   Vent day 6  7.5 @ 25 cm  16/360/+8, 30%

## 2020-06-05 NOTE — DISCHARGE PLANNING
RN updated LSW that she had a conversation with pt's sister about daughters contact information. See documentation for further details.     No contact for pt's daughter at this time. Family wanting to purse full treatment. Pt may require trach.    DC needs are unknown at this time.

## 2020-06-06 NOTE — CARE PLAN
Problem: Safety  Goal: Will remain free from falls  Outcome: PROGRESSING AS EXPECTED  Intervention: Implement fall precautions  Flowsheets (Taken 6/6/2020 0000)  Environmental Precautions: Bed in Low Position     Problem: Communication  Goal: The ability to communicate needs accurately and effectively will improve  Outcome: PROGRESSING SLOWER THAN EXPECTED  Intervention: Reorient patient to environment as needed  Flowsheets (Taken 6/6/2020 0130)  Oriented to::   Unit Routine   Patient Rights and Responsibilities

## 2020-06-06 NOTE — PROGRESS NOTES
2 RN skin check completed with LOAN Zamora RN. Cranial hardware in place; lips dry and cracked; IAD to buttocks, cream applied; small skin tear r flank; heel float boots in place; z-flow pillow in use.

## 2020-06-06 NOTE — PROGRESS NOTES
Scalp wound washed per order. Hardware from prior crani exposed, MD aware. Lips are dry and cracked. Extubated and re-intubated today. Teeth and gums appear poorly cleansed prior to hospitalization, oral care provided q2 hr. When extuabted today patient kept trying to eat oxymask and bit lower lip x2. Site is red, padded with gauze when re-intubated. Buttocks has IAD, rectal tube padded and barrier paste applied. Heels floated in heel float boots, elbows on pillow, z- flow pillow in use.

## 2020-06-06 NOTE — CARE PLAN
Problem: Safety  Goal: Will remain free from injury  Outcome: PROGRESSING AS EXPECTED  Goal: Will remain free from falls  Outcome: PROGRESSING AS EXPECTED     Problem: Infection  Goal: Will remain free from infection  Outcome: PROGRESSING AS EXPECTED     Problem: Venous Thromboembolism (VTW)/Deep Vein Thrombosis (DVT) Prevention:  Goal: Patient will participate in Venous Thrombosis (VTE)/Deep Vein Thrombosis (DVT)Prevention Measures  Outcome: PROGRESSING AS EXPECTED     Problem: Urinary Elimination:  Goal: Ability to reestablish a normal urinary elimination pattern will improve  Outcome: PROGRESSING AS EXPECTED     Problem: Skin Integrity  Goal: Risk for impaired skin integrity will decrease  Outcome: PROGRESSING AS EXPECTED     Problem: Pain Management  Goal: Pain level will decrease to patient's comfort goal  Outcome: PROGRESSING AS EXPECTED     Problem: Safety - Medical Restraint  Goal: Remains free of injury from restraints (Restraint for Interference with Medical Device)  Description: INTERVENTIONS:  1. Determine that other, less restrictive measures have been tried or would not be effective before applying the restraint  2. Evaluate the patient's condition at the time of restraint application  3. Inform patient/family regarding the reason for restraint  4. Q2H: Monitor safety, psychosocial status, comfort, nutrition and hydration  Outcome: PROGRESSING AS EXPECTED     Problem: Communication  Goal: The ability to communicate needs accurately and effectively will improve  Outcome: PROGRESSING SLOWER THAN EXPECTED     Problem: Bowel/Gastric:  Goal: Normal bowel function is maintained or improved  Outcome: PROGRESSING SLOWER THAN EXPECTED     Problem: Respiratory:  Goal: Respiratory status will improve  Outcome: PROGRESSING SLOWER THAN EXPECTED     Problem: Safety - Medical Restraint  Goal: Free from restraint(s) (Restraint for Interference with Medical Device)  Description: INTERVENTIONS:  1. ONCE/SHIFT or  MINIMUM Q12H: Assess and document the continuing need for restraints  2. Q24H: Continued use of restraint requires LIP to perform face to face examination and written order  3. Identify and implement measures to help patient regain control  Outcome: PROGRESSING SLOWER THAN EXPECTED

## 2020-06-06 NOTE — PROGRESS NOTES
"Critical Care Progress Note    Date of admission  5/31/2020    Chief Complaint  54 y.o. male admitted 5/31/2020 with Respiratory failure, seizure    Hospital Course    \"All history was obtained from old notes/charts from [facility] as the patient is intubated at the time of my evaluation.     Mr. Lechuga is a 54 year old male with the past medical history of urethral stricture with urinary retention, recurrent right-sided pleural effusion, alcohol abuse, COPD on home O2, hepatitis C with cirrhosis, chronic pain syndrome on narcotics, traumatic brain injury, history of SDH with subsequent seizure disorder who presented to [facility] on 5/22 with the complaint of difficulty urinary for 2 days and low blood pressures.  He apparently was recently hospitalized at [facility] from 5/6 to 5/11/2020 for a COPD exacerbation and reported that he was doing well for until 2 days prior to 5/22 when he developed difficulty urinating and draining his bladder.  He has worsening abdominal pain in the suprapubic region with chills.  No fevers.  No nausea or vomiting.  He has a negative COVID test on 5/6.  The patient was admitted for acute urinary retention, UTI, and hypoxia.  Over the course of the next week, the patient required intubation for seizure activity, but was then extubated.  However, the patient then developed aspiration pneumonia with AMS and was on BiPAP for a night on 5/29.  All during the patient's hospital stay, he has been battling with delirium.  Unfortunately, around 2000 last night the patient the patient became unresponsive with a bradycardic episode and no pulses.  A code was called and he received 3 rounds of CPR and 2 doses of epi before ROSC was achieved.  He was intubated again and stabilized.  He was sent to [facility] for higher level of care due to the growing complexity of his condition as well as critical care management since [facility] does not have critical care specialists on staff.\"      Interval " Problem Update  Reviewed last 24 hour events:   - Failed extubation yesterday, no events overnight   - Neuro: on dex, follows   - HR: 50s-60s   - SBP: 90s-120s   - GI: TF at goal   - UOP: 3L/24 hrs, Cr slightly worse   - Irby: yes   - Tm: afebrile   - Lines: PICC, PIV   - PPx: GI omeprazole, DVT lovenox   - ABG: mild hypoxia   - VD #7   - SBT: RSBI 56   - CXR (personally reviewed and compared to prior): no change    Updates: Discussed trach with sisters and exposed hardware with Dr. Vallejo    Yesterday   - No acute events overnight   - Neuro: GCS 11T   - HR: 50s-60s   - SBP: 90s-100s on 5 of levo -> add midodrine, stop lasix   - GI: TF at goal, rectal tube   - UOP: 3.3L/24 hrs   - Irby: yes   - Tm: 37.3   - Lines: PICC, PIV   - PPx: GI omeprazole, DVT lovenox   - VBG: metabolic alkalosis -> diamox   - VD #6   - SBT: VC: 0.7, NIF -22, RSBI 40s   - CXR (personally reviewed and compared to prior): continued effusion, edema    Updates: SBT parameter's acceptable for trial extubation, this was attempted this morning however the patient was unable to adequately clear his secretions leading to hypoxia therefore he was re-intubated.    Review of Systems  Review of Systems   Unable to perform ROS: Intubated        Vital Signs for last 24 hours   Temp:  [36.7 °C (98.1 °F)-37.1 °C (98.8 °F)] 37 °C (98.6 °F)  Pulse:  [50-82] 57  Resp:  [7-97] 31  BP: ()/() 93/57  SpO2:  [82 %-100 %] 96 %    Hemodynamic parameters for last 24 hours       Respiratory Information for the last 24 hours  Vent Mode: Spont  Rate (breaths/min): 18  Vt Target (mL): 360  PEEP/CPAP: 8  P Support: 5  MAP: 10  Control VTE (exp VT): 361    Physical Exam   Physical Exam  Vitals signs and nursing note reviewed.   Constitutional:       General: He is in acute distress.      Appearance: He is well-developed. He is ill-appearing.      Interventions: He is intubated.   HENT:      Head: Normocephalic and atraumatic.      Comments: Scalp abnormalities  consistent with prior neurosurgical procedure.  Surgical hardware is seen extruding from the skin     Right Ear: External ear normal.      Left Ear: External ear normal.      Nose: Nose normal.      Comments: Cortrak in place     Mouth/Throat:      Mouth: Mucous membranes are dry.      Pharynx: Oropharynx is clear.      Comments: ET tube in place  Eyes:      Conjunctiva/sclera: Conjunctivae normal.      Pupils: Pupils are equal, round, and reactive to light.   Neck:      Musculoskeletal: Neck supple.      Vascular: No JVD.      Trachea: No tracheal deviation.   Cardiovascular:      Rate and Rhythm: Regular rhythm. Bradycardia present.      Pulses: Normal pulses.   Pulmonary:      Effort: He is intubated.      Breath sounds: Rhonchi and rales present. No wheezing.   Abdominal:      General: Bowel sounds are normal. There is no distension.      Palpations: Abdomen is soft.   Musculoskeletal:         General: No tenderness.   Skin:     General: Skin is warm and dry.      Capillary Refill: Capillary refill takes less than 2 seconds.      Findings: No rash.      Comments: Multiple tattoos  Venous stasis dermatitis   Neurological:      General: No focal deficit present.      Mental Status: He is alert.      Comments: Opens eyes to voice, following commands   Psychiatric:      Comments: Unable to assess         Medications  Current Facility-Administered Medications   Medication Dose Route Frequency Provider Last Rate Last Dose   • midodrine (PROAMATINE) tablet 10 mg  10 mg Enteral Tube Q8HRS Pavel Orlando Jr., D.O.   10 mg at 06/06/20 0438   • dexmedetomidine (PRECEDEX) 400 mcg/100mL NS premix infusion  0.1-1.5 mcg/kg/hr Intravenous Continuous Pavel Orlando Jr., D.O. 9 mL/hr at 06/06/20 0600 0.5 mcg/kg/hr at 06/06/20 0600   • oxyCODONE immediate-release (ROXICODONE) tablet 5-10 mg  5-10 mg Enteral Tube Q4HRS PRN Pavel Orlando Jr., D.O.   5 mg at 06/04/20 1634   • levETIRAcetam (KEPPRA) tablet 1,000 mg  1,000 mg  Enteral Tube BID Pavel Orlando Jr. D.O.   1,000 mg at 06/06/20 0438   • Pharmacy Consult: Enteral tube insertion - review meds/change route/product selection  1 Each Other PHARMACY TO DOSE MARRY Olson Jr..O.       • omeprazole (FIRST-OMEPRAZOLE) 2 mg/mL oral susp 40 mg  40 mg Enteral Tube DAILY Pavel Orlando Jr. D.O.   40 mg at 06/06/20 0438   • enoxaparin (LOVENOX) inj 40 mg  40 mg Subcutaneous DAILY Pavel Orlando Jr. D.O.   40 mg at 06/06/20 0439   • thiamine tablet 100 mg  100 mg Enteral Tube DAILY Pavel Orlando Jr. D.O.   100 mg at 06/06/20 0438   • multivitamin (THERAGRAN) tablet 1 Tab  1 Tab Enteral Tube DAILY Pavel Orlando Jr. D.O.   1 Tab at 06/06/20 0438   • LORazepam (ATIVAN) injection 4 mg  4 mg Intravenous Q10 MIN PRN Pavel Orlando Jr. D.O.       • phenytoin (DILANTIN) injection 300 mg  300 mg Intravenous TWICE DAILY Pavel Orlando Jr. D.O.   300 mg at 06/06/20 0454    And   • phenytoin (DILANTIN) injection 200 mg  200 mg Intravenous DAILY Pavel Orlando Jr., D.O.   200 mg at 06/05/20 1520   • norepinephrine (Levophed) infusion 8 mg/250 mL (premix)  0-30 mcg/min Intravenous Continuous Saurabh Delatorre M.D. 7.5 mL/hr at 06/06/20 0600 4 mcg/min at 06/06/20 0600   • senna-docusate (PERICOLACE or SENOKOT S) 8.6-50 MG per tablet 2 Tab  2 Tab Enteral Tube BID Anisa Hernández M.D.   Stopped at 06/05/20 1800    And   • polyethylene glycol/lytes (MIRALAX) PACKET 1 Packet  1 Packet Enteral Tube QDAY PRN Anisa Hernández M.D.        And   • magnesium hydroxide (MILK OF MAGNESIA) suspension 30 mL  30 mL Enteral Tube QDAY PRN Anisa Hernández M.D.        And   • bisacodyl (DULCOLAX) suppository 10 mg  10 mg Rectal QDAY PRN Anisa Hernández M.D.       • acetaminophen (TYLENOL) tablet 650 mg  650 mg Enteral Tube Q6HRS PRN Anisa Hernández M.D.       • labetalol (NORMODYNE/TRANDATE) injection 10 mg  10 mg Intravenous Q4HRS PRN Anisa Hernández M.D.       •  Respiratory Therapy Consult   Nebulization Continuous RT Alexia Khan M.D.       • ipratropium-albuterol (DUONEB) nebulizer solution  3 mL Nebulization Q2HRS PRN (RT) Alexia Khan M.D.       • MD Alert...ICU Electrolyte Replacement per Pharmacy   Other PHARMACY TO DOSE Alexia Khan M.D.       • lidocaine (XYLOCAINE) 1 % injection 1-2 mL  1-2 mL Tracheal Tube Q30 MIN PRN Alexia Khan M.D.           Fluids    Intake/Output Summary (Last 24 hours) at 6/6/2020 0714  Last data filed at 6/6/2020 0600  Gross per 24 hour   Intake 2178.63 ml   Output 3275 ml   Net -1096.37 ml       Laboratory  Recent Labs     06/04/20  0449 06/05/20  0533 06/05/20  1453 06/06/20  0351   ISTATAPH 7.497  --  7.392* 7.470   ISTATAPCO2 41.4*  --  64.3* 46.8*   ISTATAPO2 75  --  257* 62*   ISTATATCO2 33  --  41* 35*   [id number] 96  --  100* 92*   ISTATARTHCO3 32.1*  --  39.2* 34.0*   ISTATARTBE 8*  --  13* 9*   ISTATTEMP 96.8 F 99.3 F 98.1 F 98.6 F   ISTATFIO2 30 30 100 30   ISTATSPEC Arterial Venous Arterial Arterial   ISTATAPHTC 7.512*  --  7.396* 7.470   [id number] 70  --  255* 62*         Recent Labs     06/04/20 0512 06/05/20  0435 06/06/20  0447   SODIUM 148* 146* 149*   POTASSIUM 3.8 4.1 4.0   CHLORIDE 109 106 109   CO2 32 33 35*   BUN 15 19 25*   CREATININE 1.37 1.49* 1.62*   MAGNESIUM 2.1  --   --    PHOSPHORUS 2.7  --   --    CALCIUM 8.3* 8.0* 8.4*     Recent Labs     06/04/20  0512 06/05/20  0435 06/06/20  0447   GLUCOSE 116* 106* 102*     Recent Labs     06/04/20  0512 06/05/20  0435 06/06/20 0447   WBC 7.4 6.8 6.8   NEUTSPOLYS 73.90* 78.30* 70.20   LYMPHOCYTES 16.50* 15.70* 18.40*   MONOCYTES 5.20 2.60 5.30   EOSINOPHILS 0.90 2.60 2.60   BASOPHILS 1.70 0.90 2.60*     Recent Labs     06/04/20  0512 06/05/20 0435 06/06/20 0447   RBC 2.37* 2.32* 2.30*   HEMOGLOBIN 7.7* 7.4* 7.3*   HEMATOCRIT 25.6* 24.8* 25.0*   PLATELETCT 163* 178 233       Imaging  X-Ray:  I have personally reviewed the images and compared  with prior images.  CT:    Reviewed    Assessment/Plan  Shock (HCC)- (present on admission)  Assessment & Plan  Multifactorial:  PEA/CPR, propofol and ?  Pneumonia  Finished Zosyn/vanco  Echo pending   Ongoing titration of Levophed for MAP goals >65  Continue midodrine  Cortisol 11.4, on Solu-Cortef    PEA (Pulseless electrical activity) (HCC)- (present on admission)  Assessment & Plan  ? Etiology from [facility], likely hypoxia  Continue supportive care  Optimize electrolytes  No obvious neurologic complications  EKG without ischemic changes  Echocardiogram: LVEF 65%. Mild concentric LVH. Grade II diastolic dysfunction. Mild mitral regurgitation. Estimated right ventricular systolic pressure is 55 mmHg    Scalp wound- (present on admission)  Assessment & Plan  With exposed hardware -spoke with the patient's sisters they state that it has been exposed for several months  Bilateral craniotomy with subdural evacuation in October 2016 by Dr. Vallejo  Wound care consult  Consulted Dr. Vallejo, MRI w/ and w/o ordered for evaluation    Goals of care, counseling/discussion  Assessment & Plan  Discussed at length with patient's sister regarding his goals of care given his POLST completed in 2015 says DNR/DNI.   In reviewing the chart, the patient actually stated that he misunderstood the form and this CODE STATUS is not correct, he wanted to be FULL CODE    Acute respiratory failure with hypoxia (HCC)- (present on admission)  Assessment & Plan  Intubated at [facility] on 5/30/20 following cardiac arrest  RT/O2 protocols  Daily and PRN ABGs  Titration of ventilator therapy based on ABGs and patient's status  Sedation as tolerated/indicated  Daily CXR  HOB >30 degrees and peridex for VAP prevention  Pepcid for GI prophylaxis  SAT/SBT when able (ABCDEF Bundle)  Early mobility  Passed SBT today, attempted ventilator liberation and failed.   Will likely need tracheostomy for ventilator liberation, family agrees to trach.  Plan for  6/7/2020    Recurrent right pleural effusion- (present on admission)  Assessment & Plan  Chronic, recurrent  Thoracenteses performed on 5/13 and 5/14  Thoracentesis completed 6/2/2020, transitive. Culture NGTD  Follow chest x-rays  Surgery to evaluate    Pneumonia- (present on admission)  Assessment & Plan  ?aspiration  Finished course of Zosyn  Sputum with Jenae--unknown clinical significance    Ileus (HCC)- (present on admission)  Assessment & Plan  Resolved    Hypokalemia- (present on admission)  Assessment & Plan  Continue to replete to a goal potassium of greater than 4    Seizure disorder (HCC)- (present on admission)  Assessment & Plan  cEEG without evidence of seizure  Neurology has signed off  Continue Keppra 1g BID and Dilantin    Ongoing encephalopathy, check MRI       VTE:  Lovenox  Ulcer: H2 Antagonist  Lines: Irby Catheter  Ongoing indication addressed    I have performed a physical exam and reviewed and updated ROS and Plan today (6/6/2020). In review of yesterday's note (6/5/2020), there are no changes except as documented above.     Ventilator and vasopressors titrated. This patient is critically ill, at high risk for decompensation leading to worsening vital organ dysfunction and death without critical care interventions.    Discussed patient condition and risk of morbidity and/or mortality with Family, RN, RT, Pharmacy, , Code status disscussed and Charge nurse / hot rounds     The patient remains critically ill.  Critical care time = 39 minutes in directly providing and coordinating critical care and extensive data review.  No time overlap and excludes procedures.

## 2020-06-06 NOTE — CARE PLAN
Problem: Ventilation Defect:  Goal: Ability to achieve and maintain unassisted ventilation or tolerate decreased levels of ventilator support  Outcome: NOT MET   Vent day 7  8.0 @ 25 cm  18/360/+8, 40%

## 2020-06-07 PROBLEM — G06.2 EPIDURAL ABSCESS: Status: ACTIVE | Noted: 2020-01-01

## 2020-06-07 PROBLEM — M86.9 OSTEOMYELITIS OF SKULL (HCC): Status: ACTIVE | Noted: 2020-01-01

## 2020-06-07 NOTE — CARE PLAN
Problem: Safety  Goal: Will remain free from falls  Outcome: PROGRESSING AS EXPECTED  Intervention: Assess risk factors for falls  Note: No falls     Problem: Infection  Goal: Will remain free from infection  Outcome: PROGRESSING AS EXPECTED  Intervention: Assess signs and symptoms of infection  Note: Following wbcs     Problem: Skin Integrity  Goal: Risk for impaired skin integrity will decrease  Outcome: PROGRESSING AS EXPECTED  Intervention: Assess risk factors for impaired skin integrity and/or pressure ulcers  Note: IAD

## 2020-06-07 NOTE — PROCEDURES
"Procedures   Perc Tracheostomy Operative Note     Date of operation: 6/7/2020     Preoperative diagnosis:  Persistent ventilator dependent respiratory failure     Postoperative diagnosis: same     Operation performed: Percutaneous tracheostomy     Surgeon: Dr. Pavel Orlando DO     Assistant/endoscopist: Dr. Blancas     Anesthesia: Intravenous analgesia     Indications: The patient is a 54 y.o. male  with acute respiratory failure (518.81), hypoxia (799.02) and encephalopathy. Percutaneous tracheostomy is carried out in the SICU under bronchoscopic guidance.     Findings: Tracheostomy in appropriate position     Specimen: none.     Procedure: Following informed consent, the patient was properly identified and optimally positioned in bed.  The patient was preoxygenated with 100% oxygen and placed on a set ventilator rate. A roll was placed between the patient's shoulders and the neck was slightly extended.  A \"time out\" was initiated. The correct patient, procedure and operative site were verified. The patient's allergy status was assessed. Procedural modifications were made as required. Intravenous analgesia, sedation and pharmacologic restraint  was administered. The surgical site was prepped with chlorhexidine.      Dr. Blancas performed the bronchoscopy. Please see dictation for full details. The fiberoptic bronchoscope was advanced through the indwelling endotracheal tube. The tube was withdrawn to the level of the vocal cords under direct vision. The anterior trachea was transilluminated through the end of the endotracheal tube and the surface landmarks for the tracheostomy were established. The scope was withdrawn prior to cannulation of the trachea  to prevent damage to the bronchoscope.     The anterior trachea was cannulated percutaneously midway between the thyroid cartilage and the suprasternal notch. The needle was withdrawn from the angiocatheter and a wire was passed without resistance under direct " bronchoscopic vision. A 1 cm transverse incision was made and the starter dilator was passed. The single graduated dilator was passed over the wire under direct vision. An 8mm cuffed Portex  tracheostomy tube was passed over the wire under direct visualization. The tracheostomy tube was connected to the ventilator circuit and the cuff was inflated. Satisfactory oxygenation and ventilation was observed. The tracheostomy tube was secured to the neck with interrupted sutures. A tracheostomy strap was applied.     The patient tolerated the procedure well and there were no apparent complications.

## 2020-06-07 NOTE — PROGRESS NOTES
Pharmacy Kinetics 54 y.o. male on vancomycin day # 1 6/6/2020     Currently on Vancomycin pulse dosing  Provider specified end date: n/a    Indication for Treatment: osteomyelitis    Pertinent history per medical record: Admitted on 5/31/2020 for respiratory failure. Originally admitted to [facility] 5/22 for difficulty urinating x2 days and hypotension. Treated for urosepsis and respiratory failure. Initially intubated d/t seizure activity (PMH of bilateral craniotomy for subdural hematoma evacuation in 2016 with subsequent seizure disorder), but was then extubated. Developed aspiration PNA with AMS. Intubated again 5/30 following cardiac arrest. Transferred to [facility] 5/31 for higher level of care. Extubation attempted/failed 6/5, pt re-intubated. Neurosurgery consulted for right-sided hardware exposure. Hardware has been exposed for 2-6 months according to pt's family. Five-day course of vancomycin finished 5/31, eight-day course of Zosyn finished 6/3. Trach scheduled for 6/7. MRI w/o contrast from 6/6 showed abnormal contrast enhancement and edema involving right frontal craniotomy flap.     Other antibiotics: Zosyn 4.5 g q8h    Allergies: Codeine     List concerns for renal function: progressively declining renal function (most recent SCr 1.62, baseline appears closer to 0.6), concurrent Zosyn administration, hypotensive -  norepinephrine currently running at 4 mcg/min    Pertinent cultures to date:   6/3: tracheal aspirate: NGTD  6/2: thoracentesis fluid: NGTD    MRSA nares swab if pneumonia is a concern (ordered/positive/negative/n-a): negative (5/31)    Recent Labs     06/04/20  0512 06/05/20  0435 06/06/20  0447   WBC 7.4 6.8 6.8   NEUTSPOLYS 73.90* 78.30* 70.20     Recent Labs     06/04/20  0512 06/05/20  0435 06/06/20  0447   BUN 15 19 25*   CREATININE 1.37 1.49* 1.62*       Intake/Output Summary (Last 24 hours) at 6/6/2020 2001  Last data filed at 6/6/2020 2200  Gross per 24 hour   Intake  "2192.2 ml   Output 1915 ml   Net 277.2 ml      BP (!) 96/58   Pulse 60   Temp 36.2 °C (97.2 °F) (Bladder)   Resp 20   Ht 1.626 m (5' 4\")   Wt 74.1 kg (163 lb 5.8 oz)   SpO2 99%  Temp (24hrs), Av.8 °C (98.2 °F), Min:36.2 °C (97.2 °F), Max:37.1 °C (98.8 °F)      A/P   1. Vancomycin dose change: restart  2. Next vancomycin level: 12-hour level ordered for  @ 1200  3. Goal trough: 12-16 mcg/mL (shooting for ~15 mcg/mL)  4. Comments: Vancomycin and Zosyn restarted for suspected osteomyelitis. SCr has steadily worsened over past week to over 2x baseline. Will collect a 12-hour level after loading dose to assess how pt is clearing vancomycin. Pharmacy will continue to follow and adjust dose accordingly.    Fidencio Garduno, PharmD  "

## 2020-06-07 NOTE — CARE PLAN
Problem: Communication  Goal: The ability to communicate needs accurately and effectively will improve  Outcome: PROGRESSING AS EXPECTED     Problem: Safety  Goal: Will remain free from injury  Outcome: PROGRESSING AS EXPECTED  Goal: Will remain free from falls  Outcome: PROGRESSING AS EXPECTED     Problem: Infection  Goal: Will remain free from infection  Outcome: PROGRESSING AS EXPECTED     Problem: Venous Thromboembolism (VTW)/Deep Vein Thrombosis (DVT) Prevention:  Goal: Patient will participate in Venous Thrombosis (VTE)/Deep Vein Thrombosis (DVT)Prevention Measures  Outcome: PROGRESSING AS EXPECTED     Problem: Bowel/Gastric:  Goal: Normal bowel function is maintained or improved  Outcome: PROGRESSING AS EXPECTED  Goal: Will not experience complications related to bowel motility  Outcome: PROGRESSING AS EXPECTED     Problem: Knowledge Deficit  Goal: Knowledge of disease process/condition, treatment plan, diagnostic tests, and medications will improve  Outcome: PROGRESSING AS EXPECTED  Goal: Knowledge of the prescribed therapeutic regimen will improve  Outcome: PROGRESSING AS EXPECTED     Problem: Fluid Volume:  Goal: Will maintain balanced intake and output  Outcome: PROGRESSING AS EXPECTED     Problem: Respiratory:  Goal: Respiratory status will improve  Outcome: PROGRESSING AS EXPECTED     Problem: Urinary Elimination:  Goal: Ability to reestablish a normal urinary elimination pattern will improve  Outcome: PROGRESSING AS EXPECTED     Problem: Skin Integrity  Goal: Risk for impaired skin integrity will decrease  Outcome: PROGRESSING AS EXPECTED     Problem: Pain Management  Goal: Pain level will decrease to patient's comfort goal  Outcome: PROGRESSING AS EXPECTED     Problem: Psychosocial Needs:  Goal: Level of anxiety will decrease  Outcome: PROGRESSING AS EXPECTED     Problem: Safety - Medical Restraint  Goal: Remains free of injury from restraints (Restraint for Interference with Medical  Device)  Description: INTERVENTIONS:  1. Determine that other, less restrictive measures have been tried or would not be effective before applying the restraint  2. Evaluate the patient's condition at the time of restraint application  3. Inform patient/family regarding the reason for restraint  4. Q2H: Monitor safety, psychosocial status, comfort, nutrition and hydration  Outcome: PROGRESSING AS EXPECTED     Problem: Safety - Medical Restraint  Goal: Free from restraint(s) (Restraint for Interference with Medical Device)  Description: INTERVENTIONS:  1. ONCE/SHIFT or MINIMUM Q12H: Assess and document the continuing need for restraints  2. Q24H: Continued use of restraint requires LIP to perform face to face examination and written order  3. Identify and implement measures to help patient regain control  Outcome: PROGRESSING SLOWER THAN EXPECTED

## 2020-06-07 NOTE — PROGRESS NOTES
"Critical Care Progress Note    Date of admission  5/31/2020    Chief Complaint  54 y.o. male admitted 5/31/2020 with Respiratory failure, seizure    Hospital Course    \"All history was obtained from old notes/charts from [facility] as the patient is intubated at the time of my evaluation.     Mr. Lechuga is a 54 year old male with the past medical history of urethral stricture with urinary retention, recurrent right-sided pleural effusion, alcohol abuse, COPD on home O2, hepatitis C with cirrhosis, chronic pain syndrome on narcotics, traumatic brain injury, history of SDH with subsequent seizure disorder who presented to [facility] on 5/22 with the complaint of difficulty urinary for 2 days and low blood pressures.  He apparently was recently hospitalized at [place] from 5/6 to 5/11/2020 for a COPD exacerbation and reported that he was doing well for until 2 days prior to 5/22 when he developed difficulty urinating and draining his bladder.  He has worsening abdominal pain in the suprapubic region with chills.  No fevers.  No nausea or vomiting.  He has a negative COVID test on 5/6.  The patient was admitted for acute urinary retention, UTI, and hypoxia.  Over the course of the next week, the patient required intubation for seizure activity, but was then extubated.  However, the patient then developed aspiration pneumonia with AMS and was on BiPAP for a night on 5/29.  All during the patient's hospital stay, he has been battling with delirium.  Unfortunately, around 2000 last night the patient the patient became unresponsive with a bradycardic episode and no pulses.  A code was called and he received 3 rounds of CPR and 2 doses of epi before ROSC was achieved.  He was intubated again and stabilized.  He was sent to [facility] for higher level of care due to the growing complexity of his condition as well as critical care management since [facility] does not have critical care specialists on staff.\"      Interval " Problem Update  Reviewed last 24 hour events:   - MRI with bony enhancement with adjacent extradural tissue enhancement concerning for epidural phlegmon without leptomeningeal enhancement   - Neuro: follows intermittently, on dex   - HR: 40s-50s   - SBP: 80s-110s, levo at 3   - GI: TF at goal   - UOP: 1.5L/24 hrs   - Irby: yes   - Tm: 98.8   - Lines: PICC   - PPx: GI omeprazole, DVT lovenox   - ABG: mild hypoxia   - VD #8   - SBT: borderline/weak parameters   - CXR (personally reviewed and compared to prior): RLL opacities and interstitial changes    Updates: Tracheostomy placed, phenytoin level noted to be elevated (dose held tonight and daily dose adjusted)    Yesterday   - Failed extubation yesterday, no events overnight   - Neuro: on dex, follows   - HR: 50s-60s   - SBP: 90s-120s   - GI: TF at goal   - UOP: 3L/24 hrs, Cr slightly worse   - Irby: yes   - Tm: afebrile   - Lines: PICC, PIV   - PPx: GI omeprazole, DVT lovenox   - ABG: mild hypoxia   - VD #7   - SBT: RSBI 56   - CXR (personally reviewed and compared to prior): no change    Updates: Discussed trach with sisters and exposed hardware with Dr. Vallejo    Review of Systems  Review of Systems   Unable to perform ROS: Intubated        Vital Signs for last 24 hours   Temp:  [36.2 °C (97.2 °F)-37.3 °C (99.1 °F)] 37.3 °C (99.1 °F)  Pulse:  [43-64] 50  Resp:  [10-44] 10  BP: ()/(52-79) 115/79  SpO2:  [93 %-100 %] 100 %    Hemodynamic parameters for last 24 hours       Respiratory Information for the last 24 hours  Vent Mode: Spont  Rate (breaths/min): 16  Vt Target (mL): 360  PEEP/CPAP: 8  P Support: 10  MAP: 11  Length of Weaning Trial (Hours): 1  Control VTE (exp VT): 358    Physical Exam   Physical Exam  Vitals signs and nursing note reviewed.   Constitutional:       General: He is in acute distress.      Appearance: He is well-developed. He is ill-appearing.      Interventions: He is intubated.   HENT:      Head: Normocephalic and atraumatic.       Comments: Scalp abnormalities consistent with prior neurosurgical procedure.  Surgical hardware is seen extruding from the skin over the right temporal region - appears to be a chronic/subacute process     Right Ear: External ear normal.      Left Ear: External ear normal.      Nose: Nose normal.      Comments: Cortrak in place     Mouth/Throat:      Mouth: Mucous membranes are dry.      Pharynx: Oropharynx is clear.      Comments: ET tube in place  Eyes:      Extraocular Movements: Extraocular movements intact.      Conjunctiva/sclera: Conjunctivae normal.      Pupils: Pupils are equal, round, and reactive to light.   Neck:      Musculoskeletal: Neck supple.      Vascular: No JVD.      Trachea: No tracheal deviation.   Cardiovascular:      Rate and Rhythm: Regular rhythm. Bradycardia present.      Heart sounds: No murmur.   Pulmonary:      Effort: He is intubated.      Breath sounds: Rhonchi and rales present. No wheezing.   Abdominal:      General: Bowel sounds are normal. There is no distension.      Palpations: Abdomen is soft.   Musculoskeletal:         General: No tenderness.   Skin:     General: Skin is warm and dry.      Capillary Refill: Capillary refill takes less than 2 seconds.      Findings: No rash.      Comments: Multiple tattoos  Venous stasis dermatitis   Neurological:      General: No focal deficit present.      Mental Status: He is alert.      Comments: Opens eyes to voice, following commands   Psychiatric:      Comments: Unable to assess         Medications  Current Facility-Administered Medications   Medication Dose Route Frequency Provider Last Rate Last Dose   • levothyroxine (SYNTHROID) tablet 50 mcg  50 mcg Enteral Tube AM ES Pavel Orlando Jr., D.O.       • hydrocortisone sodium succinate PF (SOLU-CORTEF) 100 MG injection 50 mg  50 mg Intravenous Q6HRS Pavel Orlando Jr., D.O.   50 mg at 06/07/20 0408   • gabapentin (NEURONTIN) capsule 100 mg  100 mg Enteral Tube TID Pavel Orlando Jr.,  D.O.   100 mg at 06/07/20 0421   • MD Alert...Vancomycin per Pharmacy   Other PHARMACY TO DOSE MARRY Olson Jr..O.       • piperacillin-tazobactam (ZOSYN) 4.5 g in  mL IVPB  4.5 g Intravenous Q8HRS Pavel Orlando Jr. D.O. 25 mL/hr at 06/07/20 0327 4.5 g at 06/07/20 0327   • midodrine (PROAMATINE) tablet 10 mg  10 mg Enteral Tube Q8HRS Pavel Orlando Jr. D.O.   10 mg at 06/07/20 0421   • dexmedetomidine (PRECEDEX) 400 mcg/100mL NS premix infusion  0.1-1.5 mcg/kg/hr Intravenous Continuous MARRY Olson Jr..O. 7.2 mL/hr at 06/06/20 2346 0.4 mcg/kg/hr at 06/06/20 2346   • oxyCODONE immediate-release (ROXICODONE) tablet 5-10 mg  5-10 mg Enteral Tube Q4HRS PRN Pavel Orlando Jr. D.O.   5 mg at 06/06/20 2342   • levETIRAcetam (KEPPRA) tablet 1,000 mg  1,000 mg Enteral Tube BID MARRY Olson Jr..O.   1,000 mg at 06/07/20 0540   • Pharmacy Consult: Enteral tube insertion - review meds/change route/product selection  1 Each Other PHARMACY TO DOSE MARRY Olson Jr..O.       • omeprazole (FIRST-OMEPRAZOLE) 2 mg/mL oral susp 40 mg  40 mg Enteral Tube DAILY MARRY Olson Jr..O.   40 mg at 06/07/20 0407   • enoxaparin (LOVENOX) inj 40 mg  40 mg Subcutaneous DAILY Pavel Orlando Jr. D.O.   40 mg at 06/07/20 0409   • thiamine tablet 100 mg  100 mg Enteral Tube DAILY Pavel Orlando Jr. D.O.   100 mg at 06/07/20 0408   • multivitamin (THERAGRAN) tablet 1 Tab  1 Tab Enteral Tube DAILY Pavel Orlando Jr. D.O.   1 Tab at 06/07/20 0409   • LORazepam (ATIVAN) injection 4 mg  4 mg Intravenous Q10 MIN PRN Pavel Orlando Jr., D.O.       • phenytoin (DILANTIN) injection 300 mg  300 mg Intravenous TWICE DAILY Pavel Orlando Jr., D.O.   300 mg at 06/07/20 0420    And   • phenytoin (DILANTIN) injection 200 mg  200 mg Intravenous DAILY Pavel Orlando Jr., D.O.   200 mg at 06/06/20 1423   • norepinephrine (Levophed) infusion 8 mg/250 mL (premix)  0-30 mcg/min Intravenous Continuous Saurabh Delatorre M.D.  7.5 mL/hr at 06/06/20 2228 4 mcg/min at 06/06/20 2228   • senna-docusate (PERICOLACE or SENOKOT S) 8.6-50 MG per tablet 2 Tab  2 Tab Enteral Tube BID Anisa Hernández M.D.   2 Tab at 06/07/20 0407    And   • polyethylene glycol/lytes (MIRALAX) PACKET 1 Packet  1 Packet Enteral Tube QDAY PRN Anisa Hernández M.D.        And   • magnesium hydroxide (MILK OF MAGNESIA) suspension 30 mL  30 mL Enteral Tube QDAY PRN Anisa Hernández M.D.        And   • bisacodyl (DULCOLAX) suppository 10 mg  10 mg Rectal QDAY PRN Anisa Hernández M.D.       • acetaminophen (TYLENOL) tablet 650 mg  650 mg Enteral Tube Q6HRS PRN Anisa Hernández M.D.       • labetalol (NORMODYNE/TRANDATE) injection 10 mg  10 mg Intravenous Q4HRS PRN Anisa Hernández M.D.       • Respiratory Therapy Consult   Nebulization Continuous RT Alexia Khan M.D.       • ipratropium-albuterol (DUONEB) nebulizer solution  3 mL Nebulization Q2HRS PRN (RT) Alexia Khan M.D.       • MD Alert...ICU Electrolyte Replacement per Pharmacy   Other PHARMACY TO DOSE Alexia Khan M.D.       • lidocaine (XYLOCAINE) 1 % injection 1-2 mL  1-2 mL Tracheal Tube Q30 MIN PRN Alexia Khan M.D.           Fluids    Intake/Output Summary (Last 24 hours) at 6/7/2020 0837  Last data filed at 6/7/2020 0600  Gross per 24 hour   Intake 3092.95 ml   Output 1395 ml   Net 1697.95 ml       Laboratory  Recent Labs     06/05/20  1453 06/06/20  0351 06/07/20  0519   ISTATAPH 7.392* 7.470 7.467   ISTATAPCO2 64.3* 46.8* 41.2*   ISTATAPO2 257* 62* 90*   ISTATATCO2 41* 35* 31   MQSBEVE5DHR 100* 92* 97   ISTATARTHCO3 39.2* 34.0* 29.8*   ISTATARTBE 13* 9* 6*   ISTATTEMP 98.1 F 98.6 F 98.1 F   ISTATFIO2 100 30 40   ISTATSPEC Arterial Arterial Arterial   ISTATAPHTC 7.396* 7.470 7.471   [id number] 255* 62* 89*         Recent Labs     06/05/20  0435 06/06/20  0447 06/07/20  0450   SODIUM 146* 149* 148*   POTASSIUM 4.1 4.0 3.6   CHLORIDE 106 109 110   CO2 33  35* 28   BUN 19 25* 31*   CREATININE 1.49* 1.62* 1.58*   CALCIUM 8.0* 8.4* 8.2*     Recent Labs     06/05/20 0435 06/06/20 0447 06/07/20  0450   GLUCOSE 106* 102* 201*     Recent Labs     06/05/20 0435 06/06/20 0447 06/07/20  0450   WBC 6.8 6.8 8.6   NEUTSPOLYS 78.30* 70.20 75.80*   LYMPHOCYTES 15.70* 18.40* 16.00*   MONOCYTES 2.60 5.30 6.90   EOSINOPHILS 2.60 2.60 0.10   BASOPHILS 0.90 2.60* 0.60     Recent Labs     06/05/20 0435 06/06/20 0447 06/07/20  0450   RBC 2.32* 2.30* 2.36*   HEMOGLOBIN 7.4* 7.3* 7.5*   HEMATOCRIT 24.8* 25.0* 25.8*   PLATELETCT 178 233 298       Imaging  X-Ray:  I have personally reviewed the images and compared with prior images.  CT:    Reviewed    Assessment/Plan  Shock (HCC)- (present on admission)  Assessment & Plan  Multifactorial:  PEA/CPR, propofol and infection  Continue antimicrobials  Echo: LVEF 65%. Mild concentric LVH. Grade II diastolic dysfunction. Mild mitral regurgitation. Estimated right ventricular systolic pressure is 55 mmHg  Ongoing titration of Levophed for MAP goals >65  Continue midodrine  Cortisol 11.4, continue Solu-Cortef    PEA (Pulseless electrical activity) (HCC)- (present on admission)  Assessment & Plan  ? Etiology from [place], likely hypoxia  Continue supportive care  Optimize electrolytes  EKG without ischemic changes  Echocardiogram: LVEF 65%. Mild concentric LVH. Grade II diastolic dysfunction. Mild mitral regurgitation. Estimated right ventricular systolic pressure is 55 mmHg    Epidural phlegmon- (present on admission)  Assessment & Plan  MRI with epidural tissue enhancement concerning for epidural phlegmon adjacent to osteomyelitis  Antimicrobials changed from Zosyn/Vanco to Rocephin/Flagyl/Vanco for improved CNS penetration (Pseudomonas considered unlikely, more likely gram-positive or anaerobes)  Neurosurgery on board  Will likely need infectious disease consultation for long-term antimicrobials  Consider lumbar puncture if surgical cultures  "delayed    Osteomyelitis of skull (HCC)- (present on admission)  Assessment & Plan  Scalp wound with exposed neurosurgical hardware -spoke with the patient's sisters they state that it has been exposed for several months  Bilateral craniotomy with subdural evacuation in October 2016 by Dr. Vallejo  Wound care consult  Neurosurgery on board  Antimicrobials transition to Rocephin/Flagyl/Vanco  Will likely need infectious disease consultation for long-term antimicrobials    Goals of care, counseling/discussion  Assessment & Plan  Discussed at length with patient's sister regarding his goals of care given his POLST completed in 2015 says DNR/DNI.   In reviewing the chart, the patient actually stated that he misunderstood the form and this CODE STATUS is not correct, he wanted to be FULL CODE.    Dr. Pimentel' H&P on 6/21/16:      \"Code status is full.  He had signed POLST form in the past, saying he did not want resuscitation and only wanted limited interventions; however, in a discussion with the nurse today at surgery, he clarified that he had misunderstood the form, what he meant was that he did not want to be kept alive on life support, but he would want a trial of  full resuscitation efforts.  Therefore, his code status is full.\"    Acute respiratory failure with hypoxia (HCC)- (present on admission)  Assessment & Plan  Intubated at [place] on 5/22/2020 after seizure then again on 5/30/20 following cardiac arrest, he was extubated 6/5/2020 here and failed due to encephalopathy and airway protection..  RT/O2 protocols  Daily and PRN ABGs  Titration of ventilator therapy based on ABGs and patient's status  Sedation as tolerated/indicated  Daily CXR  HOB >30 degrees and peridex for VAP prevention  Pepcid for GI prophylaxis  SAT/SBT when able (ABCDEF Bundle)  Early mobility  Discussed tracheostomy with family, they would like to proceed.  Tracheostomy tube placed today    Recurrent right pleural effusion- (present on " admission)  Assessment & Plan  Chronic, recurrent  Thoracenteses performed on 5/13 and 5/14  Thoracentesis completed 6/2/2020, transitive. Culture NGTD  Follow chest x-rays  Currently not affecting ventilation or oxygenation.    Pneumonia- (present on admission)  Assessment & Plan  ?aspiration  Finished course of Zosyn/Vanco  Sputum with Candida--unknown clinical significance    Ileus (HCC)- (present on admission)  Assessment & Plan  Resolved    Hypokalemia- (present on admission)  Assessment & Plan  Continue to replete to a goal potassium of greater than 4    Seizure disorder (HCC)- (present on admission)  Assessment & Plan  cEEG without evidence of seizure  Neurology has signed off  Continue Keppra 1g BID  Phenytoin level elevated, hold p.m. Dilantin and decreased total daily dose from 800 (300 every morning, 200 at noon and 300 nightly) to 600 (300 mg twice daily).  Will need to follow-up additional phenytoin levels to ensure adequate dosing.  MRI shows skull osteomyelitis and possible epidural phlegmon without leptomeningeal enhancement       VTE:  Lovenox  Ulcer: H2 Antagonist  Lines: Irby Catheter  Ongoing indication addressed    I have performed a physical exam and reviewed and updated ROS and Plan today (6/7/2020). In review of yesterday's note (6/6/2020), there are no changes except as documented above.     Titrating ventilator, anti-epileptic medications, addressing antimicrobial regimen and obtaining tracheostomy. This patient is critically ill, at high risk for decompensation leading to worsening vital organ dysfunction and death without critical care interventions.    Discussed patient condition and risk of morbidity and/or mortality with Family, RN, RT, Pharmacy, , Code status disscussed, Charge nurse / hot rounds and trauma surgery     The patient remains critically ill.  Critical care time = 77 minutes in directly providing and coordinating critical care and extensive data review.  No  time overlap and excludes procedures.

## 2020-06-07 NOTE — PROCEDURES
Procedure Report    Date of Procedure: 6/7/2020    Surgeon: Sammy Blancas MD.    Diagnosis: respiratory failure    Procedure: Flexible bronchoscopy and broncho-alveolar lavage    Indications: percutaneous tracheostomy    Findings: thin secretions    Procedure in detail: The patient was preoxygenated with 100% FiO2 and sedated with versed, fentanyl, and vecuronium.  The bronchoscope was advanced down the endotracheal tube and the right and left segmental and subsegmental airways were explored.  There was minimal mucopurlent material in the airways.  5mLs of saline were injected and suctioned but not sent as a specimen. I then observed the needle, catheter, wire, dilators and tracheostomy placement percutaneously. The bronchoscope was then advanced down to the level of the izaiah via the tracheostomy. There was no bleeding.   The patient tolerated the procedure well.      Specimen: none    Please see Dr. Orlando's note for details of the tracheostomy.      Sammy Blancas MD  [facility]  [phone]

## 2020-06-07 NOTE — PROGRESS NOTES
Neurosurgery Progress Note    Subjective:  *Patient is stable minimal examination on the vent    Exam:  Patient is comfortable on vent with current medications.    He does not open eyes does not follow commands completely debilitated  Did not appreciate him opening eyes or mouthing words today.    He will move all extremities to deep painful stimuli no following commands    Right side of frontoparietal bone shows exposed cranial plate      BP  Min: 85/53  Max: 143/69  Pulse  Av.4  Min: 43  Max: 64  Resp  Av.3  Min: 10  Max: 44  Temp  Av.9 °C (98.5 °F)  Min: 36.2 °C (97.2 °F)  Max: 37.3 °C (99.1 °F)  Monitored Temp 2  Av.9 °C (98.4 °F)  Min: 36.2 °C (97.2 °F)  Max: 37.3 °C (99.1 °F)  SpO2  Av.4 %  Min: 93 %  Max: 100 %    No data recorded    Recent Labs     20  0450   WBC 6.8 6.8 8.6   RBC 2.32* 2.30* 2.36*   HEMOGLOBIN 7.4* 7.3* 7.5*   HEMATOCRIT 24.8* 25.0* 25.8*   .9* 108.7* 109.3*   MCH 31.9 31.7 31.8   MCHC 29.8* 29.2* 29.1*   RDW 62.1* 65.9* 65.1*   PLATELETCT 178 233 298   MPV 10.5 10.3 10.6     Recent Labs     20  0450   SODIUM 146* 149* 148*   POTASSIUM 4.1 4.0 3.6   CHLORIDE 106 109 110   CO2 33 35* 28   GLUCOSE 106* 102* 201*   BUN 19 25* 31*   CREATININE 1.49* 1.62* 1.58*   CALCIUM 8.0* 8.4* 8.2*               Intake/Output       20 - 20 - 20 Total  Total       Intake    I.V.  169.2  154.9 324.1  --  -- --    Precedex Volume 94.1 73.4 167.5 -- -- --    Norepinephrine Volume 75.1 81.5 156.6 -- -- --    Other  230  -- 230  30  -- 30    Medications (PO/Enteral Liquids) 230 -- 230 30 -- 30    NG/GT  715  920 1635  195  -- 195    Intake (mL) (Enteral Tube 20 Cortrak - Gastric 10 Fr. Right nare)  195 -- 195    IV Piggyback  --  663.9 663.9  --  -- --    Volume (mL) (piperacillin-tazobactam (ZOSYN) 4.5  g in  mL IVPB) -- 100 100 -- -- --    Volume (mL) (piperacillin-tazobactam (ZOSYN) 4.5 g in  mL IVPB) -- 63.8 63.8 -- -- --    Volume (mL) (vancomycin (VANCOCIN) 1,900 mg in  mL IVPB) -- 500.1 500.1 -- -- --    Enteral  --  400 400  --  -- --    Free Water / Tube Flush -- 400 400 -- -- --    Total Intake 1114.2 2138.8 3253 225 -- 225       Output    Urine  795  690 1485  75  -- 75    Output (mL) (Urethral Catheter Temperature probe 16 Fr.)  75 -- 75    Stool  --  -- --  --  -- --    Number of Times Stooled 3 x -- 3 x 1 x -- 1 x    Total Output  75 -- 75       Net I/O     319.2 1448.8 1768 150 -- 150            Intake/Output Summary (Last 24 hours) at 6/7/2020 0959  Last data filed at 6/7/2020 0900  Gross per 24 hour   Intake 3317.95 ml   Output 1470 ml   Net 1847.95 ml            • levothyroxine  50 mcg AM ES   • insulin regular  1-6 Units Q6HRS    And   • glucose  16 g Q15 MIN PRN    And   • dextrose 50%  50 mL Q15 MIN PRN   • oxyCODONE immediate-release  10-15 mg Q4HRS PRN   • potassium bicarbonate  25 mEq BID   • hydrocortisone sodium succinate PF  50 mg Q6HRS   • gabapentin  100 mg TID   • MD Alert...Vancomycin per Pharmacy   PHARMACY TO DOSE   • piperacillin-tazobactam  4.5 g Q8HRS   • midodrine  10 mg Q8HRS   • dexmedetomidine (PRECEDEX) infusion  0.1-1.5 mcg/kg/hr Continuous   • levETIRAcetam  1,000 mg BID   • Pharmacy  1 Each PHARMACY TO DOSE   • omeprazole  40 mg DAILY   • enoxaparin (LOVENOX) injection  40 mg DAILY   • thiamine  100 mg DAILY   • multivitamin  1 Tab DAILY   • LORazepam  4 mg Q10 MIN PRN   • phenytoin  300 mg TWICE DAILY    And   • phenytoin  200 mg DAILY   • norepinephrine (Levophed) infusion  0-30 mcg/min Continuous   • senna-docusate  2 Tab BID    And   • polyethylene glycol/lytes  1 Packet QDAY PRN    And   • magnesium hydroxide  30 mL QDAY PRN    And   • bisacodyl  10 mg QDAY PRN   • acetaminophen  650 mg Q6HRS PRN   • labetalol  10 mg Q4HRS PRN    • Respiratory Therapy Consult   Continuous RT   • ipratropium-albuterol  3 mL Q2HRS PRN (RT)   • MD Alert...Adult ICU Electrolyte Replacement per Pharmacy   PHARMACY TO DOSE   • lidocaine  1-2 mL Q30 MIN PRN       Assessment and Plan:  Hospital day #3  POD #na  Prophylactic anticoagulation: no         Start date/time: tbd    Patient is exposed cranial plating system he has chronic urinary retention with UTI as well as concern for pneumonia at this time.  His overall mental status appears to be slightly worse than reported yesterday.  Currently his most pressing issue is his overall global infection with both UTI and pneumonia.  Likely will need to have his cranial plating system removed possibly bone per Dr. Vallejo note however this is a chronic issue as his plate has eroded through the skin and appears to have been this way for several months if not longer once he is more stabilized Dr. Vallejo can rule in as to when he needs to go to the OR for surgical removal of plate possible bone flap.    Total of 30 minutes was spent in evaluation and direct patient care coordination

## 2020-06-07 NOTE — CONSULTS
DATE OF SERVICE:  06/06/2020    HISTORY OF PRESENT ILLNESS:  The patient is a 54-year-old gentleman with a   history of alcoholism, seizure disorder, COPD, whom I performed a bilateral   craniotomy for subdural hematoma evacuation in 2016.  Initially, he did   reasonably well, but apparently he was admitted to [facility] on 05/22   with diagnosis of acute urinary retention, urinary tract infection, and   hypoxia.  He had a negative COVID-19.  He then developed a bradycardic   episode, was coded and reintubated on 05/30.  He had chronic pleural effusions   and was hospitalized for hypoxemic respiratory failure.  He underwent   thoracentesis on 05/13 and 05/14.  He has been receiving vancomycin and Zosyn.    He was transferred from [place] to [facility] on 05/31.  Neurosurgical   consultation was requested by the pulmonary critical care, Dr. Orlando, today   for right-sided exposed hardware.  I have not spoken with family, but   apparently this hardware has been exposed for somewhere between 2-6 months and   no attempt to contact neurosurgery was undertaken by family.  While in the   hospital here, he has been diagnosed with shock, PEA, scalp dehiscence, and   exposure of hardware, respiratory failure, right-sided pleural effusion,   pneumonia, ileus, hypokalemia, seizure disorder.  Apparently, he is scheduled   to be trached tomorrow.    PAST MEDICAL HISTORY:  As above.    PAST SURGICAL HISTORY:  As above.    PHYSICAL EXAMINATION:  GENERAL:  Intubated.  Opens eyes spontaneously.  Attempts to mouth words.  NEUROLOGIC:  He is moving all extremities to deep stim, but not reliably   following commands.  His right-sided superior mallorie hole cover is exposed in   several small punctate areas.  There is no associated erythema.  There is no   drainage.  There is no redness, no discharge.    IMAGING:  MRI of the brain with and without contrast shows some enhancement of   the bone suggestive of osteomyelitis.  There is no  leptomeningeal   enhancement.  There is no recurrence of the subdurals.  There is extensive   cerebral volume loss.    LABORATORY VALUES:  CBC:  Significant for white count 6.8, hemoglobin 7.3,   platelets 233.  Basic metabolic panel:  Sodium 149, K 4.0, chloride 109,   bicarbonate 35, glucose 102, BUN 25, creatinine 1.62.  INR on 05/29 was 1.07.    INR on 05/07 was 1.09, PTT at that time was 29.3.    ASSESSMENT AND PLAN:  Given the patient's exposed hardware, he will eventually   need to go to the operating room for removal of the mallorie hole cover and   closure of the wound.  It is quite possible he also may need removal of the   bone flap, but given that this is 4 years postop, it is good probability that   this bone flap is incorporated with skull and vascularized such that we may be   able to just treat him with antibiotics and remove the mallorie hole cover and   keep the bone flap.  We will plan to take him to the operating room when he is   stable from a respiratory standpoint.  I would consider doing this at the   earliest on Monday, but we will discuss with pulmonary critical care.  We will   follow.       ____________________________________     REID MILLIGAN MD    CPD / NTS    DD:  06/06/2020 21:18:08  DT:  06/06/2020 22:28:36    D#:  [id number]  Job#:  [id number]

## 2020-06-07 NOTE — DIETARY
"Brief Nutrition Support Update: consult received for \"Please adjust rate to allow for two hours of feeding interruption for levothyroxine dose at 0600 daily.\" TF currently at goal with Diabetisource AC @ 65 ml/hr.    Pertinent Meds: Precedex, Solucortef, SSI, Levothyroxine (synthroid) ICU electrolyte  Replacement per pharmacy, daily Multivitamin, Dilantin via IV, Klyte, Bowel protocol, thiamine 100 mg daily, Levophed @ 3 mcg/min    Reviewed with Pharmacist, Pharmacist stated to there is no need to hold TF at this time with Synthroid and will discuss with nursing staff.           Plan/Rec: Continue current TF/rate. RD following.   "

## 2020-06-08 PROBLEM — N17.9 AKI (ACUTE KIDNEY INJURY) (HCC): Status: ACTIVE | Noted: 2020-01-01

## 2020-06-08 NOTE — PROGRESS NOTES
Problem: Safety  Goal: Will remain free from injury  Outcome: PROGRESSING AS EXPECTED  Goal: Will remain free from falls  Outcome: PROGRESSING AS EXPECTED  Educated on fall risk and interventions In place      Problem: Infection  Goal: Will remain free from infection  Outcome: PROGRESSING AS EXPECTED  Educated on infection prevention and interventions in place     Problem: Venous Thromboembolism (VTW)/Deep Vein Thrombosis (DVT) Prevention:  Goal: Patient will participate in Venous Thrombosis (VTE)/Deep Vein Thrombosis (DVT)Prevention Measures  Outcome: PROGRESSING AS EXPECTED  Educated about interventions in place.

## 2020-06-08 NOTE — ASSESSMENT & PLAN NOTE
MRI with epidural tissue enhancement concerning for epidural phlegmon adjacent to osteomyelitis  Antimicrobials changed from Zosyn/Vanco to Rocephin/Flagyl/Vanco for improved CNS penetration (Pseudomonas considered unlikely, more likely gram-positive or anaerobes)  Neurosurgery and ID following

## 2020-06-08 NOTE — CARE PLAN
Problem: Safety  Goal: Will remain free from falls  Outcome: PROGRESSING AS EXPECTED  Intervention: Assess risk factors for falls  Flowsheets (Taken 6/8/2020 0007)  Pt Calls for Assistance: No  History of fall: 0     Problem: Infection  Goal: Will remain free from infection  Outcome: PROGRESSING SLOWER THAN EXPECTED  Intervention: Assess signs and symptoms of infection  Note: Daily labs

## 2020-06-08 NOTE — PROGRESS NOTES
1320: Dr. Londono at bedside to perform thoracentesis. Consent signed. Time out performed.   1334: Thoracentesis complete, 750 mL out. x3 specimen containers sent to lab. Ordered chest xray to follow up.

## 2020-06-08 NOTE — CARE PLAN
Problem: Ventilation Defect:  Goal: Ability to achieve and maintain unassisted ventilation or tolerate decreased levels of ventilator support  Intervention: Support and monitor invasive and noninvasive mechanical ventilation  Note:    Ventilator Daily Summary    Vent Day # 8  Trach Day #1  CMV:  16  360  +8  30%    Ventilator settings changed this shift: none    Weaning trials: Pt tolerated 1 hr on SPONT    Respiratory Procedures: Trach/Bronchoscopy    Plan: Continue current ventilator settings and wean mechanical ventilation as tolerated per physician orders.

## 2020-06-08 NOTE — PROGRESS NOTES
"Critical Care Progress Note    Date of admission  5/31/2020    Chief Complaint  54 y.o. male admitted 5/31/2020 with Respiratory failure, seizure    Hospital Course    \"All history was obtained from old notes/charts from [facility] as the patient is intubated at the time of my evaluation.     Mr. Lechuga is a 54 year old male with the past medical history of urethral stricture with urinary retention, recurrent right-sided pleural effusion, alcohol abuse, COPD on home O2, hepatitis C with cirrhosis, chronic pain syndrome on narcotics, traumatic brain injury, history of SDH with subsequent seizure disorder who presented to [facility] on 5/22 with the complaint of difficulty urinary for 2 days and low blood pressures.  He apparently was recently hospitalized at [facility] from 5/6 to 5/11/2020 for a COPD exacerbation and reported that he was doing well for until 2 days prior to 5/22 when he developed difficulty urinating and draining his bladder.  He has worsening abdominal pain in the suprapubic region with chills.  No fevers.  No nausea or vomiting.  He has a negative COVID test on 5/6.  The patient was admitted for acute urinary retention, UTI, and hypoxia.  Over the course of the next week, the patient required intubation for seizure activity, but was then extubated.  However, the patient then developed aspiration pneumonia with AMS and was on BiPAP for a night on 5/29.  All during the patient's hospital stay, he has been battling with delirium.  Unfortunately, around 2000 last night the patient the patient became unresponsive with a bradycardic episode and no pulses.  A code was called and he received 3 rounds of CPR and 2 doses of epi before ROSC was achieved.  He was intubated again and stabilized.  He was sent to [facility] for higher level of care due to the growing complexity of his condition as well as critical care management since [facility] does not have critical care specialists on staff.\"      Interval " Problem Update  Reviewed last 24 hour events:  Neuro: q4 neuro nods appropriate and mouths dex gtt 1.2 mcg/kg oxy 10mg and 15mg   HR: 50's with dex gtt  SBP: 's off levophed -> restarted  Tmax: afebrile  GI: TF at goal BM yesterday 200ml free water flushes  UOP: good   Lines: picc line  Resp: vent 9 trach 2 sbt 1 hr rsbi 70  Vte: lovenox  PPI/H2:omeprazole  Antibx: C3 metronidazole linezolid   k 3.2 75 meq klyte   Off levophed  Hydrocortisone 50m q6 -> q12  Consented for thoracentesis  INR checked and reviewed  US assessment of right lung  S/p thoracentesis  Repeat CXR with no ptx  Poor urine output albumin given  Back on pressors  Tolerated hours or t piece  1l LR at 83ml/hr      Review of Systems  Review of Systems   Unable to perform ROS: Intubated        Vital Signs for last 24 hours   Temp:  [36.2 °C (97.2 °F)] 36.2 °C (97.2 °F)  Pulse:  [49-87] 59  Resp:  [15-54] 16  BP: ()/(42-71) 90/53  SpO2:  [91 %-100 %] 100 %    Hemodynamic parameters for last 24 hours       Respiratory Information for the last 24 hours  Vent Mode: APRV  Rate (breaths/min): 16  Vt Target (mL): 360  PEEP/CPAP: 8  P Support: 5  MAP: 14  Control VTE (exp VT): 359    Physical Exam   Physical Exam  Vitals signs reviewed.   Constitutional:       General: He is not in acute distress.     Appearance: He is well-developed. He is ill-appearing.      Interventions: He is intubated.      Comments: Sitting up in bed on trach being ventilated   HENT:      Head: Normocephalic and atraumatic.      Comments: Scalp abnormalities consistent with prior neurosurgical procedure.      Right Ear: External ear normal.      Left Ear: External ear normal.      Nose: Nose normal.      Comments: Cortrak in place     Mouth/Throat:      Mouth: Mucous membranes are dry.      Pharynx: Oropharynx is clear.   Eyes:      Conjunctiva/sclera: Conjunctivae normal.      Pupils: Pupils are equal, round, and reactive to light.   Neck:      Musculoskeletal: Neck  supple.      Vascular: No JVD.      Trachea: No tracheal deviation.      Comments: Trach in place  Cardiovascular:      Rate and Rhythm: Regular rhythm. Bradycardia present.      Pulses: Normal pulses.   Pulmonary:      Effort: He is intubated.      Breath sounds: No wheezing, rhonchi or rales.      Comments: Clear bilateral  Abdominal:      General: Bowel sounds are normal. There is no distension.      Palpations: Abdomen is soft.   Musculoskeletal:         General: Swelling present. No tenderness.      Right lower leg: Edema present.      Left lower leg: Edema present.   Skin:     General: Skin is warm and dry.      Capillary Refill: Capillary refill takes less than 2 seconds.      Findings: No rash.      Comments: Multiple tattoos  Venous stasis dermatitis   Neurological:      General: No focal deficit present.      Mental Status: He is alert.      Comments: Opens eyes to voice moves extremities not following commands   Psychiatric:      Comments: Unable to assess         Medications  Current Facility-Administered Medications   Medication Dose Route Frequency Provider Last Rate Last Dose   • hydrocortisone sodium succinate PF (SOLU-CORTEF) 100 MG injection 50 mg  50 mg Intravenous Q12HRS Oziel Londono M.D.   50 mg at 06/08/20 1716   • cefepime (MAXIPIME) 2 g in  mL IVPB  2 g Intravenous Q12HRS Nu Diez M.D.       • lactated ringers infusion  1,000 mL Intravenous Continuous Oziel Londoon M.D.       • levothyroxine (SYNTHROID) tablet 50 mcg  50 mcg Enteral Tube AM ES Pavel Orlando Jr. D.O.   50 mcg at 06/08/20 0443   • insulin regular (HUMULIN R) injection 1-6 Units  1-6 Units Subcutaneous Q6HRS Pavel Orlando Jr., D.O.   Stopped at 06/08/20 0600    And   • glucose 4 g chewable tablet 16 g  16 g Oral Q15 MIN PRN Pavel Orlando Jr., D.O.        And   • dextrose 50% (D50W) injection 50 mL  50 mL Intravenous Q15 MIN PRN Pavel Orlando Jr., D.O.       • oxyCODONE immediate release  (ROXICODONE) tablet 10-15 mg  10-15 mg Enteral Tube Q4HRS PRN Pavel Orlando Jr. D.O.   10 mg at 06/08/20 1304   • phenytoin (DILANTIN) injection 300 mg  300 mg Intravenous Q12HRS Pavel Orlando Jr., D.O.   300 mg at 06/08/20 1723   • linezolid (ZYVOX) tablet 600 mg  600 mg Enteral Tube Q12HRS Pavel Orlando Jr. D.O.   600 mg at 06/08/20 1716   • metroNIDAZOLE (FLAGYL) IVPB 500 mg  500 mg Intravenous Q8HRS Pavel Orlando Jr., D.O.   Stopped at 06/08/20 1539   • fentaNYL (SUBLIMAZE) injection 25-50 mcg  25-50 mcg Intravenous Q2HRS PRN Vishnu Zamora M.D.       • gabapentin (NEURONTIN) capsule 100 mg  100 mg Enteral Tube TID Pavel Orlando Jr. D.O.   100 mg at 06/08/20 1716   • midodrine (PROAMATINE) tablet 10 mg  10 mg Enteral Tube Q8HRS Pavel Orlando Jr. D.O.   10 mg at 06/08/20 1305   • dexmedetomidine (PRECEDEX) 400 mcg/100mL NS premix infusion  0.1-1.5 mcg/kg/hr Intravenous Continuous Pavel Orlando Jr. D.O.   Stopped at 06/08/20 1833   • levETIRAcetam (KEPPRA) tablet 1,000 mg  1,000 mg Enteral Tube BID Pavel Orlando Jr. D.O.   Stopped at 06/08/20 1900   • Pharmacy Consult: Enteral tube insertion - review meds/change route/product selection  1 Each Other PHARMACY TO DOSE Pavel Orlando Jr., D.O.       • omeprazole (FIRST-OMEPRAZOLE) 2 mg/mL oral susp 40 mg  40 mg Enteral Tube DAILY Pavel Orlando Jr. D.O.   40 mg at 06/08/20 0440   • enoxaparin (LOVENOX) inj 40 mg  40 mg Subcutaneous DAILY Pavel Orlando Jr. D.O.   40 mg at 06/08/20 0440   • thiamine tablet 100 mg  100 mg Enteral Tube DAILY Pavel Orlando Jr. D.O.   100 mg at 06/08/20 0440   • multivitamin (THERAGRAN) tablet 1 Tab  1 Tab Enteral Tube DAILY Pavel Orlando Jr., D.O.   1 Tab at 06/08/20 0440   • LORazepam (ATIVAN) injection 4 mg  4 mg Intravenous Q10 MIN PRN Pavel Orlando Jr., D.O.       • norepinephrine (Levophed) infusion 8 mg/250 mL (premix)  0-30 mcg/min Intravenous Continuous Saurabh Delatorre M.D. 7.5 mL/hr at  06/08/20 1831 4 mcg/min at 06/08/20 1831   • senna-docusate (PERICOLACE or SENOKOT S) 8.6-50 MG per tablet 2 Tab  2 Tab Enteral Tube BID Anisa Hernández M.D.   2 Tab at 06/08/20 1716    And   • polyethylene glycol/lytes (MIRALAX) PACKET 1 Packet  1 Packet Enteral Tube QDAY PRN Anisa Hernández M.D.        And   • magnesium hydroxide (MILK OF MAGNESIA) suspension 30 mL  30 mL Enteral Tube QDAY PRN Anisa Hernández M.D.        And   • bisacodyl (DULCOLAX) suppository 10 mg  10 mg Rectal QDAY PRN Anisa Hernández M.D.       • acetaminophen (TYLENOL) tablet 650 mg  650 mg Enteral Tube Q6HRS PRN Anisa Hernández M.D.   650 mg at 06/08/20 1644   • labetalol (NORMODYNE/TRANDATE) injection 10 mg  10 mg Intravenous Q4HRS PRN Anisa Hernández M.D.       • Respiratory Therapy Consult   Nebulization Continuous RT Alexia Khan M.D.       • ipratropium-albuterol (DUONEB) nebulizer solution  3 mL Nebulization Q2HRS PRN (RT) Alexia Khan M.D.       • MD Alert...ICU Electrolyte Replacement per Pharmacy   Other PHARMACY TO DOSE Alexia Khan M.D.       • lidocaine (XYLOCAINE) 1 % injection 1-2 mL  1-2 mL Tracheal Tube Q30 MIN PRN Alexia Khan M.D.           Fluids    Intake/Output Summary (Last 24 hours) at 6/8/2020 1850  Last data filed at 6/8/2020 1600  Gross per 24 hour   Intake 3219.15 ml   Output 635 ml   Net 2584.15 ml       Laboratory  Recent Labs     06/06/20  0351 06/07/20  0519 06/08/20  0544   ISTATAPH 7.470 7.467 7.426   ISTATAPCO2 46.8* 41.2* 43.1*   ISTATAPO2 62* 90* 87   ISTATATCO2 35* 31 30   [id number] 92* 97 97   ISTATARTHCO3 34.0* 29.8* 28.3*   ISTATARTBE 9* 6* 4*   ISTATTEMP 98.6 F 98.1 F 96.8 F   ISTATFIO2 30 40 30   ISTATSPEC Arterial Arterial Arterial   ISTATAPHTC 7.470 7.471 7.441   [id number] 62* 89* 81         Recent Labs     06/06/20  0447 06/07/20  0450 06/07/20  1210 06/08/20  0433   SODIUM 149* 148*  --  146*   POTASSIUM 4.0 3.6 3.7 3.2*    CHLORIDE 109 110  --  106   CO2 35* 28  --  29   BUN 25* 31*  --  37*   CREATININE 1.62* 1.58*  --  1.63*   MAGNESIUM  --   --  2.2  --    PHOSPHORUS  --   --  4.0  --    CALCIUM 8.4* 8.2*  --  7.6*     Recent Labs     06/06/20 0447 06/07/20 0450 06/08/20  0433   GLUCOSE 102* 201* 128*     Recent Labs     06/06/20 0447 06/07/20 0450 06/08/20  0433 06/08/20  1334   WBC 6.8 8.6 12.0*  --    NEUTSPOLYS 70.20 75.80* 78.60*  --    LYMPHOCYTES 18.40* 16.00* 11.30*  --    MONOCYTES 5.30 6.90 8.30  --    EOSINOPHILS 2.60 0.10 0.80 1   BASOPHILS 2.60* 0.60 0.30  --      Recent Labs     06/06/20 0447 06/07/20 0450 06/08/20 0433 06/08/20  1126   RBC 2.30* 2.36* 2.19*  --    HEMOGLOBIN 7.3* 7.5* 7.1*  --    HEMATOCRIT 25.0* 25.8* 23.9*  --    PLATELETCT 233 298 323  --    PROTHROMBTM  --   --   --  13.7   APTT  --   --   --  30.4   INR  --   --   --  1.03       Imaging  X-Ray:  I have personally reviewed the images and compared with prior images.  CT:    Reviewed    Assessment/Plan  Shock ([facility])- (present on admission)  Assessment & Plan  Multifactorial:  PEA/CPR, propofol and infection  Continue antimicrobials  Echo: LVEF 65%. Mild concentric LVH. Grade II diastolic dysfunction. Mild mitral regurgitation. Estimated right ventricular systolic pressure is 55 mmHg  Ongoing titration of Levophed for MAP goals >65  Continue midodrine  Cortisol 11.4, continue Solu-Cortef    PEA (Pulseless electrical activity) ([facility])- (present on admission)  Assessment & Plan  ? Etiology from [place], likely hypoxia  Continue supportive care  Optimize electrolytes  EKG without ischemic changes  Echocardiogram: LVEF 65%. Mild concentric LVH. Grade II diastolic dysfunction. Mild mitral regurgitation. Estimated right ventricular systolic pressure is 55 mmHg    DAMIAN (acute kidney injury) (HCC)  Assessment & Plan  Slowly worsening damian   Will give trial of albumin and fluids  Continue strict map goal map > 65  Limit nephrotoxins and  "vancomycin    Epidural phlegmon- (present on admission)  Assessment & Plan  MRI with epidural tissue enhancement concerning for epidural phlegmon adjacent to osteomyelitis  Antimicrobials changed from Zosyn/Vanco to Rocephin/Flagyl/Vanco for improved CNS penetration (Pseudomonas considered unlikely, more likely gram-positive or anaerobes)  Neurosurgery on board  Will likely need infectious disease consultation for long-term antimicrobials  Consider lumbar puncture if surgical cultures delayed    Osteomyelitis of skull (HCC)- (present on admission)  Assessment & Plan  Scalp wound with exposed neurosurgical hardware -spoke with the patient's sisters they state that it has been exposed for several months  Bilateral craniotomy with subdural evacuation in October 2016 by Dr. Vallejo  Wound care consult  Neurosurgery on board plan to OR Wednesday of Friday this week  ID consulted 6/8  Antimicrobials transition to Cefepime, linezolid and metronidazole    Goals of care, counseling/discussion  Assessment & Plan  Discussed at length with patient's sister regarding his goals of care given his POLST completed in 2015 says DNR/DNI.   In reviewing the chart, the patient actually stated that he misunderstood the form and this CODE STATUS is not correct, he wanted to be FULL CODE.    Dr. Pimentel' H&P on 6/21/16:      \"Code status is full.  He had signed POLST form in the past, saying he did not want resuscitation and only wanted limited interventions; however, in a discussion with the nurse today at surgery, he clarified that he had misunderstood the form, what he meant was that he did not want to be kept alive on life support, but he would want a trial of  full resuscitation efforts.  Therefore, his code status is full.\"    Acute respiratory failure with hypoxia (HCC)- (present on admission)  Assessment & Plan  Intubated at [facility] on 5/22/2020 after seizure then again on 5/30/20 following cardiac arrest, he was extubated 6/5/2020 " here and failed due to encephalopathy and airway protection..  RT/O2 protocols  Daily and PRN ABGs  Titration of ventilator therapy based on ABGs and patient's status  Sedation as tolerated/indicated  Daily CXR  HOB >30 degrees and peridex for VAP prevention  Pepcid for GI prophylaxis  SAT/SBT when able (ABCDEF Bundle)  Early mobility  s/p tracheostomy 6/7   T-piece trials     Recurrent right pleural effusion- (present on admission)  Assessment & Plan  Chronic, recurrent  Thoracenteses performed on 5/13 and 5/14, 6/2 and 6/8  Thoracentesis completed 6/2/2020, transitive. Culture NGTD  Follow chest x-rays  Currently not affecting ventilation or oxygenation.    Pneumonia- (present on admission)  Assessment & Plan  ?aspiration  Finished course of Zosyn/Vanco  Sputum with Candida--unknown clinical significance    Ileus (HCC)- (present on admission)  Assessment & Plan  Resolved    Hypokalemia- (present on admission)  Assessment & Plan  Continue to replete to a goal potassium of greater than 4    Seizure disorder (HCC)- (present on admission)  Assessment & Plan  cEEG without evidence of seizure  Neurology has signed off  Continue Keppra 1g BID  Phenytoin level elevated, hold p.m. Dilantin and decreased total daily dose from 800 (300 every morning, 200 at noon and 300 nightly) to 600 (300 mg twice daily).  Will need to follow-up additional phenytoin levels to ensure adequate dosing.  MRI shows skull osteomyelitis and possible epidural phlegmon without leptomeningeal enhancement  Plan for OR on Wednesday vs Friday this week with Dr Vallejo       VTE:  Lovenox  Ulcer: H2 Antagonist  Lines: Irby Catheter  Ongoing indication addressed    I have performed a physical exam and reviewed and updated ROS and Plan today (6/8/2020). In review of yesterday's note (6/7/2020), there are no changes except as documented above.     Discussed patient condition and risk of morbidity and/or mortality with RN, RT, Pharmacy, , Code  status disscussed, Charge nurse / hot rounds and infectious disease and neurosurgery     The patient remains critically ill with respiratory failure and shock on norepinephrine with active titration and fluid and ventilatory management.  Critical care time = 79 minutes in directly providing and coordinating critical care and extensive data review.  No time overlap and excludes procedures.

## 2020-06-08 NOTE — PROCEDURES
Thoracentesis    Date/Time: 6/8/2020 1:41 PM  Performed by: Oziel Londono M.D.  Authorized by: Oziel Londono M.D.     Consent:     Consent obtained:  Verbal    Consent given by:  Guardian (daughter)    Risks discussed:  Bleeding, incomplete drainage and pneumothorax    Alternatives discussed:  No treatment  Anesthesia (see MAR for exact dosages):     Anesthesia method:  Local infiltration    Local anesthetic:  Lidocaine 1% w/o epi  Procedure details:     Patient position:  Sitting    Location:  R lateral    Intercostal space:  4th    Puncture method:  Over-the-needle catheter    Ultrasound guidance: yes      Indwelling catheter placed: no      Catheter size: 5 fr.    Number of attempts:  1    Drainage characteristics:  Serosanguinous  Post-procedure details:     Post-procedure chest x-ray: pending post chest xray.      Patient tolerance of procedure:  Tolerated well, no immediate complications  Comments:      Some air was sucked in with negative inspiratory force when transfer to suction otherwise 700ml of serosang bloody fluid removed and sent to lab

## 2020-06-08 NOTE — PROGRESS NOTES
Neurosurgery Progress Note    Subjective:  Seen in conjunction with Dr Yoni quinonez yest  No acute events    Exam:  Tries to mouth words but not following commands for us  WETZEL    Right side of frontoparietal bone shows exposed cranial plate      BP  Min: 88/58  Max: 143/63  Pulse  Av.5  Min: 47  Max: 65  Resp  Av.4  Min: 15  Max: 31  Temp  Av.2 °C (97.2 °F)  Min: 36.2 °C (97.2 °F)  Max: 36.2 °C (97.2 °F)  Monitored Temp 2  Av.4 °C (97.6 °F)  Min: 35.9 °C (96.6 °F)  Max: 37.1 °C (98.8 °F)  SpO2  Av.3 %  Min: 97 %  Max: 100 %    No data recorded    Recent Labs     20  0450 20  0433   WBC 6.8 8.6 12.0*   RBC 2.30* 2.36* 2.19*   HEMOGLOBIN 7.3* 7.5* 7.1*   HEMATOCRIT 25.0* 25.8* 23.9*   .7* 109.3* 109.1*   MCH 31.7 31.8 32.4   MCHC 29.2* 29.1* 29.7*   RDW 65.9* 65.1* 64.6*   PLATELETCT 233 298 323   MPV 10.3 10.6 10.6     Recent Labs     207 20  0450 20  1210 20  0433   SODIUM 149* 148*  --  146*   POTASSIUM 4.0 3.6 3.7 3.2*   CHLORIDE 109 110  --  106   CO2 35* 28  --  29   GLUCOSE 102* 201*  --  128*   BUN 25* 31*  --  37*   CREATININE 1.62* 1.58*  --  1.63*   CALCIUM 8.4* 8.2*  --  7.6*               Intake/Output       20 - 2059 20 - 20 Total  Total       Intake    I.V.  161.8  200.9 362.6  --  -- --    Precedex Volume 84.6 169.1 253.7 -- -- --    Norepinephrine Volume 77.2 31.8 109 -- -- --    Other  700  400 1100  --  -- --    Medications (PO/Enteral Liquids)  -- -- --    NG/GT  520  850 1370  --  -- --    Intake (mL) (Enteral Tube 20 Cortrak - Gastric 10 Fr. Right nare)  -- -- --    IV Piggyback  136.3  126.7 262.9  --  -- --    Volume (mL) (piperacillin-tazobactam (ZOSYN) 4.5 g in  mL IVPB) 136.3 -- 136.3 -- -- --    Volume (mL) (vancomycin (VANCOCIN) 1,900 mg in  mL IVPB) 0 -- 0 -- -- --     Volume (mL) (cefTRIAXone (ROCEPHIN) 1 g in  mL IVPB) -- 26.7 26.7 -- -- --    Volume (mL) (metroNIDAZOLE (FLAGYL) IVPB 500 mg) -- 100 100 -- -- --    Enteral  --  430 430  --  -- --    Free Water / Tube Flush -- 430 430 -- -- --    Total Intake 1518 2007.5 3525.6 -- -- --       Output    Urine  490  390 880  --  -- --    Output (mL) (Urethral Catheter Temperature probe 16 Fr.) 490 390 880 -- -- --    Stool  --  -- --  --  -- --    Number of Times Stooled 1 x -- 1 x -- -- --    Total Output 490 390 880 -- -- --       Net I/O     1028 1617.5 2645.6 -- -- --            Intake/Output Summary (Last 24 hours) at 6/8/2020 0828  Last data filed at 6/8/2020 0600  Gross per 24 hour   Intake 3365.56 ml   Output 805 ml   Net 2560.56 ml            • levothyroxine  50 mcg AM ES   • insulin regular  1-6 Units Q6HRS    And   • glucose  16 g Q15 MIN PRN    And   • dextrose 50%  50 mL Q15 MIN PRN   • oxyCODONE immediate-release  10-15 mg Q4HRS PRN   • midazolam  5 mg Once   • phenytoin  300 mg Q12HRS   • linezolid  600 mg Q12HRS   • cefTRIAXone (ROCEPHIN) IV  1 g Q24HRS   • metroNIDAZOLE (FLAGYL) IV  500 mg Q8HRS   • fentaNYL  25-50 mcg Q2HRS PRN   • hydrocortisone sodium succinate PF  50 mg Q6HRS   • gabapentin  100 mg TID   • midodrine  10 mg Q8HRS   • dexmedetomidine (PRECEDEX) infusion  0.1-1.5 mcg/kg/hr Continuous   • levETIRAcetam  1,000 mg BID   • Pharmacy  1 Each PHARMACY TO DOSE   • omeprazole  40 mg DAILY   • enoxaparin (LOVENOX) injection  40 mg DAILY   • thiamine  100 mg DAILY   • multivitamin  1 Tab DAILY   • LORazepam  4 mg Q10 MIN PRN   • norepinephrine (Levophed) infusion  0-30 mcg/min Continuous   • senna-docusate  2 Tab BID    And   • polyethylene glycol/lytes  1 Packet QDAY PRN    And   • magnesium hydroxide  30 mL QDAY PRN    And   • bisacodyl  10 mg QDAY PRN   • acetaminophen  650 mg Q6HRS PRN   • labetalol  10 mg Q4HRS PRN   • Respiratory Therapy Consult   Continuous RT   • ipratropium-albuterol  3 mL  Q2HRS PRN (RT)   • MD Alert...Adult ICU Electrolyte Replacement per Pharmacy   PHARMACY TO DOSE   • lidocaine  1-2 mL Q30 MIN PRN       Assessment and Plan:  Hospital day #4 exposed cranial plate, imaging shows osteomyelitis  POD #na  Prophylactic anticoagulation: no         Start date/time: tbd    Neuro as above  UTI/resp failure/etoh, on abx  Pt cleared by medicine for surgery  Trying to find OR time for wednesday for removal of mallorie hole cover and closure of wound  On Lovenox, d/c tomorrow if OR wed

## 2020-06-09 NOTE — CONSULTS
"Consults   INFECTIOUS DISEASES INPATIENT CONSULT NOTE     Date of Service: 6/8/2020    Consult Requested By: Pavel Orlando Jr. D.OJam    Reason for Consultation: Pneumonia, will effusions and metal plate eroding through skull    History of Present Illness:   Vishnu Lechuga Sr. is a 54 y.o.  admitted 5/31/2020. 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  He initially presented to [facility] on 5/2 complaining of urinary retention and hypotension.  He had previously been hospitalized at [place] from 5/6-5/11 for COPD exacerbation.   During his stay at [place] starting on 5/22 he required intubation due to seizure activity.  Eventually extubated but developed aspiration pneumonia and increasing encephalopathy.  The patient had an episode of unresponsiveness with bradycardia and a code was called.  Patient received 3 rounds of CPR and 2 episodes of epi before ROSC was achieved.  He was intubated and transferred to [facility] for higher level of care.      Review Of Systems:  Review of Systems   Unable to perform ROS: Intubated         PMH:   Past Medical History:   Diagnosis Date   • Alcoholism ([facility]) 9/24/2012   • Allergy    • Anemia    • Arthritis     back and knees   • At risk for sleep apnea 6/21/16    stopbang 4, intermediate risk   • Back pain    • C. difficile colitis 10/1/2016   • C. difficile colitis    • Chronic cholecystitis 6/2/2015   • Closed fracture of right olecranon process 6/21/2016   • COPD (chronic obstructive pulmonary disease) ([facility])    • Decubitus ulcer of back, stage 3 ([facility]) 4/7/2016   • Dental disorder     \"i have a bunch of decayed teeth and a cracked tooth\"   • Depression    • Encounter for screening colonoscopy 02/19/2018   • Encounter for screening colonoscopy 02/19/2018 "    Polyp   • Fall     Standing and fell backwards-elbow injury(2016), Fell down flight of stairs-took 10 mins to get back up (Nov. 2015), standing on box and fell onto deck (2015)    • Fracture of right olecranon process 6/2016    tripped in home   • Fracture, sternum closed 1/30/2017    fell in home & landed on arm of chair   • GERD (gastroesophageal reflux disease)    • Head ache    • Hepatitis C antibody test positive 3/6/2015   • History of subdural hematoma 10/2/2016   • Hypertension 9/24/2012    Past, pt states he does not have HTN   • Indigestion     intermittently   • IVDU (intravenous drug user) 9/24/2012   • Neuropathy (HCC) 9/24/2012   • Osteoporosis    • Pneumonia     2015   • Recurrent vertebral fractures 2015   • Renal disorder    • Seizure disorder (HCC)     Last 2014-grand mal states still has petit mal seizures, 2017 petit mal seizure   • Snoring    • Stroke (HCC)    • Urinary bladder disorder     urinary retention. had a powers at home for 1 month.   • Vitamin D deficiency        PSH:  Past Surgical History:   Procedure Laterality Date   • CYSTOSCOPY N/A 12/9/2019    Procedure: CYSTOSCOPY WITH URETHRAL DILATION;  Surgeon: Eulalio Pichardo M.D.;  Location: [facility];  Service: Urology   • CERVICAL DECOMPRESSION POSTERIOR  10/14/2019    Procedure: POSTERIOR CERVICAL LAMINECTOMY, C3-7;  Surgeon: Du Li M.D.;  Location: SURGERY [facility];  Service: Spine   • ENDOSCOPY PROCEDURE  10/11/2019    Procedure: ENDOSCOPY;  Surgeon: Baljinder Goncalves M.D.;  Location: SURGERY [facility];  Service: Gastroenterology   • ENDOSCOPY PROCEDURE  3/1/2019    Procedure: ENDOSCOPY PROCEDURE;  Surgeon: Baljinder Goncalves M.D.;  Location: SURGERY [facility];  Service: Gastroenterology   • COLONOSCOPY - ENDO  2/19/2018    Procedure: COLONOSCOPY - ENDO;  Surgeon: Feng Douglas M.D.;  Location: SURGERY [facility];  Service: Gastroenterology   • EVACUATION OF HEMATOMA Bilateral 10/1/2016     Procedure: EVACUATION OF SUBDURAL HEMATOMA;  Surgeon: Mian Vallejo M.D.;  Location: SURGERY [facility];  Service:    • CRANIOTOMY Bilateral 10/1/2016    Procedure: CRANIOTOMY;  Surgeon: Mian Vallejo M.D.;  Location: SURGERY [facility];  Service:    • ELBOW ORIF Right 6/21/2016    Procedure: OPEN REDUCTION INTERNAL FIXATION RIGHT OLECRANON FRACTURE ;  Surgeon: Keny Arana M.D.;  Location: SURGERY [facility];  Service:    • LUMBAR FUSION POSTERIOR  8/7/2015    Procedure: T7-8-9 ANTERIOR CORPECTOMIES ABSCESS REMOVAL CAGE/ALLOGRAFT, POSTERIIOR THORACIC FUSION, INSTRUMENTATION ALLOGRAFT;  Surgeon: Vishnu Mallory M.D.;  Location: [facility];  Service:    • THORACOTOMY Left 8/7/2015    Procedure: THORACOTOMY, LEFT WITH REMOVAL OF 6TH RIB;  Surgeon: Atilio Latham M.D.;  Location: SURGERY [facility];  Service:    • RIB RESECTION  8/7/2015    Procedure: NA;  Surgeon: Atilio Latham M.D.;  Location: SURGERY [facility];  Service:    • IR RECOVERY ROOM  7/24/2015    Procedure: IR RECOVERY ROOM;  Surgeon: Ir-Recovery Surgery;  Location: SURGERY [facility];  Service:    • BONI BY LAPAROSCOPY  6/2/2015    Procedure: LAPAROSCOPIC CHOLECYSTECTOMY ;  Surgeon: Atilio Latham M.D.;  Location: [facility];  Service:    • ENDOSCOPY PROCEDURE  3/24/2014    Performed by Feng Douglas M.D. at [facility]   • OTHER ORTHOPEDIC SURGERY     • PB ECHO,SCROTUM & CONTENTS      when i was 15 years old       FAMILY HX:  Family History   Problem Relation Age of Onset   • Diabetes Mother    • Hypertension Mother    • Hyperlipidemia Mother    • Cancer Mother         skin   • Arthritis Mother    • Heart Disease Mother    • Other Father         CAR ACCIDENT   • Other Sister 49        AIDS   • Arthritis Sister    • Heart Disease Sister    • Hypertension Sister    • Other Sister 60        2 MI's with stent placement       SOCIAL HX:  Social History     Socioeconomic History   • Marital  status: Single     Spouse name: Not on file   • Number of children: Not on file   • Years of education: Not on file   • Highest education level: Not on file   Occupational History   • Not on file   Social Needs   • Financial resource strain: Not on file   • Food insecurity     Worry: Never true     Inability: Never true   • Transportation needs     Medical: No     Non-medical: No   Tobacco Use   • Smoking status: Current Every Day Smoker     Packs/day: 0.50     Years: 30.00     Pack years: 15.00     Types: Cigarettes   • Smokeless tobacco: Never Used   Substance and Sexual Activity   • Alcohol use: Yes     Alcohol/week: 4.8 oz     Types: 8 Cans of beer per week     Frequency: 4 or more times a week     Drinks per session: 5 or 6     Binge frequency: Daily or almost daily     Comment: 5-6 beers a day reported   • Drug use: No     Comment: Hx IVDU   • Sexual activity: Not Currently   Lifestyle   • Physical activity     Days per week: Not on file     Minutes per session: Not on file   • Stress: Not on file   Relationships   • Social connections     Talks on phone: Not on file     Gets together: Not on file     Attends [employer] service: Not on file     Active member of club or organization: Not on file     Attends meetings of clubs or organizations: Not on file     Relationship status: Not on file   • Intimate partner violence     Fear of current or ex partner: Not on file     Emotionally abused: Not on file     Physically abused: Not on file     Forced sexual activity: Not on file   Other Topics Concern   • Not on file   Social History Narrative    Was living with his sister but is now staying at his mom's house while his mom stays with his sister so that he can be isolated from her during the coronavirus, has been isolating himself and the only contact he has is that his family brings him the items that he needs     Social History     Tobacco Use   Smoking Status Current Every Day Smoker   • Packs/day: 0.50   •  Years: 30.00   • Pack years: 15.00   • Types: Cigarettes   Smokeless Tobacco Never Used     Social History     Substance and Sexual Activity   Alcohol Use Yes   • Alcohol/week: 4.8 oz   • Types: 8 Cans of beer per week   • Frequency: 4 or more times a week   • Drinks per session: 5 or 6   • Binge frequency: Daily or almost daily    Comment: 5-6 beers a day reported       Allergies/Intolerances:  Allergies   Allergen Reactions   • Codeine Nausea     Tolerates Dilaudid  Sweat and cold       History reviewed MICU attending and chart    Other Current Medications:    Current Facility-Administered Medications:   •  hydrocortisone sodium succinate PF (SOLU-CORTEF) 100 MG injection 50 mg, 50 mg, Intravenous, Q12HRS, Oziel Londono M.D.  •  cefTRIAXone (ROCEPHIN) 2 g in  mL IVPB, 2 g, Intravenous, Q12HRS, Pavel Orlando Jr., D.O., Stopped at 06/08/20 1733  •  levothyroxine (SYNTHROID) tablet 50 mcg, 50 mcg, Enteral Tube, AM ES, Pavel Orlando Jr., D.O., 50 mcg at 06/08/20 0443  •  insulin regular (HUMULIN R) injection 1-6 Units, 1-6 Units, Subcutaneous, Q6HRS, Stopped at 06/08/20 0600 **AND** POC Blood Glucose, , , Q6H **AND** NOTIFY MD and PharmD, , , Once **AND** glucose 4 g chewable tablet 16 g, 16 g, Oral, Q15 MIN PRN **AND** dextrose 50% (D50W) injection 50 mL, 50 mL, Intravenous, Q15 MIN PRN, Pavel Orlando Jr., D.O.  •  oxyCODONE immediate release (ROXICODONE) tablet 10-15 mg, 10-15 mg, Enteral Tube, Q4HRS PRN, MARRY Olson Jr..O., 10 mg at 06/08/20 1304  •  phenytoin (DILANTIN) injection 300 mg, 300 mg, Intravenous, Q12HRS, 300 mg at 06/08/20 0600 **AND** [DISCONTINUED] phenytoin (DILANTIN) injection 200 mg, 200 mg, Intravenous, DAILY, aPvel Orlando Jr., D.O., Stopped at 06/07/20 1400  •  linezolid (ZYVOX) tablet 600 mg, 600 mg, Enteral Tube, Q12HRS, Pavel Orlando Jr., D.OJam, 600 mg at 06/08/20 0441  •  metroNIDAZOLE (FLAGYL) IVPB 500 mg, 500 mg, Intravenous, Q8HRS, MARRY Olson Jr..O.,  Stopped at 06/08/20 1539  •  fentaNYL (SUBLIMAZE) injection 25-50 mcg, 25-50 mcg, Intravenous, Q2HRS PRN, Vishnu Zamora M.D.  •  gabapentin (NEURONTIN) capsule 100 mg, 100 mg, Enteral Tube, TID, MARRY Olson Jr..O., 100 mg at 06/08/20 1106  •  midodrine (PROAMATINE) tablet 10 mg, 10 mg, Enteral Tube, Q8HRS, MARRY Olson Jr..O., 10 mg at 06/08/20 1305  •  dexmedetomidine (PRECEDEX) 400 mcg/100mL NS premix infusion, 0.1-1.5 mcg/kg/hr, Intravenous, Continuous, MARRY Olson Jr..OJam, Last Rate: 18.1 mL/hr at 06/08/20 1413, 1 mcg/kg/hr at 06/08/20 1413  •  levETIRAcetam (KEPPRA) tablet 1,000 mg, 1,000 mg, Enteral Tube, BID, MARRY Olson Jr..O., 1,000 mg at 06/08/20 0441  •  Pharmacy Consult: Enteral tube insertion - review meds/change route/product selection, 1 Each, Other, PHARMACY TO DOSE, MARRY Olson Jr..O.  •  omeprazole (FIRST-OMEPRAZOLE) 2 mg/mL oral susp 40 mg, 40 mg, Enteral Tube, DAILY, MARRY Olson Jr..O., 40 mg at 06/08/20 0440  •  enoxaparin (LOVENOX) inj 40 mg, 40 mg, Subcutaneous, DAILY, MARRY Olson Jr..O., 40 mg at 06/08/20 0440  •  thiamine tablet 100 mg, 100 mg, Enteral Tube, DAILY, MARRY Olson Jr..O., 100 mg at 06/08/20 0440  •  multivitamin (THERAGRAN) tablet 1 Tab, 1 Tab, Enteral Tube, DAILY, MARRY Olson Jr..O., 1 Tab at 06/08/20 0440  •  LORazepam (ATIVAN) injection 4 mg, 4 mg, Intravenous, Q10 MIN PRN, MARRY Olson Jr..O.  •  norepinephrine (Levophed) infusion 8 mg/250 mL (premix), 0-30 mcg/min, Intravenous, Continuous, Saurabh Delatorre M.D., Stopped at 06/08/20 0500  •  senna-docusate (PERICOLACE or SENOKOT S) 8.6-50 MG per tablet 2 Tab, 2 Tab, Enteral Tube, BID, 2 Tab at 06/08/20 0440 **AND** polyethylene glycol/lytes (MIRALAX) PACKET 1 Packet, 1 Packet, Enteral Tube, QDAY PRN **AND** magnesium hydroxide (MILK OF MAGNESIA) suspension 30 mL, 30 mL, Enteral Tube, QDAY PRN **AND** bisacodyl (DULCOLAX) suppository 10 mg, 10 mg, Rectal,  "QDAY PRN, Anisa Hernández M.D.  •  acetaminophen (TYLENOL) tablet 650 mg, 650 mg, Enteral Tube, Q6HRS PRN, Anisa Hernández M.D., 650 mg at 20 1644  •  labetalol (NORMODYNE/TRANDATE) injection 10 mg, 10 mg, Intravenous, Q4HRS PRN, Anisa Hernández M.D.  •  Respiratory Therapy Consult, , Nebulization, Continuous RT, Alexia Khan M.D.  •  ipratropium-albuterol (DUONEB) nebulizer solution, 3 mL, Nebulization, Q2HRS PRN (RT), Alexia Khan M.D.  •  MD Alert...ICU Electrolyte Replacement per Pharmacy, , Other, PHARMACY TO DOSE, Alexia Khan M.D.  •  lidocaine (XYLOCAINE) 1 % injection 1-2 mL, 1-2 mL, Tracheal Tube, Q30 MIN PRN, Alexia Khan M.D.  [unfilled]    Most Recent Vital Signs:  BP (!) 90/53   Pulse 69   Temp 36.2 °C (97.2 °F) (Bladder)   Resp 17   Ht 1.626 m (5' 4\")   Wt 74.8 kg (164 lb 14.5 oz)   SpO2 100%   BMI 28.31 kg/m²   Temp  Av.8 °C (98.3 °F)  Min: 35.5 °C (95.9 °F)  Max: 37.6 °C (99.7 °F)    Physical Exam:  Physical Exam   Constitutional: No distress.   HENT:   Head: Normocephalic and atraumatic.   Right Ear: External ear normal.   Left Ear: External ear normal.   Nose: Nose normal.   Eyes: Pupils are equal, round, and reactive to light. Conjunctivae and EOM are normal.   Cardiovascular: Normal rate, regular rhythm and normal heart sounds.   Pulmonary/Chest: Effort normal.   Decreased breath sounds   Abdominal: Soft. Bowel sounds are normal. He exhibits distension. There is no abdominal tenderness. There is no rebound and no guarding.   Musculoskeletal:         General: No edema.   Neurological: He is alert.   Intubated and sedated   Skin: Skin is warm and dry. He is not diaphoretic.   Psychiatric:   Opened eyes and followed simple commands           Pertinent Lab Results:  Recent Labs     20  0447 20  0450 20  0433   WBC 6.8 8.6 12.0*      Recent Labs     20  0447 200 20  0433   HEMOGLOBIN 7.3* 7.5* " 7.1*   HEMATOCRIT 25.0* 25.8* 23.9*   .7* 109.3* 109.1*   MCH 31.7 31.8 32.4   MACROCYTOSIS 2+  --   --    ANISOCYTOSIS 2+  --   --    PLATELETCT 233 298 323         Recent Labs     06/06/20  0447 06/07/20  0450 06/07/20  1210 06/08/20  0433   SODIUM 149* 148*  --  146*   POTASSIUM 4.0 3.6 3.7 3.2*   CHLORIDE 109 110  --  106   CO2 35* 28  --  29   CREATININE 1.62* 1.58*  --  1.63*        No results for input(s): ALBUMIN in the last 72 hours.    Invalid input(s): AST, ALT, ALKPHOS, BILITOT, TOTALBILIRUB, BILIRUBINTOT, BILIRUBINDIR, BILIRUBININD, ALKALINEPHOS     Pertinent Micro:  Results     Procedure Component Value Units Date/Time    FLUID CULTURE W/GRAM STAIN [[id number]] Collected:  06/08/20 1334    Order Status:  Completed Specimen:  Body Fluid from Thoracentesis Fluid Updated:  06/08/20 1443    Narrative:       Collected By:[id number] VUONG    AFB CULTURE [[id number]] Collected:  06/08/20 1334    Order Status:  Completed Specimen:  Body Fluid from Thoracentesis Fluid Updated:  06/08/20 1443    Narrative:       Collected By:[id number] JAIMIE VUONG    FUNGAL CULTURE [[id number]] Collected:  06/08/20 1334    Order Status:  Completed Specimen:  Body Fluid from Thoracentesis Fluid Updated:  06/08/20 1443    Narrative:       Collected By:[id number] VUONG    FLUID CULTURE W/GRAM STAIN [[id number]] Collected:  06/08/20 1334    Order Status:  Sent Specimen:  Body Fluid from Thoracentesis Fluid     AFB Culture [[id number]] Collected:  06/02/20 1440    Order Status:  Completed Specimen:  Body Fluid Updated:  06/07/20 0806     Significant Indicator NEG     Source BF     Site Thoracentesis Fluid     Culture Result Culture in progress.     AFB Smear Results No acid fast bacilli seen.    FLUID CULTURE W/GRAM STAIN [[id number]] Collected:  06/02/20 1440    Order Status:  Completed Specimen:  Body Fluid Updated:  06/07/20 0806     Significant Indicator NEG     Source BF     Site Thoracentesis Fluid     Culture  Result No growth at 5 days.     Gram Stain Result No organisms seen.    Fungal Culture [[id number]] Collected:  06/02/20 1440    Order Status:  Completed Specimen:  Body Fluid Updated:  06/07/20 0806     Significant Indicator NEG     Source BF     Site Thoracentesis Fluid     Culture Result Culture in progress.    CULTURE RESPIRATORY W/ GRM STN [[id number]] Collected:  06/03/20 1018    Order Status:  Completed Specimen:  Respirate from Tracheal Aspirate Updated:  06/05/20 1113     Significant Indicator NEG     Source RESP     Site TRACHEAL ASPIRATE     Culture Result No growth at 48 hours.     Gram Stain Result Rare WBCs.  No organisms seen.      Narrative:       Collected By:[id number] BROCK  Collected By:[id number] BROCK    GRAM STAIN [[id number]] Collected:  06/02/20 1440    Order Status:  Completed Specimen:  Body Fluid Updated:  06/03/20 1807     Significant Indicator .     Source BF     Site Thoracentesis Fluid     Gram Stain Result No organisms seen.    Acid Fast Stain [[id number]] Collected:  06/02/20 1440    Order Status:  Completed Specimen:  Body Fluid Updated:  06/03/20 1807     Significant Indicator NEG     Source BF     Site Thoracentesis Fluid     AFB Smear Results No acid fast bacilli seen.    GRAM STAIN [[id number]] Collected:  06/03/20 1018    Order Status:  Completed Specimen:  Respirate Updated:  06/03/20 1722     Significant Indicator .     Source RESP     Site TRACHEAL ASPIRATE     Gram Stain Result Rare WBCs.  No organisms seen.      Narrative:       Collected By:[id number] BROCK  Collected By:[id number] KLEPFER RITA L        Blood Culture   Date Value Ref Range Status   05/23/2020 No growth after 5 days of incubation.  Final        Studies:  Ct-chest (thorax) With    Result Date: 5/31/2020  HISTORY/REASON FOR EXAM: Respiratory illness, acute (Age > 40y); PE suspected, intermediate prob, positive D-dimer; post ROSC. TECHNIQUE/EXAM DESCRIPTION: CT scan of the chest with  contrast, 5/30/2020 10:33 PM. This examination was conducted with Automated Exposure Control and Automated Adjustment of Tube Current based on patient size. Following the administration of IV contrast, helical imaging is performed from the thoracic inlet to the dome of the diaphragm. COMPARISON: 5/23/2020 TOTAL DLP: 'Nam.... mGy*cm FINDINGS: Vasculature: Heart:  The heart is mildly enlarged. Mediastinal lymphadenopathy is again noted. Pretracheal lymph nodes measure 26 x 10 mm in diameter, which is increased from previous measurement of 22 mm in diameter seen on 5/23/2020. Extensive subcarinal lymphadenopathy is again noted. Prominent calcifications are again noted involving the coronary arteries. Lungs:  Extensive reticular and groundglass airspace densities have developed throughout all lobes of both lungs in the interval from the previous study. Additionally, there is a moderate right pleural effusion now present, which may be loculated anteriorly. There are small loculated collections of fluid noted involving the left pleural space. Additionally, there are prominent areas of consolidation noted involving both lungs, including near complete consolidation of the right lower lobe.     Extensive bilateral airspace opacities of developed in the interval from the previous study, including large areas of consolidation involving the right and left lower lobes. The findings suggest severe bilateral pneumonia. The distribution is uncharacteristic for Covid pneumonia, although the exam should not be considered a rule out for Covid pneumonia. There is no evidence of pulmonary embolus. Sergei Huber MD 5/31/2020 10:52 AM DLP Reporting Thresholds for Incorrect/Repeated Exams - DLP in mGy*cm Head/Neck:  0-year-old 3840, 1-year-old 5880, 5-year-old 8770, 10-year-old 36814 and adult 60695 Head:  0-year-old 4540, 1-year-old 7460, 5-year-old 34615, 10-year-old 30958 and adult 63687 Neck:  0-year-old 2940, 1-year-old 4160, 5-year-old  4550, 10-year-old 6320 and adult 8470 Chest:  0-year-old 550, 1-year-old 830, 5-year-old 1200, 10-year-old 3840 and adult 3570 Abd/pelvis:  0-year-old 440, 1-year-old 720, 5-year-old 1080, 10-year-old 3330 and adult 3330 Trunk(C/A/P):  0-year-old 490, 1-year-old 770, 5-year-old 1140, 10-year-old 3570 and adult 3330    Ct-chest (thorax) With    Result Date: 5/23/2020  HISTORY/REASON FOR EXAM:  Pleural effusion; recurrent effusion recently changed to sanguinous appearance w anemia TECHNIQUE/EXAM DESCRIPTION: CT scan of the chest with contrast. 5/23/2020 4:11 AM CT scan of the chest was carried out after the administration of IV contrast in the usual manner. Both automated exposure control and Automated adjustment of tube current based on patient size are utilized. Total DLP: 387 mGy*cm COMPARISON: 10/17/2019. Chest x-ray conducted 5/23/2020 FINDINGS: Moderate loculated right pleural effusion Trace left pleural effusion LUNGS: Focal airspace opacities/consolidation involving the right lower lobe and right middle lobe Scattered groundglass opacities are present throughout the left upper lobe Interlobular septal thickening is identified. Scar versus atelectasis in the left lower lobe. HEART: Normal in size. Coronary artery calcifications AORTA: Normal in caliber. MEDIASTINUM: Mediastinal adenopathy. The largest lymph node measures 2.2 cm Views of the upper abdomen show no acute abnormality. Degenerative and postsurgical changes of the spine with multiple compression deformities Subacute/old ununited sternal fracture     Moderate size loculated right pleural effusion Right middle lobe and right lower lobe airspace opacities/consolidation concerning for pneumonia Scattered groundglass opacity left upper lobe with interlobular septal thickening. May be secondary to pulmonary edema. DLP Reporting Thresholds for Incorrect/Repeated Exams - DLP in mGy*cm Head/Neck:  0-year-old 3840, 1-year-old 5880, 5-year-old 8770, 10-year-old  49554 and adult 86946 Head:  0-year-old 4540, 1-year-old 7460, 5-year-old 01435, 10-year-old 49598 and adult 27572 Neck:  0-year-old 2940, 1-year-old 4160, 5-year-old 4550, 10-year-old 6320 and adult 8470 Chest:  0-year-old 550, 1-year-old 830, 5-year-old 1200, 10-year-old 3840 and adult 3570 Abd/pelvis:  0-year-old 440, 1-year-old 720, 5-year-old 1080, 10-year-old 3330 and adult 3330 Trunk(C/A/P):  0-year-old 490, 1-year-old 770, 5-year-old 1140, 10-year-old 3570 and adult 3330    Ct-head W/o    Result Date: 6/1/2020 6/1/2020 12:42 AM HISTORY/REASON FOR EXAM: Altered mental status TECHNIQUE/EXAM DESCRIPTION:  CT of the head without contrast. Sequential axial images were obtained from the vertex to the skull base without contrast. Up to date radiation dose reduction adjustments have been utilized to meet ALARA standards for radiation dose reduction. COMPARISON: October 12, 2016 FINDINGS: There is mild diffuse parenchymal volume loss observed. Periventricular and subcortical white matter low-attenuation changes are seen, most commonly associated with small vessel ischemic disease. Moderate bilateral ventricular dilatation is seen, with radiographic appearance favoring ex vacuo dilatation. No space occupying lesions or areas of acute vascular territory infarctions are identified. There are no abnormal extra axial fluid collections or extra axial hemorrhage identified. The visualized paranasal sinuses and mastoid air cells are well aerated bilaterally. No depressed calvarial fractures are identified. The visualized globes and retrobulbar soft tissues appear within normal limits.  Atherosclerotic intracranial calcifications are seen.     1.  No acute intracranial abnormality is identified, there are nonspecific white matter changes, commonly associated with small vessel ischemic disease.  Associated mild cerebral atrophy is noted. 2.  Atherosclerosis.    Ct-head W/o    Result Date: 5/25/2020  HISTORY/REASON FOR EXAM:   Seizure, abnormal neuro exam; hx craniotomy for subdural, seizure. TECHNIQUE/EXAM DESCRIPTION: CT scan of the brain without contrast. 5/24/2020 9:36 PM. CT scan of the brain was carried out without contrast in the normal manner. Both automated exposure control and Automated adjustment of tube current based on patient size are utilized. Total DLP: 933 mGy*cm COMPARISON: 9/29/2019 FINDINGS: There is no evidence of mass, mass effect, hemorrhage, or extraaxial fluid collection.  There is no midline shift. Global atrophy. Postsurgical changes of bilateral craniotomies Subcutaneous air surrounds the right muscles of mastication. There is no fracture or other acute bony abnormality seen. Visualized paranasal sinuses: Clear.     No acute intracranial process Global atrophy Subcutaneous air surrounding the right muscles of mastication. Unclear etiology. DLP Reporting Thresholds for Incorrect/Repeated Exams - DLP in mGy*cm Head/Neck:  0-year-old 3840, 1-year-old 5880, 5-year-old 8770, 10-year-old 89855 and adult [id number] Head:  0-year-old 4540, 1-year-old 7460, 5-year-old 76149, 10-year-old 05082 and adult [id number] Neck:  0-year-old 2940, 1-year-old 4160, 5-year-old 4550, 10-year-old 6320 and adult 8470 Chest:  0-year-old 550, 1-year-old 830, 5-year-old 1200, 10-year-old 3840 and adult 3570 Abd/pelvis:  0-year-old 440, 1-year-old 720, 5-year-old 1080, 10-year-old 3330 and adult 3330 Trunk(C/A/P):  0-year-old 490, 1-year-old 770, 5-year-old 1140, 10-year-old 3570 and adult 3330    [id number] View    Result Date: 5/25/2020  HISTORY/REASON FOR EXAM:  Line/Tube Placement; ileus, OGT placement. ileus, OGT placement TECHNIQUE/EXAM DESCRIPTION AND NUMBER OF VIEWS: Abdomen (one view), 5/25/2020 8:49 AM. COMPARISON: None. FINDINGS: Enteric tube present with the tip in the proximal stomach. The sidehole is below the level of the gastroesophageal junction Air-filled loops of small bowel in the lower abdomen No free air. Loculated right  pleural effusion is visualized. Postsurgical changes of the spine     Enteric tube placement as above Raulito Lechuga MD 5/25/2020 9:00 AM    [id number] View    Result Date: 5/25/2020  HISTORY/REASON FOR EXAM:  Distention. TECHNIQUE/EXAM DESCRIPTION AND NUMBER OF VIEWS: Abdomen (one view), 5/24/2020 11:58 PM. COMPARISON: 10/8/2019 FINDINGS: Dilated air-filled loops of small bowel. No free air. No abnormal soft tissue masses or calcifications Partially visualized postsurgical changes of the spine     Obstructive bowel gas pattern Raulito Lechuga MD 5/25/2020 8:16 AM    Dx-chest-limited (1 View)    Result Date: 6/8/2020 6/8/2020 1:43 PM HISTORY/REASON FOR EXAM:  Shortness of Breath. TECHNIQUE/EXAM DESCRIPTION AND NUMBER OF VIEWS: Single AP view of the chest. COMPARISON: 6/8/2020 at 0447 hours FINDINGS: Lines/tubes:  Support tubing is unchanged. Fixation hardware is present in the thoracic spine. Lungs:  There is persistent atelectasis or pneumonia in the right lower lobe. A loculated appearing right-sided pleural effusion appears slightly decreased in volume. A small left pleural effusion is present. Pleura:  No pneumothorax. Heart and mediastinum:  The heart silhouette is within normal limits. Bones:  Left thoracotomy changes are present.     1.  Slightly decreased volume of loculated appearing right pleural effusion. 2.  Persistent atelectasis or pneumonia in the left lower lobe. 3.  Unchanged small left pleural effusion.    Dx-chest-limited (1 View)    Result Date: 5/27/2020  HISTORY/REASON FOR EXAM: Shortness of Breath; f/u pneumonia, pl effusion, pulm edema. f/u pneumonia, pl effusion, pulm edema TECHNIQUE/EXAM DESCRIPTION: Chest x-ray, one view, 5/27/2020 5:16 AM. COMPARISON: 5/26/2020 FINDINGS: Moderate right-sided pleural effusion is again noted. Trace left pleural effusion is again noted. Venous congestion and reticulonodular densities involving both lungs has worsened in the interval the previous study.      Findings are compatible with worsening pulmonary edema/pulmonary venous congestion.    Dx-chest-limited (1 View)    Result Date: 5/13/2020  HISTORY/REASON FOR EXAM: Pleural Effusion; post thoracentesis. post thoracentesis TECHNIQUE/EXAM DESCRIPTION: Chest x-ray, one view, 5/13/2020 9:02 AM. COMPARISON: 5/6/2020 FINDINGS: There is no evidence of right-sided pneumothorax. Right-sided pleural effusion is decreased. Left lung remains clear. The heart is normal in size.     Interval decrease in size in right pleural effusion with no evidence of postprocedural pneumothorax.    Dx-chest-portable (1 View)    Result Date: 6/8/2020 6/8/2020 4:18 AM HISTORY/REASON FOR EXAM: For indication of respiratory failure; For indication of respiratory failure TECHNIQUE/EXAM DESCRIPTION:  Single AP view of the chest. COMPARISON: Yesterday FINDINGS: Endotracheal tube has been exchanged for tracheostomy.   Otherwise medical device position is stable. Cardiomegaly is observed. The mediastinal contour appears within normal limits.  The central  pulmonary vasculature appears prominent and indistinct. The lungs appear well expanded bilaterally.  Diffuse scattered hazy pulmonary parenchymal opacities are seen. Veil-like opacities are seen in bilateral lung bases, right greater than left. Left rib fractures are noted.     1.  Pulmonary edema and/or infiltrates are identified, which are stable since the prior exam. 2.  Bilateral pleural effusions, right greater than left 3.  Cardiomegaly     Dx-chest-portable (1 View)    Result Date: 6/7/2020 6/7/2020 5:09 AM HISTORY/REASON FOR EXAM:  For indication of respiratory failure; For indication of respiratory failure TECHNIQUE/EXAM DESCRIPTION AND NUMBER OF VIEWS: Single portable view of the chest. COMPARISON: 6/6/2020 FINDINGS: Tubes and lines: ET tube, feeding tube and right central venous catheter are redemonstrated. Diffuse interstitial prominence. Patchy right base opacities. Moderate right  lateral pleural effusion. No pneumothorax. Stable cardiopericardial silhouette.     1. No significant interval change.    Dx-chest-portable (1 View)    Result Date: 6/6/2020 6/6/2020 5:13 AM HISTORY/REASON FOR EXAM:  For indication of respiratory failure; For indication of respiratory failure TECHNIQUE/EXAM DESCRIPTION AND NUMBER OF VIEWS: Single portable view of the chest. COMPARISON: 6/5/2020 FINDINGS: Tubes and lines: ET tube, feeding tube and right central venous catheter are redemonstrated. Diffuse interstitial prominence. Patchy right base opacities. Moderate right lateral pleural effusion. No pneumothorax. Stable cardiopericardial silhouette.     1. No significant interval change.    Dx-chest-portable (1 View)    Result Date: 6/5/2020 6/5/2020 12:50 PM HISTORY/REASON FOR EXAM:  Shortness of breath. TECHNIQUE/EXAM DESCRIPTION AND NUMBER OF VIEWS: Single portable view of the chest. COMPARISON: Exam at 5:26 AM FINDINGS: Single portable view of the chest demonstrates a normal cardiac silhouette and mediastinal contours. Right pleural effusion and adjacent airspace opacification are unchanged. Bilateral interstitial prominence is unchanged. No pneumothorax is identified. Lines of support are unchanged.     No significant interval change.    Dx-chest-portable (1 View)    Result Date: 6/5/2020 6/5/2020 5:16 AM HISTORY/REASON FOR EXAM: For indication of respiratory failure; For indication of respiratory failure TECHNIQUE/EXAM DESCRIPTION:  Single AP view of the chest. COMPARISON: Yesterday FINDINGS: Position of medical devices appears stable. Cardiomegaly is observed. The mediastinal contour appears within normal limits.  The central  pulmonary vasculature appears prominent and indistinct. The lungs appear well expanded bilaterally.  Diffuse scattered hazy pulmonary parenchymal opacities are seen. Veil-like opacities are seen in bilateral lung bases, right greater than left. Left rib fractures are noted.     1.   Pulmonary edema and/or infiltrates are identified, which are stable since the prior exam. 2.  Bilateral pleural effusions, right greater than left 3.  Cardiomegaly     Dx-chest-portable (1 View)    Result Date: 6/4/2020 6/4/2020 4:35 AM HISTORY/REASON FOR EXAM: For indication of respiratory failure; For indication of respiratory failure TECHNIQUE/EXAM DESCRIPTION:  Single AP view of the chest. COMPARISON: Yesterday FINDINGS: Position of medical devices appears stable. Cardiomegaly is observed. The mediastinal contour appears within normal limits.  The central  pulmonary vasculature appears prominent and indistinct. The lungs appear well expanded bilaterally.  Diffuse scattered hazy pulmonary parenchymal opacities are seen. Veil-like opacities are seen in bilateral lung bases, right greater than left. Left rib fractures are noted.     1.  Pulmonary edema and/or infiltrates are identified, which are stable since the prior exam. 2.  Bilateral pleural effusions, right greater than left 3.  Cardiomegaly     Dx-chest-portable (1 View)    Result Date: 6/3/2020    6/3/2020 4:48 AM HISTORY/REASON FOR EXAM: For indication of respiratory failure; For indication of respiratory failure TECHNIQUE/EXAM DESCRIPTION:  Single AP view of the chest. COMPARISON: Yesterday FINDINGS: Position of medical devices appears stable. Cardiomegaly is observed. The mediastinal contour appears within normal limits.  The central  pulmonary vasculature appears prominent and indistinct. The lungs appear well expanded bilaterally.  Diffuse scattered hazy pulmonary parenchymal opacities are seen. Veil-like opacities are seen in bilateral lung bases, right greater than left. Left rib fractures are noted.     1.  Pulmonary edema and/or infiltrates are identified, which are stable since the prior exam. 2.  Bilateral pleural effusions, right greater than left 3.  Cardiomegaly     Dx-chest-portable (1 View)    Result Date: 6/2/2020 6/2/2020 4:25 PM  HISTORY/REASON FOR EXAM:  Follow-up right thoracentesis TECHNIQUE/EXAM DESCRIPTION AND NUMBER OF VIEWS: Single portable view of the chest. COMPARISON: 6/2/2020 FINDINGS: Scans of postoperative changes seen in the thoracolumbar spine. ET tube and feeding tube are again seen as is a right PICC line. The mediastinal and cardiac silhouette is unremarkable. The pulmonary vascularity is within normal limits. There is peribronchial wall thickening and edema. There is right lower lobe airspace disease. There has been interval decrease in the right pleural effusion status post thoracentesis. There is no visible pneumothorax. There are no acute bony abnormalities.     1.  There is no pneumothorax following right thoracentesis. 2.  The amount of right pleural effusion is decreased. 3.  There is vascular congestion and/or edema. 4.  There is right lower lobe airspace disease which could be atelectasis or pneumonitis.    Dx-chest-portable (1 View)    Result Date: 6/2/2020 6/2/2020 4:24 AM HISTORY/REASON FOR EXAM: For indication of respiratory failure; For indication of respiratory failure TECHNIQUE/EXAM DESCRIPTION:  Single AP view of the chest. COMPARISON: None FINDINGS: Position of medical devices appears stable. Cardiomegaly is observed. The mediastinal contour appears within normal limits.  The central  pulmonary vasculature appears prominent and indistinct. The lungs appear well expanded bilaterally.  Diffuse scattered hazy pulmonary parenchymal opacities are seen. Veil-like opacities are seen in bilateral lung bases, right greater than left. Left rib fractures are noted.     1.  Pulmonary edema and/or infiltrates are identified, which are stable since the prior exam. 2.  Bilateral pleural effusions, right greater than left 3.  Cardiomegaly    Dx-chest-portable (1 View)    Result Date: 6/1/2020 6/1/2020 4:20 AM HISTORY/REASON FOR EXAM: For indication of respiratory failure; For indication of respiratory failure  TECHNIQUE/EXAM DESCRIPTION:  Single AP view of the chest. COMPARISON: None FINDINGS: Right internal jugular central line is seen terminating at the right atriocaval junction.  Endotracheal tube tip terminates at the level of the izaiah.  Nasogastric tube terminates below the lower margin of the film within the abdomen. Cardiomegaly is observed. The mediastinal contour appears within normal limits.  The central  pulmonary vasculature appears prominent and indistinct. The lungs appear well expanded bilaterally.  Diffuse scattered hazy pulmonary parenchymal opacities are seen. Veil-like opacities are seen in bilateral lung bases, right greater than left. The bony structures appear age-appropriate.     1.  Pulmonary edema and/or infiltrates. 2.  Bilateral pleural effusions, right greater than left 3.  Cardiomegaly    Dx-chest-portable (1 View)    Result Date: 5/31/2020  HISTORY/REASON FOR EXAM: intubation. intubation TECHNIQUE/EXAM DESCRIPTION: Chest x-ray, one portable view, 5/30/2020 8:26 PM. COMPARISON: 5/20/2020 FINDINGS: The endotracheal tube terminates above the level the izaiah. NG tube passes caudal to the diaphragm. Right-sided PICC line remains in stable position. Large right-sided pleural effusion is again noted and appear stable. Increasing areas of consolidation of developed involving the apices of both lungs, as well as the lateral aspect of the left lung.     Extensive bilateral lung opacities are now noted, which could be explained by severe bilateral pneumonia. Stable appearance of right pleural effusion. Supportive lines and tubes appear in appropriate positions.    Dx-chest-portable (1 View)    Result Date: 5/28/2020  HISTORY/REASON FOR EXAM: Shortness of Breath. TECHNIQUE/EXAM DESCRIPTION: Chest x-ray, one portable view, 5/28/2020 7:53 AM. COMPARISON: 5/27/2020 FINDINGS: Pulmonary venous congestion pattern has improved, however, prominent interstitial opacities persist, compatible with pulmonary  edema. Right-sided pleural effusion is stable. PICC line has been placed and appears in excellent position.     Persistent right-sided pleural effusion.    Dx-chest-portable (1 View)    Result Date: 5/26/2020  HISTORY/REASON FOR EXAM:  f/u right pleural effusion. f/u right pleural effusion TECHNIQUE/EXAM DESCRIPTION AND NUMBER OF VIEWS: Chest x-ray (one view), 5/26/2020 5:22 AM. COMPARISON: 5/24/2020 FINDINGS: Endotracheal tube and enteric tube is been removed. Large loculated right pleural effusion, appears slightly larger. Increased interstitial markings throughout both lungs. The cardiac silhouette is faintly visualized and appear stable Postsurgical changes of the spine     Endotracheal tube and enteric tube have been removed. Worsening trend with increased interstitial markings throughout the lungs probable worsening pulmonary edema Slight increase size of large loculated right pleural effusionRaulito Lechuga MD 5/26/2020 8:16 AM     Dx-chest-portable (1 View)    Result Date: 5/25/2020  HISTORY/REASON FOR EXAM:  Pleural Effusion. TECHNIQUE/EXAM DESCRIPTION AND NUMBER OF VIEWS: Chest x-ray (one view), 5/24/2020 9:38 PM. COMPARISON: 5/23/2020 FINDINGS: Endotracheal tube is been placed with the tip 5 cm above the izaiah Enteric tube extends below the field of view Loculated right pleural effusion Diffuse increased interstitial markings in both lungs. The cardiac silhouette is unremarkable. Postsurgical changes of the spine History says line placement. No central venous catheters identified. Correlate clinically.     Tubes and lines as above Large loculated right pleural effusion. Diffuse increased interstitial markings. Likely secondary to edema 5/25/2020 8:26 AM     Dx-chest-portable (1 View)    Result Date: 5/23/2020  HISTORY/REASON FOR EXAM:  HYPOTENSION. TECHNIQUE/EXAM DESCRIPTION AND NUMBER OF VIEWS: Chest x-ray (one view), 5/23/2020 12:06 AM. COMPARISON: 5/13/2020 FINDINGS: Large loculated right pleural effusion  is identified. Ill-defined airspace opacities involving the left lung base. The cardiac and mediastinal silhouette are unremarkable. Postsurgical changes of the thoracic spine.     Large right pleural effusion, loculated Atelectasis versus developing pneumonia in the left lung 5/23/2020 8:48 AM     Ir-thoracentesis Puncture Right    Result Date: 5/13/2020  HISTORY/REASON FOR EXAM:  Recurrent pleural effusion. TECHNIQUE/EXAM DESCRIPTION: Ultrasound-guided thoracentesis, 5/13/2020 8:59 AM. COMPARISON: None. PROCEDURE: The risks, benefits, alternatives and the procedure were discussed with the patient.  The patient's questions were answered to their full satisfaction.  They subsequently agreed to proceed with the proposed procedure and signed a written consent form. A procedural timeout was performed. Maximum sterile barrier protection mechanisms were employed. The patient's laboratory values, allergies and medication list were reviewed prior to the examination. The patient's thoracic cavity was sonographically evaluated.  An area suitable for thoracentesis was identified.  The overlying skin was prepped and draped in a sterile fashion.  1% lidocaine was infiltrated subcutaneously for local anesthesia. Following this, the thoracic cavity was entered utilizing a Safe-T-Centesis Catheter System.  A total of 900 cc of serosanguineous fluid was subsequently aspirated under sonographic control. The patient tolerated the procedure well and there were no immediate postprocedural complications. Following the procedure, the chest radiograph demonstrated no evidence of pneumothorax and decrease in size of the pleural effusion.     Unremarkable sonographic-guided thoracentesis.    Mr-brain-with & W/o    Result Date: 6/6/2020 6/6/2020 5:00 PM HISTORY/REASON FOR EXAM:  exposed right craniotomy hardware r/o infection. TECHNIQUE/EXAM DESCRIPTION:   MRI of the brain without and with contrast. T1 sagittal, T2 fast spin-echo axial, T1  coronal, FLAIR coronal, diffusion-weighted and apparent diffusion coefficient (ADC map) axial images were obtained of the whole brain. T1 postcontrast axial and T1 postcontrast coronal images were obtained. The study was performed on a [employer] Signa 1.5 Deborah MRI scanner. 15 mL ProHance contrast was administered intravenously. COMPARISON:  None. FINDINGS:  There is abnormal contrast enhancement and edema noted involving right frontal craniotomy flap. There is abnormal enhancing extradural tissue noted adjacent to the right frontal bone with abnormal adjacent dural enhancement. There is no evidence of leptomeningeal enhancement. There is no parenchymal contrast enhancement. There is no acute infarct. There is no acute or chronic parenchymal hemorrhage. There is severe cerebral volume loss. There is mild cerebellar volume loss. There is no intra-axial space-occupying lesion. The midline structures including the pituitary, hypothalamic and pineal regions are unremarkable. The posterior fossa structures are unremarkable. The visualized flow voids of the cerebral vasculature are unremarkable. There is no large lesion identified in the expected course of the intracranial portions of the cranial nerves. There is mucosal thickening in the bilateral mastoid air cells.     1.  There is abnormal contrast enhancement and edema noted involving right frontal craniotomy flap. There is abnormal enhancing extradural tissue noted adjacent to the right frontal bone with abnormal adjacent dural enhancement. 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 There is no evidence of leptomeningeal enhancement. There is no parenchymal contrast enhancement. 2.  Severe cerebral volume loss. 3.  Mild cerebral volume loss.    Us-extremity Artery Upper Bilat    Result Date: 6/6/2020   Vascular Laboratory  Conclusions  No hemodynamically significant evidence of arterial disease in the right  upper  extremity.  No hemodynamically significant evidence of arterial disease in the left  upper extremity.  LARISSA DURBIN  Exam Date:     2020 09:04  Room #:     Inpatient  Priority:     Routine  Ht (in):             Wt (lb):  Ordering Physician:        JONY HANSEN JR  Referring Physician:       [id number] JR, RICHARD  Sonographer:               Erlinda Pham RVT  Study Type:                Complete Bilateral  Technical Quality:         Adequate  Age:    54    Gender:     M  MRN:    [id number]  :    1965      BSA:  Indications:     Cyanosis  CPT Codes:       98174  ICD Codes:       782.5  History:         Bilateral cyanosis  Limitations:     Challenging due to patient movement.          RIGHT                                                 LEFT  Waveforms               PSV                              PSV        Waveforms                          (cm/s)                           (cm/s)  Triphasic                77.00       Subclavian          69.00      Triphasic  Bi, non-                 84.00       Axillary            54.00      Bi, non-  reversed                                                            reversed                                       Brachial  Bi, non-                 100.00      Proximal            63.00      Triphasic  reversed                                       Mid  Triphasic                72.00       Distal              38.00      Bi, non-                                                                      reversed                                       Radial  Bi, non-                 77.00       Proximal            61.10      Bi, non-  reversed                                                            reversed                                       Mid  Bi, non-                 47.00       Distal              17.40      Bi, non-  reversed                                                            reversed                                        Ulnar  Bi, non-                 77.00       Proximal            71.30      Bi, non-  reversed                                                            reversed                                       Mid  Bi, non-                 105.00      Distal              63.10      Bi, non-  reversed                                                            reversed  Findings  Right  No hemodynamically significant evidence of arterial disease in the right  upper extremity.  Multiphasic Doppler flow pattern is noted throughout the right upper  extremity.  Left  No hemodynamically significant evidence of arterial disease in the left  upper extremity.  Multiphasic Doppler flow pattern is noted throughout the left upper  extremity.  The distal radial artery is small with low velocities, but patent.  Melody MARTINEZ To  (Electronically Signed)  Final Date:      06 June 2020                   11:57    Us-thoracentesis Puncture Right    Result Date: 6/3/2020  6/2/2020 1:54 PM HISTORY/REASON FOR EXAM:  Shortness of breath TECHNIQUE/EXAM DESCRIPTION: Ultrasound-guided thoracentesis. Indication:  RIGHT pleural fluid collection. COMPARISON:  Plain film from the same day PROCEDURE:     Informed consent was obtained over the phone from the patient's family. A timeout was taken. A right pleural effusion was localized with real-time ultrasound guidance. The right posterior chest wall was prepped and draped in a sterile manner. Following local anesthesia with 1% lidocaine, a 5 [place] Roomisheh pigtail catheter was advanced into the pleural space with trocar technique and pleural fluid was drained. The patient tolerated the procedure well without evidence of complication. A post thoracentesis chest radiograph is forthcoming. FINDINGS: Fluid was sent to the laboratory. Fluid character: serosanguinous     1. Ultrasound guided right sided diagnostic and therapeutic thoracentesis. 2. 800 mL of fluid  withdrawn.    Ec-echocardiogram Complete W/o Cont    Result Date: 2020  Transthoracic Echo Report Echocardiography Laboratory CONCLUSIONS No prior study is available for comparison. Left ventricular ejection fraction is visually estimated to be 65%. Normal left ventricular systolic function. Mild concentric left ventricular hypertrophy. Grade II diastolic dysfunction. Moderately dilated right ventricle. Mildly dilated left atrium. Mild mitral regurgitation. Estimated right ventricular systolic pressure is 55 mmHg. Trace tricuspid regurgitation. Normal pericardium without effusion. LARISSA DURBIN Exam Date:         2020                    09:39 Exam Location:     Out Patient Priority:          Routine Ordering Physician:        JONY HANSEN JR Referring Physician:       [id number] JR Sonographer:               Madina Michaels RDCS Age:    54     Gender:    M MRN:    [id number] :    1965 BSA:    1.75   Ht (in):    64     Wt (lb):    153 Exam Type:     Complete Indications:     Cardiac Arrest ICD Codes:       427.5 CPT Codes:       05455 BP:   110    /   58     HR:   62 Technical Quality:       Fair MEASUREMENTS  (Male / Female) Normal Values 2D ECHO LV Diastolic Diameter PLAX        4.9 cm                4.2 - 5.9 / 3.9 - 5.3 cm LV Systolic Diameter PLAX         3 cm                  2.1 - 4.0 cm IVS Diastolic Thickness           1.1 cm                LVPW Diastolic Thickness          1.1 cm                LVOT Diameter                     2.3 cm                Estimated LV Ejection Fraction    65 %                  LV Ejection Fraction MOD BP       62.9 %                >= 55  % LV Ejection Fraction MOD 4C       63.8 %                LV Ejection Fraction MOD 2C       62.2 %                IVC Diameter                      2.5 cm                DOPPLER AV Peak Velocity                  1.1 m/s               AV Peak Gradient                  4.4 mmHg              AV Mean Gradient                   2.2 mmHg              LVOT Peak Velocity                0.83 m/s              AV Area Cont Eq vti               3.1 cm2               Mitral E Point Velocity           1 m/s                 Mitral E to A Ratio               2.3                   MV Pressure Half Time             53.7 ms               MV Area PHT                       4.1 cm2               MV Deceleration Time              185 ms                TR Peak Velocity                  291 cm/s              PV Peak Velocity                  0.53 m/s              PV Peak Gradient                  1.1 mmHg              RVOT Peak Velocity                0.4 m/s               * Indicates values subject to auto-interpretation LV EF:  65    % FINDINGS Left Ventricle Normal left ventricular chamber size. Mild concentric left ventricular hypertrophy. Normal left ventricular systolic function. Left ventricular ejection fraction is visually estimated to be 65%. Normal diastolic function. Grade II diastolic dysfunction. Right Ventricle Moderately dilated right ventricle. Right Atrium The right atrium is normal in size. Left Atrium Mildly dilated left atrium. Left atrial volume index is 38 mL/sq m. Mitral Valve Structurally normal mitral valve without significant stenosis. Mild mitral regurgitation. Aortic Valve Structurally normal aortic valve without significant stenosis or regurgitation. Tricuspid Valve Structurally normal tricuspid valve without significant stenosis. Trace tricuspid regurgitation. Estimated right ventricular systolic pressure is 55 mmHg. Right atrial pressure is estimated to be 3 mmHg. Pulmonic Valve Structurally normal pulmonic valve without significant stenosis or regurgitation. Pericardium Normal pericardium without effusion. Aorta The aortic root is normal.  Ascending aorta diameter is 3.0 cm. Pavel Franco M.D. (Electronically Signed) Final Date:     02 June 2020                 15:10    Dx-abdomen For Tube Placement    Result Date:  6/2/2020 6/2/2020 1:06 PM HISTORY/REASON FOR EXAM:  Line evaluation. TECHNIQUE/EXAM DESCRIPTION AND NUMBER OF VIEWS:  1 view(s) of the abdomen. COMPARISON:  None. FINDINGS: Enteric tube has been placed. The tip projects over the left upper quadrant. The bowel gas pattern is within normal limits. There is postoperative change in the thoracolumbar spine.     1.  Feeding tube tip projects over the gastric body.    Ir-picc Line Placement W/guidance < Age 5    Result Date: 5/27/2020  HISTORY/REASON FOR EXAM:  IR picc placement. TECHNIQUE/EXAM DESCRIPTION: ULTRASOUND AND FLUOROSCOPIC-GUIDED PICC LINE INSERTION, 5/27/2020 10:36 AM.. TECHNIQUE:  The procedure was explained to the patient.  The patient was placed supine on the fluoroscopy table.  Ultrasound examination of the right arm was performed to confirm patency of the basilic vein.  Using all elements of Maximum Sterile Barrier  technique and local anesthesia with ultrasound guidance, the above mentioned examined vein was punctured through a small skin incision.  Continuous real time ultrasound monitoring of the puncture needle entry into the vein was performed and documented. Under fluoroscopic guidance, a 0.018 wire was passed centrally through the needle.  A peel-away sheath was placed over the wire.  A 5 Khmer double lumen Power PICC line was advanced until the tip was at the SVC-right atrium junction.  The position of the catheter tip was confirmed with fluoroscopy.  The sheath was removed, and the line was secured to the patient's skin.  There were no immediate complications, and the patient tolerated the procedure well. FINDINGS: The tip of the PICC line is at the SVC-right atruim junction . 1 saved images. Fluoroscopic time was 3.2 minutes     Successful placement of a PICC line.  Ready to infuse.,      ASSESSMENT/PLAN:      54 y.o.  admitted 5/31/2020 who initially presented to [facility] on 5/2 complaining of urinary retention and  hypotension.  He had previously been hospitalized at [place] from 5/6-5/11 for COPD exacerbation.   During his stay at [place] starting on 5/22 he required intubation due to seizure activity.  Eventually extubated but developed aspiration pneumonia and increasing encephalopathy.  The patient had an episode of unresponsiveness with bradycardia and a code was called.  Patient received 3 rounds of CPR and 2 episodes of epi before ROSC was achieved.  He was intubated and transferred to [facility] for higher level of care.  He has had numerous antibiotics since admit.  Antibiotics were off from 6/4-6/6 and then he was restarted.    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  -History of bilateral craniotomy with subdural evacuation in 10/2016  -MRI on 6/6 with contrast enhancement and edema involving the right frontal craniotomy flap as well as extradural tissue adjacent to the right frontal bone concerning for craniotomy flap osteomyelitis with adjacent epidural phlegmon  -Neurosurgery is on board-planning to take patient to the OR soon  DAMIAN-ongoing and worsening  Encephalopathy  Seizure disorder  COPD and on unknown home oxygen  Alcohol abuse per chart  Hepatitis C with cirrhosis, unknown if treated    Plan     --- Agree with linezolid which should give good tissue/bone and CNS penetration, will transition ceftriaxone to renally dosed cefepime and continue Flagyl (both also with good CNS penetration)   --- Blood cultures due to new fevers -ordered  --- Will need  LP if he continues to spike fevers-in this scenario sent for cell count, glucose, protein and cultures  --- Agree with neurosurgical intervention for more definitive management-patient goes to the OR please obtain cultures  --- May need to consider other imaging and replacing lines if fevers continue      Plan of care discussed Dr. Londono.  Will continue to follow    Nu Diez M.D.

## 2020-06-09 NOTE — CARE PLAN
Vent Day 10/ Trach day 3    CMV- 16/360/+8/50    PT progressed as expected with, No  complications.

## 2020-06-09 NOTE — PROGRESS NOTES
"Critical Care Progress Note    Date of admission  5/31/2020    Chief Complaint  54 y.o. male admitted 5/31/2020 with Respiratory failure, seizure    Hospital Course    \"All history was obtained from old notes/charts from [facility] as the patient is intubated at the time of my evaluation.     Mr. Lechuga is a 54 year old male with the past medical history of urethral stricture with urinary retention, recurrent right-sided pleural effusion, alcohol abuse, COPD on home O2, hepatitis C with cirrhosis, chronic pain syndrome on narcotics, traumatic brain injury, history of SDH with subsequent seizure disorder who presented to [facility] on 5/22 with the complaint of difficulty urinary for 2 days and low blood pressures.  He apparently was recently hospitalized at [place] from 5/6 to 5/11/2020 for a COPD exacerbation and reported that he was doing well for until 2 days prior to 5/22 when he developed difficulty urinating and draining his bladder.  He has worsening abdominal pain in the suprapubic region with chills.  No fevers.  No nausea or vomiting.  He has a negative COVID test on 5/6.  The patient was admitted for acute urinary retention, UTI, and hypoxia.  Over the course of the next week, the patient required intubation for seizure activity, but was then extubated.  However, the patient then developed aspiration pneumonia with AMS and was on BiPAP for a night on 5/29.  All during the patient's hospital stay, he has been battling with delirium.  Unfortunately, around 2000 last night the patient the patient became unresponsive with a bradycardic episode and no pulses.  A code was called and he received 3 rounds of CPR and 2 doses of epi before ROSC was achieved.  He was intubated again and stabilized.  He was sent to [facility] for higher level of care due to the growing complexity of his condition as well as critical care management since [place] does not have critical care specialists on staff.\"      Interval " Problem Update  Reviewed last 24 hour events:  Neuro: off sedation moving following ext oxy x1   HR: 50-60's  SBP: 110-120's ne 4  Tmax: 99  GI: TF at goal large BM BMS  UOP: powers 400ml mild improved  Lines: picc powers LR 83ml/hr   Resp: t piece 730 am vent 10 trach 3    Vte: lovenox -> held for nsg  PPI/H2:ppi  Antibx: day linezolid flagyl and cefepimine   k replaced   Hydrocortisone 50mg     wean dex   Transfuse one unit  Consult thoracic multiple attempts no answer  Still on norepinephrine at 4  Had desaturation so was placed back on vent to rest  BMP repeat  Ct chest w/o contrast  Talked with general surgery plan for decortication in a couple days    Review of Systems  Review of Systems   Unable to perform ROS: Intubated        Vital Signs for last 24 hours   Temp:  [36.7 °C (98.1 °F)-37.1 °C (98.8 °F)] 36.9 °C (98.5 °F)  Pulse:  [54-92] 80  Resp:  [8-52] 22  BP: ()/(50-89) 113/66  SpO2:  [90 %-100 %] 92 %    Hemodynamic parameters for last 24 hours       Respiratory Information for the last 24 hours  Vent Mode: APVCMV  Rate (breaths/min): 16  Vt Target (mL): 360  PEEP/CPAP: 8  MAP: 18  Control VTE (exp VT): 302    Physical Exam   Physical Exam  Vitals signs reviewed.   Constitutional:       General: He is not in acute distress.     Appearance: He is well-developed. He is ill-appearing.      Interventions: He is intubated.      Comments: Sitting in bed chronic ill appearing   HENT:      Head: Normocephalic and atraumatic.      Comments: Scalp abnormalities consistent with prior neurosurgical procedure.      Right Ear: External ear normal.      Left Ear: External ear normal.      Nose: Nose normal.      Comments: Cortrak in place     Mouth/Throat:      Mouth: Mucous membranes are dry.      Pharynx: Oropharynx is clear.   Eyes:      Conjunctiva/sclera: Conjunctivae normal.      Pupils: Pupils are equal, round, and reactive to light.   Neck:      Musculoskeletal: Neck supple.      Vascular: No JVD.       Trachea: No tracheal deviation.      Comments: Trach in place  Cardiovascular:      Rate and Rhythm: Regular rhythm. Bradycardia present.      Pulses: Normal pulses.   Pulmonary:      Effort: He is intubated.      Breath sounds: No wheezing, rhonchi or rales.      Comments: Clear bilateral  Abdominal:      General: Bowel sounds are normal. There is no distension.      Palpations: Abdomen is soft.   Musculoskeletal:         General: Swelling present. No tenderness.      Right lower leg: Edema present.      Left lower leg: Edema present.   Skin:     General: Skin is warm and dry.      Capillary Refill: Capillary refill takes less than 2 seconds.      Findings: No rash.      Comments: Multiple tattoos  Venous stasis dermatitis   Neurological:      General: No focal deficit present.      Mental Status: He is alert.      Comments: Opens eyes to voice moves extremities follows commands   Psychiatric:      Comments: Unable to assess         Medications  Current Facility-Administered Medications   Medication Dose Route Frequency Provider Last Rate Last Dose   • NS infusion   Intravenous Continuous Oziel Londono M.D. 30 mL/hr at 06/09/20 0745     • hydrocortisone sodium succinate PF (SOLU-CORTEF) 100 MG injection 50 mg  50 mg Intravenous Q12HRS Oziel Londono M.D.   50 mg at 06/09/20 0449   • cefepime (MAXIPIME) 2 g in  mL IVPB  2 g Intravenous Q12HRS Nu Diez M.D.   Stopped at 06/09/20 0530   • lactated ringers infusion  1,000 mL Intravenous Continuous Oziel Londono M.D. 83 mL/hr at 06/08/20 1908 1,000 mL at 06/08/20 1908   • levothyroxine (SYNTHROID) tablet 50 mcg  50 mcg Enteral Tube AM ES Pavel Orlando Jr., D.O.   50 mcg at 06/09/20 0449   • insulin regular (HUMULIN R) injection 1-6 Units  1-6 Units Subcutaneous Q6HRS Pavel Orlando Jr., D.O.   Stopped at 06/09/20 0600    And   • glucose 4 g chewable tablet 16 g  16 g Oral Q15 MIN PRN Pavel Orlando Jr., D.O.        And   • dextrose 50%  (D50W) injection 50 mL  50 mL Intravenous Q15 MIN PRN Pavel Orlando Jr. D.O.       • oxyCODONE immediate release (ROXICODONE) tablet 10-15 mg  10-15 mg Enteral Tube Q4HRS PRN Pavel Orlando Jr. D.O.   10 mg at 06/09/20 1404   • phenytoin (DILANTIN) injection 300 mg  300 mg Intravenous Q12HRS Pavel Orlando Jr. D.O.   300 mg at 06/09/20 0455   • linezolid (ZYVOX) tablet 600 mg  600 mg Enteral Tube Q12HRS Pavel Orlando Jr. D.O.   600 mg at 06/09/20 0450   • metroNIDAZOLE (FLAGYL) IVPB 500 mg  500 mg Intravenous Q8HRS Pavel Orlando Jr. D.O. 100 mL/hr at 06/09/20 1403 500 mg at 06/09/20 1403   • fentaNYL (SUBLIMAZE) injection 25-50 mcg  25-50 mcg Intravenous Q2HRS PRN Vishnu Zamora M.D.       • gabapentin (NEURONTIN) capsule 100 mg  100 mg Enteral Tube TID Pavel Orlando Jr. D.O.   100 mg at 06/09/20 1209   • midodrine (PROAMATINE) tablet 10 mg  10 mg Enteral Tube Q8HRS Pavel Orlando Jr. D.O.   10 mg at 06/09/20 1403   • dexmedetomidine (PRECEDEX) 400 mcg/100mL NS premix infusion  0.1-1.5 mcg/kg/hr Intravenous Continuous MARRY Olson Jr..O.   Stopped at 06/09/20 0630   • levETIRAcetam (KEPPRA) tablet 1,000 mg  1,000 mg Enteral Tube BID Pavel Orlando Jr. D.O.   1,000 mg at 06/09/20 0448   • Pharmacy Consult: Enteral tube insertion - review meds/change route/product selection  1 Each Other PHARMACY TO DOSE MARRY Olson Jr..O.       • omeprazole (FIRST-OMEPRAZOLE) 2 mg/mL oral susp 40 mg  40 mg Enteral Tube DAILY Pavel Orlando Jr. D.O.   40 mg at 06/09/20 0550   • thiamine tablet 100 mg  100 mg Enteral Tube DAILY Pavel Orlando Jr. D.O.   100 mg at 06/09/20 0448   • multivitamin (THERAGRAN) tablet 1 Tab  1 Tab Enteral Tube DAILY MARRY Olson Jr..OJam   1 Tab at 06/09/20 0448   • LORazepam (ATIVAN) injection 4 mg  4 mg Intravenous Q10 MIN PRN MARRY Olson Jr..O.       • norepinephrine (Levophed) infusion 8 mg/250 mL (premix)  0-30 mcg/min Intravenous Continuous Saurabh IBARRA  LORENZA Delatorre   Stopped at 06/09/20 1215   • senna-docusate (PERICOLACE or SENOKOT S) 8.6-50 MG per tablet 2 Tab  2 Tab Enteral Tube BID Anisa Hernández M.D.   Stopped at 06/09/20 0600    And   • polyethylene glycol/lytes (MIRALAX) PACKET 1 Packet  1 Packet Enteral Tube QDAY PRN Anisa Hernández M.D.        And   • magnesium hydroxide (MILK OF MAGNESIA) suspension 30 mL  30 mL Enteral Tube QDAY PRN Anisa Hernández M.D.        And   • bisacodyl (DULCOLAX) suppository 10 mg  10 mg Rectal QDAY PRN Anisa Hernández M.D.       • acetaminophen (TYLENOL) tablet 650 mg  650 mg Enteral Tube Q6HRS PRN Anisa Hernández M.D.   650 mg at 06/08/20 1644   • labetalol (NORMODYNE/TRANDATE) injection 10 mg  10 mg Intravenous Q4HRS PRN Anisa Hernández M.D.       • Respiratory Therapy Consult   Nebulization Continuous RT Alexia Khan M.D.       • ipratropium-albuterol (DUONEB) nebulizer solution  3 mL Nebulization Q2HRS PRN (RT) Alexia Khan M.D.       • lidocaine (XYLOCAINE) 1 % injection 1-2 mL  1-2 mL Tracheal Tube Q30 MIN PRN Alexia Khan M.D.           Fluids    Intake/Output Summary (Last 24 hours) at 6/9/2020 1426  Last data filed at 6/9/2020 1400  Gross per 24 hour   Intake 4587.61 ml   Output 2055 ml   Net 2532.61 ml       Laboratory  Recent Labs     06/07/20  0519 06/08/20  0544 06/09/20  0353   ISTATAPH 7.467 7.426 7.383*   ISTATAPCO2 41.2* 43.1* 49.1*   ISTATAPO2 90* 87 97*   ISTATATCO2 31 30 31   YKPDCUS9URI 97 97 97   ISTATARTHCO3 29.8* 28.3* 29.3*   ISTATARTBE 6* 4* 4*   ISTATTEMP 98.1 F 96.8 F 98.6 F   ISTATFIO2 40 30 40   ISTATSPEC Arterial Arterial Arterial   ISTATAPHTC 7.471 7.441 7.383*   ETARYBJC0UB 89* 81 97*         Recent Labs     06/07/20  0450 06/07/20  1210 06/08/20  0433 06/09/20  0324   SODIUM 148*  --  146* 143   POTASSIUM 3.6 3.7 3.2* 3.5*   CHLORIDE 110  --  106 104   CO2 28  --  29 26   BUN 31*  --  37* 43*   CREATININE 1.58*  --  1.63* 1.86*    MAGNESIUM  --  2.2  --   --    PHOSPHORUS  --  4.0  --   --    CALCIUM 8.2*  --  7.6* 7.8*     Recent Labs     06/07/20  0450 06/08/20  0433 06/09/20  0324   GLUCOSE 201* 128* 149*     Recent Labs     06/07/20  0450 06/08/20  0433 06/08/20  1334 06/09/20  0324   WBC 8.6 12.0*  --  15.6*   NEUTSPOLYS 75.80* 78.60*  --  78.90*   LYMPHOCYTES 16.00* 11.30*  --  11.40*   MONOCYTES 6.90 8.30  --  7.30   EOSINOPHILS 0.10 0.80 1 1.50   BASOPHILS 0.60 0.30  --  0.30     Recent Labs     06/07/20 0450 06/08/20  0433 06/08/20  1126 06/09/20  0324   RBC 2.36* 2.19*  --  2.15*   HEMOGLOBIN 7.5* 7.1*  --  6.9*   HEMATOCRIT 25.8* 23.9*  --  23.3*   PLATELETCT 298 323  --  306   PROTHROMBTM  --   --  13.7  --    APTT  --   --  30.4  --    INR  --   --  1.03  --        Imaging  X-Ray:  I have personally reviewed the images and compared with prior images. and My impression is: loculated right effusion  CT:    Reviewed    Assessment/Plan  Shock ([facility])- (present on admission)  Assessment & Plan  Multifactorial:  PEA/CPR, propofol and infection  Continue antimicrobials  Echo: LVEF 65%. Mild concentric LVH. Grade II diastolic dysfunction. Mild mitral regurgitation. Estimated right ventricular systolic pressure is 55 mmHg  Ongoing titration of Levophed for MAP goals >65  Continue midodrine  Cortisol 11.4, continue Solu-Cortef    Still on low dose norepinephrine getting blood and fluids monitor need to increase steroids    PEA (Pulseless electrical activity) ([facility])- (present on admission)  Assessment & Plan  ? Etiology from [place], likely hypoxia  Continue supportive care  Optimize electrolytes  EKG without ischemic changes  Echocardiogram: LVEF 65%. Mild concentric LVH. Grade II diastolic dysfunction. Mild mitral regurgitation. Estimated right ventricular systolic pressure is 55 mmHg    Anemia  Assessment & Plan  Restrictive transfusion strategy hg < 7    DAMIAN (acute kidney injury) ([facility])  Assessment & Plan  Slowly worsening damian   Will give  "trial of albumin and fluids  Continue strict map goal map > 65  Limit nephrotoxins and vancomycin    Epidural phlegmon- (present on admission)  Assessment & Plan  MRI with epidural tissue enhancement concerning for epidural phlegmon adjacent to osteomyelitis  Antimicrobials changed from Zosyn/Vanco to Rocephin/Flagyl/Vanco for improved CNS penetration (Pseudomonas considered unlikely, more likely gram-positive or anaerobes)  Neurosurgery on board  Will likely need infectious disease consultation for long-term antimicrobials  Consider lumbar puncture if surgical cultures delayed    Osteomyelitis of skull (HCC)- (present on admission)  Assessment & Plan  Scalp wound with exposed neurosurgical hardware -spoke with the patient's sisters they state that it has been exposed for several months  Bilateral craniotomy with subdural evacuation in October 2016 by Dr. Vallejo  Wound care consult  Neurosurgery on board plan to OR Wednesday of Friday this week  ID consulted 6/8  Antimicrobials transition to Cefepime, linezolid and metronidazole    Goals of care, counseling/discussion  Assessment & Plan  Discussed at length with patient's sister regarding his goals of care given his POLST completed in 2015 says DNR/DNI.   In reviewing the chart, the patient actually stated that he misunderstood the form and this CODE STATUS is not correct, he wanted to be FULL CODE.    Dr. Pimentel' H&P on 6/21/16:      \"Code status is full.  He had signed POLST form in the past, saying he did not want resuscitation and only wanted limited interventions; however, in a discussion with the nurse today at surgery, he clarified that he had misunderstood the form, what he meant was that he did not want to be kept alive on life support, but he would want a trial of  full resuscitation efforts.  Therefore, his code status is full.\"    Acute respiratory failure with hypoxia (HCC)- (present on admission)  Assessment & Plan  Intubated at [facility] on 5/22/2020 " after seizure then again on 5/30/20 following cardiac arrest, he was extubated 6/5/2020 here and failed due to encephalopathy and airway protection and addition failed extubation trial requiring trach RT/O2 protocols  Daily and PRN ABGs  Titration of ventilator therapy based on ABGs and patient's status  Daily CXR  HOB >30 degrees and peridex for VAP prevention  Pepcid for GI prophylaxis  SAT/SBT when able (ABCDEF Bundle)  Early mobility  s/p tracheostomy 6/7   T-piece trials on going    Recurrent right pleural effusion- (present on admission)  Assessment & Plan  Chronic, recurrent  Thoracenteses performed on 5/13 and 5/14, 6/2 and 6/8  Thoracentesis completed 6/2/2020, transitive. Culture NGTD  Follow chest x-rays  Discussed with Dr Lance 6/9 will plan for decortication later this week repeat CT chest ordered    Pneumonia- (present on admission)  Assessment & Plan  ?aspiration s/p treatment with V/Z now on cefepime, flagyl and linezolid for CNS osteo      Ileus (HCC)- (present on admission)  Assessment & Plan  Resolved    Hypokalemia- (present on admission)  Assessment & Plan  Continue to monitor and replete    Seizure disorder (HCC)- (present on admission)  Assessment & Plan  cEEG without evidence of seizure  Neurology has signed off  Continue Keppra 1g BID  Phenytoin level elevated continue to monitor level and adjust  MRI shows skull osteomyelitis and possible epidural phlegmon without leptomeningeal enhancement  Plan for OR on Wednesday vs Friday this week with Dr Vallejo  ID following       VTE:  Lovenox  Ulcer: H2 Antagonist  Lines: Irby Catheter  Ongoing indication addressed    I have performed a physical exam and reviewed and updated ROS and Plan today (6/9/2020). In review of yesterday's note (6/8/2020), there are no changes except as documented above.     Discussed patient condition and risk of morbidity and/or mortality with RN, RT, Pharmacy, , Code status disscussed, Charge nurse / hot  rounds and infectious disease and neurosurgery     The patient remains critically ill with shock on norepinephrine with active titration and ventilator support transfusion of blood products.  Critical care time = 76 minutes in directly providing and coordinating critical care and extensive data review.  No time overlap and excludes procedures.

## 2020-06-09 NOTE — CARE PLAN
Problem: Safety  Goal: Will remain free from injury  Outcome: PROGRESSING AS EXPECTED  Goal: Will remain free from falls  Outcome: PROGRESSING AS EXPECTED     Problem: Urinary Elimination:  Goal: Ability to reestablish a normal urinary elimination pattern will improve  Outcome: PROGRESSING AS EXPECTED     Problem: Skin Integrity  Goal: Risk for impaired skin integrity will decrease  Outcome: PROGRESSING AS EXPECTED     Problem: Pain Management  Goal: Pain level will decrease to patient's comfort goal  Outcome: PROGRESSING AS EXPECTED     Problem: Communication  Goal: The ability to communicate needs accurately and effectively will improve  Outcome: PROGRESSING SLOWER THAN EXPECTED     Problem: Infection  Goal: Will remain free from infection  Outcome: PROGRESSING SLOWER THAN EXPECTED     Problem: Venous Thromboembolism (VTW)/Deep Vein Thrombosis (DVT) Prevention:  Goal: Patient will participate in Venous Thrombosis (VTE)/Deep Vein Thrombosis (DVT)Prevention Measures  Outcome: PROGRESSING SLOWER THAN EXPECTED     Problem: Bowel/Gastric:  Goal: Normal bowel function is maintained or improved  Outcome: PROGRESSING SLOWER THAN EXPECTED  Goal: Will not experience complications related to bowel motility  Outcome: PROGRESSING SLOWER THAN EXPECTED     Problem: Fluid Volume:  Goal: Will maintain balanced intake and output  Outcome: PROGRESSING SLOWER THAN EXPECTED     Problem: Respiratory:  Goal: Respiratory status will improve  Outcome: PROGRESSING SLOWER THAN EXPECTED

## 2020-06-09 NOTE — ASSESSMENT & PLAN NOTE
Continue strict map goal map > 65  Limit nephrotoxins    Improved continue to hold fluids and monitor function closely  Once able force diuresis

## 2020-06-09 NOTE — CARE PLAN
Problem: Ventilation Defect:  Goal: Ability to achieve and maintain unassisted ventilation or tolerate decreased levels of ventilator support  Intervention: Support and monitor invasive and noninvasive mechanical ventilation  Note:    Ventilator Daily Summary    Vent Day # 9  Trach Day # 2  CMV:  16  360  8  30%    Ventilator settings changed this shift: none    Weaning trials: Pt tolerated 5 hrs on T-piece 10L, 40%    Plan: Continue current ventilator settings and wean mechanical ventilation as tolerated per physician orders.

## 2020-06-09 NOTE — PROGRESS NOTES
1730 - Dr. Londono notified norepinephrine turned back on, temperature spiked, patient lethargic and urine output continues to be low.

## 2020-06-09 NOTE — PROGRESS NOTES
2 RN skin check with RN. Checked under all devices and restraints. No skin breakdown observed. Heel float boots in use with z-pillow and pillows for turning

## 2020-06-09 NOTE — PROGRESS NOTES
Infectious Disease Progress Note    Author: Nu Diez M.D. Date & Time of service: 2020  1:19 PM    Chief Complaint:  Pneumonia, pleural effusions and metal plate eroding through skull    Interval History:      Review of Systems:  Review of Systems   Unable to perform ROS: Intubated       Hemodynamics:  Temp (24hrs), Av.9 °C (98.5 °F), Min:36.7 °C (98.1 °F), Max:37.1 °C (98.8 °F)  Temperature: 36.9 °C (98.5 °F), Monitored Temp: 37.1 °C (98.8 °F)  Pulse  Av  Min: 43  Max: 113   Blood Pressure: 160/83       Physical Exam:  Physical Exam  Constitutional:       Appearance: Normal appearance.   HENT:      Head:      Comments: Lesion on skull with metal eroding through  Cardiovascular:      Rate and Rhythm: Normal rate and regular rhythm.      Heart sounds: Normal heart sounds.   Pulmonary:      Effort: Pulmonary effort is normal.      Comments: Decreased breath sounds on right  Abdominal:      General: Abdomen is flat. Bowel sounds are normal. There is no distension.      Palpations: Abdomen is soft.      Tenderness: There is no abdominal tenderness. There is no guarding.   Musculoskeletal:      Right lower leg: No edema.      Left lower leg: No edema.   Skin:     General: Skin is warm and dry.   Neurological:      Mental Status: He is alert.      Comments: Moving all extremities and following commands         Meds:    Current Facility-Administered Medications:   •  NS  •  hydrocortisone sodium succinate PF  •  cefepime  •  LR  •  levothyroxine  •  insulin regular **AND** POC Blood Glucose **AND** NOTIFY MD and PharmD **AND** glucose **AND** dextrose 50%  •  oxyCODONE immediate-release  •  phenytoin **AND** [DISCONTINUED] phenytoin  •  linezolid  •  metroNIDAZOLE (FLAGYL) IV  •  fentaNYL  •  gabapentin  •  midodrine  •  dexmedetomidine (PRECEDEX) infusion  •  levETIRAcetam  •  Pharmacy  •  omeprazole  •  thiamine  •  multivitamin  •  LORazepam  •  norepinephrine (Levophed) infusion  •   senna-docusate **AND** polyethylene glycol/lytes **AND** magnesium hydroxide **AND** bisacodyl  •  acetaminophen  •  labetalol  •  Respiratory Therapy Consult  •  ipratropium-albuterol  •  lidocaine    Labs:  Recent Labs     06/07/20  0450 06/08/20  0433 06/08/20  1334 06/09/20  0324   WBC 8.6 12.0*  --  15.6*   RBC 2.36* 2.19*  --  2.15*   HEMOGLOBIN 7.5* 7.1*  --  6.9*   HEMATOCRIT 25.8* 23.9*  --  23.3*   .3* 109.1*  --  108.4*   MCH 31.8 32.4  --  32.1   RDW 65.1* 64.6*  --  63.8*   PLATELETCT 298 323  --  306   MPV 10.6 10.6  --  10.3   NEUTSPOLYS 75.80* 78.60*  --  78.90*   LYMPHOCYTES 16.00* 11.30*  --  11.40*   MONOCYTES 6.90 8.30  --  7.30   EOSINOPHILS 0.10 0.80 1 1.50   BASOPHILS 0.60 0.30  --  0.30     Recent Labs     06/07/20  0450 06/07/20  1210 06/08/20  0433 06/09/20  0324   SODIUM 148*  --  146* 143   POTASSIUM 3.6 3.7 3.2* 3.5*   CHLORIDE 110  --  106 104   CO2 28  --  29 26   GLUCOSE 201*  --  128* 149*   BUN 31*  --  37* 43*     Recent Labs     06/07/20  0450 06/08/20  0433 06/09/20  0324   CREATININE 1.58* 1.63* 1.86*       Imaging:  Ct-chest (thorax) With    Result Date: 5/31/2020  HISTORY/REASON FOR EXAM: Respiratory illness, acute (Age > 40y); PE suspected, intermediate prob, positive D-dimer; post ROSC. TECHNIQUE/EXAM DESCRIPTION: CT scan of the chest with contrast, 5/30/2020 10:33 PM. This examination was conducted with Automated Exposure Control and Automated Adjustment of Tube Current based on patient size. Following the administration of IV contrast, helical imaging is performed from the thoracic inlet to the dome of the diaphragm. COMPARISON: 5/23/2020 TOTAL DLP: 'Nam.... mGy*cm FINDINGS: Vasculature: Heart:  The heart is mildly enlarged. Mediastinal lymphadenopathy is again noted. Pretracheal lymph nodes measure 26 x 10 mm in diameter, which is increased from previous measurement of 22 mm in diameter seen on 5/23/2020. Extensive subcarinal lymphadenopathy is again noted.  Prominent calcifications are again noted involving the coronary arteries. Lungs:  Extensive reticular and groundglass airspace densities have developed throughout all lobes of both lungs in the interval from the previous study. Additionally, there is a moderate right pleural effusion now present, which may be loculated anteriorly. There are small loculated collections of fluid noted involving the left pleural space. Additionally, there are prominent areas of consolidation noted involving both lungs, including near complete consolidation of the right lower lobe.     Extensive bilateral airspace opacities of developed in the interval from the previous study, including large areas of consolidation involving the right and left lower lobes. The findings suggest severe bilateral pneumonia. The distribution is uncharacteristic for Covid pneumonia, although the exam should not be considered a rule out for Covid pneumonia. There is no evidence of pulmonary embolus. Sergei Huber MD 5/31/2020 10:52 AM DLP Reporting Thresholds for Incorrect/Repeated Exams - DLP in mGy*cm Head/Neck:  0-year-old 3840, 1-year-old 5880, 5-year-old 8770, 10-year-old 12089 and adult [id number] Head:  0-year-old 4540, 1-year-old 7460, 5-year-old 55831, 10-year-old 17715 and adult 91582 Neck:  0-year-old 2940, 1-year-old 4160, 5-year-old 4550, 10-year-old 6320 and adult 8470 Chest:  0-year-old 550, 1-year-old 830, 5-year-old 1200, 10-year-old 3840 and adult 3570 Abd/pelvis:  0-year-old 440, 1-year-old 720, 5-year-old 1080, 10-year-old 3330 and adult 3330 Trunk(C/A/P):  0-year-old 490, 1-year-old 770, 5-year-old 1140, 10-year-old 3570 and adult 3330    Ct-chest (thorax) With    Result Date: 5/23/2020  HISTORY/REASON FOR EXAM:  Pleural effusion; recurrent effusion recently changed to sanguinous appearance w anemia TECHNIQUE/EXAM DESCRIPTION: CT scan of the chest with contrast. 5/23/2020 4:11 AM CT scan of the chest was carried out after the administration of  IV contrast in the usual manner. Both automated exposure control and Automated adjustment of tube current based on patient size are utilized. Total DLP: 387 mGy*cm COMPARISON: 10/17/2019. Chest x-ray conducted 5/23/2020 FINDINGS: Moderate loculated right pleural effusion Trace left pleural effusion LUNGS: Focal airspace opacities/consolidation involving the right lower lobe and right middle lobe Scattered groundglass opacities are present throughout the left upper lobe Interlobular septal thickening is identified. Scar versus atelectasis in the left lower lobe. HEART: Normal in size. Coronary artery calcifications AORTA: Normal in caliber. MEDIASTINUM: Mediastinal adenopathy. The largest lymph node measures 2.2 cm Views of the upper abdomen show no acute abnormality. Degenerative and postsurgical changes of the spine with multiple compression deformities Subacute/old ununited sternal fracture     Moderate size loculated right pleural effusion Right middle lobe and right lower lobe airspace opacities/consolidation concerning for pneumonia Scattered groundglass opacity left upper lobe with interlobular septal thickening. May be secondary to pulmonary edema. DLP Reporting Thresholds for Incorrect/Repeated Exams - DLP in mGy*cm Head/Neck:  0-year-old 3840, 1-year-old 5880, 5-year-old 8770, 10-year-old 07666 and adult 05294 Head:  0-year-old 4540, 1-year-old 7460, 5-year-old 82388, 10-year-old 25729 and adult 27788 Neck:  0-year-old 2940, 1-year-old 4160, 5-year-old 4550, 10-year-old 6320 and adult 8470 Chest:  0-year-old 550, 1-year-old 830, 5-year-old 1200, 10-year-old 3840 and adult 3570 Abd/pelvis:  0-year-old 440, 1-year-old 720, 5-year-old 1080, 10-year-old 3330 and adult 3330 Trunk(C/A/P):  0-year-old 490, 1-year-old 770, 5-year-old 1140, 10-year-old 3570 and adult 3330    Ct-head W/o    Result Date: 6/1/2020 6/1/2020 12:42 AM HISTORY/REASON FOR EXAM: Altered mental status TECHNIQUE/EXAM DESCRIPTION:  CT of the  head without contrast. Sequential axial images were obtained from the vertex to the skull base without contrast. Up to date radiation dose reduction adjustments have been utilized to meet ALARA standards for radiation dose reduction. COMPARISON: October 12, 2016 FINDINGS: There is mild diffuse parenchymal volume loss observed. Periventricular and subcortical white matter low-attenuation changes are seen, most commonly associated with small vessel ischemic disease. Moderate bilateral ventricular dilatation is seen, with radiographic appearance favoring ex vacuo dilatation. No space occupying lesions or areas of acute vascular territory infarctions are identified. There are no abnormal extra axial fluid collections or extra axial hemorrhage identified. The visualized paranasal sinuses and mastoid air cells are well aerated bilaterally. No depressed calvarial fractures are identified. The visualized globes and retrobulbar soft tissues appear within normal limits.  Atherosclerotic intracranial calcifications are seen.     1.  No acute intracranial abnormality is identified, there are nonspecific white matter changes, commonly associated with small vessel ischemic disease.  Associated mild cerebral atrophy is noted. 2.  Atherosclerosis.    Ct-head W/o    Result Date: 5/25/2020  HISTORY/REASON FOR EXAM:  Seizure, abnormal neuro exam; hx craniotomy for subdural, seizure. TECHNIQUE/EXAM DESCRIPTION: CT scan of the brain without contrast. 5/24/2020 9:36 PM. CT scan of the brain was carried out without contrast in the normal manner. Both automated exposure control and Automated adjustment of tube current based on patient size are utilized. Total DLP: 933 mGy*cm COMPARISON: 9/29/2019 FINDINGS: There is no evidence of mass, mass effect, hemorrhage, or extraaxial fluid collection.  There is no midline shift. Global atrophy. Postsurgical changes of bilateral craniotomies Subcutaneous air surrounds the right muscles of  mastication. There is no fracture or other acute bony abnormality seen. Visualized paranasal sinuses: Clear.     No acute intracranial process Global atrophy Subcutaneous air surrounding the right muscles of mastication. Unclear etiology. DLP Reporting Thresholds for Incorrect/Repeated Exams - DLP in mGy*cm Head/Neck:  0-year-old 3840, 1-year-old 5880, 5-year-old 8770, 10-year-old 99975 and adult 24726 Head:  0-year-old 4540, 1-year-old 7460, 5-year-old 98926, 10-year-old 12753 and adult 63038 Neck:  0-year-old 2940, 1-year-old 4160, 5-year-old 4550, 10-year-old 6320 and adult 8470 Chest:  0-year-old 550, 1-year-old 830, 5-year-old 1200, 10-year-old 3840 and adult 3570 Abd/pelvis:  0-year-old 440, 1-year-old 720, 5-year-old 1080, 10-year-old 3330 and adult 3330 Trunk(C/A/P):  0-year-old 490, 1-year-old 770, 5-year-old 1140, 10-year-old 3570 and adult 3330    [id number] View    Result Date: 5/25/2020  HISTORY/REASON FOR EXAM:  Line/Tube Placement; ileus, OGT placement. ileus, OGT placement TECHNIQUE/EXAM DESCRIPTION AND NUMBER OF VIEWS: Abdomen (one view), 5/25/2020 8:49 AM. COMPARISON: None. FINDINGS: Enteric tube present with the tip in the proximal stomach. The sidehole is below the level of the gastroesophageal junction Air-filled loops of small bowel in the lower abdomen No free air. Loculated right pleural effusion is visualized. Postsurgical changes of the spine     Enteric tube placement as above Raulito Lechuga MD 5/25/2020 9:00 AM    [id number] View    Result Date: 5/25/2020  HISTORY/REASON FOR EXAM:  Distention. TECHNIQUE/EXAM DESCRIPTION AND NUMBER OF VIEWS: Abdomen (one view), 5/24/2020 11:58 PM. COMPARISON: 10/8/2019 FINDINGS: Dilated air-filled loops of small bowel. No free air. No abnormal soft tissue masses or calcifications Partially visualized postsurgical changes of the spine     Obstructive bowel gas pattern Raulito Lechuga MD 5/25/2020 8:16 AM    Dx-chest-limited (1 View)    Result Date:  6/8/2020 6/8/2020 1:43 PM HISTORY/REASON FOR EXAM:  Shortness of Breath. TECHNIQUE/EXAM DESCRIPTION AND NUMBER OF VIEWS: Single AP view of the chest. COMPARISON: 6/8/2020 at 0447 hours FINDINGS: Lines/tubes:  Support tubing is unchanged. Fixation hardware is present in the thoracic spine. Lungs:  There is persistent atelectasis or pneumonia in the right lower lobe. A loculated appearing right-sided pleural effusion appears slightly decreased in volume. A small left pleural effusion is present. Pleura:  No pneumothorax. Heart and mediastinum:  The heart silhouette is within normal limits. Bones:  Left thoracotomy changes are present.     1.  Slightly decreased volume of loculated appearing right pleural effusion. 2.  Persistent atelectasis or pneumonia in the left lower lobe. 3.  Unchanged small left pleural effusion.    Dx-chest-limited (1 View)    Result Date: 5/27/2020  HISTORY/REASON FOR EXAM: Shortness of Breath; f/u pneumonia, pl effusion, pulm edema. f/u pneumonia, pl effusion, pulm edema TECHNIQUE/EXAM DESCRIPTION: Chest x-ray, one view, 5/27/2020 5:16 AM. COMPARISON: 5/26/2020 FINDINGS: Moderate right-sided pleural effusion is again noted. Trace left pleural effusion is again noted. Venous congestion and reticulonodular densities involving both lungs has worsened in the interval the previous study.     Findings are compatible with worsening pulmonary edema/pulmonary venous congestion.    Dx-chest-limited (1 View)    Result Date: 5/13/2020  HISTORY/REASON FOR EXAM: Pleural Effusion; post thoracentesis. post thoracentesis TECHNIQUE/EXAM DESCRIPTION: Chest x-ray, one view, 5/13/2020 9:02 AM. COMPARISON: 5/6/2020 FINDINGS: There is no evidence of right-sided pneumothorax. Right-sided pleural effusion is decreased. Left lung remains clear. The heart is normal in size.     Interval decrease in size in right pleural effusion with no evidence of postprocedural pneumothorax.    Dx-chest-portable (1 View)    Result  Date: 6/9/2020 6/9/2020 4:31 AM HISTORY/REASON FOR EXAM: For indication of respiratory failure; For indication of respiratory failure TECHNIQUE/EXAM DESCRIPTION:  Single AP view of the chest. COMPARISON: Yesterday FINDINGS: Position of medical devices appears stable. Cardiomegaly is observed. The mediastinal contour appears within normal limits.  The central  pulmonary vasculature appears prominent and indistinct. The lungs appear well expanded bilaterally.  Diffuse scattered hazy pulmonary parenchymal opacities are seen. Veil-like opacities are seen in bilateral lung bases, right greater than left. Left rib fractures are noted.     1.  Pulmonary edema and/or infiltrates are identified, which are stable since the prior exam. 2.  Bilateral pleural effusions, right greater than left 3.  Cardiomegaly     Dx-chest-portable (1 View)    Result Date: 6/8/2020 6/8/2020 4:18 AM HISTORY/REASON FOR EXAM: For indication of respiratory failure; For indication of respiratory failure TECHNIQUE/EXAM DESCRIPTION:  Single AP view of the chest. COMPARISON: Yesterday FINDINGS: Endotracheal tube has been exchanged for tracheostomy.   Otherwise medical device position is stable. Cardiomegaly is observed. The mediastinal contour appears within normal limits.  The central  pulmonary vasculature appears prominent and indistinct. The lungs appear well expanded bilaterally.  Diffuse scattered hazy pulmonary parenchymal opacities are seen. Veil-like opacities are seen in bilateral lung bases, right greater than left. Left rib fractures are noted.     1.  Pulmonary edema and/or infiltrates are identified, which are stable since the prior exam. 2.  Bilateral pleural effusions, right greater than left 3.  Cardiomegaly     Dx-chest-portable (1 View)    Result Date: 6/7/2020 6/7/2020 5:09 AM HISTORY/REASON FOR EXAM:  For indication of respiratory failure; For indication of respiratory failure TECHNIQUE/EXAM DESCRIPTION AND NUMBER OF VIEWS:  Single portable view of the chest. COMPARISON: 6/6/2020 FINDINGS: Tubes and lines: ET tube, feeding tube and right central venous catheter are redemonstrated. Diffuse interstitial prominence. Patchy right base opacities. Moderate right lateral pleural effusion. No pneumothorax. Stable cardiopericardial silhouette.     1. No significant interval change.    Dx-chest-portable (1 View)    Result Date: 6/6/2020 6/6/2020 5:13 AM HISTORY/REASON FOR EXAM:  For indication of respiratory failure; For indication of respiratory failure TECHNIQUE/EXAM DESCRIPTION AND NUMBER OF VIEWS: Single portable view of the chest. COMPARISON: 6/5/2020 FINDINGS: Tubes and lines: ET tube, feeding tube and right central venous catheter are redemonstrated. Diffuse interstitial prominence. Patchy right base opacities. Moderate right lateral pleural effusion. No pneumothorax. Stable cardiopericardial silhouette.     1. No significant interval change.    Dx-chest-portable (1 View)    Result Date: 6/5/2020 6/5/2020 12:50 PM HISTORY/REASON FOR EXAM:  Shortness of breath. TECHNIQUE/EXAM DESCRIPTION AND NUMBER OF VIEWS: Single portable view of the chest. COMPARISON: Exam at 5:26 AM FINDINGS: Single portable view of the chest demonstrates a normal cardiac silhouette and mediastinal contours. Right pleural effusion and adjacent airspace opacification are unchanged. Bilateral interstitial prominence is unchanged. No pneumothorax is identified. Lines of support are unchanged.     No significant interval change.    Dx-chest-portable (1 View)    Result Date: 6/5/2020 6/5/2020 5:16 AM HISTORY/REASON FOR EXAM: For indication of respiratory failure; For indication of respiratory failure TECHNIQUE/EXAM DESCRIPTION:  Single AP view of the chest. COMPARISON: Yesterday FINDINGS: Position of medical devices appears stable. Cardiomegaly is observed. The mediastinal contour appears within normal limits.  The central  pulmonary vasculature appears prominent and  indistinct. The lungs appear well expanded bilaterally.  Diffuse scattered hazy pulmonary parenchymal opacities are seen. Veil-like opacities are seen in bilateral lung bases, right greater than left. Left rib fractures are noted.     1.  Pulmonary edema and/or infiltrates are identified, which are stable since the prior exam. 2.  Bilateral pleural effusions, right greater than left 3.  Cardiomegaly     Dx-chest-portable (1 View)    Result Date: 6/4/2020 6/4/2020 4:35 AM HISTORY/REASON FOR EXAM: For indication of respiratory failure; For indication of respiratory failure TECHNIQUE/EXAM DESCRIPTION:  Single AP view of the chest. COMPARISON: Yesterday FINDINGS: Position of medical devices appears stable. Cardiomegaly is observed. The mediastinal contour appears within normal limits.  The central  pulmonary vasculature appears prominent and indistinct. The lungs appear well expanded bilaterally.  Diffuse scattered hazy pulmonary parenchymal opacities are seen. Veil-like opacities are seen in bilateral lung bases, right greater than left. Left rib fractures are noted.     1.  Pulmonary edema and/or infiltrates are identified, which are stable since the prior exam. 2.  Bilateral pleural effusions, right greater than left 3.  Cardiomegaly     Dx-chest-portable (1 View)    Result Date: 6/3/2020    6/3/2020 4:48 AM HISTORY/REASON FOR EXAM: For indication of respiratory failure; For indication of respiratory failure TECHNIQUE/EXAM DESCRIPTION:  Single AP view of the chest. COMPARISON: Yesterday FINDINGS: Position of medical devices appears stable. Cardiomegaly is observed. The mediastinal contour appears within normal limits.  The central  pulmonary vasculature appears prominent and indistinct. The lungs appear well expanded bilaterally.  Diffuse scattered hazy pulmonary parenchymal opacities are seen. Veil-like opacities are seen in bilateral lung bases, right greater than left. Left rib fractures are noted.     1.   Pulmonary edema and/or infiltrates are identified, which are stable since the prior exam. 2.  Bilateral pleural effusions, right greater than left 3.  Cardiomegaly     Dx-chest-portable (1 View)    Result Date: 6/2/2020 6/2/2020 4:25 PM HISTORY/REASON FOR EXAM:  Follow-up right thoracentesis TECHNIQUE/EXAM DESCRIPTION AND NUMBER OF VIEWS: Single portable view of the chest. COMPARISON: 6/2/2020 FINDINGS: Scans of postoperative changes seen in the thoracolumbar spine. ET tube and feeding tube are again seen as is a right PICC line. The mediastinal and cardiac silhouette is unremarkable. The pulmonary vascularity is within normal limits. There is peribronchial wall thickening and edema. There is right lower lobe airspace disease. There has been interval decrease in the right pleural effusion status post thoracentesis. There is no visible pneumothorax. There are no acute bony abnormalities.     1.  There is no pneumothorax following right thoracentesis. 2.  The amount of right pleural effusion is decreased. 3.  There is vascular congestion and/or edema. 4.  There is right lower lobe airspace disease which could be atelectasis or pneumonitis.    Dx-chest-portable (1 View)    Result Date: 6/2/2020 6/2/2020 4:24 AM HISTORY/REASON FOR EXAM: For indication of respiratory failure; For indication of respiratory failure TECHNIQUE/EXAM DESCRIPTION:  Single AP view of the chest. COMPARISON: None FINDINGS: Position of medical devices appears stable. Cardiomegaly is observed. The mediastinal contour appears within normal limits.  The central  pulmonary vasculature appears prominent and indistinct. The lungs appear well expanded bilaterally.  Diffuse scattered hazy pulmonary parenchymal opacities are seen. Veil-like opacities are seen in bilateral lung bases, right greater than left. Left rib fractures are noted.     1.  Pulmonary edema and/or infiltrates are identified, which are stable since the prior exam. 2.  Bilateral  pleural effusions, right greater than left 3.  Cardiomegaly    Dx-chest-portable (1 View)    Result Date: 6/1/2020 6/1/2020 4:20 AM HISTORY/REASON FOR EXAM: For indication of respiratory failure; For indication of respiratory failure TECHNIQUE/EXAM DESCRIPTION:  Single AP view of the chest. COMPARISON: None FINDINGS: Right internal jugular central line is seen terminating at the right atriocaval junction.  Endotracheal tube tip terminates at the level of the izaiah.  Nasogastric tube terminates below the lower margin of the film within the abdomen. Cardiomegaly is observed. The mediastinal contour appears within normal limits.  The central  pulmonary vasculature appears prominent and indistinct. The lungs appear well expanded bilaterally.  Diffuse scattered hazy pulmonary parenchymal opacities are seen. Veil-like opacities are seen in bilateral lung bases, right greater than left. The bony structures appear age-appropriate.     1.  Pulmonary edema and/or infiltrates. 2.  Bilateral pleural effusions, right greater than left 3.  Cardiomegaly    Dx-chest-portable (1 View)    Result Date: 5/31/2020  HISTORY/REASON FOR EXAM: intubation. intubation TECHNIQUE/EXAM DESCRIPTION: Chest x-ray, one portable view, 5/30/2020 8:26 PM. COMPARISON: 5/20/2020 FINDINGS: The endotracheal tube terminates above the level the izaiah. NG tube passes caudal to the diaphragm. Right-sided PICC line remains in stable position. Large right-sided pleural effusion is again noted and appear stable. Increasing areas of consolidation of developed involving the apices of both lungs, as well as the lateral aspect of the left lung.     Extensive bilateral lung opacities are now noted, which could be explained by severe bilateral pneumonia. Stable appearance of right pleural effusion. Supportive lines and tubes appear in appropriate positions.    Dx-chest-portable (1 View)    Result Date: 5/28/2020  HISTORY/REASON FOR EXAM: Shortness of Breath.  TECHNIQUE/EXAM DESCRIPTION: Chest x-ray, one portable view, 5/28/2020 7:53 AM. COMPARISON: 5/27/2020 FINDINGS: Pulmonary venous congestion pattern has improved, however, prominent interstitial opacities persist, compatible with pulmonary edema. Right-sided pleural effusion is stable. PICC line has been placed and appears in excellent position.     Persistent right-sided pleural effusion.    Dx-chest-portable (1 View)    Result Date: 5/26/2020  HISTORY/REASON FOR EXAM:  f/u right pleural effusion. f/u right pleural effusion TECHNIQUE/EXAM DESCRIPTION AND NUMBER OF VIEWS: Chest x-ray (one view), 5/26/2020 5:22 AM. COMPARISON: 5/24/2020 FINDINGS: Endotracheal tube and enteric tube is been removed. Large loculated right pleural effusion, appears slightly larger. Increased interstitial markings throughout both lungs. The cardiac silhouette is faintly visualized and appear stable Postsurgical changes of the spine     Endotracheal tube and enteric tube have been removed. Worsening trend with increased interstitial markings throughout the lungs probable worsening pulmonary edema Slight increase size of large loculated right pleural effusionRaulito Lechuga MD 5/26/2020 8:16 AM     Dx-chest-portable (1 View)    Result Date: 5/25/2020  HISTORY/REASON FOR EXAM:  Pleural Effusion. TECHNIQUE/EXAM DESCRIPTION AND NUMBER OF VIEWS: Chest x-ray (one view), 5/24/2020 9:38 PM. COMPARISON: 5/23/2020 FINDINGS: Endotracheal tube is been placed with the tip 5 cm above the izaiah Enteric tube extends below the field of view Loculated right pleural effusion Diffuse increased interstitial markings in both lungs. The cardiac silhouette is unremarkable. Postsurgical changes of the spine History says line placement. No central venous catheters identified. Correlate clinically.     Tubes and lines as above Large loculated right pleural effusion. Diffuse increased interstitial markings. Likely secondary to edema 5/25/2020 8:26 AM     Dx-chest-portable  (1 View)    Result Date: 5/23/2020  HISTORY/REASON FOR EXAM:  HYPOTENSION. TECHNIQUE/EXAM DESCRIPTION AND NUMBER OF VIEWS: Chest x-ray (one view), 5/23/2020 12:06 AM. COMPARISON: 5/13/2020 FINDINGS: Large loculated right pleural effusion is identified. Ill-defined airspace opacities involving the left lung base. The cardiac and mediastinal silhouette are unremarkable. Postsurgical changes of the thoracic spine.     Large right pleural effusion, loculated Atelectasis versus developing pneumonia in the left lung 5/23/2020 8:48 AM     Ir-thoracentesis Puncture Right    Result Date: 5/13/2020  HISTORY/REASON FOR EXAM:  Recurrent pleural effusion. TECHNIQUE/EXAM DESCRIPTION: Ultrasound-guided thoracentesis, 5/13/2020 8:59 AM. COMPARISON: None. PROCEDURE: The risks, benefits, alternatives and the procedure were discussed with the patient.  The patient's questions were answered to their full satisfaction.  They subsequently agreed to proceed with the proposed procedure and signed a written consent form. A procedural timeout was performed. Maximum sterile barrier protection mechanisms were employed. The patient's laboratory values, allergies and medication list were reviewed prior to the examination. The patient's thoracic cavity was sonographically evaluated.  An area suitable for thoracentesis was identified.  The overlying skin was prepped and draped in a sterile fashion.  1% lidocaine was infiltrated subcutaneously for local anesthesia. Following this, the thoracic cavity was entered utilizing a Safe-T-Centesis Catheter System.  A total of 900 cc of serosanguineous fluid was subsequently aspirated under sonographic control. The patient tolerated the procedure well and there were no immediate postprocedural complications. Following the procedure, the chest radiograph demonstrated no evidence of pneumothorax and decrease in size of the pleural effusion.     Unremarkable sonographic-guided thoracentesis.    Mr-brain-with &  W/o    Result Date: 6/6/2020 6/6/2020 5:00 PM HISTORY/REASON FOR EXAM:  exposed right craniotomy hardware r/o infection. TECHNIQUE/EXAM DESCRIPTION:   MRI of the brain without and with contrast. T1 sagittal, T2 fast spin-echo axial, T1 coronal, FLAIR coronal, diffusion-weighted and apparent diffusion coefficient (ADC map) axial images were obtained of the whole brain. T1 postcontrast axial and T1 postcontrast coronal images were obtained. The study was performed on a [employer] 1.5 Deborah MRI scanner. 15 mL ProHance contrast was administered intravenously. COMPARISON:  None. FINDINGS:  There is abnormal contrast enhancement and edema noted involving right frontal craniotomy flap. There is abnormal enhancing extradural tissue noted adjacent to the right frontal bone with abnormal adjacent dural enhancement. There is no evidence of leptomeningeal enhancement. There is no parenchymal contrast enhancement. There is no acute infarct. There is no acute or chronic parenchymal hemorrhage. There is severe cerebral volume loss. There is mild cerebellar volume loss. There is no intra-axial space-occupying lesion. The midline structures including the pituitary, hypothalamic and pineal regions are unremarkable. The posterior fossa structures are unremarkable. The visualized flow voids of the cerebral vasculature are unremarkable. There is no large lesion identified in the expected course of the intracranial portions of the cranial nerves. There is mucosal thickening in the bilateral mastoid air cells.     1.  There is abnormal contrast enhancement and edema noted involving right frontal craniotomy flap. There is abnormal enhancing extradural tissue noted adjacent to the right frontal bone with abnormal adjacent dural enhancement. 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 There is no evidence of leptomeningeal enhancement. There is no parenchymal contrast enhancement. 2.   Severe cerebral volume loss. 3.  Mild cerebral volume loss.    Us-extremity Artery Upper Bilat    Result Date: 2020   Vascular Laboratory  Conclusions  No hemodynamically significant evidence of arterial disease in the right  upper extremity.  No hemodynamically significant evidence of arterial disease in the left  upper extremity.  LARISSA DURBIN  Exam Date:     2020 09:04  Room #:     Inpatient  Priority:     Routine  Ht (in):             Wt (lb):  Ordering Physician:        JONY HANSEN JR  Referring Physician:       [id number] JR, RICHARD  Sonographer:               Erlinda Pham RVT  Study Type:                Complete Bilateral  Technical Quality:         Adequate  Age:    54    Gender:     M  MRN:    [id number]  :    1965      BSA:  Indications:     Cyanosis  CPT Codes:       42407  ICD Codes:       782.5  History:         Bilateral cyanosis  Limitations:     Challenging due to patient movement.          RIGHT                                                 LEFT  Waveforms               PSV                              PSV        Waveforms                          (cm/s)                           (cm/s)  Triphasic                77.00       Subclavian          69.00      Triphasic  Bi, non-                 84.00       Axillary            54.00      Bi, non-  reversed                                                            reversed                                       Brachial  Bi, non-                 100.00      Proximal            63.00      Triphasic  reversed                                       Mid  Triphasic                72.00       Distal              38.00      Bi, non-                                                                      reversed                                       Radial  Bi, non-                 77.00       Proximal            61.10      Bi, non-  reversed                                                             reversed                                       Mid  Bi, non-                 47.00       Distal              17.40      Bi, non-  reversed                                                            reversed                                       Ulnar  Bi, non-                 77.00       Proximal            71.30      Bi, non-  reversed                                                            reversed                                       Mid  Bi, non-                 105.00      Distal              63.10      Bi, non-  reversed                                                            reversed  Findings  Right  No hemodynamically significant evidence of arterial disease in the right  upper extremity.  Multiphasic Doppler flow pattern is noted throughout the right upper  extremity.  Left  No hemodynamically significant evidence of arterial disease in the left  upper extremity.  Multiphasic Doppler flow pattern is noted throughout the left upper  extremity.  The distal radial artery is small with low velocities, but patent.  Melody MARTINEZ To  (Electronically Signed)  Final Date:      06 June 2020                   11:57    Us-thoracentesis Puncture Right    Result Date: 6/3/2020  6/2/2020 1:54 PM HISTORY/REASON FOR EXAM:  Shortness of breath TECHNIQUE/EXAM DESCRIPTION: Ultrasound-guided thoracentesis. Indication:  RIGHT pleural fluid collection. COMPARISON:  Plain film from the same day PROCEDURE:     Informed consent was obtained over the phone from the patient's family. A timeout was taken. A right pleural effusion was localized with real-time ultrasound guidance. The right posterior chest wall was prepped and draped in a sterile manner. Following local anesthesia with 1% lidocaine, a 5 [place] KienVeeh pigtail catheter was advanced into the pleural space with trocar technique and pleural fluid was drained. The patient tolerated the procedure well without evidence of complication. A post  thoracentesis chest radiograph is forthcoming. FINDINGS: Fluid was sent to the laboratory. Fluid character: serosanguinous     1. Ultrasound guided right sided diagnostic and therapeutic thoracentesis. 2. 800 mL of fluid withdrawn.    Ec-echocardiogram Complete W/o Cont    Result Date: 2020  Transthoracic Echo Report Echocardiography Laboratory CONCLUSIONS No prior study is available for comparison. Left ventricular ejection fraction is visually estimated to be 65%. Normal left ventricular systolic function. Mild concentric left ventricular hypertrophy. Grade II diastolic dysfunction. Moderately dilated right ventricle. Mildly dilated left atrium. Mild mitral regurgitation. Estimated right ventricular systolic pressure is 55 mmHg. Trace tricuspid regurgitation. Normal pericardium without effusion. LARISSA DURBIN Exam Date:         2020                    09:39 Exam Location:     Out Patient Priority:          Routine Ordering Physician:        JONY HANSEN JR Referring Physician:       [id number] JR Sonographer:               Madina Michaels RDCS Age:    54     Gender:    M MRN:    [id number] :    1965 BSA:    1.75   Ht (in):    64     Wt (lb):    153 Exam Type:     Complete Indications:     Cardiac Arrest ICD Codes:       427.5 CPT Codes:       42849 BP:   110    /   58     HR:   62 Technical Quality:       Fair MEASUREMENTS  (Male / Female) Normal Values 2D ECHO LV Diastolic Diameter PLAX        4.9 cm                4.2 - 5.9 / 3.9 - 5.3 cm LV Systolic Diameter PLAX         3 cm                  2.1 - 4.0 cm IVS Diastolic Thickness           1.1 cm                LVPW Diastolic Thickness          1.1 cm                LVOT Diameter                     2.3 cm                Estimated LV Ejection Fraction    65 %                  LV Ejection Fraction MOD BP       62.9 %                >= 55  % LV Ejection Fraction MOD 4C       63.8 %                LV Ejection Fraction MOD 2C       62.2 %                 IVC Diameter                      2.5 cm                DOPPLER AV Peak Velocity                  1.1 m/s               AV Peak Gradient                  4.4 mmHg              AV Mean Gradient                  2.2 mmHg              LVOT Peak Velocity                0.83 m/s              AV Area Cont Eq vti               3.1 cm2               Mitral E Point Velocity           1 m/s                 Mitral E to A Ratio               2.3                   MV Pressure Half Time             53.7 ms               MV Area PHT                       4.1 cm2               MV Deceleration Time              185 ms                TR Peak Velocity                  291 cm/s              PV Peak Velocity                  0.53 m/s              PV Peak Gradient                  1.1 mmHg              RVOT Peak Velocity                0.4 m/s               * Indicates values subject to auto-interpretation LV EF:  65    % FINDINGS Left Ventricle Normal left ventricular chamber size. Mild concentric left ventricular hypertrophy. Normal left ventricular systolic function. Left ventricular ejection fraction is visually estimated to be 65%. Normal diastolic function. Grade II diastolic dysfunction. Right Ventricle Moderately dilated right ventricle. Right Atrium The right atrium is normal in size. Left Atrium Mildly dilated left atrium. Left atrial volume index is 38 mL/sq m. Mitral Valve Structurally normal mitral valve without significant stenosis. Mild mitral regurgitation. Aortic Valve Structurally normal aortic valve without significant stenosis or regurgitation. Tricuspid Valve Structurally normal tricuspid valve without significant stenosis. Trace tricuspid regurgitation. Estimated right ventricular systolic pressure is 55 mmHg. Right atrial pressure is estimated to be 3 mmHg. Pulmonic Valve Structurally normal pulmonic valve without significant stenosis or regurgitation. Pericardium Normal pericardium without effusion.  Aorta The aortic root is normal.  Ascending aorta diameter is 3.0 cm. aPvel Franco M.D. (Electronically Signed) Final Date:     02 June 2020                 15:10    Dx-abdomen For Tube Placement    Result Date: 6/2/2020 6/2/2020 1:06 PM HISTORY/REASON FOR EXAM:  Line evaluation. TECHNIQUE/EXAM DESCRIPTION AND NUMBER OF VIEWS:  1 view(s) of the abdomen. COMPARISON:  None. FINDINGS: Enteric tube has been placed. The tip projects over the left upper quadrant. The bowel gas pattern is within normal limits. There is postoperative change in the thoracolumbar spine.     1.  Feeding tube tip projects over the gastric body.    Ir-picc Line Placement W/guidance < Age 5    Result Date: 5/27/2020  HISTORY/REASON FOR EXAM:  IR picc placement. TECHNIQUE/EXAM DESCRIPTION: ULTRASOUND AND FLUOROSCOPIC-GUIDED PICC LINE INSERTION, 5/27/2020 10:36 AM.. TECHNIQUE:  The procedure was explained to the patient.  The patient was placed supine on the fluoroscopy table.  Ultrasound examination of the right arm was performed to confirm patency of the basilic vein.  Using all elements of Maximum Sterile Barrier  technique and local anesthesia with ultrasound guidance, the above mentioned examined vein was punctured through a small skin incision.  Continuous real time ultrasound monitoring of the puncture needle entry into the vein was performed and documented. Under fluoroscopic guidance, a 0.018 wire was passed centrally through the needle.  A peel-away sheath was placed over the wire.  A 5 Irish double lumen Power PICC line was advanced until the tip was at the SVC-right atrium junction.  The position of the catheter tip was confirmed with fluoroscopy.  The sheath was removed, and the line was secured to the patient's skin.  There were no immediate complications, and the patient tolerated the procedure well. FINDINGS: The tip of the PICC line is at the SVC-right atruim junction . 1 saved images. Fluoroscopic time was 3.2 minutes  "    Successful placement of a PICC line.  Ready to infuse.,      Micro:  Results     Procedure Component Value Units Date/Time    FUNGAL CULTURE [[id number]] Collected:  06/08/20 1334    Order Status:  Completed Specimen:  Body Fluid from Thoracentesis Fluid Updated:  06/09/20 1307     Significant Indicator NEG     Source BF     Site Pleural Fluid     Culture Result Culture in progress.    Narrative:       Collected By:[id number] JAIMIE MATOS.  Collected By:[id number] JAIMIE VUONG    FLUID CULTURE W/GRAM STAIN [[id number]] Collected:  06/08/20 1334    Order Status:  Completed Specimen:  Body Fluid from Thoracentesis Fluid Updated:  06/09/20 1307     Significant Indicator NEG     Source BF     Site Pleural Fluid     Culture Result No growth at 24 hours.     Gram Stain Result No organisms seen.    Narrative:       Collected By:[id number] MATOS.  Collected By:[id number] JAIMIE VUONG    BLOOD CULTURE [[id number]] Collected:  06/08/20 1825    Order Status:  Completed Specimen:  Blood from Peripheral Updated:  06/09/20 0740     Significant Indicator NEG     Source BLD     Site PERIPHERAL     Culture Result No Growth  Note: Blood cultures are incubated for 5 days and  are monitored continuously.Positive blood cultures  are called to the RN and reported as soon as  they are identified.      Narrative:       Collected By:[id number] TAVARES IBARRA  Per Hospital Policy: Only change Specimen Src: to \"Line\" if  specified by physician order.  Right Forearm/Arm    BLOOD CULTURE [[id number]] Collected:  06/08/20 1830    Order Status:  Completed Specimen:  Blood from Peripheral Updated:  06/09/20 0740     Significant Indicator NEG     Source BLD     Site PERIPHERAL     Culture Result No Growth  Note: Blood cultures are incubated for 5 days and  are monitored continuously.Positive blood cultures  are called to the RN and reported as soon as  they are identified.      Narrative:       Collected By:[id number] TAVARES IBARRA  Per Hospital " "Policy: Only change Specimen Src: to \"Line\" if  specified by physician order.  Left Forearm/Arm    GRAM STAIN [[id number]] Collected:  06/08/20 1334    Order Status:  Completed Specimen:  Body Fluid Updated:  06/08/20 2229     Significant Indicator .     Source BF     Site Pleural Fluid     Gram Stain Result No organisms seen.    Narrative:       Collected By:[id number] MATOS.  Collected By:[id number] JAIMIE VUONG    AFB CULTURE [[id number]] Collected:  06/08/20 1334    Order Status:  Completed Specimen:  Body Fluid from Thoracentesis Fluid Updated:  06/08/20 1443    Narrative:       Collected By:[id number] JAIMIE VUONG    FLUID CULTURE W/GRAM STAIN [[id number]] Collected:  06/08/20 1334    Order Status:  Sent Specimen:  Body Fluid from Thoracentesis Fluid     AFB Culture [[id number]] Collected:  06/02/20 1440    Order Status:  Completed Specimen:  Body Fluid Updated:  06/07/20 0806     Significant Indicator NEG     Source BF     Site Thoracentesis Fluid     Culture Result Culture in progress.     AFB Smear Results No acid fast bacilli seen.    FLUID CULTURE W/GRAM STAIN [[id number]] Collected:  06/02/20 1440    Order Status:  Completed Specimen:  Body Fluid Updated:  06/07/20 0806     Significant Indicator NEG     Source BF     Site Thoracentesis Fluid     Culture Result No growth at 5 days.     Gram Stain Result No organisms seen.    Fungal Culture [[id number]] Collected:  06/02/20 1440    Order Status:  Completed Specimen:  Body Fluid Updated:  06/07/20 0806     Significant Indicator NEG     Source BF     Site Thoracentesis Fluid     Culture Result Culture in progress.    CULTURE RESPIRATORY W/ GRM STN [[id number]] Collected:  06/03/20 1018    Order Status:  Completed Specimen:  Respirate from Tracheal Aspirate Updated:  06/05/20 1113     Significant Indicator NEG     Source RESP     Site TRACHEAL ASPIRATE     Culture Result No growth at 48 hours.     Gram Stain Result Rare WBCs.  No organisms seen.      " Narrative:       Collected By:[id number] KEN BROCK  Collected By:[id number] KEN BROCK    GRAM STAIN [[id number]] Collected:  06/02/20 1440    Order Status:  Completed Specimen:  Body Fluid Updated:  06/03/20 1807     Significant Indicator .     Source BF     Site Thoracentesis Fluid     Gram Stain Result No organisms seen.    Acid Fast Stain [[id number]] Collected:  06/02/20 1440    Order Status:  Completed Specimen:  Body Fluid Updated:  06/03/20 1807     Significant Indicator NEG     Source BF     Site Thoracentesis Fluid     AFB Smear Results No acid fast bacilli seen.    GRAM STAIN [[id number]] Collected:  06/03/20 1018    Order Status:  Completed Specimen:  Respirate Updated:  06/03/20 1722     Significant Indicator .     Source RESP     Site TRACHEAL ASPIRATE     Gram Stain Result Rare WBCs.  No organisms seen.      Narrative:       Collected By:[id number] KEN BROCK  Collected By:[id number] KEN BROCK          Assessment:  Active Hospital Problems    Diagnosis   • PEA (Pulseless electrical activity) (Formerly [facility]) [I46.9]   • Shock ([facility]) [R57.9]   • Seizure disorder (Formerly [facility]) [G40.909]   • DAMIAN (acute kidney injury) ([facility]) [N17.9]   • Epidural phlegmon [G06.2]   • Osteomyelitis of skull ([facility]) [M86.9]   • Goals of care, counseling/discussion [Z71.89]   • Acute respiratory failure with hypoxia ([facility]) [J96.01]   • Recurrent right pleural effusion [J90]   • Pneumonia [J18.9]   • Ileus ([facility]) [K56.7]   • Hypokalemia [E87.6]     Interval 24 hours:      TMax 101.7, improved today , Vent 8/40%  Labs reviewed  Micro reviewed  Events: Thoracentesis on 6/8    Pt alert and following commands.  She continued on broad-spectrum antibiotics for now may de-escalate soon.       ASSESSMENT/PLAN:       54 y.o.  admitted 5/31/2020 who initially presented to [facility] on 5/2 complaining of urinary retention and hypotension.  He had previously been hospitalized at [facility] from 5/6-5/11 for COPD exacerbation.   During his  stay at [facility] starting on 5/22 he required intubation due to seizure activity.  Eventually extubated but developed aspiration pneumonia and increasing encephalopathy.  The patient had an episode of unresponsiveness with bradycardia and a code was called.  Patient received 3 rounds of CPR and 2 episodes of epi before ROSC was achieved.  He was intubated and transferred to [facility] for higher level of care.  He has had numerous antibiotics since admit.  Antibiotics were off from 6/4-6/6 and then he was restarted.     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  -History of bilateral craniotomy with subdural evacuation in 10/2016  -MRI on 6/6 with contrast enhancement and edema involving the right frontal craniotomy flap as well as extradural tissue adjacent to the right frontal bone concerning for craniotomy flap osteomyelitis with adjacent epidural phlegmon  -Neurosurgery is on board-planning to take patient to the OR soon  DAMIAN-ongoing and worsening again today   Encephalopathy  Seizure disorder  COPD and on unknown home oxygen  Alcohol abuse per chart  Hepatitis C with cirrhosis, unknown if treated     Plan      --- Agree with linezolid which should give good tissue/bone and CNS penetration, continue renally dosed cefepime and continue Flagyl (both also with good CNS penetration)   --- Blood cultures due to new fevers -NGTD   --- F/up thoracentesis cultures - NGTD   --- Agree with neurosurgical intervention for more definitive management- plan is to go to  the OR [facility] tomorrow - please obtain tissue/bone and CSF for cultures  --- May need to consider additional imaging and replacing lines if fevers continue        Plan of care discussed Dr. Londono.  Will continue to follow

## 2020-06-09 NOTE — WOUND TEAM
Wound team note:   Pt seen for placement of BMS. MD order verified. Pt assessed, noted to be incontinent of loose brown stool. Turned to left side. Sacrum/buttocks pink and intact. Rectal trumpet currently in place, removed. Sphincter tone determined to be adequate. BMS placed without difficulty, 38 mL of tap water placed in retention balloon, irrigated with 60 mL warm tap water. Confirmed irrigant/stool output in tube. Nursing to irrigate q shift with 60 mL-120 mL warm tap water or until patent. Bedside RN Baljinder updated, appropriate orders placed.

## 2020-06-09 NOTE — CARE PLAN
Problem: Safety  Goal: Will remain free from injury  Outcome: PROGRESSING AS EXPECTED  Note: Bed is locked and in low position. Bed alarm is on. Patient is close to nursing station. Patient checked on hourly.      Problem: Skin Integrity  Goal: Risk for impaired skin integrity will decrease  Outcome: PROGRESSING AS EXPECTED  Note: Patient is turned every 2 hours. Patient has mepilex on sacrum. Pillows in use under elbows. Heel float boats are on.

## 2020-06-09 NOTE — DIETARY
Nutrition support weekly update:  Day 9 of admit.  53 yo male admitted with respiratory failure.  Tube feeding initiated on 6/2. Current TF Diabetisource at full goal 65 ml/hr providing 1872 kcals, 94 g protein, 1266 ml H20/day via gastric cortrak..     Assessment:  Weight today 70.9 kg is increased .5 kg from admit weight of 70.4 kg.     Evaluation:   1. No prealbumin to report on this week.  CRP 9.31 is decreased from 23.02 last week indicating decreased inflammation.  2. Pt noted to have pleural effusions and bilateral pulmonary edema.   3. Trach placed on 6/7 after failed extubation.   4. Current feeding meeting nutritional needs    Malnutrition risk: na    Recommendations/Plan:  1. Continue same TF formula and rate   2. Po diet when appropriate

## 2020-06-09 NOTE — CONSULTS
DATE OF CONSULTATION:  6/9/2020     REFERRING PHYSICIAN:   Oziel Londono M.D.     CONSULTING PHYSICIAN:  Gómez Lance M.D.     REASON FOR CONSULTATION:  Loculated right pleural effusion.   I have been asked by Dr. Londono to see the patient in surgical consultation for evaluation of a persistent loculated right pleural effusion.    HISTORY OF PRESENT ILLNESS: The patient is a chronically ill 54 year-old White man who was admitted to the hospital 9 days ago for respiratory failure, pneumonia, and seizures.  He has had a terribly complicated hospital course complicated by his multiple comorbidities including COPD, alcohol abuse, hepatitis C with cirrhosis, traumatic brain injury, and seizure disorder.  He experienced a cardiac arrest just prior to transfer associated with this admission but was successfully resuscitated.  He has had a persistent loculated right pleural effusion first noted on CT imaging on 530.  This is been tapped multiple times but has failed to resolve.  I been asked to see him in surgical consultation for evaluation a definitive decortication.    PAST MEDICAL HISTORY:  has a past medical history of Alcoholism ([facility]) (9/24/2012), Allergy, Anemia, Arthritis, At risk for sleep apnea (6/21/16), Back pain, C. difficile colitis (10/1/2016), C. difficile colitis, Chronic cholecystitis (6/2/2015), Closed fracture of right olecranon process (6/21/2016), COPD (chronic obstructive pulmonary disease) ([facility]), Decubitus ulcer of back, stage 3 ([facility]) (4/7/2016), Dental disorder, Depression, Encounter for screening colonoscopy (02/19/2018), Encounter for screening colonoscopy (02/19/2018), Fall, Fracture of right olecranon process (6/2016), Fracture, sternum closed (1/30/2017), GERD (gastroesophageal reflux disease), Head ache, Hepatitis C antibody test positive (3/6/2015), History of subdural hematoma (10/2/2016), Hypertension (9/24/2012), Indigestion, IVDU (intravenous drug user) (9/24/2012), Neuropathy  (H[facility]) (9/24/2012), Osteoporosis, Pneumonia, Recurrent vertebral fractures (2015), Renal disorder, Seizure disorder ([facility]), Snoring, Stroke ([facility]), Urinary bladder disorder, and Vitamin D deficiency.    PAST SURGICAL HISTORY:  has a past surgical history that includes endoscopy procedure (3/24/2014); echo,scrotum & contents; major by laparoscopy (6/2/2015); ir recovery room (7/24/2015); lumbar fusion posterior (8/7/2015); thoracotomy (Left, 8/7/2015); rib resection (8/7/2015); elbow orif (Right, 6/21/2016); other orthopedic surgery; evacuation of hematoma (Bilateral, 10/1/2016); craniotomy (Bilateral, 10/1/2016); colonoscopy - endo (2/19/2018); endoscopy procedure (3/1/2019); endoscopy procedure (10/11/2019); cervical decompression posterior (10/14/2019); and cystoscopy (N/A, 12/9/2019).     ALLERGIES:   Allergies   Allergen Reactions   • Codeine Nausea     Tolerates Dilaudid  Sweat and cold     CURRENT MEDICATIONS:   Home Medications     Reviewed by Catalino Mcfarland (Pharmacy Tech) on 06/01/20 at 1248  Med List Status: Complete   Medication Last Dose Status   albuterol (VENTOLIN HFA) 108 (90 Base) MCG/ACT Aero Soln inhalation aerosol UNK Active   gabapentin (NEURONTIN) 300 MG Cap UNK Active   HYDROcodone/acetaminophen (NORCO)  MG Tab UNK Active   levothyroxine (SYNTHROID) 50 MCG Tab UNK Active   omeprazole (PRILOSEC) 40 MG delayed-release capsule UNK Active   phenytoin ER (DILANTIN) 100 MG Cap UNK Active              FAMILY HISTORY:   Family History   Problem Relation Age of Onset   • Diabetes Mother    • Hypertension Mother    • Hyperlipidemia Mother    • Cancer Mother         skin   • Arthritis Mother    • Heart Disease Mother    • Other Father         CAR ACCIDENT   • Other Sister 49        AIDS   • Arthritis Sister    • Heart Disease Sister    • Hypertension Sister    • Other Sister 60        2 MI's with stent placement     SOCIAL HISTORY:   Social History     Tobacco Use   • Smoking status: Current  "Every Day Smoker     Packs/day: 0.50     Years: 30.00     Pack years: 15.00     Types: Cigarettes   • Smokeless tobacco: Never Used   Substance and Sexual Activity   • Alcohol use: Yes     Alcohol/week: 4.8 oz     Types: 8 Cans of beer per week     Frequency: 4 or more times a week     Drinks per session: 5 or 6     Binge frequency: Daily or almost daily     Comment: 5-6 beers a day reported   • Drug use: No     Comment: Hx IVDU   • Sexual activity: Not Currently       REVIEW OF SYSTEMS: Comprehensive review of systems is not able to be elicited from the patient secondary to the acuity of the clinical situation.     PHYSICAL EXAMINATION:   General Appearance: The patient is a critically ill-appearing and man .  VITAL SIGNS: /66   Pulse 80   Temp 36.9 °C (98.5 °F)   Resp (!) 22   Ht 1.626 m (5' 4\")   Wt 70.9 kg (156 lb 4.9 oz)   SpO2 92%   HEAD AND NECK: Demonstrates symmetric, reactive pupils. Extraocular muscles   are intact. Nares and oropharynx are clear.   NECK: Supple. No adenopathy.  CHEST:    Inspection: Mechanically ventilated.   Palpation:  The chest is nontender.    Auscultation: Coarse breath sounds bilaterally, decreased on the right.  CARDIOVASCULAR:   Inspection: The skin is warm and dry.  Auscultation: Regular rate and rhythm.   Peripheral Pulses: Normal.    EXTREMITIES:   Examination of the upper and lower extremities demonstrates edema.  NEUROLOGIC:   Neurologic examination reveals no focal deficits noted.  PSYCHIATRIC:   Cannot be assessed.    LABORATORY VALUES:   Recent Labs     06/07/20  0450 06/08/20  0433 06/09/20  0324   WBC 8.6 12.0* 15.6*   RBC 2.36* 2.19* 2.15*   HEMOGLOBIN 7.5* 7.1* 6.9*   HEMATOCRIT 25.8* 23.9* 23.3*   .3* 109.1* 108.4*   MCH 31.8 32.4 32.1   MCHC 29.1* 29.7* 29.6*   RDW 65.1* 64.6* 63.8*   PLATELETCT 298 323 306   MPV 10.6 10.6 10.3     Recent Labs     06/07/20  0450 06/07/20  1210 06/08/20  0433 06/09/20  0324   SODIUM 148*  --  146* 143   POTASSIUM " 3.6 3.7 3.2* 3.5*   CHLORIDE 110  --  106 104   CO2 28  --  29 26   GLUCOSE 201*  --  128* 149*   BUN 31*  --  37* 43*   CREATININE 1.58*  --  1.63* 1.86*   CALCIUM 8.2*  --  7.6* 7.8*     Recent Labs     06/08/20  1126   INR 1.03     Recent Labs     06/08/20  1126   APTT 30.4   INR 1.03        IMAGING:   DX-CHEST-PORTABLE (1 VIEW)   Final Result         1.  Pulmonary edema and/or infiltrates are identified, which are stable since the prior exam.   2.  Bilateral pleural effusions, right greater than left   3.  Cardiomegaly                     DX-CHEST-LIMITED (1 VIEW)   Final Result      1.  Slightly decreased volume of loculated appearing right pleural effusion.      2.  Persistent atelectasis or pneumonia in the left lower lobe.      3.  Unchanged small left pleural effusion.      DX-CHEST-PORTABLE (1 VIEW)   Final Result         1.  Pulmonary edema and/or infiltrates are identified, which are stable since the prior exam.   2.  Bilateral pleural effusions, right greater than left   3.  Cardiomegaly                  DX-CHEST-PORTABLE (1 VIEW)   Final Result         1. No significant interval change.      MR-BRAIN-WITH & W/O   Final Result      1.  There is abnormal contrast enhancement and edema noted involving right frontal craniotomy flap. There is abnormal enhancing extradural tissue noted adjacent to the right frontal bone with abnormal adjacent dural enhancement. These findings are    concerning for craniotomy flap osteomyelitis with adjacent epidural phlegmon. There is no evidence of leptomeningeal enhancement. There is no parenchymal contrast enhancement.   2.  Severe cerebral volume loss.   3.  Mild cerebral volume loss.      US-EXTREMITY ARTERY UPPER BILAT   Final Result      DX-CHEST-PORTABLE (1 VIEW)   Final Result         1. No significant interval change.      DX-CHEST-PORTABLE (1 VIEW)   Final Result      No significant interval change.      DX-CHEST-PORTABLE (1 VIEW)   Final Result         1.   Pulmonary edema and/or infiltrates are identified, which are stable since the prior exam.   2.  Bilateral pleural effusions, right greater than left   3.  Cardiomegaly               DX-CHEST-PORTABLE (1 VIEW)   Final Result         1.  Pulmonary edema and/or infiltrates are identified, which are stable since the prior exam.   2.  Bilateral pleural effusions, right greater than left   3.  Cardiomegaly            DX-CHEST-PORTABLE (1 VIEW)   Final Result         1.  Pulmonary edema and/or infiltrates are identified, which are stable since the prior exam.   2.  Bilateral pleural effusions, right greater than left   3.  Cardiomegaly         DX-CHEST-PORTABLE (1 VIEW)   Final Result      1.  There is no pneumothorax following right thoracentesis.   2.  The amount of right pleural effusion is decreased.   3.  There is vascular congestion and/or edema.   4.  There is right lower lobe airspace disease which could be atelectasis or pneumonitis.      US-THORACENTESIS PUNCTURE RIGHT   Final Result      1. Ultrasound guided right sided diagnostic and therapeutic thoracentesis.      2. 800 mL of fluid withdrawn.      DX-ABDOMEN FOR TUBE PLACEMENT   Final Result      1.  Feeding tube tip projects over the gastric body.      EC-ECHOCARDIOGRAM COMPLETE W/O CONT   Final Result      DX-CHEST-PORTABLE (1 VIEW)   Final Result         1.  Pulmonary edema and/or infiltrates are identified, which are stable since the prior exam.   2.  Bilateral pleural effusions, right greater than left   3.  Cardiomegaly      DX-CHEST-PORTABLE (1 VIEW)   Final Result         1.  Pulmonary edema and/or infiltrates.   2.  Bilateral pleural effusions, right greater than left   3.  Cardiomegaly      CT-HEAD W/O   Final Result         1.  No acute intracranial abnormality is identified, there are nonspecific white matter changes, commonly associated with small vessel ischemic disease.  Associated mild cerebral atrophy is noted.   2.  Atherosclerosis.                 IMPRESSION AND PLAN:  Loculated right pleural effusion, almost certainly an organized empyema at this time.  The patient is currently scheduled for neurosurgical intervention of his exposed cranial bone plate tomorrow.  Over the next several days it would be useful to obtain repeat CT imaging of the chest to better define the current status of his effusion.  Given his tenuous renal function, it would be acceptable to do this study without IV contrast.    Anticipate the need for an open thoracotomy and decortication to definitively manage his right pleural space process in the next 3-5 days.    ACS NSQIP Surgical Risk Calculator         ____________________________________     Gómez Lance M.D.    DD: 6/9/2020  2:17 PM

## 2020-06-09 NOTE — PROGRESS NOTES
Neurosurgery Progress Note    Subjective:   levo at 4mcg- BP low 100's- put back on at 1700 yest, was febrile- BC drawn- neg so far  On abx    Exam:  EO spont, intermittently tracks  Follows briskly x 4 this am  Right side of frontoparietal bone shows exposed cranial plate      BP  Min: 81/50  Max: 132/81  Pulse  Av.1  Min: 51  Max: 87  Resp  Av.6  Min: 8  Max: 54  Monitored Temp 2  Av.3 °C (99.1 °F)  Min: 36.3 °C (97.3 °F)  Max: 38.7 °C (101.7 °F)  SpO2  Av.1 %  Min: 91 %  Max: 100 %    No data recorded    Recent Labs     20  04520  0433 20  0324   WBC 8.6 12.0* 15.6*   RBC 2.36* 2.19* 2.15*   HEMOGLOBIN 7.5* 7.1* 6.9*   HEMATOCRIT 25.8* 23.9* 23.3*   .3* 109.1* 108.4*   MCH 31.8 32.4 32.1   MCHC 29.1* 29.7* 29.6*   RDW 65.1* 64.6* 63.8*   PLATELETCT 298 323 306   MPV 10.6 10.6 10.3     Recent Labs     20  0450 20  1210 20  0433 20  0324   SODIUM 148*  --  146* 143   POTASSIUM 3.6 3.7 3.2* 3.5*   CHLORIDE 110  --  106 104   CO2 28  --  29 26   GLUCOSE 201*  --  128* 149*   BUN 31*  --  37* 43*   CREATININE 1.58*  --  1.63* 1.86*   CALCIUM 8.2*  --  7.6* 7.8*     Recent Labs     20  1126   APTT 30.4   INR 1.03           Intake/Output       20 - 2059 20 - 06/10/20 0659      [id number] Total  Total       Intake    I.V.  731.8  1272.2   --  -- --    Albumin Volume 500 -- 500 -- -- --    Precedex Volume 227.8 238.6 466.4 -- -- --    Norepinephrine Volume 4 131.6 135.7 -- -- --    Volume (mL) (lactated ringers infusion) 0 901.9 901.9 -- -- --    Other  460  150 610  --  -- --    Medications (PO/Enteral Liquids) 460 150 610 -- -- --    NG/GT  660  780 1440  --  -- --    Intake (mL) (Enteral Tube 20 Cortrak - Gastric 10 Fr. Right nare)  -- -- --    IV Piggyback  200  200 400  --  -- --    Volume (mL) (cefTRIAXone (ROCEPHIN) 1 g in  mL IVPB) 100 -- 100 -- -- --     Volume (mL) (cefTRIAXone (ROCEPHIN) 2 g in  mL IVPB) 100 -- 100 -- -- --    Volume (mL) (cefepime (MAXIPIME) 2 g in  mL IVPB) -- 200 200 -- -- --    Total Intake 2051.8 2402.2 4454 -- -- --       Output    Urine  290  400 690  --  -- --    Output (mL) (Urethral Catheter Temperature probe 16 Fr.) 290 400 690 -- -- --    Stool  --  -- --  --  -- --    Number of Times Stooled -- 1 x 1 x -- -- --    Total Output 290 400 690 -- -- --       Net I/O     1761.8 2002.2 3764 -- -- --            Intake/Output Summary (Last 24 hours) at 6/9/2020 0729  Last data filed at 6/9/2020 0600  Gross per 24 hour   Intake 4453.98 ml   Output 690 ml   Net 3763.98 ml            • NS   Continuous   • potassium bicarbonate  50 mEq Once   • hydrocortisone sodium succinate PF  50 mg Q12HRS   • cefepime  2 g Q12HRS   • LR  1,000 mL Continuous   • levothyroxine  50 mcg AM ES   • insulin regular  1-6 Units Q6HRS    And   • glucose  16 g Q15 MIN PRN    And   • dextrose 50%  50 mL Q15 MIN PRN   • oxyCODONE immediate-release  10-15 mg Q4HRS PRN   • phenytoin  300 mg Q12HRS   • linezolid  600 mg Q12HRS   • metroNIDAZOLE (FLAGYL) IV  500 mg Q8HRS   • fentaNYL  25-50 mcg Q2HRS PRN   • gabapentin  100 mg TID   • midodrine  10 mg Q8HRS   • dexmedetomidine (PRECEDEX) infusion  0.1-1.5 mcg/kg/hr Continuous   • levETIRAcetam  1,000 mg BID   • Pharmacy  1 Each PHARMACY TO DOSE   • omeprazole  40 mg DAILY   • enoxaparin (LOVENOX) injection  40 mg DAILY   • thiamine  100 mg DAILY   • multivitamin  1 Tab DAILY   • LORazepam  4 mg Q10 MIN PRN   • norepinephrine (Levophed) infusion  0-30 mcg/min Continuous   • senna-docusate  2 Tab BID    And   • polyethylene glycol/lytes  1 Packet QDAY PRN    And   • magnesium hydroxide  30 mL QDAY PRN    And   • bisacodyl  10 mg QDAY PRN   • acetaminophen  650 mg Q6HRS PRN   • labetalol  10 mg Q4HRS PRN   • Respiratory Therapy Consult   Continuous RT   • ipratropium-albuterol  3 mL Q2HRS PRN (RT)   • MD  Alert...Adult ICU Electrolyte Replacement per Pharmacy   PHARMACY TO DOSE   • lidocaine  1-2 mL Q30 MIN PRN       Assessment and Plan:  Hospital day #5 exposed cranial plate, imaging shows osteomyelitis  POD #na  Prophylactic anticoagulation: no -d/c lovenox for surgery      Start date/time: tbd    Neuro as above  UTI/resp failure/etoh, on abx, BC drawn last noc d/t fever  Pt cleared by medicine for surgery  anemia- 1 unit RBCs pending  OR tomorrow for removal of mallorie hole cover and closure of wound  NPO at [facility]  On Lovenox, d/c now for surgery tomorrow

## 2020-06-10 NOTE — CARE PLAN
Problem: Safety  Goal: Will remain free from falls  Outcome: PROGRESSING AS EXPECTED  Intervention: Implement fall precautions  Flowsheets  Taken 6/9/2020 2345  Bed Alarm: Yes - Alarm On  Taken 6/9/2020 2000  Environmental Precautions:   Treaded Slipper Socks on Patient   Bed in Low Position   Report Given to Other Health Care Providers Regarding Fall Risk     Problem: Communication  Goal: The ability to communicate needs accurately and effectively will improve  Outcome: PROGRESSING SLOWER THAN EXPECTED  Intervention: Ruso patient and significant other/support system to call light to alert staff of needs  Flowsheets (Taken 6/6/2020 0130)  Oriented to::   Unit Routine   Patient Rights and Responsibilities  Note: Requires restraintes, trached

## 2020-06-10 NOTE — PROGRESS NOTES
Neurosurgery Progress Note    Subjective:  No acute events, off levophed  Tube feeds still running despite NPO at MN orders- RN turned off just now  Exam:  EO spont, intermittently tracks  Follows intermittently, WETZEL spont  Right side of frontoparietal bone shows exposed cranial plate      BP  Min: 84/52  Max: 160/83  Pulse  Av.1  Min: 42  Max: 92  Resp  Av.1  Min: 11  Max: 33  Temp  Av.8 °C (98.2 °F)  Min: 36.5 °C (97.7 °F)  Max: 37.1 °C (98.8 °F)  Monitored Temp 2  Av.7 °C (98 °F)  Min: 35.8 °C (96.4 °F)  Max: 37.1 °C (98.8 °F)  SpO2  Av %  Min: 90 %  Max: 100 %    No data recorded    Recent Labs     20  0433 20  0324 06/10/20  0518   WBC 12.0* 15.6* 14.4*   RBC 2.19* 2.15* 2.51*   HEMOGLOBIN 7.1* 6.9* 7.9*   HEMATOCRIT 23.9* 23.3* 26.5*   .1* 108.4* 105.6*   MCH 32.4 32.1 31.5   MCHC 29.7* 29.6* 29.8*   RDW 64.6* 63.8* 77.3*   PLATELETCT 323 306 323   MPV 10.6 10.3 10.4     Recent Labs     20  0324 20  1410 20  1752 06/10/20  0518   SODIUM 143 149*  --  143   POTASSIUM 3.5* 3.9  --  3.6   CHLORIDE 104 109  --  105   CO2 26 27  --  29   GLUCOSE 149* 120* 196* 119*   BUN 43* 39*  --  37*   CREATININE 1.86* 1.70*  --  1.53*   CALCIUM 7.8* 7.4*  --  8.0*     Recent Labs     20  1126   APTT 30.4   INR 1.03           Intake/Output       20 0700 - 06/10/20 0659 06/10/20 07 - 20 0659      3685-9976 9048-2889 Total 1299-6180 5326-6766 Total       Intake    I.V.  1840.2  498 2338.2  --  -- --    Precedex Volume -- 0 0 -- -- --    Norepinephrine Volume 36.7 0 36.7 -- -- --    Volume (mL) (lactated ringers infusion)  -- -- --    Volume (mL) (NS infusion) 807.5 0 807.5 -- -- --    Blood  300  -- 300  --  -- --    Volume (RELEASE RED BLOOD CELLS) 300 -- 300 -- -- --    Other  550  -- 550  --  -- --    Medications (PO/Enteral Liquids) 550 -- 550 -- -- --    NG/GT  780  920 1700  --  -- --    Intake (mL) (Enteral Tube 20 Kamaljit -  Gastric 10 Fr. Right nare)  -- -- --    IV Piggyback  100  -- 100  --  -- --    Volume (mL) (cefepime (MAXIPIME) 2 g in  mL IVPB) 100 -- 100 -- -- --    Enteral  --  400 400  --  -- --    Free Water / Tube Flush -- 400 400 -- -- --    Total Intake 3570.2 1818 5388.2 -- -- --       Output    Urine  745  390 1135  --  -- --    Output (mL) (Urethral Catheter Temperature probe 16 Fr.)  -- -- --    Stool  1175  -- 1175  --  -- --    Number of Times Stooled 2 x -- 2 x -- -- --    Measurable Stool (mL) 1175 -- 1175 -- -- --    Total Output 3783 941 5765 -- -- --       Net I/O     1650.2 1428 3078.2 -- -- --            Intake/Output Summary (Last 24 hours) at 6/10/2020 0728  Last data filed at 6/10/2020 0600  Gross per 24 hour   Intake 5388.15 ml   Output 2310 ml   Net 3078.15 ml            • NS   Continuous   • hydrocortisone sodium succinate PF  50 mg Q12HRS   • cefepime  2 g Q12HRS   • levothyroxine  50 mcg AM ES   • insulin regular  1-6 Units Q6HRS    And   • dextrose 50%  50 mL Q15 MIN PRN   • oxyCODONE immediate-release  10-15 mg Q4HRS PRN   • phenytoin  300 mg Q12HRS   • linezolid  600 mg Q12HRS   • metroNIDAZOLE (FLAGYL) IV  500 mg Q8HRS   • fentaNYL  25-50 mcg Q2HRS PRN   • gabapentin  100 mg TID   • midodrine  10 mg Q8HRS   • dexmedetomidine (PRECEDEX) infusion  0.1-1.5 mcg/kg/hr Continuous   • levETIRAcetam  1,000 mg BID   • Pharmacy  1 Each PHARMACY TO DOSE   • omeprazole  40 mg DAILY   • thiamine  100 mg DAILY   • multivitamin  1 Tab DAILY   • LORazepam  4 mg Q10 MIN PRN   • norepinephrine (Levophed) infusion  0-30 mcg/min Continuous   • senna-docusate  2 Tab BID    And   • polyethylene glycol/lytes  1 Packet QDAY PRN    And   • magnesium hydroxide  30 mL QDAY PRN    And   • bisacodyl  10 mg QDAY PRN   • acetaminophen  650 mg Q6HRS PRN   • labetalol  10 mg Q4HRS PRN   • Respiratory Therapy Consult   Continuous RT   • ipratropium-albuterol  3 mL Q2HRS PRN (RT)   • lidocaine  1-2 mL  Q30 MIN PRN       Assessment and Plan:  Hospital day #6 exposed cranial plate, imaging shows osteomyelitis  POD #na  Prophylactic anticoagulation: no -d/c lovenox for surgery      Start date/time: tbd    Neuro as above  UTI/resp failure/etoh, on abx, BC neg so far  Pt cleared by medicine for surgery  OR today for removal of mallorie hole cover and closure of wound  NPO-- TF just stopped despite NPO at [facility] orders, will d/w anesthesia- RN can place OG tube and put on sxn if anesthesia deems necessary

## 2020-06-10 NOTE — ANESTHESIA TIME REPORT
Anesthesia Start and Stop Event Times     Date Time Event    6/10/2020 1340 Ready for Procedure     1348 Anesthesia Start     1523 Anesthesia Stop        Responsible Staff  06/10/20    Name Role Begin End    Ilia Gil M.D. Anesth 1348 1523        Preop Diagnosis (Free Text):  Pre-op Diagnosis     nonhealing cranial plate         Preop Diagnosis (Codes):    Post op Diagnosis  Metal plate in skull      Premium Reason  A. 3PM - 7AM    Comments:

## 2020-06-10 NOTE — PROGRESS NOTES
Infectious Disease Progress Note    Author: Nu Diez M.D. 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  Pt alert and following commands.      Review of Systems:  Review of Systems   Unable to perform ROS: Intubated       Hemodynamics:  Temp (24hrs), Av.7 °C (98.1 °F), Min:36.5 °C (97.7 °F), Max:36.9 °C (98.5 °F)  Temperature: 36.7 °C (98.1 °F), Monitored Temp: 36.5 °C (97.7 °F)  Pulse  Av.1  Min: 42  Max: 113   Blood Pressure: (!) 84/52       Physical Exam:  Physical Exam  Constitutional:       Appearance: Normal appearance.   HENT:      Mouth/Throat:      Comments: Tracheostomy in place  Cardiovascular:      Rate and Rhythm: Normal rate and regular rhythm.      Heart sounds: Normal heart sounds.   Pulmonary:      Effort: Pulmonary effort is normal.      Breath sounds: Normal breath sounds. No wheezing, rhonchi or rales.   Abdominal:      General: There is distension.      Palpations: Abdomen is soft.      Tenderness: There is no abdominal tenderness.   Musculoskeletal:      Right lower leg: Edema present.      Left lower leg: Edema present.   Skin:     General: Skin is warm and dry.   Neurological:      Mental Status: He is alert.      Comments: He is alert and tracking but not following commands today         Meds:    Current Facility-Administered Medications:   •  NS  •  hydrocortisone sodium succinate PF  •  cefepime  •  levothyroxine  •  insulin regular **AND** POC Blood Glucose **AND** NOTIFY MD and PharmD **AND** [DISCONTINUED] glucose **AND** dextrose 50%  •  oxyCODONE immediate-release  •  phenytoin **AND** [DISCONTINUED] phenytoin  •  linezolid  •  metroNIDAZOLE (FLAGYL) IV  •  fentaNYL  •  gabapentin  •  midodrine  •  dexmedetomidine (PRECEDEX)  infusion  •  levETIRAcetam  •  Pharmacy  •  omeprazole  •  thiamine  •  multivitamin  •  LORazepam  •  norepinephrine (Levophed) infusion  •  senna-docusate **AND** polyethylene glycol/lytes **AND** magnesium hydroxide **AND** bisacodyl  •  acetaminophen  •  labetalol  •  Respiratory Therapy Consult  •  ipratropium-albuterol  •  lidocaine    Labs:  Recent Labs     06/08/20  0433 06/08/20  1334 06/09/20  0324 06/10/20  0518   WBC 12.0*  --  15.6* 14.4*   RBC 2.19*  --  2.15* 2.51*   HEMOGLOBIN 7.1*  --  6.9* 7.9*   HEMATOCRIT 23.9*  --  23.3* 26.5*   .1*  --  108.4* 105.6*   MCH 32.4  --  32.1 31.5   RDW 64.6*  --  63.8* 77.3*   PLATELETCT 323  --  306 323   MPV 10.6  --  10.3 10.4   NEUTSPOLYS 78.60*  --  78.90* 79.60*   LYMPHOCYTES 11.30*  --  11.40* 10.40*   MONOCYTES 8.30  --  7.30 7.00   EOSINOPHILS 0.80 1 1.50 1.90   BASOPHILS 0.30  --  0.30 0.50     Recent Labs     06/09/20  0324 06/09/20  1410 06/09/20  1752 06/10/20  0518   SODIUM 143 149*  --  143   POTASSIUM 3.5* 3.9  --  3.6   CHLORIDE 104 109  --  105   CO2 26 27  --  29   GLUCOSE 149* 120* 196* 119*   BUN 43* 39*  --  37*     Recent Labs     06/09/20  0324 06/09/20  1410 06/10/20  0518   CREATININE 1.86* 1.70* 1.53*       Imaging:  Ct-chest (thorax) W/o    Result Date: 6/9/2020 6/9/2020 3:48 PM HISTORY/REASON FOR EXAM:  Pleural effusion. TECHNIQUE/EXAM DESCRIPTION: CT scan of the chest without contrast. Noncontrast helical scanning of the chest was obtained from the lung apices through the adrenal glands. Low dose optimization technique was utilized for this CT exam including automated exposure control and adjustment of the mA and/or kV according to patient size. COMPARISON: Plain film from the same day. FINDINGS: Evaluation of parenchymal and vascular structures is limited by the lack of intravenous contrast. Atherosclerotic plaque is seen in the aorta and coronary arteries. There is trace pericardial fluid. There is a small right pleural  effusion which is loculated. There is likely pleural thickening. There is a small loculated left pleural effusion. Tracheostomy tube is noted. Enteric tube projects over the stomach. Main pulmonary artery measures 3.9 cm in diameter. Precarinal lymph node measures 1.3 cm in short axis dimension. Evaluation of the shirley is limited by the lack of intravenous contrast and by airspace opacities adjacent to the right hilum. 9 mm para-aortic lymph node is seen. There are small axillary lymph nodes bilaterally. There is septal thickening and groundglass opacities bilaterally. There is subsegmental lingula and left lower lobe atelectasis. There is atelectasis/contusion overlying the right pleural effusion. Limited views were obtained of the upper abdomen. There is trace free fluid in the upper abdomen. There is gynecomastia. Degenerative changes are seen in the spine. Postsurgical changes are seen in the thoracic and lumbar spine. There are remote bilateral rib fractures. There are healing left-sided rib fractures. There are acute fractures of the lateral right fifth, sixth ribs and the left anterior fourth and lateral fourth, fifth, sixth ribs. There are multiple compression fractures in the thoracic and lumbar spine. There is an old posttraumatic deformity of the sternum. Bones are demineralized.     Small loculated right pleural effusion with overlying atelectasis/consolidation. Mild pleural thickening may be infectious/inflammatory but malignancy is not excluded. Small loculated left pleural effusion with subsegmental lingula and left lower lobe atelectasis. Interlobular septal thickening and groundglass opacities compatible with edema. Infection not excluded. Cardiomegaly. Enlarged mediastinal lymph nodes may be reactive but malignancy is not excluded. Atherosclerotic plaque including coronary artery calcification. Prominence of the main pulmonary artery can be seen in pulmonary arterial hypertension. Trace amount of  ascites. Bilateral rib fractures. Multiple fractures in the thoracic spine. Demineralization.     Ct-chest (thorax) With    Result Date: 5/31/2020  HISTORY/REASON FOR EXAM: Respiratory illness, acute (Age > 40y); PE suspected, intermediate prob, positive D-dimer; post ROSC. TECHNIQUE/EXAM DESCRIPTION: CT scan of the chest with contrast, 5/30/2020 10:33 PM. This examination was conducted with Automated Exposure Control and Automated Adjustment of Tube Current based on patient size. Following the administration of IV contrast, helical imaging is performed from the thoracic inlet to the dome of the diaphragm. COMPARISON: 5/23/2020 TOTAL DLP: 'Nam.... mGy*cm FINDINGS: Vasculature: Heart:  The heart is mildly enlarged. Mediastinal lymphadenopathy is again noted. Pretracheal lymph nodes measure 26 x 10 mm in diameter, which is increased from previous measurement of 22 mm in diameter seen on 5/23/2020. Extensive subcarinal lymphadenopathy is again noted. Prominent calcifications are again noted involving the coronary arteries. Lungs:  Extensive reticular and groundglass airspace densities have developed throughout all lobes of both lungs in the interval from the previous study. Additionally, there is a moderate right pleural effusion now present, which may be loculated anteriorly. There are small loculated collections of fluid noted involving the left pleural space. Additionally, there are prominent areas of consolidation noted involving both lungs, including near complete consolidation of the right lower lobe.     Extensive bilateral airspace opacities of developed in the interval from the previous study, including large areas of consolidation involving the right and left lower lobes. The findings suggest severe bilateral pneumonia. The distribution is uncharacteristic for Covid pneumonia, although the exam should not be considered a rule out for Covid pneumonia. There is no evidence of pulmonary embolus. Sergei Huber MD  5/31/2020 10:52 AM DLP Reporting Thresholds for Incorrect/Repeated Exams - DLP in mGy*cm Head/Neck:  0-year-old 3840, 1-year-old 5880, 5-year-old 8770, 10-year-old 77427 and adult 13056 Head:  0-year-old 4540, 1-year-old 7460, 5-year-old 88609, 10-year-old 08477 and adult 09811 Neck:  0-year-old 2940, 1-year-old 4160, 5-year-old 4550, 10-year-old 6320 and adult 8470 Chest:  0-year-old 550, 1-year-old 830, 5-year-old 1200, 10-year-old 3840 and adult 3570 Abd/pelvis:  0-year-old 440, 1-year-old 720, 5-year-old 1080, 10-year-old 3330 and adult 3330 Trunk(C/A/P):  0-year-old 490, 1-year-old 770, 5-year-old 1140, 10-year-old 3570 and adult 3330    Ct-chest (thorax) With    Result Date: 5/23/2020  HISTORY/REASON FOR EXAM:  Pleural effusion; recurrent effusion recently changed to sanguinous appearance w anemia TECHNIQUE/EXAM DESCRIPTION: CT scan of the chest with contrast. 5/23/2020 4:11 AM CT scan of the chest was carried out after the administration of IV contrast in the usual manner. Both automated exposure control and Automated adjustment of tube current based on patient size are utilized. Total DLP: 387 mGy*cm COMPARISON: 10/17/2019. Chest x-ray conducted 5/23/2020 FINDINGS: Moderate loculated right pleural effusion Trace left pleural effusion LUNGS: Focal airspace opacities/consolidation involving the right lower lobe and right middle lobe Scattered groundglass opacities are present throughout the left upper lobe Interlobular septal thickening is identified. Scar versus atelectasis in the left lower lobe. HEART: Normal in size. Coronary artery calcifications AORTA: Normal in caliber. MEDIASTINUM: Mediastinal adenopathy. The largest lymph node measures 2.2 cm Views of the upper abdomen show no acute abnormality. Degenerative and postsurgical changes of the spine with multiple compression deformities Subacute/old ununited sternal fracture     Moderate size loculated right pleural effusion Right middle lobe and right  lower lobe airspace opacities/consolidation concerning for pneumonia Scattered groundglass opacity left upper lobe with interlobular septal thickening. May be secondary to pulmonary edema. DLP Reporting Thresholds for Incorrect/Repeated Exams - DLP in mGy*cm Head/Neck:  0-year-old 3840, 1-year-old 5880, 5-year-old 8770, 10-year-old 83744 and adult 20583 Head:  0-year-old 4540, 1-year-old 7460, 5-year-old 08708, 10-year-old 65560 and adult 48669 Neck:  0-year-old 2940, 1-year-old 4160, 5-year-old 4550, 10-year-old 6320 and adult 8470 Chest:  0-year-old 550, 1-year-old 830, 5-year-old 1200, 10-year-old 3840 and adult 3570 Abd/pelvis:  0-year-old 440, 1-year-old 720, 5-year-old 1080, 10-year-old 3330 and adult 3330 Trunk(C/A/P):  0-year-old 490, 1-year-old 770, 5-year-old 1140, 10-year-old 3570 and adult 3330    Ct-head W/o    Result Date: 6/1/2020 6/1/2020 12:42 AM HISTORY/REASON FOR EXAM: Altered mental status TECHNIQUE/EXAM DESCRIPTION:  CT of the head without contrast. Sequential axial images were obtained from the vertex to the skull base without contrast. Up to date radiation dose reduction adjustments have been utilized to meet ALARA standards for radiation dose reduction. COMPARISON: October 12, 2016 FINDINGS: There is mild diffuse parenchymal volume loss observed. Periventricular and subcortical white matter low-attenuation changes are seen, most commonly associated with small vessel ischemic disease. Moderate bilateral ventricular dilatation is seen, with radiographic appearance favoring ex vacuo dilatation. No space occupying lesions or areas of acute vascular territory infarctions are identified. There are no abnormal extra axial fluid collections or extra axial hemorrhage identified. The visualized paranasal sinuses and mastoid air cells are well aerated bilaterally. No depressed calvarial fractures are identified. The visualized globes and retrobulbar soft tissues appear within normal limits.   Atherosclerotic intracranial calcifications are seen.     1.  No acute intracranial abnormality is identified, there are nonspecific white matter changes, commonly associated with small vessel ischemic disease.  Associated mild cerebral atrophy is noted. 2.  Atherosclerosis.    Ct-head W/o    Result Date: 5/25/2020  HISTORY/REASON FOR EXAM:  Seizure, abnormal neuro exam; hx craniotomy for subdural, seizure. TECHNIQUE/EXAM DESCRIPTION: CT scan of the brain without contrast. 5/24/2020 9:36 PM. CT scan of the brain was carried out without contrast in the normal manner. Both automated exposure control and Automated adjustment of tube current based on patient size are utilized. Total DLP: 933 mGy*cm COMPARISON: 9/29/2019 FINDINGS: There is no evidence of mass, mass effect, hemorrhage, or extraaxial fluid collection.  There is no midline shift. Global atrophy. Postsurgical changes of bilateral craniotomies Subcutaneous air surrounds the right muscles of mastication. There is no fracture or other acute bony abnormality seen. Visualized paranasal sinuses: Clear.     No acute intracranial process Global atrophy Subcutaneous air surrounding the right muscles of mastication. Unclear etiology. DLP Reporting Thresholds for Incorrect/Repeated Exams - DLP in mGy*cm Head/Neck:  0-year-old 3840, 1-year-old 5880, 5-year-old 8770, 10-year-old 88953 and adult 76529 Head:  0-year-old 4540, 1-year-old 7460, 5-year-old 52738, 10-year-old 54244 and adult 92030 Neck:  0-year-old 2940, 1-year-old 4160, 5-year-old 4550, 10-year-old 6320 and adult 8470 Chest:  0-year-old 550, 1-year-old 830, 5-year-old 1200, 10-year-old 3840 and adult 3570 Abd/pelvis:  0-year-old 440, 1-year-old 720, 5-year-old 1080, 10-year-old 3330 and adult 3330 Trunk(C/A/P):  0-year-old 490, 1-year-old 770, 5-year-old 1140, 10-year-old 3570 and adult 3330    [id number] View    Result Date: 5/25/2020  HISTORY/REASON FOR EXAM:  Line/Tube Placement; ileus, OGT placement.  ileus, OGT placement TECHNIQUE/EXAM DESCRIPTION AND NUMBER OF VIEWS: Abdomen (one view), 5/25/2020 8:49 AM. COMPARISON: None. FINDINGS: Enteric tube present with the tip in the proximal stomach. The sidehole is below the level of the gastroesophageal junction Air-filled loops of small bowel in the lower abdomen No free air. Loculated right pleural effusion is visualized. Postsurgical changes of the spine     Enteric tube placement as above Raulito Lechuga MD 5/25/2020 9:00 AM    [id number] View    Result Date: 5/25/2020  HISTORY/REASON FOR EXAM:  Distention. TECHNIQUE/EXAM DESCRIPTION AND NUMBER OF VIEWS: Abdomen (one view), 5/24/2020 11:58 PM. COMPARISON: 10/8/2019 FINDINGS: Dilated air-filled loops of small bowel. No free air. No abnormal soft tissue masses or calcifications Partially visualized postsurgical changes of the spine     Obstructive bowel gas pattern Raulito Lechuga MD 5/25/2020 8:16 AM    Dx-chest-limited (1 View)    Result Date: 6/8/2020 6/8/2020 1:43 PM HISTORY/REASON FOR EXAM:  Shortness of Breath. TECHNIQUE/EXAM DESCRIPTION AND NUMBER OF VIEWS: Single AP view of the chest. COMPARISON: 6/8/2020 at 0447 hours FINDINGS: Lines/tubes:  Support tubing is unchanged. Fixation hardware is present in the thoracic spine. Lungs:  There is persistent atelectasis or pneumonia in the right lower lobe. A loculated appearing right-sided pleural effusion appears slightly decreased in volume. A small left pleural effusion is present. Pleura:  No pneumothorax. Heart and mediastinum:  The heart silhouette is within normal limits. Bones:  Left thoracotomy changes are present.     1.  Slightly decreased volume of loculated appearing right pleural effusion. 2.  Persistent atelectasis or pneumonia in the left lower lobe. 3.  Unchanged small left pleural effusion.    Dx-chest-limited (1 View)    Result Date: 5/27/2020  HISTORY/REASON FOR EXAM: Shortness of Breath; f/u pneumonia, pl effusion, pulm edema. f/u pneumonia, pl  effusion, pulm edema TECHNIQUE/EXAM DESCRIPTION: Chest x-ray, one view, 5/27/2020 5:16 AM. COMPARISON: 5/26/2020 FINDINGS: Moderate right-sided pleural effusion is again noted. Trace left pleural effusion is again noted. Venous congestion and reticulonodular densities involving both lungs has worsened in the interval the previous study.     Findings are compatible with worsening pulmonary edema/pulmonary venous congestion.    Dx-chest-limited (1 View)    Result Date: 5/13/2020  HISTORY/REASON FOR EXAM: Pleural Effusion; post thoracentesis. post thoracentesis TECHNIQUE/EXAM DESCRIPTION: Chest x-ray, one view, 5/13/2020 9:02 AM. COMPARISON: 5/6/2020 FINDINGS: There is no evidence of right-sided pneumothorax. Right-sided pleural effusion is decreased. Left lung remains clear. The heart is normal in size.     Interval decrease in size in right pleural effusion with no evidence of postprocedural pneumothorax.    Dx-chest-portable (1 View)    Result Date: 6/10/2020  6/10/2020 4:26 AM HISTORY/REASON FOR EXAM:  For indication of respiratory failure; For indication of respiratory failure. TECHNIQUE/EXAM DESCRIPTION AND NUMBER OF VIEWS: Single portable view of the chest. COMPARISON: One day prior FINDINGS: Lines and tubes are stable. Cardiomediastinal silhouette is stable. Small to moderate loculated right pleural effusion. Small loculated left pleural effusion seen on CT is not well evaluated radiographically. Diffuse interstitial and some hazy airspace opacities likely pulmonary edema. Correlate clinically for infection.  Findings appear mildly worsened since prior radiograph. The bones are unchanged.     Loculated pleural effusions, right greater than left. Interstitial and airspace opacities likely pulmonary edema. Correlate clinically for infection. Findings possibly slightly increased from prior.    Dx-chest-portable (1 View)    Result Date: 6/9/2020 6/9/2020 4:31 AM HISTORY/REASON FOR EXAM: For indication of  respiratory failure; For indication of respiratory failure TECHNIQUE/EXAM DESCRIPTION:  Single AP view of the chest. COMPARISON: Yesterday FINDINGS: Position of medical devices appears stable. Cardiomegaly is observed. The mediastinal contour appears within normal limits.  The central  pulmonary vasculature appears prominent and indistinct. The lungs appear well expanded bilaterally.  Diffuse scattered hazy pulmonary parenchymal opacities are seen. Veil-like opacities are seen in bilateral lung bases, right greater than left. Left rib fractures are noted.     1.  Pulmonary edema and/or infiltrates are identified, which are stable since the prior exam. 2.  Bilateral pleural effusions, right greater than left 3.  Cardiomegaly     Dx-chest-portable (1 View)    Result Date: 6/8/2020 6/8/2020 4:18 AM HISTORY/REASON FOR EXAM: For indication of respiratory failure; For indication of respiratory failure TECHNIQUE/EXAM DESCRIPTION:  Single AP view of the chest. COMPARISON: Yesterday FINDINGS: Endotracheal tube has been exchanged for tracheostomy.   Otherwise medical device position is stable. Cardiomegaly is observed. The mediastinal contour appears within normal limits.  The central  pulmonary vasculature appears prominent and indistinct. The lungs appear well expanded bilaterally.  Diffuse scattered hazy pulmonary parenchymal opacities are seen. Veil-like opacities are seen in bilateral lung bases, right greater than left. Left rib fractures are noted.     1.  Pulmonary edema and/or infiltrates are identified, which are stable since the prior exam. 2.  Bilateral pleural effusions, right greater than left 3.  Cardiomegaly     Dx-chest-portable (1 View)    Result Date: 6/7/2020 6/7/2020 5:09 AM HISTORY/REASON FOR EXAM:  For indication of respiratory failure; For indication of respiratory failure TECHNIQUE/EXAM DESCRIPTION AND NUMBER OF VIEWS: Single portable view of the chest. COMPARISON: 6/6/2020 FINDINGS: Tubes and  lines: ET tube, feeding tube and right central venous catheter are redemonstrated. Diffuse interstitial prominence. Patchy right base opacities. Moderate right lateral pleural effusion. No pneumothorax. Stable cardiopericardial silhouette.     1. No significant interval change.    Dx-chest-portable (1 View)    Result Date: 6/6/2020 6/6/2020 5:13 AM HISTORY/REASON FOR EXAM:  For indication of respiratory failure; For indication of respiratory failure TECHNIQUE/EXAM DESCRIPTION AND NUMBER OF VIEWS: Single portable view of the chest. COMPARISON: 6/5/2020 FINDINGS: Tubes and lines: ET tube, feeding tube and right central venous catheter are redemonstrated. Diffuse interstitial prominence. Patchy right base opacities. Moderate right lateral pleural effusion. No pneumothorax. Stable cardiopericardial silhouette.     1. No significant interval change.    Dx-chest-portable (1 View)    Result Date: 6/5/2020 6/5/2020 12:50 PM HISTORY/REASON FOR EXAM:  Shortness of breath. TECHNIQUE/EXAM DESCRIPTION AND NUMBER OF VIEWS: Single portable view of the chest. COMPARISON: Exam at 5:26 AM FINDINGS: Single portable view of the chest demonstrates a normal cardiac silhouette and mediastinal contours. Right pleural effusion and adjacent airspace opacification are unchanged. Bilateral interstitial prominence is unchanged. No pneumothorax is identified. Lines of support are unchanged.     No significant interval change.    Dx-chest-portable (1 View)    Result Date: 6/5/2020 6/5/2020 5:16 AM HISTORY/REASON FOR EXAM: For indication of respiratory failure; For indication of respiratory failure TECHNIQUE/EXAM DESCRIPTION:  Single AP view of the chest. COMPARISON: Yesterday FINDINGS: Position of medical devices appears stable. Cardiomegaly is observed. The mediastinal contour appears within normal limits.  The central  pulmonary vasculature appears prominent and indistinct. The lungs appear well expanded bilaterally.  Diffuse scattered  hazy pulmonary parenchymal opacities are seen. Veil-like opacities are seen in bilateral lung bases, right greater than left. Left rib fractures are noted.     1.  Pulmonary edema and/or infiltrates are identified, which are stable since the prior exam. 2.  Bilateral pleural effusions, right greater than left 3.  Cardiomegaly     Dx-chest-portable (1 View)    Result Date: 6/4/2020 6/4/2020 4:35 AM HISTORY/REASON FOR EXAM: For indication of respiratory failure; For indication of respiratory failure TECHNIQUE/EXAM DESCRIPTION:  Single AP view of the chest. COMPARISON: Yesterday FINDINGS: Position of medical devices appears stable. Cardiomegaly is observed. The mediastinal contour appears within normal limits.  The central  pulmonary vasculature appears prominent and indistinct. The lungs appear well expanded bilaterally.  Diffuse scattered hazy pulmonary parenchymal opacities are seen. Veil-like opacities are seen in bilateral lung bases, right greater than left. Left rib fractures are noted.     1.  Pulmonary edema and/or infiltrates are identified, which are stable since the prior exam. 2.  Bilateral pleural effusions, right greater than left 3.  Cardiomegaly     Dx-chest-portable (1 View)    Result Date: 6/3/2020    6/3/2020 4:48 AM HISTORY/REASON FOR EXAM: For indication of respiratory failure; For indication of respiratory failure TECHNIQUE/EXAM DESCRIPTION:  Single AP view of the chest. COMPARISON: Yesterday FINDINGS: Position of medical devices appears stable. Cardiomegaly is observed. The mediastinal contour appears within normal limits.  The central  pulmonary vasculature appears prominent and indistinct. The lungs appear well expanded bilaterally.  Diffuse scattered hazy pulmonary parenchymal opacities are seen. Veil-like opacities are seen in bilateral lung bases, right greater than left. Left rib fractures are noted.     1.  Pulmonary edema and/or infiltrates are identified, which are stable since the  prior exam. 2.  Bilateral pleural effusions, right greater than left 3.  Cardiomegaly     Dx-chest-portable (1 View)    Result Date: 6/2/2020 6/2/2020 4:25 PM HISTORY/REASON FOR EXAM:  Follow-up right thoracentesis TECHNIQUE/EXAM DESCRIPTION AND NUMBER OF VIEWS: Single portable view of the chest. COMPARISON: 6/2/2020 FINDINGS: Scans of postoperative changes seen in the thoracolumbar spine. ET tube and feeding tube are again seen as is a right PICC line. The mediastinal and cardiac silhouette is unremarkable. The pulmonary vascularity is within normal limits. There is peribronchial wall thickening and edema. There is right lower lobe airspace disease. There has been interval decrease in the right pleural effusion status post thoracentesis. There is no visible pneumothorax. There are no acute bony abnormalities.     1.  There is no pneumothorax following right thoracentesis. 2.  The amount of right pleural effusion is decreased. 3.  There is vascular congestion and/or edema. 4.  There is right lower lobe airspace disease which could be atelectasis or pneumonitis.    Dx-chest-portable (1 View)    Result Date: 6/2/2020 6/2/2020 4:24 AM HISTORY/REASON FOR EXAM: For indication of respiratory failure; For indication of respiratory failure TECHNIQUE/EXAM DESCRIPTION:  Single AP view of the chest. COMPARISON: None FINDINGS: Position of medical devices appears stable. Cardiomegaly is observed. The mediastinal contour appears within normal limits.  The central  pulmonary vasculature appears prominent and indistinct. The lungs appear well expanded bilaterally.  Diffuse scattered hazy pulmonary parenchymal opacities are seen. Veil-like opacities are seen in bilateral lung bases, right greater than left. Left rib fractures are noted.     1.  Pulmonary edema and/or infiltrates are identified, which are stable since the prior exam. 2.  Bilateral pleural effusions, right greater than left 3.  Cardiomegaly    Dx-chest-portable (1  View)    Result Date: 6/1/2020 6/1/2020 4:20 AM HISTORY/REASON FOR EXAM: For indication of respiratory failure; For indication of respiratory failure TECHNIQUE/EXAM DESCRIPTION:  Single AP view of the chest. COMPARISON: None FINDINGS: Right internal jugular central line is seen terminating at the right atriocaval junction.  Endotracheal tube tip terminates at the level of the izaiah.  Nasogastric tube terminates below the lower margin of the film within the abdomen. Cardiomegaly is observed. The mediastinal contour appears within normal limits.  The central  pulmonary vasculature appears prominent and indistinct. The lungs appear well expanded bilaterally.  Diffuse scattered hazy pulmonary parenchymal opacities are seen. Veil-like opacities are seen in bilateral lung bases, right greater than left. The bony structures appear age-appropriate.     1.  Pulmonary edema and/or infiltrates. 2.  Bilateral pleural effusions, right greater than left 3.  Cardiomegaly    Dx-chest-portable (1 View)    Result Date: 5/31/2020  HISTORY/REASON FOR EXAM: intubation. intubation TECHNIQUE/EXAM DESCRIPTION: Chest x-ray, one portable view, 5/30/2020 8:26 PM. COMPARISON: 5/20/2020 FINDINGS: The endotracheal tube terminates above the level the izaiah. NG tube passes caudal to the diaphragm. Right-sided PICC line remains in stable position. Large right-sided pleural effusion is again noted and appear stable. Increasing areas of consolidation of developed involving the apices of both lungs, as well as the lateral aspect of the left lung.     Extensive bilateral lung opacities are now noted, which could be explained by severe bilateral pneumonia. Stable appearance of right pleural effusion. Supportive lines and tubes appear in appropriate positions.    Dx-chest-portable (1 View)    Result Date: 5/28/2020  HISTORY/REASON FOR EXAM: Shortness of Breath. TECHNIQUE/EXAM DESCRIPTION: Chest x-ray, one portable view, 5/28/2020 7:53 AM.  COMPARISON: 5/27/2020 FINDINGS: Pulmonary venous congestion pattern has improved, however, prominent interstitial opacities persist, compatible with pulmonary edema. Right-sided pleural effusion is stable. PICC line has been placed and appears in excellent position.     Persistent right-sided pleural effusion.    Dx-chest-portable (1 View)    Result Date: 5/26/2020  HISTORY/REASON FOR EXAM:  f/u right pleural effusion. f/u right pleural effusion TECHNIQUE/EXAM DESCRIPTION AND NUMBER OF VIEWS: Chest x-ray (one view), 5/26/2020 5:22 AM. COMPARISON: 5/24/2020 FINDINGS: Endotracheal tube and enteric tube is been removed. Large loculated right pleural effusion, appears slightly larger. Increased interstitial markings throughout both lungs. The cardiac silhouette is faintly visualized and appear stable Postsurgical changes of the spine     Endotracheal tube and enteric tube have been removed. Worsening trend with increased interstitial markings throughout the lungs probable worsening pulmonary edema Slight increase size of large loculated right pleural effusionRaulito Lechuga MD 5/26/2020 8:16 AM     Dx-chest-portable (1 View)    Result Date: 5/25/2020  HISTORY/REASON FOR EXAM:  Pleural Effusion. TECHNIQUE/EXAM DESCRIPTION AND NUMBER OF VIEWS: Chest x-ray (one view), 5/24/2020 9:38 PM. COMPARISON: 5/23/2020 FINDINGS: Endotracheal tube is been placed with the tip 5 cm above the izaiah Enteric tube extends below the field of view Loculated right pleural effusion Diffuse increased interstitial markings in both lungs. The cardiac silhouette is unremarkable. Postsurgical changes of the spine History says line placement. No central venous catheters identified. Correlate clinically.     Tubes and lines as above Large loculated right pleural effusion. Diffuse increased interstitial markings. Likely secondary to edema 5/25/2020 8:26 AM     Dx-chest-portable (1 View)    Result Date: 5/23/2020  HISTORY/REASON FOR EXAM:  HYPOTENSION.  TECHNIQUE/EXAM DESCRIPTION AND NUMBER OF VIEWS: Chest x-ray (one view), 5/23/2020 12:06 AM. COMPARISON: 5/13/2020 FINDINGS: Large loculated right pleural effusion is identified. Ill-defined airspace opacities involving the left lung base. The cardiac and mediastinal silhouette are unremarkable. Postsurgical changes of the thoracic spine.     Large right pleural effusion, loculated Atelectasis versus developing pneumonia in the left lung 5/23/2020 8:48 AM     Ir-thoracentesis Puncture Right    Result Date: 5/13/2020  HISTORY/REASON FOR EXAM:  Recurrent pleural effusion. TECHNIQUE/EXAM DESCRIPTION: Ultrasound-guided thoracentesis, 5/13/2020 8:59 AM. COMPARISON: None. PROCEDURE: The risks, benefits, alternatives and the procedure were discussed with the patient.  The patient's questions were answered to their full satisfaction.  They subsequently agreed to proceed with the proposed procedure and signed a written consent form. A procedural timeout was performed. Maximum sterile barrier protection mechanisms were employed. The patient's laboratory values, allergies and medication list were reviewed prior to the examination. The patient's thoracic cavity was sonographically evaluated.  An area suitable for thoracentesis was identified.  The overlying skin was prepped and draped in a sterile fashion.  1% lidocaine was infiltrated subcutaneously for local anesthesia. Following this, the thoracic cavity was entered utilizing a Safe-T-Centesis Catheter System.  A total of 900 cc of serosanguineous fluid was subsequently aspirated under sonographic control. The patient tolerated the procedure well and there were no immediate postprocedural complications. Following the procedure, the chest radiograph demonstrated no evidence of pneumothorax and decrease in size of the pleural effusion.     Unremarkable sonographic-guided thoracentesis.    Mr-brain-with & W/o    Result Date: 6/6/2020 6/6/2020 5:00 PM HISTORY/REASON FOR EXAM:   exposed right craniotomy hardware r/o infection. TECHNIQUE/EXAM DESCRIPTION:   MRI of the brain without and with contrast. T1 sagittal, T2 fast spin-echo axial, T1 coronal, FLAIR coronal, diffusion-weighted and apparent diffusion coefficient (ADC map) axial images were obtained of the whole brain. T1 postcontrast axial and T1 postcontrast coronal images were obtained. The study was performed on a [employer]a 1.5 Deborah MRI scanner. 15 mL ProHance contrast was administered intravenously. COMPARISON:  None. FINDINGS:  There is abnormal contrast enhancement and edema noted involving right frontal craniotomy flap. There is abnormal enhancing extradural tissue noted adjacent to the right frontal bone with abnormal adjacent dural enhancement. There is no evidence of leptomeningeal enhancement. There is no parenchymal contrast enhancement. There is no acute infarct. There is no acute or chronic parenchymal hemorrhage. There is severe cerebral volume loss. There is mild cerebellar volume loss. There is no intra-axial space-occupying lesion. The midline structures including the pituitary, hypothalamic and pineal regions are unremarkable. The posterior fossa structures are unremarkable. The visualized flow voids of the cerebral vasculature are unremarkable. There is no large lesion identified in the expected course of the intracranial portions of the cranial nerves. There is mucosal thickening in the bilateral mastoid air cells.     1.  There is abnormal contrast enhancement and edema noted involving right frontal craniotomy flap. There is abnormal enhancing extradural tissue noted adjacent to the right frontal bone with abnormal adjacent dural enhancement. 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 There is no evidence of leptomeningeal enhancement. There is no parenchymal contrast enhancement. 2.  Severe cerebral volume loss. 3.  Mild cerebral volume loss.    Us-extremity  Artery Upper Bilat    Result Date: 2020   Vascular Laboratory  Conclusions  No hemodynamically significant evidence of arterial disease in the right  upper extremity.  No hemodynamically significant evidence of arterial disease in the left  upper extremity.  LARISSA DURBIN  Exam Date:     2020 09:04  Room #:     Inpatient  Priority:     Routine  Ht (in):             Wt (lb):  Ordering Physician:        JONY HANSEN JR  Referring Physician:       [id number] JR, RICHARD  Sonographer:               Erlinda Pham RVT  Study Type:                Complete Bilateral  Technical Quality:         Adequate  Age:    54    Gender:     M  MRN:    [id number]  :    1965      BSA:  Indications:     Cyanosis  CPT Codes:       28542  ICD Codes:       782.5  History:         Bilateral cyanosis  Limitations:     Challenging due to patient movement.          RIGHT                                                 LEFT  Waveforms               PSV                              PSV        Waveforms                          (cm/s)                           (cm/s)  Triphasic                77.00       Subclavian          69.00      Triphasic  Bi, non-                 84.00       Axillary            54.00      Bi, non-  reversed                                                            reversed                                       Brachial  Bi, non-                 100.00      Proximal            63.00      Triphasic  reversed                                       Mid  Triphasic                72.00       Distal              38.00      Bi, non-                                                                      reversed                                       Radial  Bi, non-                 77.00       Proximal            61.10      Bi, non-  reversed                                                            reversed                                       Mid   Bi, non-                 47.00       Distal              17.40      Bi, non-  reversed                                                            reversed                                       Ulnar  Bi, non-                 77.00       Proximal            71.30      Bi, non-  reversed                                                            reversed                                       Mid  Bi, non-                 105.00      Distal              63.10      Bi, non-  reversed                                                            reversed  Findings  Right  No hemodynamically significant evidence of arterial disease in the right  upper extremity.  Multiphasic Doppler flow pattern is noted throughout the right upper  extremity.  Left  No hemodynamically significant evidence of arterial disease in the left  upper extremity.  Multiphasic Doppler flow pattern is noted throughout the left upper  extremity.  The distal radial artery is small with low velocities, but patent.  Melody MARTINEZ To  (Electronically Signed)  Final Date:      06 June 2020                   11:57    Us-thoracentesis Puncture Right    Result Date: 6/3/2020  6/2/2020 1:54 PM HISTORY/REASON FOR EXAM:  Shortness of breath TECHNIQUE/EXAM DESCRIPTION: Ultrasound-guided thoracentesis. Indication:  RIGHT pleural fluid collection. COMPARISON:  Plain film from the same day PROCEDURE:     Informed consent was obtained over the phone from the patient's family. A timeout was taken. A right pleural effusion was localized with real-time ultrasound guidance. The right posterior chest wall was prepped and draped in a sterile manner. Following local anesthesia with 1% lidocaine, a 5 Malian Yueh pigtail catheter was advanced into the pleural space with trocar technique and pleural fluid was drained. The patient tolerated the procedure well without evidence of complication. A post thoracentesis chest radiograph is forthcoming. FINDINGS: Fluid was sent to the  laboratory. Fluid character: serosanguinous     1. Ultrasound guided right sided diagnostic and therapeutic thoracentesis. 2. 800 mL of fluid withdrawn.    Ec-echocardiogram Complete W/o Cont    Result Date: 2020  Transthoracic Echo Report Echocardiography Laboratory CONCLUSIONS No prior study is available for comparison. Left ventricular ejection fraction is visually estimated to be 65%. Normal left ventricular systolic function. Mild concentric left ventricular hypertrophy. Grade II diastolic dysfunction. Moderately dilated right ventricle. Mildly dilated left atrium. Mild mitral regurgitation. Estimated right ventricular systolic pressure is 55 mmHg. Trace tricuspid regurgitation. Normal pericardium without effusion. DURBINLARISSA Exam Date:         2020                    09:39 Exam Location:     Out Patient Priority:          Routine Ordering Physician:        JONY HANSEN JR Referring Physician:       [id number] JR Sonographer:               Madina Michaels RDCS Age:    54     Gender:    M MRN:    [id number] :    1965 BSA:    1.75   Ht (in):    64     Wt (lb):    153 Exam Type:     Complete Indications:     Cardiac Arrest ICD Codes:       427.5 CPT Codes:       34502 BP:   110    /   58     HR:   62 Technical Quality:       Fair MEASUREMENTS  (Male / Female) Normal Values 2D ECHO LV Diastolic Diameter PLAX        4.9 cm                4.2 - 5.9 / 3.9 - 5.3 cm LV Systolic Diameter PLAX         3 cm                  2.1 - 4.0 cm IVS Diastolic Thickness           1.1 cm                LVPW Diastolic Thickness          1.1 cm                LVOT Diameter                     2.3 cm                Estimated LV Ejection Fraction    65 %                  LV Ejection Fraction MOD BP       62.9 %                >= 55  % LV Ejection Fraction MOD 4C       63.8 %                LV Ejection Fraction MOD 2C       62.2 %                IVC Diameter                      2.5 cm                DOPPLER AV  Peak Velocity                  1.1 m/s               AV Peak Gradient                  4.4 mmHg              AV Mean Gradient                  2.2 mmHg              LVOT Peak Velocity                0.83 m/s              AV Area Cont Eq vti               3.1 cm2               Mitral E Point Velocity           1 m/s                 Mitral E to A Ratio               2.3                   MV Pressure Half Time             53.7 ms               MV Area PHT                       4.1 cm2               MV Deceleration Time              185 ms                TR Peak Velocity                  291 cm/s              PV Peak Velocity                  0.53 m/s              PV Peak Gradient                  1.1 mmHg              RVOT Peak Velocity                0.4 m/s               * Indicates values subject to auto-interpretation LV EF:  65    % FINDINGS Left Ventricle Normal left ventricular chamber size. Mild concentric left ventricular hypertrophy. Normal left ventricular systolic function. Left ventricular ejection fraction is visually estimated to be 65%. Normal diastolic function. Grade II diastolic dysfunction. Right Ventricle Moderately dilated right ventricle. Right Atrium The right atrium is normal in size. Left Atrium Mildly dilated left atrium. Left atrial volume index is 38 mL/sq m. Mitral Valve Structurally normal mitral valve without significant stenosis. Mild mitral regurgitation. Aortic Valve Structurally normal aortic valve without significant stenosis or regurgitation. Tricuspid Valve Structurally normal tricuspid valve without significant stenosis. Trace tricuspid regurgitation. Estimated right ventricular systolic pressure is 55 mmHg. Right atrial pressure is estimated to be 3 mmHg. Pulmonic Valve Structurally normal pulmonic valve without significant stenosis or regurgitation. Pericardium Normal pericardium without effusion. Aorta The aortic root is normal.  Ascending aorta diameter is 3.0 cm. Pavel BENITEZ  Joan BRITT (Electronically Signed) Final Date:     02 June 2020                 15:10    Dx-abdomen For Tube Placement    Result Date: 6/2/2020 6/2/2020 1:06 PM HISTORY/REASON FOR EXAM:  Line evaluation. TECHNIQUE/EXAM DESCRIPTION AND NUMBER OF VIEWS:  1 view(s) of the abdomen. COMPARISON:  None. FINDINGS: Enteric tube has been placed. The tip projects over the left upper quadrant. The bowel gas pattern is within normal limits. There is postoperative change in the thoracolumbar spine.     1.  Feeding tube tip projects over the gastric body.    Ir-picc Line Placement W/guidance < Age 5    Result Date: 5/27/2020  HISTORY/REASON FOR EXAM:  IR picc placement. TECHNIQUE/EXAM DESCRIPTION: ULTRASOUND AND FLUOROSCOPIC-GUIDED PICC LINE INSERTION, 5/27/2020 10:36 AM.. TECHNIQUE:  The procedure was explained to the patient.  The patient was placed supine on the fluoroscopy table.  Ultrasound examination of the right arm was performed to confirm patency of the basilic vein.  Using all elements of Maximum Sterile Barrier  technique and local anesthesia with ultrasound guidance, the above mentioned examined vein was punctured through a small skin incision.  Continuous real time ultrasound monitoring of the puncture needle entry into the vein was performed and documented. Under fluoroscopic guidance, a 0.018 wire was passed centrally through the needle.  A peel-away sheath was placed over the wire.  A 5 Malay double lumen Power PICC line was advanced until the tip was at the SVC-right atrium junction.  The position of the catheter tip was confirmed with fluoroscopy.  The sheath was removed, and the line was secured to the patient's skin.  There were no immediate complications, and the patient tolerated the procedure well. FINDINGS: The tip of the PICC line is at the SVC-right atruim junction . 1 saved images. Fluoroscopic time was 3.2 minutes     Successful placement of a PICC line.  Ready to infuse.,      Micro:  Results      Procedure Component Value Units Date/Time    FLUID CULTURE W/GRAM STAIN [[id number]] Collected:  06/08/20 1334    Order Status:  Completed Specimen:  Body Fluid from Thoracentesis Fluid Updated:  06/10/20 0753     Significant Indicator NEG     Source BF     Site Pleural Fluid     Culture Result No growth at 48 hours.     Gram Stain Result No organisms seen.    Narrative:       Collected By:[id number] MATOS.  Collected By:[id number] JAIMIE VUONG    AFB CULTURE [[id number]] Collected:  06/08/20 1334    Order Status:  Completed Specimen:  Body Fluid from Thoracentesis Fluid Updated:  06/10/20 0753     Significant Indicator NEG     Source BF     Site Pleural Fluid     Culture Result Culture in progress.     AFB Smear Results No acid fast bacilli seen.    Narrative:       Collected By:[id number] MATOS.  Collected By:[id number] VUONG    FUNGAL CULTURE [[id number]] Collected:  06/08/20 1334    Order Status:  Completed Specimen:  Body Fluid from Thoracentesis Fluid Updated:  06/10/20 0753     Significant Indicator NEG     Source BF     Site Pleural Fluid     Culture Result Culture in progress.    Narrative:       Collected By:[id number] JAIMIE MATOS.  Collected By:[id number] JAIMIE VUONG    SARS-CoV-2, PCR (In-House) [[id number]] Collected:  06/10/20 0646    Order Status:  Completed Updated:  06/10/20 0722     SARS-CoV-2 Source NP Swab     SARS-CoV-2 by PCR NotDetected    Narrative:       Collected By:[id number] CARLA RIDDLE  Rapid test for Pre Op surgery today  Is this test for diagnosis or screening?->Screen    COVID/SARS CoV-2 [[id number]] Collected:  06/10/20 0646    Order Status:  Completed Specimen:  Respirate from Nasal Updated:  06/10/20 0651     COVID Order Status Received    Narrative:       Collected By:[id number] [email or web address]  Rapid test for Pre Op surgery today  Is this test for diagnosis or screening?->Screen    GRAM STAIN [[id number]] Collected:  06/08/20 1334    Order Status:  Completed Specimen:   "Body Fluid Updated:  06/09/20 2214     Significant Indicator .     Source BF     Site Pleural Fluid     Gram Stain Result No organisms seen.    Narrative:       Collected By:[id number] MATOS.  Collected By:[id number] JAIMIE VUONG    Acid Fast Stain [[id number]] Collected:  06/08/20 1334    Order Status:  Completed Specimen:  Body Fluid Updated:  06/09/20 2214     Significant Indicator NEG     Source BF     Site Pleural Fluid     AFB Smear Results No acid fast bacilli seen.    Narrative:       Collected By:[id number] JAIMIE MATOS.  Collected By:[id number] JAIMIE VUONG    BLOOD CULTURE [[id number]] Collected:  06/08/20 1825    Order Status:  Completed Specimen:  Blood from Peripheral Updated:  06/09/20 0740     Significant Indicator NEG     Source BLD     Site PERIPHERAL     Culture Result No Growth  Note: Blood cultures are incubated for 5 days and  are monitored continuously.Positive blood cultures  are called to the RN and reported as soon as  they are identified.      Narrative:       Collected By:[id number] TAVARES IBARRA  Per Hospital Policy: Only change Specimen Src: to \"Line\" if  specified by physician order.  Right Forearm/Arm    BLOOD CULTURE [[id number]] Collected:  06/08/20 1830    Order Status:  Completed Specimen:  Blood from Peripheral Updated:  06/09/20 0740     Significant Indicator NEG     Source BLD     Site PERIPHERAL     Culture Result No Growth  Note: Blood cultures are incubated for 5 days and  are monitored continuously.Positive blood cultures  are called to the RN and reported as soon as  they are identified.      Narrative:       Collected By:[id number] TAVARES IBARRA  Per Hospital Policy: Only change Specimen Src: to \"Line\" if  specified by physician order.  Left Forearm/Arm    FLUID CULTURE W/GRAM STAIN [[id number]] Collected:  06/08/20 1334    Order Status:  Sent Specimen:  Body Fluid from Thoracentesis Fluid     AFB Culture [[id number]] Collected:  06/02/20 1440    Order Status:  Completed " Specimen:  Body Fluid Updated:  06/07/20 0806     Significant Indicator NEG     Source BF     Site Thoracentesis Fluid     Culture Result Culture in progress.     AFB Smear Results No acid fast bacilli seen.    FLUID CULTURE W/GRAM STAIN [[id number]] Collected:  06/02/20 1440    Order Status:  Completed Specimen:  Body Fluid Updated:  06/07/20 0806     Significant Indicator NEG     Source BF     Site Thoracentesis Fluid     Culture Result No growth at 5 days.     Gram Stain Result No organisms seen.    Fungal Culture [[id number]] Collected:  06/02/20 1440    Order Status:  Completed Specimen:  Body Fluid Updated:  06/07/20 0806     Significant Indicator NEG     Source BF     Site Thoracentesis Fluid     Culture Result Culture in progress.    CULTURE RESPIRATORY W/ GRM STN [[id number]] Collected:  06/03/20 1018    Order Status:  Completed Specimen:  Respirate from Tracheal Aspirate Updated:  06/05/20 1113     Significant Indicator NEG     Source RESP     Site TRACHEAL ASPIRATE     Culture Result No growth at 48 hours.     Gram Stain Result Rare WBCs.  No organisms seen.      Narrative:       Collected By:[id number] KEN BROCK  Collected By:[id number] BROCK    GRAM STAIN [[id number]] Collected:  06/02/20 1440    Order Status:  Completed Specimen:  Body Fluid Updated:  06/03/20 1807     Significant Indicator .     Source BF     Site Thoracentesis Fluid     Gram Stain Result No organisms seen.    Acid Fast Stain [[id number]] Collected:  06/02/20 1440    Order Status:  Completed Specimen:  Body Fluid Updated:  06/03/20 1807     Significant Indicator NEG     Source BF     Site Thoracentesis Fluid     AFB Smear Results No acid fast bacilli seen.    GRAM STAIN [[id number]] Collected:  06/03/20 1018    Order Status:  Completed Specimen:  Respirate Updated:  06/03/20 1722     Significant Indicator .     Source RESP     Site TRACHEAL ASPIRATE     Gram Stain Result Rare WBCs.  No organisms seen.      Narrative:        Collected By:[id number] KEN BROCK  Collected By:[id number] KEN BROCK          Assessment:  Active Hospital Problems    Diagnosis   • PEA (Pulseless electrical activity) ([facility]) [I46.9]   • Shock ([facility]) [R57.9]   • Anemia [D64.9]   • Seizure disorder ([facility]) [G40.909]   • DAMIAN (acute kidney injury) ([facility]) [N17.9]   • Epidural phlegmon [G06.2]   • Osteomyelitis of skull ([facility]) [M86.9]   • Goals of care, counseling/discussion [Z71.89]   • Acute respiratory failure with hypoxia ([facility]) [J96.01]   • Recurrent right pleural effusion [J90]   • Pneumonia [J18.9]   • Ileus ([facility]) [K56.7]   • Hypokalemia [E87.6]     Interval 24 hours:      AF, O2 T-piece 15 liters   Labs reviewed  Imaging personally reviewed both images and report.  Pleural effusions, significant change per my read.   Studies reviewed  Micro reviewed    Events- OR today with neurosurgery    Pt is alert but did not follow any commands for me today.  He is continued on broad-spectrum antibiotics.       ASSESSMENT/PLAN:       54 y.o.  admitted 5/31/2020 who initially presented to [facility] on 5/2 complaining of urinary retention and hypotension.  He had previously been hospitalized at [facility] from 5/6-5/11 for COPD exacerbation.   During his stay at [facility] starting on 5/22 he required intubation due to seizure activity.  Eventually extubated but developed aspiration pneumonia and increasing encephalopathy.  The patient had an episode of unresponsiveness with bradycardia and a code was called.  Patient received 3 rounds of CPR and 2 episodes of epi before ROSC was achieved.  He was intubated and transferred to [facility] for higher level of care.  He has had numerous antibiotics since admit.  Antibiotics were off from 6/4-6/6 and then he was restarted.     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   -History of bilateral craniotomy with subdural evacuation in 10/2016  -MRI on 6/6 with contrast enhancement and edema involving the right frontal craniotomy flap as well as extradural tissue adjacent to the right frontal bone concerning for craniotomy flap osteomyelitis with adjacent epidural phlegmon  -Neurosurgery is on board-planning to take patient to the OR today   DAMIAN-ongoing, some improvement today   Encephalopathy- improving   Seizure disorder  COPD and on unknown home oxygen  Alcohol abuse per chart  Hepatitis C with cirrhosis per chart, unknown if treated     Plan      --- Continue linezolid, cefepime and Flagyl  - pt improved on this regimen and with thoracentesis.  He will likely need multiple weeks of antibiotics.  May be able to tailor based on cultures in OR but he has been on broad-spectrum for several days so will likely limit culture yield.  Avoiding vancomycin so far due to renal dysfunction.    --- Blood cultures due to new fevers -NGTD   --- F/up thoracentesis cultures - NGTD   --- Agree with neurosurgical intervention for more definitive management- OR today for removal of mallorie hole cover and closure of wound -  please obtain tissue/bone and CSF for cultures      Plan of care discussed Dr. Londono and ID pharmacy regarding dosing insetting of improving DAMIAN. Will continue to follow.

## 2020-06-10 NOTE — ANESTHESIA QCDR
2019 [facility] Clinical Data Registry (for Quality Improvement)     Postoperative nausea/vomiting risk protocol (Adult = 18 yrs and Pediatric 3-17 yrs)- (430 and 463)  General inhalation anesthetic (NOT TIVA) with PONV risk factors: Yes  Provision of anti-emetic therapy with at least 2 different classes of agents: No   Patient DID NOT receive anti-emetic therapy and reason is documented in Medical Record: Yes       Multimodal Pain Management- (477)  Non-emergent surgery AND patient age >= 18: Yes  Use of Multimodal Pain Management, two or more drugs and/or interventions, NOT including systemic opioids: Yes  Exception: Documented allergy to multiple classes of analgesics: N/A    Smoking Abstinence (404)  Patient is current smoker (cigarette, pipe, e-cig, marijuanna): No  Elective Surgery:   Abstinence instructions provided prior to day of surgery:   Patient abstained from smoking on day of surgery:     Pre-Op Beta-Blocker in Isolated CABG (44)  Isolated CABG AND patient age >= 18: No  Beta-blocker admin within 24 hours of surgical incision:   Exception:of medical reason(s) for not administering beta blocker within 24 hours prior to surgical incision (e.g., not  indicated,other medical reason):     PACU assessment of acute postoperative pain prior to Anesthesia Care End- Applies to Patients Age = 18- (ABG7)  Initial PACU pain score is which of the following: < 7/10  Patient unable to report pain score: N/A    Post-anesthetic transfer of care checklist/protocol to PACU/ICU- (426 and 427)  Upon conclusion of case, patient transferred to which of the following locations: ICU  Use of transfer checklist/protocol: Yes  Exclusion: Service Performed in Patient Hospital Room (and thus did not require transfer): N/A  Unplanned admission to ICU related to anesthesia service up through end of PACU care- (MD51)  Unplanned admission to ICU (not initially anticipated at anesthesia start time): No

## 2020-06-10 NOTE — PROGRESS NOTES
Patient transported to OR with RN and RT on monitor and supplemental oxygen. VSS, uneventful transport.

## 2020-06-10 NOTE — CARE PLAN
Problem: Respiratory:  Goal: Respiratory status will improve  Intervention: Assess and monitor pulmonary status  Flowsheets (Taken 6/10/2020 0262)  Respiratory Pattern: Shallow  Cough: Weak  RUL Breath Sounds: Clear  RML Breath Sounds: Clear  CHARLEY Breath Sounds: Diminished  LLL Breath Sounds:   Clear   Diminished     Problem: Pain Management  Goal: Pain level will decrease to patient's comfort goal  Outcome: PROGRESSING AS EXPECTED

## 2020-06-10 NOTE — ANESTHESIA POSTPROCEDURE EVALUATION
Patient: Vishnu Lechuga Sr.    Procedure Summary     Date:  06/10/20 Room / Location:  [facility] OR 03 / SURGERY [facility]    Anesthesia Start:  1348 Anesthesia Stop:  1523    Procedure:  CRANIECTOMY - FOR REMOVAL RIGHT CRANI HARDWARE, POSS CRANIECTOMY (Right Head) Diagnosis:  (non healing cranial plate )    Surgeon:  Mian Vallejo M.D. Responsible Provider:  Ilia Gil M.D.    Anesthesia Type:  general ASA Status:  3          Final Anesthesia Type: general  Last vitals  BP   Blood Pressure: 112/59    Temp   36.7 °C (98.1 °F)    Pulse   Pulse: 75   Resp   12    SpO2   96 %      Anesthesia Post Evaluation    Patient location during evaluation: ICU  Patient participation: complete - patient cannot participate  Level of consciousness: responsive to light touch  Pain score: 0    Airway patency: patent  Anesthetic complications: no  Cardiovascular status: adequate  Respiratory status: T-piece  Hydration status: acceptable    PONV: none           Nurse Pain Score: 2 (NPRS)

## 2020-06-10 NOTE — PROGRESS NOTES
"    DATE: 6/10/2020    Hospital Day 10     Interval Events:  Repeat CT imaging of the chest.    PHYSICAL EXAMINATION:  Constitutional:     Vital Signs: BP (!) 84/52   Pulse 66   Temp 36.7 °C (98.1 °F) (Bladder)   Resp (!) 22   Ht 1.626 m (5' 4\")   Wt 70.9 kg (156 lb 4.9 oz)   SpO2 100%    General Appearance: The patient is a critically ill-appearing and man .  Respiratory:   Inspection: Mechanically ventilated.   Palpation:  The chest is nontender.    Auscultation: Coarse bilaterally, diminished in the bases.    Laboratory Values:   Recent Labs     06/08/20  0433 06/09/20  0324 06/10/20  0518   WBC 12.0* 15.6* 14.4*   RBC 2.19* 2.15* 2.51*   HEMOGLOBIN 7.1* 6.9* 7.9*   HEMATOCRIT 23.9* 23.3* 26.5*   .1* 108.4* 105.6*   MCH 32.4 32.1 31.5   MCHC 29.7* 29.6* 29.8*   RDW 64.6* 63.8* 77.3*   PLATELETCT 323 306 323   MPV 10.6 10.3 10.4     Recent Labs     06/09/20  0324 06/09/20  1410 06/09/20  1752 06/10/20  0518   SODIUM 143 149*  --  143   POTASSIUM 3.5* 3.9  --  3.6   CHLORIDE 104 109  --  105   CO2 26 27  --  29   GLUCOSE 149* 120* 196* 119*   BUN 43* 39*  --  37*   CREATININE 1.86* 1.70*  --  1.53*   CALCIUM 7.8* 7.4*  --  8.0*     Recent Labs     06/08/20  1126   INR 1.03     Recent Labs     06/08/20  1126   APTT 30.4   INR 1.03        Imaging:   DX-CHEST-PORTABLE (1 VIEW)   Final Result      Loculated pleural effusions, right greater than left. Interstitial and airspace opacities likely pulmonary edema. Correlate clinically for infection. Findings possibly slightly increased from prior.      CT-CHEST (THORAX) W/O   Final Result      Small loculated right pleural effusion with overlying atelectasis/consolidation. Mild pleural thickening may be infectious/inflammatory but malignancy is not excluded.      Small loculated left pleural effusion with subsegmental lingula and left lower lobe atelectasis.      Interlobular septal thickening and groundglass opacities compatible with edema. Infection not " excluded.      Cardiomegaly.      Enlarged mediastinal lymph nodes may be reactive but malignancy is not excluded.      Atherosclerotic plaque including coronary artery calcification.      Prominence of the main pulmonary artery can be seen in pulmonary arterial hypertension.      Trace amount of ascites.      Bilateral rib fractures.      Multiple fractures in the thoracic spine. Demineralization.            DX-CHEST-PORTABLE (1 VIEW)   Final Result         1.  Pulmonary edema and/or infiltrates are identified, which are stable since the prior exam.   2.  Bilateral pleural effusions, right greater than left   3.  Cardiomegaly                     DX-CHEST-LIMITED (1 VIEW)   Final Result      1.  Slightly decreased volume of loculated appearing right pleural effusion.      2.  Persistent atelectasis or pneumonia in the left lower lobe.      3.  Unchanged small left pleural effusion.      DX-CHEST-PORTABLE (1 VIEW)   Final Result         1.  Pulmonary edema and/or infiltrates are identified, which are stable since the prior exam.   2.  Bilateral pleural effusions, right greater than left   3.  Cardiomegaly                  DX-CHEST-PORTABLE (1 VIEW)   Final Result         1. No significant interval change.      MR-BRAIN-WITH & W/O   Final Result      1.  There is abnormal contrast enhancement and edema noted involving right frontal craniotomy flap. There is abnormal enhancing extradural tissue noted adjacent to the right frontal bone with abnormal adjacent dural enhancement. These findings are    concerning for craniotomy flap osteomyelitis with adjacent epidural phlegmon. There is no evidence of leptomeningeal enhancement. There is no parenchymal contrast enhancement.   2.  Severe cerebral volume loss.   3.  Mild cerebral volume loss.      US-EXTREMITY ARTERY UPPER BILAT   Final Result      DX-CHEST-PORTABLE (1 VIEW)   Final Result         1. No significant interval change.      DX-CHEST-PORTABLE (1 VIEW)   Final  Result      No significant interval change.      DX-CHEST-PORTABLE (1 VIEW)   Final Result         1.  Pulmonary edema and/or infiltrates are identified, which are stable since the prior exam.   2.  Bilateral pleural effusions, right greater than left   3.  Cardiomegaly               DX-CHEST-PORTABLE (1 VIEW)   Final Result         1.  Pulmonary edema and/or infiltrates are identified, which are stable since the prior exam.   2.  Bilateral pleural effusions, right greater than left   3.  Cardiomegaly            DX-CHEST-PORTABLE (1 VIEW)   Final Result         1.  Pulmonary edema and/or infiltrates are identified, which are stable since the prior exam.   2.  Bilateral pleural effusions, right greater than left   3.  Cardiomegaly         DX-CHEST-PORTABLE (1 VIEW)   Final Result      1.  There is no pneumothorax following right thoracentesis.   2.  The amount of right pleural effusion is decreased.   3.  There is vascular congestion and/or edema.   4.  There is right lower lobe airspace disease which could be atelectasis or pneumonitis.      US-THORACENTESIS PUNCTURE RIGHT   Final Result      1. Ultrasound guided right sided diagnostic and therapeutic thoracentesis.      2. 800 mL of fluid withdrawn.      DX-ABDOMEN FOR TUBE PLACEMENT   Final Result      1.  Feeding tube tip projects over the gastric body.      EC-ECHOCARDIOGRAM COMPLETE W/O CONT   Final Result      DX-CHEST-PORTABLE (1 VIEW)   Final Result         1.  Pulmonary edema and/or infiltrates are identified, which are stable since the prior exam.   2.  Bilateral pleural effusions, right greater than left   3.  Cardiomegaly      DX-CHEST-PORTABLE (1 VIEW)   Final Result         1.  Pulmonary edema and/or infiltrates.   2.  Bilateral pleural effusions, right greater than left   3.  Cardiomegaly      CT-HEAD W/O   Final Result         1.  No acute intracranial abnormality is identified, there are nonspecific white matter changes, commonly associated with  small vessel ischemic disease.  Associated mild cerebral atrophy is noted.   2.  Atherosclerosis.          ASSESSMENT AND PLAN:     No new Assessment & Plan notes have been filed under this hospital service since the last note was generated.  Service: Surgery General      DISPOSITION: Surgery today for coverage of expose cranial plate. Interval thoracotomy and decortication pending overall condition.     ____________________________________     Gómez Lance M.D.

## 2020-06-10 NOTE — OR NURSING
2 RN consent obtained for anesthesia via telephone from Argelialauren Santiago, sister, with Ramón Beltrán RN.

## 2020-06-10 NOTE — DISCHARGE PLANNING
Spoke with PC RN Ruby re NOK for pt. Again, NOK are children. We currently do not have any contact for children. PC RN stated she quickly spoke with Dixon Ramey with bioethics re situation. See PC documentation on conversation/outcome. Ruby is going to reach out to family to discuss. Per pt's needs at this time, anticipated dc is [facility]. Will continue to follow for medical clearance.

## 2020-06-10 NOTE — PROGRESS NOTES
Patient arrived from OR at 1520.  Wound vac placed on top of head, suction not working.  Wound vac turned off.  Per OR MD MERY wanted it clamped sine it is not functioning appropriately.  Dr Guzmán to consult.  VSS.  Per Anesthesia, patient received some paralytics and gas, no events during surgery.  Mepilex applied to coccyx, no other skin issues noted.

## 2020-06-10 NOTE — RESPIRATORY CARE
Ventilator Daily Summary    Vent Day # 11  Trach Days: 4    Ventilator settings changed this shift:    Weaning trials: SBT for 3 hours 5/8 40%.    Respiratory Procedures:    Plan: Continue current ventilator settings and wean mechanical ventilation as tolerated per physician orders.

## 2020-06-10 NOTE — OR SURGEON
Immediate Post OP Note    PreOp Diagnosis: exposed cranial plate    PostOp Diagnosis: same    Procedure(s):  CRANIECTOMY - FOR REMOVAL RIGHT CRANI HARDWARE, POSS CRANIECTOMY - Wound Class: Clean    Surgeon(s):  Mian Vallejo M.D.    Anesthesiologist/Type of Anesthesia:  Anesthesiologist: Ilia Gil M.D./General    Surgical Staff:  Assistant: GUS Montano  Circulator: Maria Esther Salazar R.N.  Scrub Person: Chikis Kingsley    Specimens removed if any:  ID Type Source Tests Collected by Time Destination   1 : cranial plate  Tissue Brain AFB CULTURE, FUNGAL CULTURE, AEROBIC/ANAEROBIC CULTURE (SURGERY) Mian Vallejo M.D. 6/10/2020  2:15 PM        Estimated Blood Loss: 50cc    Findings: removal of cranial plate    Complications: unable to close scalp incision, wound vac placed,  Not holding suction        6/10/2020 3:24 PM GUS Montano

## 2020-06-10 NOTE — ANESTHESIA PREPROCEDURE EVALUATION
Relevant Problems   ANESTHESIA   (+) ARNULFO (obstructive sleep apnea) - treats with 2L O2      PULMONARY   (+) COPD (chronic obstructive pulmonary disease) ([facility])   (+) Pneumonia      NEURO   (+) H/O: GI bleed   (+) History of subdural hematoma   (+) Seizure ([facility])   (+) Seizure disorder ([facility])      CARDIAC   (+) PEA (Pulseless electrical activity) ([facility])         (+) DAMIAN (acute kidney injury) ([facility])      Other   (+) Osteomyelitis of skull ([facility])       Physical Exam    Airway - unable to assess       Cardiovascular   Rhythm: regular  Rate: normal     Dental    Pulmonary   Breath sounds clear to auscultation     Abdominal    Neurological - normal exam                 Anesthesia Plan    ASA 3       Plan - general       Airway plan will be Tracheostomy        Induction: inhalational          Informed Consent:    Anesthetic plan and risks discussed with healthcare power of .

## 2020-06-10 NOTE — PROGRESS NOTES
"Critical Care Progress Note    Date of admission  5/31/2020    Chief Complaint  54 y.o. male admitted 5/31/2020 with Respiratory failure, seizure    Hospital Course    \"All history was obtained from old notes/charts from [facility] as the patient is intubated at the time of my evaluation.     Mr. Lechuga is a 54 year old male with the past medical history of urethral stricture with urinary retention, recurrent right-sided pleural effusion, alcohol abuse, COPD on home O2, hepatitis C with cirrhosis, chronic pain syndrome on narcotics, traumatic brain injury, history of SDH with subsequent seizure disorder who presented to [facility] on 5/22 with the complaint of difficulty urinary for 2 days and low blood pressures.  He apparently was recently hospitalized at [facility] from 5/6 to 5/11/2020 for a COPD exacerbation and reported that he was doing well for until 2 days prior to 5/22 when he developed difficulty urinating and draining his bladder.  He has worsening abdominal pain in the suprapubic region with chills.  No fevers.  No nausea or vomiting.  He has a negative COVID test on 5/6.  The patient was admitted for acute urinary retention, UTI, and hypoxia.  Over the course of the next week, the patient required intubation for seizure activity, but was then extubated.  However, the patient then developed aspiration pneumonia with AMS and was on BiPAP for a night on 5/29.  All during the patient's hospital stay, he has been battling with delirium.  Unfortunately, around 2000 last night the patient the patient became unresponsive with a bradycardic episode and no pulses.  A code was called and he received 3 rounds of CPR and 2 doses of epi before ROSC was achieved.  He was intubated again and stabilized.  He was sent to [facility] for higher level of care due to the growing complexity of his condition as well as critical care management since [facility] does not have critical care specialists on staff.\"      Interval " Problem Update  Reviewed last 24 hour events:  OR today with dina  Neuro: oxy x2 neuro intact  HR: 60-80's  SBP: map > 65 off pressors since yest  Tmax: afebrile  GI: NPO for surgery   UOP: 45ml 2 hrs   Lines: right picc powers  Resp: vent 11 trach 4 t piece 15l 50%   Vte: contra or  PPI/H2:ppi home  Antibx: linezolid flagyl cefpimine    NS at 50ml/hr low urine output 45ml per 2 hrs  kdur 40 meq  Saw post op with wound vac will need plastic consult for closure  Stop gabapentin  Inc midodrine    Review of Systems  Review of Systems   Unable to perform ROS: Intubated        Vital Signs for last 24 hours   Temp:  [36.5 °C (97.7 °F)-36.7 °C (98.1 °F)] 36.7 °C (98.1 °F)  Pulse:  [42-88] 75  Resp:  [12-43] 12  BP: ()/(51-82) 112/59  SpO2:  [92 %-100 %] 96 %    Hemodynamic parameters for last 24 hours       Respiratory Information for the last 24 hours  Vent Mode: APVCMV  Rate (breaths/min): 16  Vt Target (mL): 360  PEEP/CPAP: 8  MAP: 15  Control VTE (exp VT): 463    Physical Exam   Physical Exam  Vitals signs reviewed.   Constitutional:       General: He is not in acute distress.     Appearance: He is well-developed. He is ill-appearing.      Interventions: He is intubated.      Comments: Sitting in bed chronic ill appearing on ventilator   HENT:      Head: Normocephalic and atraumatic.      Comments: Scalp wound vac in place     Right Ear: External ear normal.      Left Ear: External ear normal.      Nose: Nose normal.      Comments: Cortrak in place     Mouth/Throat:      Mouth: Mucous membranes are dry.      Pharynx: Oropharynx is clear.   Eyes:      Conjunctiva/sclera: Conjunctivae normal.      Pupils: Pupils are equal, round, and reactive to light.   Neck:      Musculoskeletal: Neck supple.      Vascular: No JVD.      Trachea: No tracheal deviation.      Comments: Trach in place  Cardiovascular:      Rate and Rhythm: Regular rhythm. Bradycardia present.      Pulses: Normal pulses.   Pulmonary:      Effort: He  is intubated.      Breath sounds: No wheezing, rhonchi or rales.      Comments: Respiratory distress while on vent coarse bilateral  Abdominal:      General: Bowel sounds are normal. There is no distension.      Palpations: Abdomen is soft.   Musculoskeletal:         General: Swelling present. No tenderness.      Right lower leg: Edema present.      Left lower leg: Edema present.   Skin:     General: Skin is warm and dry.      Capillary Refill: Capillary refill takes less than 2 seconds.      Findings: No rash.      Comments: Multiple tattoos  Venous stasis dermatitis   Neurological:      General: No focal deficit present.      Mental Status: He is alert.      Comments: Opens eyes to voice moves extremities follows some commands   Psychiatric:      Comments: Unable to assess         Medications  Current Facility-Administered Medications   Medication Dose Route Frequency Provider Last Rate Last Dose   • NS infusion   Intravenous Continuous Oziel Londono M.D. 50 mL/hr at 06/10/20 1536     • cefepime (MAXIPIME) 2 g in  mL IVPB  2 g Intravenous Q12HRS Nu Diez M.D. 200 mL/hr at 06/10/20 1757 2 g at 06/10/20 1757   • ondansetron (ZOFRAN) syringe/vial injection 4 mg  4 mg Intravenous Once PRN Ilia Gil M.D.       • haloperidol lactate (HALDOL) injection 1 mg  1 mg Intravenous Q15 MIN PRN Ilia Gil M.D.       • diphenhydrAMINE (BENADRYL) injection 12.5 mg  12.5 mg Intravenous Q15 MIN PRN Ilia Gil M.D.       • fentaNYL (SUBLIMAZE) injection 25 mcg  25 mcg Intravenous Q2 MIN PRN Ilia Gil M.D.        Or   • fentaNYL (SUBLIMAZE) injection 50 mcg  50 mcg Intravenous Q2 MIN PRN Ilia Gil M.D.       • midodrine (PROAMATINE) tablet 15 mg  15 mg Enteral Tube Q8HRS Oziel Londono M.D.       • hydrocortisone sodium succinate PF (SOLU-CORTEF) 100 MG injection 50 mg  50 mg Intravenous Q12HRS Oziel Londono M.D.   50 mg at 06/10/20 1757   • levothyroxine (SYNTHROID) tablet 50 mcg  50 mcg  Enteral Tube AM ES Pavel Orlando Jr. D.O.   50 mcg at 06/10/20 0456   • insulin regular (HUMULIN R) injection 1-6 Units  1-6 Units Subcutaneous Q6HRS Pavel Orlando Jr. D.O.   Stopped at 06/09/20 0600    And   • dextrose 50% (D50W) injection 50 mL  50 mL Intravenous Q15 MIN PRN MARRY Olson Jr..O.   50 mL at 06/09/20 1722   • oxyCODONE immediate release (ROXICODONE) tablet 10-15 mg  10-15 mg Enteral Tube Q4HRS PRN Pavel Orlando Jr. D.O.   10 mg at 06/10/20 1544   • phenytoin (DILANTIN) injection 300 mg  300 mg Intravenous Q12HRS Pavel Orlando Jr. D.O.   300 mg at 06/10/20 1757   • linezolid (ZYVOX) tablet 600 mg  600 mg Enteral Tube Q12HRS Pavel Orlando Jr. D.O.   600 mg at 06/10/20 1756   • metroNIDAZOLE (FLAGYL) IVPB 500 mg  500 mg Intravenous Q8HRS Pavel Orlando Jr. D.O.   Stopped at 06/10/20 1420   • fentaNYL (SUBLIMAZE) injection 25-50 mcg  25-50 mcg Intravenous Q2HRS PRN Vishnu Zamora M.D.   50 mcg at 06/09/20 1700   • levETIRAcetam (KEPPRA) tablet 1,000 mg  1,000 mg Enteral Tube BID Pavel Orlando Jr. D.O.   1,000 mg at 06/10/20 1757   • Pharmacy Consult: Enteral tube insertion - review meds/change route/product selection  1 Each Other PHARMACY TO DOSE Pavel Orlando Jr. D.O.       • omeprazole (FIRST-OMEPRAZOLE) 2 mg/mL oral susp 40 mg  40 mg Enteral Tube DAILY Pavel Orlando Jr. D.O.   40 mg at 06/10/20 0449   • thiamine tablet 100 mg  100 mg Enteral Tube DAILY Pavel Orlando Jr. D.O.   100 mg at 06/10/20 0450   • multivitamin (THERAGRAN) tablet 1 Tab  1 Tab Enteral Tube DAILY Pavel Orlando Jr. D.O.   1 Tab at 06/10/20 0451   • LORazepam (ATIVAN) injection 4 mg  4 mg Intravenous Q10 MIN PRN Pavel Orlando Jr., D.O.       • norepinephrine (Levophed) infusion 8 mg/250 mL (premix)  0-30 mcg/min Intravenous Continuous Saurabh Delatorre M.D.   Stopped at 06/09/20 1215   • senna-docusate (PERICOLACE or SENOKOT S) 8.6-50 MG per tablet 2 Tab  2 Tab Enteral Tube BID Anisa Mcarthur  LORENZA Hernández   2 Tab at 06/10/20 1757    And   • polyethylene glycol/lytes (MIRALAX) PACKET 1 Packet  1 Packet Enteral Tube QDAY PRN Anisa Hernández M.D.        And   • magnesium hydroxide (MILK OF MAGNESIA) suspension 30 mL  30 mL Enteral Tube QDAY PRN Anisa Hernández M.D.        And   • bisacodyl (DULCOLAX) suppository 10 mg  10 mg Rectal QDAY PRN Anisa Hernández M.D.       • acetaminophen (TYLENOL) tablet 650 mg  650 mg Enteral Tube Q6HRS PRN Anisa Hernández M.D.   650 mg at 06/08/20 1644   • labetalol (NORMODYNE/TRANDATE) injection 10 mg  10 mg Intravenous Q4HRS PRN Anisa Hernández M.D.       • Respiratory Therapy Consult   Nebulization Continuous RT Alexia Khan M.D.       • ipratropium-albuterol (DUONEB) nebulizer solution  3 mL Nebulization Q2HRS PRN (RT) Alexia Khan M.D.       • lidocaine (XYLOCAINE) 1 % injection 1-2 mL  1-2 mL Tracheal Tube Q30 MIN PRN Alexia Khan M.D.           Fluids    Intake/Output Summary (Last 24 hours) at 6/10/2020 1759  Last data filed at 6/10/2020 1522  Gross per 24 hour   Intake 4603.15 ml   Output 910 ml   Net 3693.15 ml       Laboratory  Recent Labs     06/08/20  0544 06/09/20  0353 06/10/20  0516   ISTATAPH 7.426 7.383* 7.319*   ISTATAPCO2 43.1* 49.1* 57.6*   ISTATAPO2 87 97* 78   ISTATATCO2 30 31 31   [id number] 97 97 94   ISTATARTHCO3 28.3* 29.3* 29.6*   ISTATARTBE 4* 4* 3   ISTATTEMP 96.8 F 98.6 F 37.5 C   ISTATFIO2 30 40 40   ISTATSPEC Arterial Arterial Arterial   ISTATAPHTC 7.441 7.383* 7.312*   [id number] 81 97* 80         Recent Labs     06/09/20  0324 06/09/20  1410 06/09/20  1752 06/10/20  0518   SODIUM 143 149*  --  143   POTASSIUM 3.5* 3.9  --  3.6   CHLORIDE 104 109  --  105   CO2 26 27  --  29   BUN 43* 39*  --  37*   CREATININE 1.86* 1.70*  --  1.53*   MAGNESIUM  --   --  2.0 2.1   PHOSPHORUS  --   --  3.2 3.2   CALCIUM 7.8* 7.4*  --  8.0*     Recent Labs     06/09/20  1410 06/09/20  1752 06/10/20  0518    GLUCOSE 120* 196* 119*     Recent Labs     06/08/20  0433 06/08/20  1334 06/09/20  0324 06/10/20  0518   WBC 12.0*  --  15.6* 14.4*   NEUTSPOLYS 78.60*  --  78.90* 79.60*   LYMPHOCYTES 11.30*  --  11.40* 10.40*   MONOCYTES 8.30  --  7.30 7.00   EOSINOPHILS 0.80 1 1.50 1.90   BASOPHILS 0.30  --  0.30 0.50     Recent Labs     06/08/20  0433 06/08/20  1126 06/09/20  0324 06/10/20  0518   RBC 2.19*  --  2.15* 2.51*   HEMOGLOBIN 7.1*  --  6.9* 7.9*   HEMATOCRIT 23.9*  --  23.3* 26.5*   PLATELETCT 323  --  306 323   PROTHROMBTM  --  13.7  --   --    APTT  --  30.4  --   --    INR  --  1.03  --   --        Imaging  X-Ray:  I have personally reviewed the images and compared with prior images. and My impression is: still with worsening right loculated effusion, trach in place  CT:    Reviewed    Assessment/Plan  Shock ([facility])- (present on admission)  Assessment & Plan  Multifactorial:  PEA/CPR, propofol and infection  Continue antimicrobials  Echo: LVEF 65%. Mild concentric LVH. Grade II diastolic dysfunction. Mild mitral regurgitation. Estimated right ventricular systolic pressure is 55 mmHg  Ongoing titration of Levophed for MAP goals >65  Continue midodrine  Cortisol 11.4, continue Solu-Cortef    PEA (Pulseless electrical activity) ([facility])- (present on admission)  Assessment & Plan  ? Etiology from [place], likely hypoxia  Continue supportive care  Optimize electrolytes  EKG without ischemic changes  Echocardiogram: LVEF 65%. Mild concentric LVH. Grade II diastolic dysfunction. Mild mitral regurgitation. Estimated right ventricular systolic pressure is 55 mmHg    Anemia  Assessment & Plan  Restrictive transfusion strategy hg < 7    DAMIAN (acute kidney injury) ([facility])  Assessment & Plan  Slowly worsening damian   Continue strict map goal map > 65  Limit nephrotoxins and vancomycin    Improved slow fluids back being mindful of volume overload    Epidural phlegmon- (present on admission)  Assessment & Plan  MRI with epidural tissue  "enhancement concerning for epidural phlegmon adjacent to osteomyelitis  Antimicrobials changed from Zosyn/Vanco to Rocephin/Flagyl/Vanco for improved CNS penetration (Pseudomonas considered unlikely, more likely gram-positive or anaerobes)  Neurosurgery on board  Will likely need infectious disease consultation for long-term antimicrobials  Consider lumbar puncture if surgical cultures delayed    Osteomyelitis of skull (HCC)- (present on admission)  Assessment & Plan  Scalp wound with exposed neurosurgical hardware -spoke with the patient's sisters they state that it has been exposed for several months  Bilateral craniotomy with subdural evacuation in October 2016 by Dr. Vallejo  Wound care consult  Neurosurgery on board plan to OR 6/10 with hardware removal and vac placed will need plastic closure  ID consulted 6/8  Antimicrobials transition to Cefepime, linezolid and metronidazole    Goals of care, counseling/discussion  Assessment & Plan  Discussed at length with patient's sister regarding his goals of care given his POLST completed in 2015 says DNR/DNI.   In reviewing the chart, the patient actually stated that he misunderstood the form and this CODE STATUS is not correct, he wanted to be FULL CODE.    Dr. Pimentel' H&P on 6/21/16:      \"Code status is full.  He had signed POLST form in the past, saying he did not want resuscitation and only wanted limited interventions; however, in a discussion with the nurse today at surgery, he clarified that he had misunderstood the form, what he meant was that he did not want to be kept alive on life support, but he would want a trial of  full resuscitation efforts.  Therefore, his code status is full.\"    Acute respiratory failure with hypoxia (HCC)- (present on admission)  Assessment & Plan  Intubated at [place] on 5/22/2020 after seizure then again on 5/30/20 following cardiac arrest, he was extubated 6/5/2020 here and failed due to encephalopathy and airway protection and " addition failed extubation trial requiring trach RT/O2 protocols  Daily and PRN ABGs  Titration of ventilator therapy based on ABGs and patient's status  Daily CXR  HOB >30 degrees and peridex for VAP prevention  Pepcid for GI prophylaxis  SAT/SBT when able (ABCDEF Bundle)  Early mobility  s/p tracheostomy 6/7   T-piece trials on going    Still looking poor on ventilator will rest on vent    Recurrent right pleural effusion- (present on admission)  Assessment & Plan  Chronic, recurrent  Thoracenteses performed on 5/13 and 5/14, 6/2 and 6/8  Thoracentesis completed 6/2/2020, transitive. Culture NGTD  Follow chest x-rays  Discussed with Dr Lance 6/9 will plan for decortication later this week repeat CT chest reviewed    Pneumonia- (present on admission)  Assessment & Plan  ?aspiration s/p treatment with V/Z now on cefepime, flagyl and linezolid for CNS osteo      Ileus (HCC)- (present on admission)  Assessment & Plan  Resolved    Hypokalemia- (present on admission)  Assessment & Plan  Continue to monitor and replete    Seizure disorder (HCC)- (present on admission)  Assessment & Plan  cEEG without evidence of seizure  Neurology has signed off  Continue Keppra 1g BID  Phenytoin level elevated continue to monitor level and adjust  MRI shows skull osteomyelitis and possible epidural phlegmon without leptomeningeal enhancement  Plan for OR on Wednesday vs Friday this week with Dr Vallejo  ID following       VTE:  Lovenox  Ulcer: H2 Antagonist  Lines: Irby Catheter  Ongoing indication addressed    I have performed a physical exam and reviewed and updated ROS and Plan today (6/10/2020). In review of yesterday's note (6/9/2020), there are no changes except as documented above.     Discussed patient condition and risk of morbidity and/or mortality with RN, RT, Pharmacy, Charge nurse / hot rounds and general surgery, infectious disease and neurosurgery     The patient remains critically ill still requiring mechanical  ventilation and t piece trials.  Critical care time = 38 minutes in directly providing and coordinating critical care and extensive data review.  No time overlap and excludes procedures.

## 2020-06-11 NOTE — OP REPORT
DATE OF SERVICE:  06/11/2020    PREOPERATIVE DIAGNOSES:  Prior craniotomy 4 years ago with exposed hardware in   the right superior craniotomy without evidence of epidural collection or   subdural collection.    POSTOPERATIVE DIAGNOSES:  Prior craniotomy 4 years ago with exposed hardware   in the right superior craniotomy without evidence of epidural collection or   subdural collection.    PROCEDURES:  1.  Removal of plates, the patient's exposed bone.  2.  Attempted closure of patient's open wound on the scalp.  3.  Placement of wound VAC.  4.  Use of modifier 22 for extensive difficulty with dissection and closure.    SURGEON:  Mian Vallejo MD    ASSISTANT:  TAVON Veliz    ANESTHESIA:  General.    COMPLICATIONS:  Unable to close the wound, so placement of wound VAC.    ESTIMATED BLOOD LOSS:  Minimal.    DESCRIPTION OF PROCEDURE:  The patient was brought to the operating room,   identified in the usual fashion.  General endotracheal anesthesia was induced   by the anesthesia team.  Patient was then placed supine on the operating room   table.  All pressure points were meticulously padded.  Head was placed in a   horseshoe.  Surgical clippers were used to clip the patient's hair.  We then   planned out a very large elliptical incision along the same axis as the   previous incision extending inferiorly through the previous incision and then   superiorly and medially 7-8 cm past it as well.  This was a total of about 15   cm incision that we plan to cover a defect of about 1 cm.  The patient was   then prepped and draped in the usual sterile fashion.  We then used a 10 blade   to incise the skin down to bone.  We had a very narrow long ellipse that we   had taken out.  This was removed and we then removed the cranial plate and   then just locally debrided it.  The plate was sent off for culture.    We then spent an extended amount of time undermining anteriorly, posteriorly,   laterally and medially  underneath the incision about 8 cm anteriorly, 8 cm   posteriorly.  The skin was thin and fairly tight, so we then used a 2-0 nylon   FSLX needle, vertical mattress sutures progressively from either side to try   to close it toward the center .  Despite having a significant amount of   undermining, we were unable to close it, as the skin started to fall apart   when we placed sutures, because it was very thin and friable.  We then   attempted to make a posterior relaxing incision that did not give us any   significant benefit.  We then closed the relaxing incision with staples and   elected to place a wound VAC at this point, as we were unable to adequately   close the incision.  We placed a wound VAC and then patient was sent to the   recovery room in stable condition.       ____________________________________     REID MILLIGAN MD    CPD / NTS    DD:  06/11/2020 09:56:30  DT:  06/11/2020 11:28:05    D#:  [id number]  Job#:  [id number]

## 2020-06-11 NOTE — CONSULTS
DATE OF SERVICE:  06/11/2020    CHIEF COMPLAINT:  Cutaneous defect of the scalp with exposed bone.      BRIEF HISTORY:  The patient has multiple medical comorbidities.  The patient   had apparently a craniotomy 4 years ago.  This was, I believe a trauma   craniotomy.  The patient apparently has had at least 6 months of exposed plate   over the mallorie hole.  Dr. Vallejo took him to the operating room for excision   debridement of the bone removal of foreign body and closure was not able to be   achieved because of the poor quality of the tissue.  The patient had a   negative pressure dressing in place.  I am here primarily to discuss wound   management for the patient.  Currently, the patient is intubated and sedated   and cannot communicate with me.  Therefore, I have no way to interview him or   understand what his wishes are as he is critically ill at this time.      PAST MEDICAL HISTORY:  Patient's medical history is extensive.  His medical   history is significant for chronic pleural effusions, chronic alcohol abuse,   COPD with oxygen dependence, hepatitis C with cirrhosis, chronic pain, history   of subdural hematoma, seizure disorder history, urinary retention, anemia,   arthritis, C. diff colitis, cholecystitis, depression, gastroesophageal reflux   disease, hypertension, intravenous drug abuse, neuropathy, osteoporosis,   pneumonia, renal disorder, history of stroke, vitamin D deficiency.      PAST SURGICAL HISTORY:  Patient had evacuation of the subdural hematoma as   already mentioned.  Patient has had cystoscopy, cervical decompression, ORIF   of the elbow, lumbar fusion, thoracotomy, rib resection, laparoscopic   cholecystectomy.      MEDICATIONS:  The patient's home medications consist of Dilantin, Norco,   Prilosec, Ventolin, and Synthroid.      ALLERGIES TO MEDICATIONS:  PATIENT HAS A HISTORY TO CODEINE.      HABITS:  The patient smokes cigarettes.  The patient consumes alcohol   regularly.  The patient  has a history of IV drug abuse and other drug use.      SOCIAL HISTORY:  The patient is single.  He lives, I believe in [place].  He   is not employed.      REVIEW OF SYSTEMS:  I cannot obtain review of systems because of the patient's   mental status.    PHYSICAL EXAMINATION:    GENERAL:  The patient appears malnourished and critically ill.  He is   intubated and sedated in the intensive care unit.    VITAL SIGNS:  Currently, the patient is afebrile and has stable vital signs.    HEENT:  The patient's head has a negative pressure dressing in place.  There   is no surrounding erythema.  There is no foul drainage.  There were no   abnormalities noted.  Both pupils are equal, round, reactive to light.    Extraocular motions are intact.  The sclerae are anicteric.    NECK:  The neck is supple.  There is no adenopathy.  There is a tracheotomy   tube in place and the patient is ventilated through this.    LUNGS:  The lungs have coarse and distant breath sounds throughout.    HEART:  The heart is regular rate.  There is no murmur.    ABDOMEN:  The abdomen is slightly distended, soft, and there are no masses.    NEUROLOGIC:  The patient cannot have a neurologic exam because of his sedated   status.    PSYCHIATRIC:  Again cannot evaluate this.      ASSESSMENT AND PLAN:  Critically ill patient with multiple medical   comorbidities.  At this point, I do not have a sense at what the patient's   wishes are as far as therapy.  My plan at this point would probably be because   of his multiple medical problems as to not do anything drastic and rather try   to bur down the bone until I got to punctate bleeding and place a piece of   Integra and hope that we got some vascularization of the Integra thus allowing   this to skin graft at a later date.  Certainly, we could try to do local flap   and skin graft behind it.  There are other options, but I think for this   patient, we will try simplest thing best.  My plan will be to take  him back to   the operating room on Monday assuming that family consents to this.       ____________________________________     MD ASHLEY MOE / ODETTE    DD:  06/11/2020 09:47:24  DT:  06/11/2020 13:07:50    D#:  [id number]  Job#:  [id number]

## 2020-06-11 NOTE — CARE PLAN
Problem: Infection  Goal: Will remain free from infection  Outcome: PROGRESSING AS EXPECTED  Note: Standard precautions implemented. Hand hygiene being performed before and after patient contact.       Problem: Respiratory:  Goal: Respiratory status will improve  Outcome: PROGRESSING AS EXPECTED  Note: Collaborating with RT and Interdisciplinary team on pt's treatment measures to improve respiratory function.

## 2020-06-11 NOTE — PROGRESS NOTES
"    DATE: 6/11/2020    Hospital Day 11     Interval Events:  CT imaging of the chest.    PHYSICAL EXAMINATION:  Constitutional:     Vital Signs: BP (!) 92/52   Pulse 71   Temp 36.7 °C (98.1 °F) (Bladder)   Resp 12   Ht 1.626 m (5' 4.02\")   Wt 71.5 kg (157 lb 10.1 oz)   SpO2 100%    General Appearance: The patient is a critically ill-appearing and man .  HEENT:    The pupils are equal, round, and reactive to light bilaterally. The extraocular muscles are intact bilaterally. The nares and oropharynx are clear. The cervical spine is supple and non tender. Tracheostomy site is clean and dry.  Respiratory:   Inspection: Mechanically ventilated.   Palpation:  The chest is nontender.    Auscultation: Decreased breath sounds on the right.  Cardiovascular:   Inspection: The skin is warm and dry.  Auscultation: Regular rate and rhythm.   Peripheral Pulses: Normal.   Neurologic:    Neurologic examination reveals no focal deficits noted.    Laboratory Values:   Recent Labs     06/09/20  0324 06/10/20  0518 06/11/20  0439   WBC 15.6* 14.4* 15.4*   RBC 2.15* 2.51* 2.27*   HEMOGLOBIN 6.9* 7.9* 7.2*   HEMATOCRIT 23.3* 26.5* 24.0*   .4* 105.6* 105.7*   MCH 32.1 31.5 31.7   MCHC 29.6* 29.8* 30.0*   RDW 63.8* 77.3* 74.3*   PLATELETCT 306 323 323   MPV 10.3 10.4 10.3     Recent Labs     06/09/20  1410 06/09/20  1752 06/10/20  0518 06/11/20  0439   SODIUM 149*  --  143 150*   POTASSIUM 3.9  --  3.6 3.5*   CHLORIDE 109  --  105 113*   CO2 27  --  29 27   GLUCOSE 120* 196* 119* 168*   BUN 39*  --  37* 34*   CREATININE 1.70*  --  1.53* 1.35   CALCIUM 7.4*  --  8.0* 7.4*           ASSESSMENT AND PLAN:     Loculated right pleural effusion.    DISPOSITION: To surgery for right thoracotomy and decortication. I have seen and examined the patient today.  Critical physical examination findings, laboratory indices, and radiographic studies reviewed.     The operative strategy was explained and reviewed. Alternatives discussed. " Potential complications including, but not limited to, infection, bleeding, damage to adjacent structures, and anesthetic complications were discussed. Questions were elicited and answered to the patient's decision maker's satisfaction. The correct operative site was verified and marked. Operative consent signed.       ____________________________________     Gómez Lance M.D.

## 2020-06-11 NOTE — ANESTHESIA PREPROCEDURE EVALUATION
Relevant Problems   ANESTHESIA   (+) ARNULFO (obstructive sleep apnea) - treats with 2L O2      PULMONARY   (+) COPD (chronic obstructive pulmonary disease) ([facility])   (+) Pneumonia      NEURO   (+) H/O: GI bleed   (+) History of subdural hematoma   (+) Seizure ([facility])   (+) Seizure disorder ([facility])      CARDIAC   (+) PEA (Pulseless electrical activity) ([facility])         (+) DAMIAN (acute kidney injury) ([facility])      Other   (+) Osteomyelitis of skull ([facility])       Physical Exam    Airway - unable to assess  Mallampati: II  TM distance: >3 FB  Neck ROM: full       Cardiovascular - normal exam  Rhythm: regular  Rate: normal  (-) murmur     Dental - normal exam           Pulmonary - normal exam  Breath sounds clear to auscultation  (+) rhonchi, decreased breath sounds, wheezes     Abdominal    Neurological - normal exam, abnormal exam and sedated/unconcious                 Anesthesia Plan    ASA 4   ASA physical status 4 criteria: respiratory failure    Plan - general       Airway plan will be Tracheostomy        Induction: intravenous    Postoperative Plan: Postoperative administration of opioids is intended.    Pertinent diagnostic labs and testing reviewed    Informed Consent:    Anesthetic plan and risks discussed with patient.    Use of blood products discussed with: patient whom consented to blood products.

## 2020-06-11 NOTE — PROGRESS NOTES
Makayla from Lab called with critical result of gram + cocci. Critical lab result read back to Makayla.   Dr. Londono notified of critical lab result. MD at the bedside.

## 2020-06-11 NOTE — WOUND TEAM
This RN clarified vac orders with Chet MONTES DE OCA. Per nursing and OP report, vac seal never obtained in OR last night. Nursing requested wound team to eval. Pt now on scheduled vac changes per protocol Thurs & Sat pending possible return to OR on Monday 6/15. Wound team to replace today.

## 2020-06-11 NOTE — PROGRESS NOTES
2 RN skin check complete with MERY Liz.    Devices in place: Pulse ox, cardiac leads, BP cuff, PIVs, PICC, SCDs, tracheostomy, wound vac.     Skin assessed under all of the following devices listed above.     Preventative measures in place including mepilex on sacrum; cord and devices frequently repositioned and assessed; and padding under devices.    Following areas of concern:    -surgical incision with wound vac in place (not working since OR, assessed by Carol MARTINEZ, Dr. Vallejo, and Dr. Guzmán) Follow up-wound team came to bedside and wound vac is now functioning.    -pt has generalized flakey skin with bruising    -coccyx is pink and blanching   -head is pink and blanching (pt tosses head back and forth frequently) Mepilex placed around wound vac and to head    The following interventions in place:   -Q2 turns   -Pillows used to float heels and elbows   -2 RN Skin checks   -Mepilex to sacrum and head   -off loading done to all tubing and connections    Wound consult placedYES/NO: yes    Wound reported YES/NO: yes  Appropriate LDAs opened YES/NO: yes

## 2020-06-11 NOTE — PROGRESS NOTES
2 RN skin check complete with MERY Liz again after surgery.     Devices in place: Pulse ox, cardiac leads, BP cuff, PIVs, PICC, SCDs, tracheostomy, wound vac.     Skin assessed under all of the following devices listed above.     Preventative measures in place including mepilex on sacrum; cord and devices frequently repositioned and assessed; and padding under devices.    Following areas of concern:    -surgical incision with wound vac in place (not working since OR, assessed by Carol MARTINEZ, Dr. Vallejo, and Dr. Guzmán) Follow up-wound team came to bedside and wound vac is now functioning.    -pt has generalized flakey skin with bruising    -coccyx is pink and blanching   -head is pink and blanching (pt tosses head back and forth frequently) Mepilex placed around wound vac and to head    The following interventions in place:   -Q2 turns   -Pillows used to float heels and elbows   -2 RN Skin checks   -Mepilex to sacrum and head   -off loading done to all tubing and connections    Wound consult placedYES/NO: yes    Wound reported YES/NO: yes  Appropriate LDAs opened YES/NO: yes

## 2020-06-11 NOTE — PROGRESS NOTES
Arlette from Lab called with critical result of gram + cocci with possible staph growth should know more by tomorrow. Critical lab result read back to Arlette.   Dr. Londono notified of critical lab result. MD at the bedside.

## 2020-06-11 NOTE — PROGRESS NOTES
DATE OF OPERATION: 6/11/2020     PREOPERATIVE DIAGNOSES: Empyema, right side. Loculated right pleural effusion.    POSTOPERATIVE DIAGNOSES: Same.     PROCEDURES PERFORMED: Right posterolateral thoracotomy. Right pulmonary decortication, total.     SURGEON: Gómez Lance M.D.     ASSISTANT: MICHELLE Zarate.     ANESTHESIOLOGIST: Pro Goyal M.D..     ANESTHESIA: General endotracheal anesthesia.    ASA CLASSIFICATION: IV.    INDICATIONS: The patient is a 54 y.o. male with a right Loculated right pleural effusion. Preoperative CT imaging demonstrated A thickened pleural process. He is taken to the operating room for thoracotomy and decortication.     FINDINGS: 400 mL of yellow serous effusion. Extremely thick right pleural peel of the right upper, middle, and lower lobes with complete entrapment of the right lung.    WOUND CLASSIFICATION: Class IV, Dirty or Infected. Infection present at the time of surgery (PATOS).    SPECIMEN: Pleural fluid for Gram stain, culture, and sensitivity.     ESTIMATED BLOOD LOSS: 100 mL.     PROCEDURE: Following informed consent consent, the patient was properly identified, taken to the operating room and placed in supine position where general endotracheal anesthesia was administered. Intravenous antibiotics were administered in the preoperative setting within the correct time interval. Sequential compression devices were employed. The patient was repositioned into a lateral decubitus position with careful attention to padding of the extremities and placement of an axillary roll. The right chest wall was prepped and draped into a sterile field.     A right posterolateral thoracotomy incision was made in the 6th intercostal space. The soft tissue layers were divided with electrocautery. The rib was divided posteriorly. A  Finochietto retractor was used to facilitate exposure. The loculated effusion was broken down bluntly. The pleural space was developed and the lung  fully mobilized. Decortication was then carried out upon the right right upper lobe, right middle lobe, right lower lobe, right parietal pleura and diaphragm. Specimen was obtained for culture.     The operative field was copiously irrigated. A pair of 24-Fijian flexible Mariusz drains were placed into the dependent portions of the thoracic cavity and secured to the skin at separate inferior stab wound sites with 2-0 silk sutures.     The ribs were approximated with multiple interrupted #1 Vicryl sutures. The soft tissues were closed in layers with running 2-0 Vicryl sutures and the skin was approximated with staples. A sterile dressing was applied. The chest tubes were connected to closed suction drainage.     Multiple intercostal nerve blocks were administered using 0.5% bupivacaine with epinephrine.    The patient tolerated the procedure well, and there were no apparent complications. All sponge, needle, and instrument counts were correct on 2 separate occasions. The patient was transferred to to the surgical intensive care unit (SICU) in hemodynamically stable condition.       ____________________________________     Gómez Lance M.D.    DD: 6/11/2020  4:32 PM

## 2020-06-11 NOTE — OR NURSING
Pt in Pre OP on vent, Dr Goyal at bedside, WHO checklist completed, surgery and anesthesia consents signed, on chart.

## 2020-06-11 NOTE — PROGRESS NOTES
Infectious Disease Progress Note    Author: Neva Gonzalez M.D. 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 male admitted 2020 who initially presented to [facility] on  complaining of urinary retention and hypotension.  At [place], on  he required intubation due to seizure activity.  Eventually extubated but developed aspiration pneumonia and increasing encephalopathy. He had an episode of unresponsiveness with bradycardia and a code was called.  Patient received 3 rounds of CPR and 2 episodes of epi before ROSC was achieved.  He was intubated and transferred to [facility] for higher level of care. In ICU since 6/10  6/9  TMax 101.7, improved today , Vent 8/40%, thoracentesis on .  Pt alert and following commands.     Temp 99.7 WBC 15.4  trached-not following commands. Hypotensive and tachy FiO2 0.4 Plan for OR today noted    Review of Systems:  Review of Systems   Unable to perform ROS: Intubated       Hemodynamics:  No data recorded.  Monitored Temp: 37.6 °C (99.7 °F)  Pulse  Av.4  Min: 42  Max: 113   Blood Pressure: 115/55       Physical Exam:  Physical Exam  Vitals signs and nursing note reviewed.   Constitutional:       Appearance: He is ill-appearing. He is not toxic-appearing or diaphoretic.   HENT:      Head:      Comments: VAC to scalp     Nose: No rhinorrhea.      Mouth/Throat:      Pharynx: No oropharyngeal exudate.   Eyes:      General: No scleral icterus.  Neck:      Musculoskeletal: No neck rigidity.      Comments: trach  Cardiovascular:      Rate and Rhythm: Tachycardia present.   Pulmonary:      Effort: Pulmonary effort is normal. No respiratory distress.      Breath sounds: No stridor. Rhonchi present.      Comments: Decreased BS right  Abdominal:      General: There is distension.    Tenderness: There is no abdominal tenderness. There is no guarding or rebound.      Comments: protuberant   Musculoskeletal:      Right lower leg: Edema present.      Left lower leg: Edema present.   Skin:     General: Skin is warm and dry.   Neurological:      Comments: Eyes open  Not following commands today         Meds:    Current Facility-Administered Medications:   •  [START ON 6/12/2020] hydrocortisone sodium succinate PF  •  LR  •  MD Alert...Adult ICU Electrolyte Replacement per Pharmacy  •  cefepime  •  midodrine  •  levothyroxine  •  oxyCODONE immediate-release  •  phenytoin **AND** [DISCONTINUED] phenytoin  •  linezolid  •  metroNIDAZOLE (FLAGYL) IV  •  fentaNYL  •  levETIRAcetam  •  Pharmacy  •  omeprazole  •  thiamine  •  multivitamin  •  LORazepam  •  norepinephrine (Levophed) infusion  •  senna-docusate **AND** polyethylene glycol/lytes **AND** magnesium hydroxide **AND** bisacodyl  •  acetaminophen  •  labetalol  •  Respiratory Therapy Consult  •  ipratropium-albuterol  •  lidocaine    Labs:  Recent Labs     06/09/20  0324 06/10/20  0518 06/11/20  0439   WBC 15.6* 14.4* 15.4*   RBC 2.15* 2.51* 2.27*   HEMOGLOBIN 6.9* 7.9* 7.2*   HEMATOCRIT 23.3* 26.5* 24.0*   .4* 105.6* 105.7*   MCH 32.1 31.5 31.7   RDW 63.8* 77.3* 74.3*   PLATELETCT 306 323 323   MPV 10.3 10.4 10.3   NEUTSPOLYS 78.90* 79.60* 82.50*   LYMPHOCYTES 11.40* 10.40* 9.30*   MONOCYTES 7.30 7.00 6.00   EOSINOPHILS 1.50 1.90 1.40   BASOPHILS 0.30 0.50 0.30     Recent Labs     06/09/20  1410 06/09/20  1752 06/10/20  0518 06/11/20  0439   SODIUM 149*  --  143 150*   POTASSIUM 3.9  --  3.6 3.5*   CHLORIDE 109  --  105 113*   CO2 27  --  29 27   GLUCOSE 120* 196* 119* 168*   BUN 39*  --  37* 34*     Recent Labs     06/09/20  1410 06/10/20  0518 06/11/20  0439   CREATININE 1.70* 1.53* 1.35       Imaging:  Ct-chest (thorax) W/o    Result Date: 6/9/2020 6/9/2020 3:48 PM HISTORY/REASON FOR EXAM:  Pleural effusion. TECHNIQUE/EXAM DESCRIPTION:  CT scan of the chest without contrast. Noncontrast helical scanning of the chest was obtained from the lung apices through the adrenal glands. Low dose optimization technique was utilized for this CT exam including automated exposure control and adjustment of the mA and/or kV according to patient size. COMPARISON: Plain film from the same day. FINDINGS: Evaluation of parenchymal and vascular structures is limited by the lack of intravenous contrast. Atherosclerotic plaque is seen in the aorta and coronary arteries. There is trace pericardial fluid. There is a small right pleural effusion which is loculated. There is likely pleural thickening. There is a small loculated left pleural effusion. Tracheostomy tube is noted. Enteric tube projects over the stomach. Main pulmonary artery measures 3.9 cm in diameter. Precarinal lymph node measures 1.3 cm in short axis dimension. Evaluation of the shirley is limited by the lack of intravenous contrast and by airspace opacities adjacent to the right hilum. 9 mm para-aortic lymph node is seen. There are small axillary lymph nodes bilaterally. There is septal thickening and groundglass opacities bilaterally. There is subsegmental lingula and left lower lobe atelectasis. There is atelectasis/contusion overlying the right pleural effusion. Limited views were obtained of the upper abdomen. There is trace free fluid in the upper abdomen. There is gynecomastia. Degenerative changes are seen in the spine. Postsurgical changes are seen in the thoracic and lumbar spine. There are remote bilateral rib fractures. There are healing left-sided rib fractures. There are acute fractures of the lateral right fifth, sixth ribs and the left anterior fourth and lateral fourth, fifth, sixth ribs. There are multiple compression fractures in the thoracic and lumbar spine. There is an old posttraumatic deformity of the sternum. Bones are demineralized.     Small loculated right pleural effusion with  overlying atelectasis/consolidation. Mild pleural thickening may be infectious/inflammatory but malignancy is not excluded. Small loculated left pleural effusion with subsegmental lingula and left lower lobe atelectasis. Interlobular septal thickening and groundglass opacities compatible with edema. Infection not excluded. Cardiomegaly. Enlarged mediastinal lymph nodes may be reactive but malignancy is not excluded. Atherosclerotic plaque including coronary artery calcification. Prominence of the main pulmonary artery can be seen in pulmonary arterial hypertension. Trace amount of ascites. Bilateral rib fractures. Multiple fractures in the thoracic spine. Demineralization.     Ct-chest (thorax) With    Result Date: 5/31/2020  HISTORY/REASON FOR EXAM: Respiratory illness, acute (Age > 40y); PE suspected, intermediate prob, positive D-dimer; post ROSC. TECHNIQUE/EXAM DESCRIPTION: CT scan of the chest with contrast, 5/30/2020 10:33 PM. This examination was conducted with Automated Exposure Control and Automated Adjustment of Tube Current based on patient size. Following the administration of IV contrast, helical imaging is performed from the thoracic inlet to the dome of the diaphragm. COMPARISON: 5/23/2020 TOTAL DLP: 'Nam.... mGy*cm FINDINGS: Vasculature: Heart:  The heart is mildly enlarged. Mediastinal lymphadenopathy is again noted. Pretracheal lymph nodes measure 26 x 10 mm in diameter, which is increased from previous measurement of 22 mm in diameter seen on 5/23/2020. Extensive subcarinal lymphadenopathy is again noted. Prominent calcifications are again noted involving the coronary arteries. Lungs:  Extensive reticular and groundglass airspace densities have developed throughout all lobes of both lungs in the interval from the previous study. Additionally, there is a moderate right pleural effusion now present, which may be loculated anteriorly. There are small loculated collections of fluid noted involving the  left pleural space. Additionally, there are prominent areas of consolidation noted involving both lungs, including near complete consolidation of the right lower lobe.     Extensive bilateral airspace opacities of developed in the interval from the previous study, including large areas of consolidation involving the right and left lower lobes. The findings suggest severe bilateral pneumonia. The distribution is uncharacteristic for Covid pneumonia, although the exam should not be considered a rule out for Covid pneumonia. There is no evidence of pulmonary embolus. Sergei Huber MD 5/31/2020 10:52 AM DLP Reporting Thresholds for Incorrect/Repeated Exams - DLP in mGy*cm Head/Neck:  0-year-old 3840, 1-year-old 5880, 5-year-old 8770, 10-year-old [id number] and adult [id number] Head:  0-year-old 4540, 1-year-old 7460, 5-year-old 68859, 10-year-old 48716 and adult 19468 Neck:  0-year-old 2940, 1-year-old 4160, 5-year-old 4550, 10-year-old 6320 and adult 8470 Chest:  0-year-old 550, 1-year-old 830, 5-year-old 1200, 10-year-old 3840 and adult 3570 Abd/pelvis:  0-year-old 440, 1-year-old 720, 5-year-old 1080, 10-year-old 3330 and adult 3330 Trunk(C/A/P):  0-year-old 490, 1-year-old 770, 5-year-old 1140, 10-year-old 3570 and adult 3330    Ct-chest (thorax) With    Result Date: 5/23/2020  HISTORY/REASON FOR EXAM:  Pleural effusion; recurrent effusion recently changed to sanguinous appearance w anemia TECHNIQUE/EXAM DESCRIPTION: CT scan of the chest with contrast. 5/23/2020 4:11 AM CT scan of the chest was carried out after the administration of IV contrast in the usual manner. Both automated exposure control and Automated adjustment of tube current based on patient size are utilized. Total DLP: 387 mGy*cm COMPARISON: 10/17/2019. Chest x-ray conducted 5/23/2020 FINDINGS: Moderate loculated right pleural effusion Trace left pleural effusion LUNGS: Focal airspace opacities/consolidation involving the right lower lobe and right middle lobe  Scattered groundglass opacities are present throughout the left upper lobe Interlobular septal thickening is identified. Scar versus atelectasis in the left lower lobe. HEART: Normal in size. Coronary artery calcifications AORTA: Normal in caliber. MEDIASTINUM: Mediastinal adenopathy. The largest lymph node measures 2.2 cm Views of the upper abdomen show no acute abnormality. Degenerative and postsurgical changes of the spine with multiple compression deformities Subacute/old ununited sternal fracture     Moderate size loculated right pleural effusion Right middle lobe and right lower lobe airspace opacities/consolidation concerning for pneumonia Scattered groundglass opacity left upper lobe with interlobular septal thickening. May be secondary to pulmonary edema.  Ct-head W/o    Result Date: 6/1/2020 6/1/2020 12:42 AM HISTORY/REASON FOR EXAM: Altered mental status TECHNIQUE/EXAM DESCRIPTION:  CT of the head without contrast. Sequential axial images were obtained from the vertex to the skull base without contrast. Up to date radiation dose reduction adjustments have been utilized to meet ALARA standards for radiation dose reduction. COMPARISON: October 12, 2016 FINDINGS: There is mild diffuse parenchymal volume loss observed. Periventricular and subcortical white matter low-attenuation changes are seen, most commonly associated with small vessel ischemic disease. Moderate bilateral ventricular dilatation is seen, with radiographic appearance favoring ex vacuo dilatation. No space occupying lesions or areas of acute vascular territory infarctions are identified. There are no abnormal extra axial fluid collections or extra axial hemorrhage identified. The visualized paranasal sinuses and mastoid air cells are well aerated bilaterally. No depressed calvarial fractures are identified. The visualized globes and retrobulbar soft tissues appear within normal limits.  Atherosclerotic intracranial calcifications are seen.        1.  No acute intracranial abnormality is identified, there are nonspecific white matter changes, commonly associated with small vessel ischemic disease.  Associated mild cerebral atrophy is noted. 2.  Atherosclerosis.    Ct-head W/o    Result Date: 5/25/2020  HISTORY/REASON FOR EXAM:  Seizure, abnormal neuro exam; hx craniotomy for subdural, seizure. TECHNIQUE/EXAM DESCRIPTION: CT scan of the brain without contrast. 5/24/2020 9:36 PM. CT scan of the brain was carried out without contrast in the normal manner. Both automated exposure control and Automated adjustment of tube current based on patient size are utilized. Total DLP: 933 mGy*cm COMPARISON: 9/29/2019 FINDINGS: There is no evidence of mass, mass effect, hemorrhage, or extraaxial fluid collection.  There is no midline shift. Global atrophy. Postsurgical changes of bilateral craniotomies Subcutaneous air surrounds the right muscles of mastication. There is no fracture or other acute bony abnormality seen. Visualized paranasal sinuses: Clear.     No acute intracranial process Global atrophy Subcutaneous air surrounding the right muscles of mastication. Unclear etiology.  [id number] View    [id number] View    Result Date: 5/25/2020  HISTORY/REASON FOR EXAM:  Distention. TECHNIQUE/EXAM DESCRIPTION AND NUMBER OF VIEWS: Abdomen (one view), 5/24/2020 11:58 PM. COMPARISON: 10/8/2019 FINDINGS: Dilated air-filled loops of small bowel. No free air. No abnormal soft tissue masses or calcifications Partially visualized postsurgical changes of the spine     Obstructive bowel gas pattern Raulito Lechuga MD 5/25/2020 8:16 AM    Dx-chest-limited (1 View)    Result Date: 6/8/2020 6/8/2020 1:43 PM HISTORY/REASON FOR EXAM:  Shortness of Breath. TECHNIQUE/EXAM DESCRIPTION AND NUMBER OF VIEWS: Single AP view of the chest. COMPARISON: 6/8/2020 at 0447 hours FINDINGS: Lines/tubes:  Support tubing is unchanged. Fixation hardware is present in the thoracic spine. Lungs:  There is  persistent atelectasis or pneumonia in the right lower lobe. A loculated appearing right-sided pleural effusion appears slightly decreased in volume. A small left pleural effusion is present. Pleura:  No pneumothorax. Heart and mediastinum:  The heart silhouette is within normal limits. Bones:  Left thoracotomy changes are present.     1.  Slightly decreased volume of loculated appearing right pleural effusion. 2.  Persistent atelectasis or pneumonia in the left lower lobe. 3.  Unchanged small left pleural effusion.    Dx-chest-limited (1 View)    Result Date: 5/27/2020  HISTORY/REASON FOR EXAM: Shortness of Breath; f/u pneumonia, pl effusion, pulm edema. f/u pneumonia, pl effusion, pulm edema TECHNIQUE/EXAM DESCRIPTION: Chest x-ray, one view, 5/27/2020 5:16 AM. COMPARISON: 5/26/2020 FINDINGS: Moderate right-sided pleural effusion is again noted. Trace left pleural effusion is again noted. Venous congestion and reticulonodular densities involving both lungs has worsened in the interval the previous study.     Findings are compatible with worsening pulmonary edema/pulmonary venous congestion.    Dx-chest-portable (1 View)    Result Date: 6/10/2020  6/10/2020 4:26 AM HISTORY/REASON FOR EXAM:  For indication of respiratory failure; For indication of respiratory failure. TECHNIQUE/EXAM DESCRIPTION AND NUMBER OF VIEWS: Single portable view of the chest. COMPARISON: One day prior FINDINGS: Lines and tubes are stable. Cardiomediastinal silhouette is stable. Small to moderate loculated right pleural effusion. Small loculated left pleural effusion seen on CT is not well evaluated radiographically. Diffuse interstitial and some hazy airspace opacities likely pulmonary edema. Correlate clinically for infection.  Findings appear mildly worsened since prior radiograph. The bones are unchanged.     Loculated pleural effusions, right greater than left. Interstitial and airspace opacities likely pulmonary edema. Correlate  clinically for infection. Findings possibly slightly increased from prior.    Dx-chest-portable (1 View)    Result Date: 6/9/2020 6/9/2020 4:31 AM HISTORY/REASON FOR EXAM: For indication of respiratory failure; For indication of respiratory failure TECHNIQUE/EXAM DESCRIPTION:  Single AP view of the chest. COMPARISON: Yesterday FINDINGS: Position of medical devices appears stable. Cardiomegaly is observed. The mediastinal contour appears within normal limits.  The central  pulmonary vasculature appears prominent and indistinct. The lungs appear well expanded bilaterally.  Diffuse scattered hazy pulmonary parenchymal opacities are seen. Veil-like opacities are seen in bilateral lung bases, right greater than left. Left rib fractures are noted.     1.  Pulmonary edema and/or infiltrates are identified, which are stable since the prior exam. 2.  Bilateral pleural effusions, right greater than left 3.  Cardiomegaly     Dx-chest-portable (1 View)    Result Date: 6/8/2020 6/8/2020 4:18 AM HISTORY/REASON FOR EXAM: For indication of respiratory failure; For indication of respiratory failure TECHNIQUE/EXAM DESCRIPTION:  Single AP view of the chest. COMPARISON: Yesterday FINDINGS: Endotracheal tube has been exchanged for tracheostomy.   Otherwise medical device position is stable. Cardiomegaly is observed. The mediastinal contour appears within normal limits.  The central  pulmonary vasculature appears prominent and indistinct. The lungs appear well expanded bilaterally.  Diffuse scattered hazy pulmonary parenchymal opacities are seen. Veil-like opacities are seen in bilateral lung bases, right greater than left. Left rib fractures are noted.     1.  Pulmonary edema and/or infiltrates are identified, which are stable since the prior exam. 2.  Bilateral pleural effusions, right greater than left 3.  Cardiomegaly     Dx-chest-portable (1 View)    Result Date: 6/7/2020 6/7/2020 5:09 AM HISTORY/REASON FOR EXAM:  For  indication of respiratory failure; For indication of respiratory failure TECHNIQUE/EXAM DESCRIPTION AND NUMBER OF VIEWS: Single portable view of the chest. COMPARISON: 6/6/2020 FINDINGS: Tubes and lines: ET tube, feeding tube and right central venous catheter are redemonstrated. Diffuse interstitial prominence. Patchy right base opacities. Moderate right lateral pleural effusion. No pneumothorax. Stable cardiopericardial silhouette.     1. No significant interval change.    Dx-chest-portable (1 View)    Result Date: 6/6/2020 6/6/2020 5:13 AM HISTORY/REASON FOR EXAM:  For indication of respiratory failure; For indication of respiratory failure TECHNIQUE/EXAM DESCRIPTION AND NUMBER OF VIEWS: Single portable view of the chest. COMPARISON: 6/5/2020 FINDINGS: Tubes and lines: ET tube, feeding tube and right central venous catheter are redemonstrated. Diffuse interstitial prominence. Patchy right base opacities. Moderate right lateral pleural effusion. No pneumothorax. Stable cardiopericardial silhouette.     1. No significant interval change.    Dx-chest-portable (1 View)    Result Date: 6/5/2020 6/5/2020 12:50 PM HISTORY/REASON FOR EXAM:  Shortness of breath. TECHNIQUE/EXAM DESCRIPTION AND NUMBER OF VIEWS: Single portable view of the chest. COMPARISON: Exam at 5:26 AM FINDINGS: Single portable view of the chest demonstrates a normal cardiac silhouette and mediastinal contours. Right pleural effusion and adjacent airspace opacification are unchanged. Bilateral interstitial prominence is unchanged. No pneumothorax is identified. Lines of support are unchanged.     No significant interval change.    Dx-chest-portable (1 View)    Result Date: 6/5/2020 6/5/2020 5:16 AM HISTORY/REASON FOR EXAM: For indication of respiratory failure; For indication of respiratory failure TECHNIQUE/EXAM DESCRIPTION:  Single AP view of the chest. COMPARISON: Yesterday FINDINGS: Position of medical devices appears stable. Cardiomegaly is  observed. The mediastinal contour appears within normal limits.  The central  pulmonary vasculature appears prominent and indistinct. The lungs appear well expanded bilaterally.  Diffuse scattered hazy pulmonary parenchymal opacities are seen. Veil-like opacities are seen in bilateral lung bases, right greater than left. Left rib fractures are noted.     1.  Pulmonary edema and/or infiltrates are identified, which are stable since the prior exam. 2.  Bilateral pleural effusions, right greater than left 3.  Cardiomegaly     Dx-chest-portable (1 View)    Result Date: 6/4/2020 6/4/2020 4:35 AM HISTORY/REASON FOR EXAM: For indication of respiratory failure; For indication of respiratory failure TECHNIQUE/EXAM DESCRIPTION:  Single AP view of the chest. COMPARISON: Yesterday FINDINGS: Position of medical devices appears stable. Cardiomegaly is observed. The mediastinal contour appears within normal limits.  The central  pulmonary vasculature appears prominent and indistinct. The lungs appear well expanded bilaterally.  Diffuse scattered hazy pulmonary parenchymal opacities are seen. Veil-like opacities are seen in bilateral lung bases, right greater than left. Left rib fractures are noted.     1.  Pulmonary edema and/or infiltrates are identified, which are stable since the prior exam. 2.  Bilateral pleural effusions, right greater than left 3.  Cardiomegaly     Dx-chest-portable (1 View)    Result Date: 6/3/2020    6/3/2020 4:48 AM HISTORY/REASON FOR EXAM: For indication of respiratory failure; For indication of respiratory failure TECHNIQUE/EXAM DESCRIPTION:  Single AP view of the chest. COMPARISON: Yesterday FINDINGS: Position of medical devices appears stable. Cardiomegaly is observed. The mediastinal contour appears within normal limits.  The central  pulmonary vasculature appears prominent and indistinct. The lungs appear well expanded bilaterally.  Diffuse scattered hazy pulmonary parenchymal opacities are seen.  Veil-like opacities are seen in bilateral lung bases, right greater than left. Left rib fractures are noted.     1.  Pulmonary edema and/or infiltrates are identified, which are stable since the prior exam. 2.  Bilateral pleural effusions, right greater than left 3.  Cardiomegaly     Dx-chest-portable (1 View)    Result Date: 6/2/2020 6/2/2020 4:25 PM HISTORY/REASON FOR EXAM:  Follow-up right thoracentesis TECHNIQUE/EXAM DESCRIPTION AND NUMBER OF VIEWS: Single portable view of the chest. COMPARISON: 6/2/2020 FINDINGS: Scans of postoperative changes seen in the thoracolumbar spine. ET tube and feeding tube are again seen as is a right PICC line. The mediastinal and cardiac silhouette is unremarkable. The pulmonary vascularity is within normal limits. There is peribronchial wall thickening and edema. There is right lower lobe airspace disease. There has been interval decrease in the right pleural effusion status post thoracentesis. There is no visible pneumothorax. There are no acute bony abnormalities.     1.  There is no pneumothorax following right thoracentesis. 2.  The amount of right pleural effusion is decreased. 3.  There is vascular congestion and/or edema. 4.  There is right lower lobe airspace disease which could be atelectasis or pneumonitis.    Dx-chest-portable (1 View)    Result Date: 6/2/2020 6/2/2020 4:24 AM HISTORY/REASON FOR EXAM: For indication of respiratory failure; For indication of respiratory failure TECHNIQUE/EXAM DESCRIPTION:  Single AP view of the chest. COMPARISON: None FINDINGS: Position of medical devices appears stable. Cardiomegaly is observed. The mediastinal contour appears within normal limits.  The central  pulmonary vasculature appears prominent and indistinct. The lungs appear well expanded bilaterally.  Diffuse scattered hazy pulmonary parenchymal opacities are seen. Veil-like opacities are seen in bilateral lung bases, right greater than left. Left rib fractures are noted.       1.  Pulmonary edema and/or infiltrates are identified, which are stable since the prior exam. 2.  Bilateral pleural effusions, right greater than left 3.  Cardiomegaly      Dx-chest-portable (1 View)    Result Date: 5/31/2020  HISTORY/REASON FOR EXAM: intubation. intubation TECHNIQUE/EXAM DESCRIPTION: Chest x-ray, one portable view, 5/30/2020 8:26 PM. COMPARISON: 5/20/2020 FINDINGS: The endotracheal tube terminates above the level the izaiah. NG tube passes caudal to the diaphragm. Right-sided PICC line remains in stable position. Large right-sided pleural effusion is again noted and appear stable. Increasing areas of consolidation of developed involving the apices of both lungs, as well as the lateral aspect of the left lung.     Extensive bilateral lung opacities are now noted, which could be explained by severe bilateral pneumonia. Stable appearance of right pleural effusion. Supportive lines and tubes appear in appropriate positions.    Dx-chest-portable (1 View)    Result Date: 5/28/2020  HISTORY/REASON FOR EXAM: Shortness of Breath. TECHNIQUE/EXAM DESCRIPTION: Chest x-ray, one portable view, 5/28/2020 7:53 AM. COMPARISON: 5/27/2020 FINDINGS: Pulmonary venous congestion pattern has improved, however, prominent interstitial opacities persist, compatible with pulmonary edema. Right-sided pleural effusion is stable. PICC line has been placed and appears in excellent position.     Persistent right-sided pleural effusion.    Dx-chest-portable (1 View)    Result Date: 5/23/2020  HISTORY/REASON FOR EXAM:  HYPOTENSION. TECHNIQUE/EXAM DESCRIPTION AND NUMBER OF VIEWS: Chest x-ray (one view), 5/23/2020 12:06 AM. COMPARISON: 5/13/2020 FINDINGS: Large loculated right pleural effusion is identified. Ill-defined airspace opacities involving the left lung base. The cardiac and mediastinal silhouette are unremarkable. Postsurgical changes of the thoracic spine.     Large right pleural effusion, loculated Atelectasis versus  developing pneumonia in the left lung 5/23/2020 8:48 AM     Ir-thoracentesis Puncture Right    Result Date: 5/13/2020  HISTORY/REASON FOR EXAM:  Recurrent pleural effusion. TECHNIQUE/EXAM DESCRIPTION: Ultrasound-guided thoracentesis, 5/13/2020 8:59 AM. COMPARISON: None. PROCEDURE: The risks, benefits, alternatives and the procedure were discussed with the patient.  The patient's questions were answered to their full satisfaction.  They subsequently agreed to proceed with the proposed procedure and signed a written consent form. A procedural timeout was performed. Maximum sterile barrier protection mechanisms were employed. The patient's laboratory values, allergies and medication list were reviewed prior to the examination. The patient's thoracic cavity was sonographically evaluated.  An area suitable for thoracentesis was identified.  The overlying skin was prepped and draped in a sterile fashion.  1% lidocaine was infiltrated subcutaneously for local anesthesia. Following this, the thoracic cavity was entered utilizing a Safe-T-Centesis Catheter System.  A total of 900 cc of serosanguineous fluid was subsequently aspirated under sonographic control. The patient tolerated the procedure well and there were no immediate postprocedural complications. Following the procedure, the chest radiograph demonstrated no evidence of pneumothorax and decrease in size of the pleural effusion.     Unremarkable sonographic-guided thoracentesis.    Mr-brain-with & W/o    Result Date: 6/6/2020 6/6/2020 5:00 PM HISTORY/REASON FOR EXAM:  exposed right craniotomy hardware r/o infection. TECHNIQUE/EXAM DESCRIPTION:   MRI of the brain without and with contrast. T1 sagittal, T2 fast spin-echo axial, T1 coronal, FLAIR coronal, diffusion-weighted and apparent diffusion coefficient (ADC map) axial images were obtained of the whole brain. T1 postcontrast axial and T1 postcontrast coronal images were obtained. The study was performed on a .E.  Signa 1.5 Deborah MRI scanner. 15 mL ProHance contrast was administered intravenously. COMPARISON:  None. FINDINGS:  There is abnormal contrast enhancement and edema noted involving right frontal craniotomy flap. There is abnormal enhancing extradural tissue noted adjacent to the right frontal bone with abnormal adjacent dural enhancement. There is no evidence of leptomeningeal enhancement. There is no parenchymal contrast enhancement. There is no acute infarct. There is no acute or chronic parenchymal hemorrhage. There is severe cerebral volume loss. There is mild cerebellar volume loss. There is no intra-axial space-occupying lesion. The midline structures including the pituitary, hypothalamic and pineal regions are unremarkable. The posterior fossa structures are unremarkable. The visualized flow voids of the cerebral vasculature are unremarkable. There is no large lesion identified in the expected course of the intracranial portions of the cranial nerves. There is mucosal thickening in the bilateral mastoid air cells.     1.  There is abnormal contrast enhancement and edema noted involving right frontal craniotomy flap. There is abnormal enhancing extradural tissue noted adjacent to the right frontal bone with abnormal adjacent dural enhancement. 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 There is no evidence of leptomeningeal enhancement. There is no parenchymal contrast enhancement. 2.  Severe cerebral volume loss. 3.  Mild cerebral volume loss.    Us-extremity Artery Upper Bilat    Result Date: 6/6/2020   Vascular Laboratory  Conclusions  No hemodynamically significant evidence of arterial disease in the right  upper extremity.  No hemodynamically significant evidence of arterial disease in the left  upper extremity.  LARISSA DURBIN  Exam Date:     06/06/2020 09:04  Room #:     Inpatient  Priority:     Routine  Ht (in):             Wt (lb):  Ordering  Physician:        JONY HANSEN JR  Referring Physician:       [id number], JONY HANSEN JR,  Sonographer:               Erlinda Pham RVT  Study Type:                Complete Bilateral  Technical Quality:         Adequate  Age:    54    Gender:     M  MRN:    [id number]  :    1965      BSA:  Indications:     Cyanosis  CPT Codes:       49265  ICD Codes:       782.5  History:         Bilateral cyanosis  Limitations:     Challenging due to patient movement.          RIGHT                                                 LEFT  Waveforms               PSV                              PSV        Waveforms                          (cm/s)                           (cm/s)  Triphasic                77.00       Subclavian          69.00      Triphasic  Bi, non-                 84.00       Axillary            54.00      Bi, non-  reversed                                                            reversed                                       Brachial  Bi, non-                 100.00      Proximal            63.00      Triphasic  reversed                                       Mid  Triphasic                72.00       Distal              38.00      Bi, non-                                                                      reversed                                       Radial  Bi, non-                 77.00       Proximal            61.10      Bi, non-  reversed                                                            reversed                                       Mid  Bi, non-                 47.00       Distal              17.40      Bi, non-  reversed                                                            reversed                                       Ulnar  Bi, non-                 77.00       Proximal            71.30      Bi, non-  reversed                                                            reversed                                       Mid   Bi, non-                 105.00      Distal              63.10      Bi, non-  reversed                                                            reversed  Findings  Right  No hemodynamically significant evidence of arterial disease in the right  upper extremity.  Multiphasic Doppler flow pattern is noted throughout the right upper  extremity.  Left  No hemodynamically significant evidence of arterial disease in the left  upper extremity.  Multiphasic Doppler flow pattern is noted throughout the left upper  extremity.  The distal radial artery is small with low velocities, but patent.  Melody MARTINEZ To  (Electronically Signed)  Final Date:      06 June 2020                   11:57    Us-thoracentesis Puncture Right    Result Date: 6/3/2020  6/2/2020 1:54 PM HISTORY/REASON FOR EXAM:  Shortness of breath TECHNIQUE/EXAM DESCRIPTION: Ultrasound-guided thoracentesis. Indication:  RIGHT pleural fluid collection. COMPARISON:  Plain film from the same day PROCEDURE:     Informed consent was obtained over the phone from the patient's family. A timeout was taken. A right pleural effusion was localized with real-time ultrasound guidance. The right posterior chest wall was prepped and draped in a sterile manner. Following local anesthesia with 1% lidocaine, a 5 Welsh Yueh pigtail catheter was advanced into the pleural space with trocar technique and pleural fluid was drained. The patient tolerated the procedure well without evidence of complication. A post thoracentesis chest radiograph is forthcoming. FINDINGS: Fluid was sent to the laboratory. Fluid character: serosanguinous     1. Ultrasound guided right sided diagnostic and therapeutic thoracentesis. 2. 800 mL of fluid withdrawn.    Ec-echocardiogram Complete W/o Cont    Result Date: 6/2/2020  Transthoracic Echo Report Echocardiography Laboratory CONCLUSIONS No prior study is available for comparison. Left ventricular ejection fraction is visually estimated to be 65%.  Normal left ventricular systolic function. Mild concentric left ventricular hypertrophy. Grade II diastolic dysfunction. Moderately dilated right ventricle. Mildly dilated left atrium. Mild mitral regurgitation. Estimated right ventricular systolic pressure is 55 mmHg. Trace tricuspid regurgitation. Normal pericardium without effusion. LARISSA DURBIN Exam Date:        LV EF:  65    % FINDINGS Left Ventricle Normal left ventricular chamber size. Mild concentric left ventricular hypertrophy. Normal left ventricular systolic function. Left ventricular ejection fraction is visually estimated to be 65%. Normal diastolic function. Grade II diastolic dysfunction. Right Ventricle Moderately dilated right ventricle. Right Atrium The right atrium is normal in size. Left Atrium Mildly dilated left atrium. Left atrial volume index is 38 mL/sq m. Mitral Valve Structurally normal mitral valve without significant stenosis. Mild mitral regurgitation. Aortic Valve Structurally normal aortic valve without significant stenosis or regurgitation. Tricuspid Valve Structurally normal tricuspid valve without significant stenosis. Trace tricuspid regurgitation. Estimated right ventricular systolic pressure is 55 mmHg. Right atrial pressure is estimated to be 3 mmHg. Pulmonic Valve Structurally normal pulmonic valve without significant stenosis or regurgitation. Pericardium Normal pericardium without effusion. Aorta The aortic root is normal.  Ascending aorta diameter is 3.0 cm. Pavel Franco M.D. (Electronically Signed) Final Date:     02 June 2020                 15:10    Dx-abdomen For Tube Placement    Result Date: 6/2/2020 6/2/2020 1:06 PM HISTORY/REASON FOR EXAM:  Line evaluation. TECHNIQUE/EXAM DESCRIPTION AND NUMBER OF VIEWS:  1 view(s) of the abdomen. COMPARISON:  None. FINDINGS: Enteric tube has been placed. The tip projects over the left upper quadrant. The bowel gas pattern is within normal limits. There is postoperative  change in the thoracolumbar spine.     1.  Feeding tube tip projects over the gastric body.    Ir-picc Line Placement W/guidance < Age 3    Result Date: 5/27/2020  HISTORY/REASON FOR EXAM:  IR picc placement. TECHNIQUE/EXAM DESCRIPTION: ULTRASOUND AND FLUOROSCOPIC-GUIDED PICC LINE INSERTION, 5/27/2020 10:36 AM.. TECHNIQUE:  The procedure was explained to the patient.  The patient was placed supine on the fluoroscopy table.  Ultrasound examination of the right arm was performed to confirm patency of the basilic vein.  Using all elements of Maximum Sterile Barrier  technique and local anesthesia with ultrasound guidance, the above mentioned examined vein was punctured through a small skin incision.  Continuous real time ultrasound monitoring of the puncture needle entry into the vein was performed and documented. Under fluoroscopic guidance, a 0.018 wire was passed centrally through the needle.  A peel-away sheath was placed over the wire.  A 5 [place]ian double lumen Power PICC line was advanced until the tip was at the SVC-right atrium junction.  The position of the catheter tip was confirmed with fluoroscopy.  The sheath was removed, and the line was secured to the patient's skin.  There were no immediate complications, and the patient tolerated the procedure well. FINDINGS: The tip of the PICC line is at the SVC-right atruim junction . 1 saved images. Fluoroscopic time was 3.2 minutes     Successful placement of a PICC line.  Ready to infuse.,      Micro:  Results     Procedure Component Value Units Date/Time    CULTURE TISSUE W/ GRM STAIN [[id number]]  (Abnormal) Collected:  06/10/20 1415    Order Status:  Completed Specimen:  Tissue Updated:  06/11/20 1222     Significant Indicator POS     Source TISS     Site Cranial Plate     Culture Result -     Gram Stain Result Rare WBCs.  Moderate Gram positive cocci.       Culture Result Coagulase-negative Staphylococcus species  Moderate growth      Narrative:       CALL   [place]  19 tel. [phone],  CALLED  19 tel. [phone] 06/11/2020, 12:21, RB PERF. RESULTS CALLED TO:[id number]  Surgery Specimen    GRAM STAIN [[id number]] Collected:  06/10/20 1415    Order Status:  Completed Specimen:  Tissue Updated:  06/11/20 0902     Significant Indicator .     Source TISS     Site Cranial Plate     Gram Stain Result Rare WBCs.  Moderate Gram positive cocci.      Narrative:       CALL  [place]  19 tel. [phone],  CALLED  19 tel. [phone] 06/11/2020, 09:01, RB PERF. RESULTS CALLED TO:RN  [id number]  Surgery Specimen    FLUID CULTURE W/GRAM STAIN [[id number]] Collected:  06/08/20 1334    Order Status:  Completed Specimen:  Body Fluid from Thoracentesis Fluid Updated:  06/11/20 0730     Significant Indicator NEG     Source BF     Site Pleural Fluid     Culture Result No growth at 72 hours.     Gram Stain Result No organisms seen.    Narrative:       Collected By:[id number] VUONG  Collected By:[id number] VUONG    AFB CULTURE [[id number]] Collected:  06/08/20 1334    Order Status:  Completed Specimen:  Body Fluid from Thoracentesis Fluid Updated:  06/11/20 0730     Significant Indicator NEG     Source BF     Site Pleural Fluid     Culture Result Culture in progress.     AFB Smear Results No acid fast bacilli seen.    Narrative:       Collected By:[id number] VUONG  Collected By:[id number] JAIMIE VUONG    FUNGAL CULTURE [[id number]] Collected:  06/08/20 1334    Order Status:  Completed Specimen:  Body Fluid from Thoracentesis Fluid Updated:  06/11/20 0730     Significant Indicator NEG     Source BF     Site Pleural Fluid     Culture Result No fungal growth to date.    Narrative:       Collected By:[id number] VUONG  Collected By:[id number] JAIMIE VUONG    Anaerobic Culture [[id number]] Collected:  06/10/20 1415    Order Status:  Completed Specimen:  Other Updated:  06/10/20 1641    AFB Culture [[id number]] Collected:  06/10/20 1415    Order Status:  Completed Specimen:  Other Updated:   06/10/20 1641    Fungal Culture [[id number]] Collected:  06/10/20 1415    Order Status:  Completed Specimen:  Other Updated:  06/10/20 1641    FLUID CULTURE W/GRAM STAIN [[id number]] Collected:  06/02/20 1440    Order Status:  Completed Specimen:  Body Fluid Updated:  06/10/20 1016     Significant Indicator NEG     Source BF     Site Thoracentesis Fluid     Culture Result No growth at 5 days.     Gram Stain Result No organisms seen.    Fungal Culture [[id number]] Collected:  06/02/20 1440    Order Status:  Completed Specimen:  Body Fluid Updated:  06/10/20 1016     Significant Indicator NEG     Source BF     Site Thoracentesis Fluid     Culture Result No fungal growth to date.    AFB Culture [[id number]] Collected:  06/02/20 1440    Order Status:  Completed Specimen:  Body Fluid Updated:  06/10/20 1016     Significant Indicator NEG     Source BF     Site Thoracentesis Fluid     Culture Result Culture in progress.     AFB Smear Results No acid fast bacilli seen.    SARS-CoV-2, PCR (In-House) [[id number]] Collected:  06/10/20 0646    Order Status:  Completed Updated:  06/10/20 0722     SARS-CoV-2 Source NP Swab     SARS-CoV-2 by PCR NotDetected    Narrative:       Collected By:[id number] [place]  Rapid test for Pre Op surgery today  Is this test for diagnosis or screening?->Screen    COVID/SARS CoV-2 [[id number]] Collected:  06/10/20 0646    Order Status:  Completed Specimen:  Respirate from Nasal Updated:  06/10/20 0651     COVID Order Status Received    Narrative:       Collected By:[id number] Jule GameEL  Rapid test for Pre Op surgery today  Is this test for diagnosis or screening?->Screen    GRAM STAIN [[id number]] Collected:  06/08/20 1334    Order Status:  Completed Specimen:  Body Fluid Updated:  06/09/20 2214     Significant Indicator .     Source BF     Site Pleural Fluid     Gram Stain Result No organisms seen.    Narrative:       Collected By:[id number] JAIMIE VUONG  Collected By:[id number] JAIMIE VUONG     "Acid Fast Stain [[id number]] Collected:  06/08/20 1334    Order Status:  Completed Specimen:  Body Fluid Updated:  06/09/20 2214     Significant Indicator NEG     Source BF     Site Pleural Fluid     AFB Smear Results No acid fast bacilli seen.    Narrative:       Collected By:[id number] JAIMIE VUONG  Collected By:[id number] JAIMIE VUONG    BLOOD CULTURE [[id number]] Collected:  06/08/20 1825    Order Status:  Completed Specimen:  Blood from Peripheral Updated:  06/09/20 0740     Significant Indicator NEG     Source BLD     Site PERIPHERAL     Culture Result No Growth  Note: Blood cultures are incubated for 5 days and  are monitored continuously.Positive blood cultures  are called to the RN and reported as soon as  they are identified.      Narrative:       Collected By:[id number] TAVARES IBARRA  Per Hospital Policy: Only change Specimen Src: to \"Line\" if  specified by physician order.  Right Forearm/Arm    BLOOD CULTURE [[id number]] Collected:  06/08/20 1830    Order Status:  Completed Specimen:  Blood from Peripheral Updated:  06/09/20 0740     Significant Indicator NEG     Source BLD     Site PERIPHERAL     Culture Result No Growth  Note: Blood cultures are incubated for 5 days and  are monitored continuously.Positive blood cultures  are called to the RN and reported as soon as  they are identified.      Narrative:       Collected By:[id number] TAVARES IBARRA  Per Hospital Policy: Only change Specimen Src: to \"Line\" if  specified by physician order.  Left Forearm/Arm    FLUID CULTURE W/GRAM STAIN [[id number]] Collected:  06/08/20 1334    Order Status:  Sent Specimen:  Body Fluid from Thoracentesis Fluid     CULTURE RESPIRATORY W/ GRM STN [[id number]] Collected:  06/03/20 1018    Order Status:  Completed Specimen:  Respirate from Tracheal Aspirate Updated:  06/05/20 1113     Significant Indicator NEG     Source RESP     Site TRACHEAL ASPIRATE     Culture Result No growth at 48 hours.     Gram Stain Result Rare WBCs.  No " organisms seen.      Narrative:       Collected By:[id number] KEN BROCK  Collected By:[id number] KEN BROCK          Assessment:  Active Hospital Problems    Diagnosis   • PEA (Pulseless electrical activity) ([facility]) [I46.9]   • Shock ([facility]) [R57.9]   • Anemia [D64.9]   • Seizure disorder ([facility]) [G40.909]   • DAMIAN (acute kidney injury) ([facility]) [N17.9]   • Epidural phlegmon [G06.2]   • Osteomyelitis of skull ([facility]) [M86.9]   • Goals of care, counseling/discussion [Z71.89]   • Acute respiratory failure with hypoxia ([facility]) [J96.01]   • Recurrent right pleural effusion [J90]   • Pneumonia [J18.9]   • Ileus ([facility]) [K56.7]   • Hypokalemia [E87.6]      ASSESSMENT/PLAN:   Ventilator dependent respiratory failure  Recurrent loculated pleural effusion  Hospital-acquired pneumonia-prior course Zosyn and vancomycin  Afebrile  Persistent leukocytosis  Pressors  S/p thoracentesis 6/2 and 6/8-cxs neg WBC and LDH less than 150 but ph 7  Plan for thoracotomy/decort today  Continue linezolid, cefepime and Flagyl     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   h/o bilateral craniotomy with subdural evacuation in 10/2016 due to SDH  MRI on 6/6+ enhancement and edema involving the right frontal craniotomy flap as well as extradural tissue adjacent to the right frontal bone concerning for craniotomy flap osteomyelitis with adjacent epidural phlegmon  S/p OR -removal plates-not able to close due to poor skin integrity. VAC placed  Cxs +CNS  On abx as above    DAMIAN, improved  Avoid nephrotoxic agents    Encephalopathy  Seizure disorder  On Keppra    Hepatitis C with cirrhosis per chart  Mult VL undetectable from [id number]  FU GI if indicated after discharge     Prognosis guarded  Appreciate Palliative Care eval

## 2020-06-11 NOTE — PROGRESS NOTES
"Critical Care Progress Note    Date of admission  5/31/2020    Chief Complaint  54 y.o. male admitted 5/31/2020 with Respiratory failure, seizure    Hospital Course    \"All history was obtained from old notes/charts from [facility] as the patient is intubated at the time of my evaluation.     Mr. Lechuga is a 54 year old male with the past medical history of urethral stricture with urinary retention, recurrent right-sided pleural effusion, alcohol abuse, COPD on home O2, hepatitis C with cirrhosis, chronic pain syndrome on narcotics, traumatic brain injury, history of SDH with subsequent seizure disorder who presented to [facility] on 5/22 with the complaint of difficulty urinary for 2 days and low blood pressures.  He apparently was recently hospitalized at [facility] from 5/6 to 5/11/2020 for a COPD exacerbation and reported that he was doing well for until 2 days prior to 5/22 when he developed difficulty urinating and draining his bladder.  He has worsening abdominal pain in the suprapubic region with chills.  No fevers.  No nausea or vomiting.  He has a negative COVID test on 5/6.  The patient was admitted for acute urinary retention, UTI, and hypoxia.  Over the course of the next week, the patient required intubation for seizure activity, but was then extubated.  However, the patient then developed aspiration pneumonia with AMS and was on BiPAP for a night on 5/29.  All during the patient's hospital stay, he has been battling with delirium.  Unfortunately, around 2000 last night the patient the patient became unresponsive with a bradycardic episode and no pulses.  A code was called and he received 3 rounds of CPR and 2 doses of epi before ROSC was achieved.  He was intubated again and stabilized.  He was sent to [facility] for higher level of care due to the growing complexity of his condition as well as critical care management since [facility] does not have critical care specialists on staff.\"      Interval " Problem Update  Reviewed last 24 hour events:  Neuro: intermittent follows moves all 3/5 oxy for pain no seizure   HR: 70-90  SBP: midodrine 's  Tmax: 99.1  GI: TF at goal   UOP: powers 450  Lines: picc right arm LR at 50ml  Resp: t piece 10l 40%    Vte: held per nsg  PPI/H2:home ppi  Antibx: linezolid falgy and cefepime    D/c NS ->lR 50ml/hr to maintain marginal urine output  Drop hydrocort to 50mg daily  start free water 200ml Q4   Thoracentesis to left chest ? Vs bilateral vats  Mag, K and phos replace  Sliding scale stop  Edge of bed  Spoke with sister on phone  Came back from decortication did well with 3 lobes trap lung  Discuss case with Dr Cohen    Review of Systems  Review of Systems   Unable to perform ROS: Intubated        Vital Signs for last 24 hours   Pulse:  [71-84] 84  Resp:  [0-29] 29  BP: ()/(51-67) 115/55  SpO2:  [90 %-100 %] 99 %    Hemodynamic parameters for last 24 hours       Respiratory Information for the last 24 hours  Vent Mode: APVCMV  Rate (breaths/min): 16  Vt Target (mL): 360  PEEP/CPAP: 8  MAP: 12  Control VTE (exp VT): 446    Physical Exam   Physical Exam  Vitals signs reviewed.   Constitutional:       General: He is not in acute distress.     Appearance: He is well-developed. He is ill-appearing.      Interventions: He is intubated.      Comments: Sitting in bed chronic ill appearing on ventilator   HENT:      Head: Normocephalic and atraumatic.      Comments: Scalp wound vac in place     Right Ear: External ear normal.      Left Ear: External ear normal.      Nose: Nose normal.      Comments: Cortrak in place     Mouth/Throat:      Mouth: Mucous membranes are dry.      Pharynx: Oropharynx is clear.   Eyes:      Conjunctiva/sclera: Conjunctivae normal.      Pupils: Pupils are equal, round, and reactive to light.   Neck:      Musculoskeletal: Neck supple.      Vascular: No JVD.      Trachea: No tracheal deviation.      Comments: Trach in place  Cardiovascular:       Rate and Rhythm: Regular rhythm. Bradycardia present.      Pulses: Normal pulses.   Pulmonary:      Effort: He is intubated.      Breath sounds: No wheezing, rhonchi or rales.      Comments: No distress on vent but coarse bilateral  Abdominal:      General: Bowel sounds are normal. There is no distension.      Palpations: Abdomen is soft.   Musculoskeletal:         General: Swelling present. No tenderness.      Right lower leg: Edema present.      Left lower leg: Edema present.   Skin:     General: Skin is warm and dry.      Capillary Refill: Capillary refill takes less than 2 seconds.      Findings: No rash.      Comments: Multiple tattoos  Venous stasis dermatitis   Neurological:      General: No focal deficit present.      Mental Status: He is alert.      Comments: Opens eyes to voice moves extremities not following commands   Psychiatric:      Comments: Unable to assess         Medications  Current Facility-Administered Medications   Medication Dose Route Frequency Provider Last Rate Last Dose   • [START ON 6/12/2020] hydrocortisone sodium succinate PF (SOLU-CORTEF) 100 MG injection 50 mg  50 mg Intravenous DAILY Oziel Londono M.D.       • lactated ringers infusion   Intravenous Continuous Oziel Londono M.D. 50 mL/hr at 06/11/20 0612     • MD Alert...ICU Electrolyte Replacement per Pharmacy   Other PHARMACY TO DOSE Oziel Londono M.D.       • cefepime (MAXIPIME) 2 g in  mL IVPB  2 g Intravenous Q12HRS Nu Diez M.D. 200 mL/hr at 06/11/20 1740 2 g at 06/11/20 1740   • midodrine (PROAMATINE) tablet 15 mg  15 mg Enteral Tube Q8HRS Oziel Londono M.D.   Stopped at 06/11/20 1400   • levothyroxine (SYNTHROID) tablet 50 mcg  50 mcg Enteral Tube AM ES Pavel Orlando Jr., D.O.   50 mcg at 06/11/20 0521   • oxyCODONE immediate release (ROXICODONE) tablet 10-15 mg  10-15 mg Enteral Tube Q4HRS PRN Pavel Orlando Jr., D.O.   10 mg at 06/11/20 1741   • phenytoin (DILANTIN) injection 300 mg   300 mg Intravenous Q12HRS Pavel Orlando Jr. D.O.   300 mg at 06/11/20 0521   • linezolid (ZYVOX) tablet 600 mg  600 mg Enteral Tube Q12HRS Pavel Orlando Jr. D.O.   600 mg at 06/11/20 1741   • metroNIDAZOLE (FLAGYL) IVPB 500 mg  500 mg Intravenous Q8HRS Pavel Orlando Jr. D.O.   Stopped at 06/11/20 0622   • fentaNYL (SUBLIMAZE) injection 25-50 mcg  25-50 mcg Intravenous Q2HRS PRN Vishnu Zamora M.D.   25 mcg at 06/11/20 1222   • levETIRAcetam (KEPPRA) tablet 1,000 mg  1,000 mg Enteral Tube BID Pavel Orlando Jr. D.O.   1,000 mg at 06/11/20 1741   • Pharmacy Consult: Enteral tube insertion - review meds/change route/product selection  1 Each Other PHARMACY TO DOSE Pavel Orlando Jr., D.O.       • omeprazole (FIRST-OMEPRAZOLE) 2 mg/mL oral susp 40 mg  40 mg Enteral Tube DAILY Pavel Orlando Jr. D.O.   40 mg at 06/11/20 0521   • thiamine tablet 100 mg  100 mg Enteral Tube DAILY Pavel Orlando Jr. D.O.   100 mg at 06/11/20 0521   • multivitamin (THERAGRAN) tablet 1 Tab  1 Tab Enteral Tube DAILY Pavel Orlando Jr., D.O.   1 Tab at 06/11/20 0521   • LORazepam (ATIVAN) injection 4 mg  4 mg Intravenous Q10 MIN PRN Pavel Orlando Jr., D.O.       • norepinephrine (Levophed) infusion 8 mg/250 mL (premix)  0-30 mcg/min Intravenous Continuous Saurabh Delatorre M.D.   Stopped at 06/09/20 1215   • senna-docusate (PERICOLACE or SENOKOT S) 8.6-50 MG per tablet 2 Tab  2 Tab Enteral Tube BID Anisa Hernández M.D.   Stopped at 06/11/20 1800    And   • polyethylene glycol/lytes (MIRALAX) PACKET 1 Packet  1 Packet Enteral Tube QDAY PRN Lange Blue Edgar, M.D.        And   • magnesium hydroxide (MILK OF MAGNESIA) suspension 30 mL  30 mL Enteral Tube QDAY PRN Anisa Hernández M.D.        And   • bisacodyl (DULCOLAX) suppository 10 mg  10 mg Rectal QDAY PRN Anisa Hernández M.D.       • acetaminophen (TYLENOL) tablet 650 mg  650 mg Enteral Tube Q6HRS PRN Anisa Hernández M.D.   650 mg at 06/08/20  1644   • labetalol (NORMODYNE/TRANDATE) injection 10 mg  10 mg Intravenous Q4HRS PRN Anisa Hernández M.D.       • Respiratory Therapy Consult   Nebulization Continuous RT Alexia Khan M.D.       • ipratropium-albuterol (DUONEB) nebulizer solution  3 mL Nebulization Q2HRS PRN (RT) Alexia Khan M.D.       • lidocaine (XYLOCAINE) 1 % injection 1-2 mL  1-2 mL Tracheal Tube Q30 MIN PRN Alexia Khan M.D.           Fluids    Intake/Output Summary (Last 24 hours) at 6/11/2020 1745  Last data filed at 6/11/2020 1658  Gross per 24 hour   Intake 4520 ml   Output 1555 ml   Net 2965 ml       Laboratory  Recent Labs     06/09/20  0353 06/10/20  0516 06/11/20  0504   ISTATAPH 7.383* 7.319* 7.391*   ISTATAPCO2 49.1* 57.6* 43.4*   ISTATAPO2 97* 78 83   ISTATATCO2 31 31 28   HYFSXLH3UGH 97 94 96   ISTATARTHCO3 29.3* 29.6* 26.3*   ISTATARTBE 4* 3 1   ISTATTEMP 98.6 F 37.5 C 98.8 F   ISTATFIO2 40 40 40   ISTATSPEC Arterial Arterial Arterial   ISTATAPHTC 7.383* 7.312* 7.390*   TYSPLTFQ4YT 97* 80 83         Recent Labs     06/09/20  1410 06/09/20  1752 06/10/20  0518 06/11/20  0439   SODIUM 149*  --  143 150*   POTASSIUM 3.9  --  3.6 3.5*   CHLORIDE 109  --  105 113*   CO2 27  --  29 27   BUN 39*  --  37* 34*   CREATININE 1.70*  --  1.53* 1.35   MAGNESIUM  --  2.0 2.1 2.0   PHOSPHORUS  --  3.2 3.2 2.2*   CALCIUM 7.4*  --  8.0* 7.4*     Recent Labs     06/09/20  1752 06/10/20  0518 06/11/20  0439   GLUCOSE 196* 119* 168*     Recent Labs     06/09/20  0324 06/10/20  0518 06/11/20 0439   WBC 15.6* 14.4* 15.4*   NEUTSPOLYS 78.90* 79.60* 82.50*   LYMPHOCYTES 11.40* 10.40* 9.30*   MONOCYTES 7.30 7.00 6.00   EOSINOPHILS 1.50 1.90 1.40   BASOPHILS 0.30 0.50 0.30     Recent Labs     06/09/20  0324 06/10/20  0518 06/11/20  0439   RBC 2.15* 2.51* 2.27*   HEMOGLOBIN 6.9* 7.9* 7.2*   HEMATOCRIT 23.3* 26.5* 24.0*   PLATELETCT 306 323 323       Imaging  X-Ray:  I have personally reviewed the images and compared with prior images.  and My impression is: still with worsening right loculated effusion, trach in place  CT:    Reviewed    Assessment/Plan  Shock ([facility])- (present on admission)  Assessment & Plan  Multifactorial:  PEA/CPR, propofol and infection  Continue antimicrobials  Echo: LVEF 65%. Mild concentric LVH. Grade II diastolic dysfunction. Mild mitral regurgitation. Estimated right ventricular systolic pressure is 55 mmHg  Ongoing titration of Levophed for MAP goals >65  Continue midodrine  Cortisol 11.4, continue Solu-Cortef  Continue weaning steroids 6/11    PEA (Pulseless electrical activity) ([facility])- (present on admission)  Assessment & Plan  ? Etiology from [place], likely hypoxia  Continue supportive care  Optimize electrolytes  EKG without ischemic changes  Echocardiogram: LVEF 65%. Mild concentric LVH. Grade II diastolic dysfunction. Mild mitral regurgitation. Estimated right ventricular systolic pressure is 55 mmHg    Anemia  Assessment & Plan  Restrictive transfusion strategy hg < 7    DAMIAN (acute kidney injury) ([facility])  Assessment & Plan  Slowly worsening damian   Continue strict map goal map > 65  Limit nephrotoxins and vancomycin    Improved slow fluids back being mindful of volume overload    Epidural phlegmon- (present on admission)  Assessment & Plan  MRI with epidural tissue enhancement concerning for epidural phlegmon adjacent to osteomyelitis  Antimicrobials changed from Zosyn/Vanco to Rocephin/Flagyl/Vanco for improved CNS penetration (Pseudomonas considered unlikely, more likely gram-positive or anaerobes)  Neurosurgery and ID following    Osteomyelitis of skull ([facility])- (present on admission)  Assessment & Plan  Scalp wound with exposed neurosurgical hardware -spoke with the patient's sisters they state that it has been exposed for several months  Bilateral craniotomy with subdural evacuation in October 2016 by Dr. Vallejo  S/p hardware removal by Dr Vallejo 6/10  Wound care consult and plastic surgery following for  "closure      Goals of care, counseling/discussion  Assessment & Plan  Discussed at length with patient's sister regarding his goals of care given his POLST completed in 2015 says DNR/DNI.   In reviewing the chart, the patient actually stated that he misunderstood the form and this CODE STATUS is not correct, he wanted to be FULL CODE.    Dr. Pimentel' H&P on 6/21/16:      \"Code status is full.  He had signed POLST form in the past, saying he did not want resuscitation and only wanted limited interventions; however, in a discussion with the nurse today at surgery, he clarified that he had misunderstood the form, what he meant was that he did not want to be kept alive on life support, but he would want a trial of  full resuscitation efforts.  Therefore, his code status is full.\"    Acute respiratory failure with hypoxia (HCC)- (present on admission)  Assessment & Plan  Intubated at [facility] on 5/22/2020 after seizure then again on 5/30/20 following cardiac arrest, he was extubated 6/5/2020 here and failed due to encephalopathy and airway protection and addition failed extubation trial requiring trach RT/O2 protocols  Daily and PRN ABGs  Titration of ventilator therapy based on ABGs and patient's status  Daily CXR  HOB >30 degrees and peridex for VAP prevention  Pepcid for GI prophylaxis  SAT/SBT when able (ABCDEF Bundle)  Early mobility  s/p tracheostomy 6/7   T-piece trials on going    Still looking poor on ventilator will rest on vent    Recurrent right pleural effusion- (present on admission)  Assessment & Plan  Chronic, recurrent  Thoracenteses performed on 5/13 and 5/14, 6/2 and 6/8  Thoracentesis completed 6/2/2020, transitive. Culture NGTD  Follow chest x-rays  Discussed with Dr Lance 6/9 will plan for decortication later this week repeat CT chest reviewed  S/p decortication by Dr Lance for 3 lobe trap lung 6/11  May need left sided preformed as well will continue to follow    Pneumonia- (present on " admission)  Assessment & Plan  ?aspiration s/p treatment with V/Z now on cefepime, flagyl and linezolid for CNS osteo      Ileus (HCC)- (present on admission)  Assessment & Plan  Resolved    Hypokalemia- (present on admission)  Assessment & Plan  Continue to monitor and replete    Seizure disorder (HCC)- (present on admission)  Assessment & Plan  cEEG without evidence of seizure  Neurology has signed off  Continue Keppra 1g BID  Phenytoin level elevated continue to monitor level and adjust  MRI shows skull osteomyelitis and possible epidural phlegmon without leptomeningeal enhancement  Neuro and ID following       VTE:  Lovenox  Ulcer: H2 Antagonist  Lines: Irby Catheter  Ongoing indication addressed    I have performed a physical exam and reviewed and updated ROS and Plan today (6/11/2020). In review of yesterday's note (6/10/2020), there are no changes except as documented above.     Discussed patient condition and risk of morbidity and/or mortality with RN, RT, Pharmacy, Charge nurse / hot rounds and general surgery     The patient remains critically ill on ventilator with active titration and discussion with family of goals and plan of care.  Critical care time = 76 minutes in directly providing and coordinating critical care and extensive data review.  No time overlap and excludes procedures.

## 2020-06-11 NOTE — PROGRESS NOTES
Neurosurgery Progress Note    Subjective:  No acute events  T piece this am  Exam:  EO spont, intermittently tracks  Follows intermittently- not for me today, WETZEL spont  Vac sponge and ioban remain intact, vac clamped since surgery d/t seal not working      BP  Min: 87/53  Max: 136/74  Pulse  Av.7  Min: 72  Max: 88  Resp  Avg: 15.7  Min: 0  Max: 43  Monitored Temp 2  Av.8 °C (98.3 °F)  Min: 35.7 °C (96.3 °F)  Max: 37.4 °C (99.3 °F)  SpO2  Av.3 %  Min: 92 %  Max: 100 %    No data recorded    Recent Labs     20  0324 06/10/20  0518 20  0439   WBC 15.6* 14.4* 15.4*   RBC 2.15* 2.51* 2.27*   HEMOGLOBIN 6.9* 7.9* 7.2*   HEMATOCRIT 23.3* 26.5* 24.0*   .4* 105.6* 105.7*   MCH 32.1 31.5 31.7   MCHC 29.6* 29.8* 30.0*   RDW 63.8* 77.3* 74.3*   PLATELETCT 306 323 323   MPV 10.3 10.4 10.3     Recent Labs     20  1410 20  1752 06/10/20  0518 20  0439   SODIUM 149*  --  143 150*   POTASSIUM 3.9  --  3.6 3.5*   CHLORIDE 109  --  105 113*   CO2 27  --  29 27   GLUCOSE 120* 196* 119* 168*   BUN 39*  --  37* 34*   CREATININE 1.70*  --  1.53* 1.35   CALCIUM 7.4*  --  8.0* 7.4*     Recent Labs     20  1126   APTT 30.4   INR 1.03           Intake/Output       06/10/20 0700 - 20 0659 20 - 2059      1620-9306 5123-1557 Total 9111-3343 6018-0979 Total       Intake    I.V.  985  600 1585  --  -- --    Volume (mL) (NS infusion)  -- -- --    Volume (mL) (Lactated Ringers) 500 -- 500 -- -- --    Other  100  -- 100  --  -- --    Medications (PO/Enteral Liquids) 100 -- 100 -- -- --    NG/GT  390  780 1170  --  -- --    Intake (mL) (Enteral Tube 20 Cortrak - Gastric 10 Fr. Right nare)  -- -- --    IV Piggyback  10  100 110  --  -- --    Volume (mL) (cefepime (MAXIPIME) 2 g in  mL IVPB) 10 100 110 -- -- --    Enteral  50  80 130  --  -- --    Free Water / Tube Flush 50 80 130 -- -- --    Total Intake 6153 7895 7525 -- -- --        Output    Urine  400  380 780  --  -- --    Output (mL) (Urethral Catheter Temperature probe 16 Fr.) 400 380 780 -- -- --    Stool  150  -- 150  --  -- --    Measurable Stool (mL) 150 -- 150 -- -- --    Blood  20  -- 20  --  -- --    Est. Blood Loss 20 -- 20 -- -- --    Total Output 570 380 950 -- -- --       Net I/O     965 1180 2145 -- -- --            Intake/Output Summary (Last 24 hours) at 6/11/2020 0808  Last data filed at 6/11/2020 0600  Gross per 24 hour   Intake 2880 ml   Output 850 ml   Net 2030 ml            • [START ON 6/12/2020] hydrocortisone sodium succinate PF  50 mg DAILY   • LR   Continuous   • magnesium sulfate  2 g Once   • cefepime  2 g Q12HRS   • midodrine  15 mg Q8HRS   • levothyroxine  50 mcg AM ES   • insulin regular  1-6 Units Q6HRS    And   • dextrose 50%  50 mL Q15 MIN PRN   • oxyCODONE immediate-release  10-15 mg Q4HRS PRN   • phenytoin  300 mg Q12HRS   • linezolid  600 mg Q12HRS   • metroNIDAZOLE (FLAGYL) IV  500 mg Q8HRS   • fentaNYL  25-50 mcg Q2HRS PRN   • levETIRAcetam  1,000 mg BID   • Pharmacy  1 Each PHARMACY TO DOSE   • omeprazole  40 mg DAILY   • thiamine  100 mg DAILY   • multivitamin  1 Tab DAILY   • LORazepam  4 mg Q10 MIN PRN   • norepinephrine (Levophed) infusion  0-30 mcg/min Continuous   • senna-docusate  2 Tab BID    And   • polyethylene glycol/lytes  1 Packet QDAY PRN    And   • magnesium hydroxide  30 mL QDAY PRN    And   • bisacodyl  10 mg QDAY PRN   • acetaminophen  650 mg Q6HRS PRN   • labetalol  10 mg Q4HRS PRN   • Respiratory Therapy Consult   Continuous RT   • ipratropium-albuterol  3 mL Q2HRS PRN (RT)   • lidocaine  1-2 mL Q30 MIN PRN       Assessment and Plan:  Hospital day #7 exposed cranial plate, imaging shows osteomyelitis  POD # 1 removal of exposed cranial plate, attempted wound closure, vac placement  Prophylactic anticoagulation: no -d/c'd lovenox for surgery      Start date/time: tbd    Neuro as above  UTI/resp failure/etoh, on abx, BC neg so  far  Cranial wound unable to be closed, vac in place, not holding sxn  Dr Vallejo to consult plastics Dr Guzmán  Plate sent for culture    ATTENDING ADDENDUM:  Patient seen independently and agree with above note

## 2020-06-11 NOTE — DISCHARGE PLANNING
RN concerned re decision maker. Per RN, pt's sister (Jess) has not informed his mother of his status because of her dementia and cancer. Jess informed RN that pt's daughter is traveling to [place] today from [place] today to see pt. Jess states that daughter wants to revoke her NOK rights and appoint Jess since she has been most involved in his care.     LSW present when Dr. Londono spoke with Jess over the phone and updated her on status. RN aware. Consent for surgery obtained from Jess.    Team to meet with daughter once she arrives to discuss.     LSW notified Eulalia with palliative care.

## 2020-06-11 NOTE — RESPIRATORY CARE
Ventilator Daily Summary    Vent Day # 12  Trach Days 5    Ventilator settings changed this shift: None    Weaning trials: SBT on t-piece 15 lpm at 50% for 3 hours    Respiratory Procedures:    Plan: Continue current ventilator settings and wean mechanical ventilation as tolerated per physician orders.

## 2020-06-11 NOTE — PALLIATIVE CARE
Palliative Care follow-up  PC RN left  for pt's sister Jess and sister Gayatri. Pt's mother, Arun, has phone numbers listed that are not in service. PC RN calling to follow up with GOC discussion on 6/3, answer questions, and provide support. Await call back. Per notes pt's family has been unable to reach pt's adult children. Pt's mother is NOK and pt's sister Jess has been spokesperson for family.       Updated: Renetta LORD RN and Kristy ROSS    Plan: PC RN will follow and support as clinical picture evolves.     Thank you for allowing Palliative Care to support this patient and family. Contact [phone] for additional assistance, change in patient status, or with any questions/concerns.

## 2020-06-12 NOTE — PROGRESS NOTES
2 RN skin assessment completed with Adelaida ORDONEZ.    Surgical incision on head with wound vac in place with mepilex over top, wound vac functioning.  Generalized flaky, loose skin and bruising.  R sided chest tubes with surgical incision, gauze and hypafix tape dressing in place, C/D/I.  Sacrum and buttocks are pink and blanching, BMS in place. No mepilex at this time due to incontinence.

## 2020-06-12 NOTE — RESPIRATORY CARE
Ventilator Daily Summary    Vent Day # 12  Trach Days 5    Ventilator settings changed this shift: No change    Weaning trials:3 hours on SBT at 40%'    Respiratory Procedures:    Plan: Continue current ventilator settings and wean mechanical ventilation as tolerated per physician orders.

## 2020-06-12 NOTE — PROGRESS NOTES
"Critical Care Progress Note    Date of admission  5/31/2020    Chief Complaint  54 y.o. male admitted 5/31/2020 with Respiratory failure, seizure    Hospital Course    \"All history was obtained from old notes/charts from [facility] as the patient is intubated at the time of my evaluation.     Mr. Lechuga is a 54 year old male with the past medical history of urethral stricture with urinary retention, recurrent right-sided pleural effusion, alcohol abuse, COPD on home O2, hepatitis C with cirrhosis, chronic pain syndrome on narcotics, traumatic brain injury, history of SDH with subsequent seizure disorder who presented to [facility] on 5/22 with the complaint of difficulty urinary for 2 days and low blood pressures.  He apparently was recently hospitalized at [facility] from 5/6 to 5/11/2020 for a COPD exacerbation and reported that he was doing well for until 2 days prior to 5/22 when he developed difficulty urinating and draining his bladder.  He has worsening abdominal pain in the suprapubic region with chills.  No fevers.  No nausea or vomiting.  He has a negative COVID test on 5/6.  The patient was admitted for acute urinary retention, UTI, and hypoxia.  Over the course of the next week, the patient required intubation for seizure activity, but was then extubated.  However, the patient then developed aspiration pneumonia with AMS and was on BiPAP for a night on 5/29.  All during the patient's hospital stay, he has been battling with delirium.  Unfortunately, around 2000 last night the patient the patient became unresponsive with a bradycardic episode and no pulses.  A code was called and he received 3 rounds of CPR and 2 doses of epi before ROSC was achieved.  He was intubated again and stabilized.  He was sent to [facility] for higher level of care due to the growing complexity of his condition as well as critical care management since [facility] does not have critical care specialists on staff.\"      Interval " Problem Update  Reviewed last 24 hour events:  Neuro: moves all eyes open not following commands fentanyl pushes oxy x1  HR: snr 70-80's  SBP: 100-130's  Tmax: afebrile   GI: TF at goal BMS in 300ml  UOP: 90ml/hr powers  Lines: picc powers bms  Resp: water seal on CT minimal  Vent 12 trach 5 t piece 10l 40% none secretion  Vte: lovenox  PPI/H2:ppi home  Antibx: linezolid cefepime metro  Coag neg staph from scalp bx  Hydrocortisone stop    Review of Systems  Review of Systems   Unable to perform ROS: Intubated        Vital Signs for last 24 hours   Temp:  [37.1 °C (98.8 °F)] 37.1 °C (98.8 °F)  Pulse:  [73-92] 84  Resp:  [9-36] 29  BP: ()/(54-82) 132/72  SpO2:  [91 %-100 %] 97 %    Hemodynamic parameters for last 24 hours       Respiratory Information for the last 24 hours  Vent Mode: APVCMV  Rate (breaths/min): 16  Vt Target (mL): 360  PEEP/CPAP: 8  MAP: 15  Control VTE (exp VT): 387    Physical Exam   Physical Exam  Vitals signs reviewed.   Constitutional:       General: He is not in acute distress.     Appearance: He is well-developed. He is ill-appearing.      Interventions: He is intubated.      Comments: Sitting in bed ill appearing on t piece restless mild agitated   HENT:      Head: Normocephalic and atraumatic.      Comments: Scalp wound vac in place     Right Ear: External ear normal.      Left Ear: External ear normal.      Nose: Nose normal.      Comments: Cortrak in place     Mouth/Throat:      Mouth: Mucous membranes are dry.      Pharynx: Oropharynx is clear.   Eyes:      Conjunctiva/sclera: Conjunctivae normal.      Pupils: Pupils are equal, round, and reactive to light.   Neck:      Musculoskeletal: Neck supple.      Vascular: No JVD.      Trachea: No tracheal deviation.      Comments: Trach in place  Cardiovascular:      Rate and Rhythm: Regular rhythm. Bradycardia present.      Pulses: Normal pulses.   Pulmonary:      Effort: He is intubated.      Breath sounds: Rhonchi present. No wheezing or  rales.      Comments: Restless on t piece   Double chest tubes on right chest  Coarse ronchi bilateral  Abdominal:      General: Bowel sounds are normal. There is distension.      Palpations: Abdomen is soft. There is no mass.      Tenderness: There is no abdominal tenderness. There is no guarding.      Hernia: No hernia is present.      Comments: With BMS in place   Musculoskeletal:         General: Swelling present. No tenderness.      Right lower leg: Edema present.      Left lower leg: Edema present.   Skin:     General: Skin is warm and dry.      Capillary Refill: Capillary refill takes less than 2 seconds.      Findings: No rash.      Comments: Multiple tattoos  Venous stasis dermatitis   Neurological:      General: No focal deficit present.      Mental Status: He is alert.      Comments: Opens eyes to voice moves extremities not following commands   Psychiatric:      Comments: Unable to assess         Medications  Current Facility-Administered Medications   Medication Dose Route Frequency Provider Last Rate Last Dose   • cefepime (MAXIPIME) 2 g in  mL IVPB  2 g Intravenous Q8HRS Oziel Londono M.D.   Stopped at 06/12/20 1330   • metroNIDAZOLE (FLAGYL) tablet 500 mg  500 mg Enteral Tube Q8HRS Oziel Londono M.D.       • MD Alert...ICU Electrolyte Replacement per Pharmacy   Other PHARMACY TO DOSE Oziel Londono M.D.       • midodrine (PROAMATINE) tablet 15 mg  15 mg Enteral Tube Q8HRS Oziel Londono M.D.   15 mg at 06/12/20 1403   • levothyroxine (SYNTHROID) tablet 50 mcg  50 mcg Enteral Tube AM ES Pavel Orlando Jr., D.O.   50 mcg at 06/12/20 0824   • oxyCODONE immediate release (ROXICODONE) tablet 10-15 mg  10-15 mg Enteral Tube Q4HRS PRN Pavel Orlando Jr., D.O.   15 mg at 06/12/20 1612   • phenytoin (DILANTIN) injection 300 mg  300 mg Intravenous Q12HRS Pavel Orlando Jr., D.O.   300 mg at 06/12/20 0449   • linezolid (ZYVOX) tablet 600 mg  600 mg Enteral Tube Q12HRS Pavel Orlando Jr.,  D.O.   600 mg at 06/12/20 0824   • fentaNYL (SUBLIMAZE) injection 25-50 mcg  25-50 mcg Intravenous Q2HRS PRN Vishnu Zamora M.D.   50 mcg at 06/12/20 1500   • levETIRAcetam (KEPPRA) tablet 1,000 mg  1,000 mg Enteral Tube BID Pavel Orlando Jr. D.O.   1,000 mg at 06/12/20 0447   • Pharmacy Consult: Enteral tube insertion - review meds/change route/product selection  1 Each Other PHARMACY TO DOSE Pavel Orlando Jr., D.O.       • omeprazole (FIRST-OMEPRAZOLE) 2 mg/mL oral susp 40 mg  40 mg Enteral Tube DAILY Pavel Orlando Jr. D.O.   40 mg at 06/12/20 0448   • thiamine tablet 100 mg  100 mg Enteral Tube DAILY Pavel Orlando Jr. D.O.   100 mg at 06/12/20 0448   • multivitamin (THERAGRAN) tablet 1 Tab  1 Tab Enteral Tube DAILY Pavel Orlando Jr., D.O.   1 Tab at 06/12/20 0447   • LORazepam (ATIVAN) injection 4 mg  4 mg Intravenous Q10 MIN PRN Pavel Orlando Jr. D.O.       • senna-docusate (PERICOLACE or SENOKOT S) 8.6-50 MG per tablet 2 Tab  2 Tab Enteral Tube BID Anisa Hernández M.D.   Stopped at 06/11/20 1800    And   • polyethylene glycol/lytes (MIRALAX) PACKET 1 Packet  1 Packet Enteral Tube QDAY PRN Anisa Hernández M.D.        And   • magnesium hydroxide (MILK OF MAGNESIA) suspension 30 mL  30 mL Enteral Tube QDAY PRN Anisa Hernández M.D.        And   • bisacodyl (DULCOLAX) suppository 10 mg  10 mg Rectal QDAY PRN Anisa Hernández M.D.       • acetaminophen (TYLENOL) tablet 650 mg  650 mg Enteral Tube Q6HRS PRN Anisa Hernández M.D.   650 mg at 06/08/20 1644   • labetalol (NORMODYNE/TRANDATE) injection 10 mg  10 mg Intravenous Q4HRS PRN Anisa Hernández M.D.       • Respiratory Therapy Consult   Nebulization Continuous RT Alexia Khan M.D.       • ipratropium-albuterol (DUONEB) nebulizer solution  3 mL Nebulization Q2HRS PRN (RT) Alexia Khan M.D.       • lidocaine (XYLOCAINE) 1 % injection 1-2 mL  1-2 mL Tracheal Tube Q30 MIN PRN Alexia Khan M.D.            Fluids    Intake/Output Summary (Last 24 hours) at 6/12/2020 1641  Last data filed at 6/12/2020 1600  Gross per 24 hour   Intake 5640.74 ml   Output 1967 ml   Net 3673.74 ml       Laboratory  Recent Labs     06/10/20  0516 06/11/20  0504 06/12/20  0542   ISTATAPH 7.319* 7.391* 7.422   ISTATAPCO2 57.6* 43.4* 38.8*   ISTATAPO2 78 83 72   ISTATATCO2 31 28 26   UYLNGCV4ERQ 94 96 95   ISTATARTHCO3 29.6* 26.3* 25.3*   ISTATARTBE 3 1 1   ISTATTEMP 37.5 C 98.8 F 98.8 F   ISTATFIO2 40 40 40   ISTATSPEC Arterial Arterial Arterial   ISTATAPHTC 7.312* 7.390* 7.421   [id number] 80 83 73         Recent Labs     06/10/20  0518 06/11/20  0439 06/12/20  0420   SODIUM 143 150* 150*   POTASSIUM 3.6 3.5* 3.7   CHLORIDE 105 113* 113*   CO2 29 27 25   BUN 37* 34* 31*   CREATININE 1.53* 1.35 1.14   MAGNESIUM 2.1 2.0 2.2   PHOSPHORUS 3.2 2.2* 2.0*   CALCIUM 8.0* 7.4* 7.4*     Recent Labs     06/10/20  0518 06/11/20  0439 06/12/20  0420   GLUCOSE 119* 168* 140*     Recent Labs     06/10/20  0518 06/11/20  0439 06/12/20  0420   WBC 14.4* 15.4* 16.3*   NEUTSPOLYS 79.60* 82.50* 80.00*   LYMPHOCYTES 10.40* 9.30* 9.60*   MONOCYTES 7.00 6.00 5.40   EOSINOPHILS 1.90 1.40 3.90   BASOPHILS 0.50 0.30 0.40     Recent Labs     06/10/20  0518 06/11/20  0439 06/12/20  0420   RBC 2.51* 2.27* 3.18*   HEMOGLOBIN 7.9* 7.2* 10.0*   HEMATOCRIT 26.5* 24.0* 31.7*   PLATELETCT 323 323 316       Imaging  X-Ray:  I have personally reviewed the images and compared with prior images. and My impression is: still with worsening right loculated effusion, trach in place  CT:    Reviewed    Assessment/Plan  Shock (HCC)- (present on admission)  Assessment & Plan  Multifactorial:  PEA/CPR, propofol and infection  Continue antimicrobials  Echo: LVEF 65%. Mild concentric LVH. Grade II diastolic dysfunction. Mild mitral regurgitation. Estimated right ventricular systolic pressure is 55 mmHg  Ongoing titration of Levophed for MAP goals >65  Continue midodrine  Cortisol  11.4, continue Solu-Cortef weaned off steroids 6/12    PEA (Pulseless electrical activity) (HCC)- (present on admission)  Assessment & Plan  ? Etiology from [facility], likely hypoxia  Continue supportive care  Optimize electrolytes  EKG without ischemic changes  Echocardiogram: LVEF 65%. Mild concentric LVH. Grade II diastolic dysfunction. Mild mitral regurgitation. Estimated right ventricular systolic pressure is 55 mmHg    Recurrent right pleural effusion- (present on admission)  Assessment & Plan  Chronic, recurrent  Thoracenteses multiple, Cx negative exudate  Follow chest x-rays, Ct chest reviewed  Discussed with Dr Lance 6/9 s/p decortication  S/p decortication by Dr Lance for 3 lobe trap lung 6/11  May need left sided preformed as well will continue to follow    Anemia  Assessment & Plan  Restrictive transfusion strategy hg < 7    DAMIAN (acute kidney injury) ([facility])  Assessment & Plan  Continue strict map goal map > 65  Limit nephrotoxins    Improved continue to hold fluids and monitor function closely  Once able force diuresis    Epidural phlegmon- (present on admission)  Assessment & Plan  MRI with epidural tissue enhancement concerning for epidural phlegmon adjacent to osteomyelitis  Antimicrobials changed from Zosyn/Vanco to Rocephin/Flagyl/Vanco for improved CNS penetration (Pseudomonas considered unlikely, more likely gram-positive or anaerobes)  Neurosurgery and ID following    Osteomyelitis of skull (HCC)- (present on admission)  Assessment & Plan  Scalp wound with exposed neurosurgical hardware -spoke with the patient's sisters they state that it has been exposed for several months  Bilateral craniotomy with subdural evacuation in October 2016 by Dr. Vallejo  S/p hardware removal by Dr Vallejo 6/10  Wound care consult and plastic surgery following for closure Dr Reye planned for OR on Monday      Goals of care, counseling/discussion  Assessment & Plan  Discussed at length with patient's sister regarding his  "goals of care given his POLST completed in 2015 says DNR/DNI.   In reviewing the chart, the patient actually stated that he misunderstood the form and this CODE STATUS is not correct, he wanted to be FULL CODE.    Dr. Pimentel' H&P on 6/21/16:      \"Code status is full.  He had signed POLST form in the past, saying he did not want resuscitation and only wanted limited interventions; however, in a discussion with the nurse today at surgery, he clarified that he had misunderstood the form, what he meant was that he did not want to be kept alive on life support, but he would want a trial of  full resuscitation efforts.  Therefore, his code status is full.\"    Acute respiratory failure with hypoxia (HCC)- (present on admission)  Assessment & Plan  Intubated at [facility] on 5/22/2020 after seizure then again on 5/30/20 following cardiac arrest, he was extubated 6/5/2020 here and failed due to encephalopathy and airway protection and addition failed extubation trial requiring trach 6/7  RT/O2 protocols  Titration of ventilator therapy based on ABGs and patient's status  Daily CXR w/ chest tubes in place  HOB >30 degrees and peridex for VAP prevention  Pepcid for GI prophylaxis  SAT/SBT when able (ABCDEF Bundle)  Early mobility  s/p tracheostomy 6/7   T-piece trials on going      Pneumonia- (present on admission)  Assessment & Plan  Aspiration on cefepime, flagyl and linezolid for CNS osteo      Ileus (HCC)- (present on admission)  Assessment & Plan  Resolved    Hypokalemia- (present on admission)  Assessment & Plan  Continue to monitor and replete    Seizure disorder (HCC)- (present on admission)  Assessment & Plan  cEEG without evidence of seizure  Neurology has signed off  Continue Keppra 1g BID  Phenytoin level elevated continue to monitor level and adjust  MRI shows skull osteomyelitis and possible epidural phlegmon without leptomeningeal enhancement  Neuro and ID following       VTE:  Lovenox  Ulcer: H2 Antagonist  Lines: " Irby Catheter  Ongoing indication addressed    I have performed a physical exam and reviewed and updated ROS and Plan today (6/12/2020). In review of yesterday's note (6/11/2020), there are no changes except as documented above.     Discussed patient condition and risk of morbidity and/or mortality with RN, RT, Pharmacy, Charge nurse / hot rounds and general surgery     The patient remains critically ill on s/p decortication on ventilator with active weaning and monitoring.  Critical care time = 40 minutes in directly providing and coordinating critical care and extensive data review.  No time overlap and excludes procedures.

## 2020-06-12 NOTE — CARE PLAN
Problem: Respiratory:  Goal: Respiratory status will improve  Note: Collaboration with RT.     Problem: Pain Management  Goal: Pain level will decrease to patient's comfort goal  Note: Pt medicated per MAR. Non-pharmacological interventions per flowsheets.

## 2020-06-12 NOTE — PROGRESS NOTES
Neurosurgery Progress Note    Subjective:  No acute events  T piece this am  Exam:  EO spont, intermittently tracks   not following for me today, WETZEL spont  Vac intact/sxn on      BP  Min: 92/52  Max: 144/82  Pulse  Av.4  Min: 71  Max: 84  Resp  Av.2  Min: 9  Max: 29  Monitored Temp 2  Av.9 °C (98.5 °F)  Min: 36.2 °C (97.2 °F)  Max: 37.7 °C (99.9 °F)  SpO2  Av.3 %  Min: 97 %  Max: 100 %    No data recorded    Recent Labs     06/10/20  0518 06/11/20  0439 20  0420   WBC 14.4* 15.4* 16.3*   RBC 2.51* 2.27* 3.18*   HEMOGLOBIN 7.9* 7.2* 10.0*   HEMATOCRIT 26.5* 24.0* 31.7*   .6* 105.7* 99.7*   MCH 31.5 31.7 31.4   MCHC 29.8* 30.0* 31.5*   RDW 77.3* 74.3* 65.5*   PLATELETCT 323 323 316   MPV 10.4 10.3 9.9     Recent Labs     06/10/20  0518 06/11/20  0439 06/12/20  0420   SODIUM 143 150* 150*   POTASSIUM 3.6 3.5* 3.7   CHLORIDE 105 113* 113*   CO2 29 27 25   GLUCOSE 119* 168* 140*   BUN 37* 34* 31*   CREATININE 1.53* 1.35 1.14   CALCIUM 8.0* 7.4* 7.4*               Intake/Output       20 - 2059 20 - 2059       Total  Total       Intake    I.V.  1251.7  1050 2301.7  --  -- --    Magnesium Sulfate Volume 50 -- 50 -- -- --    Volume (mL) (NS infusion) 0 450 450 -- -- --    Volume (mL) (lactated ringers infusion) 1201.7 600 1801.7 -- -- --    Blood  600  -- 600  --  -- --    Volume (RELEASE RED BLOOD CELLS-ACTIVE BLEEDING) 300 -- 300 -- -- --    Volume (RELEASE RED BLOOD CELLS-ACTIVE BLEEDING) 300 -- 300 -- -- --    Other  --  150 150  --  -- --    Medications (PO/Enteral Liquids) -- 150 150 -- -- --    NG/GT  395  647 1175  --  -- --    Intake (mL) (Enteral Tube 20 Cortrak - Gastric 10 Fr. Right nare)  -- -- --    IV Piggyback  416.7  333.3 750  --  -- --    Volume (mL) (metroNIDAZOLE (FLAGYL) IVPB 500 mg) -- 200 200 -- -- --    Volume (mL) (cefepime (MAXIPIME) 2 g in  mL IVPB) 166.7 133.3  300 -- -- --    Volume (mL) (potassium phosphates 15 mmol in D5W 250 mL ivpb) 250 -- 250 -- -- --    Enteral  200  600 800  --  -- --    Free Water / Tube Flush 200 600 800 -- -- --    Total Intake 2863.3 2913.3 5776.7 -- -- --       Output    Urine  1050  570 1620  --  -- --    Urine 450 -- 450 -- -- --    Output (mL) (Urethral Catheter Temperature probe 16 Fr.)  -- -- --    Drains  50  308 358  --  -- --    Output (mL) (Closed/Suction Drain 2 Other (Comment);Right Other (Comments)) 50 308 358 -- -- --    Stool  320  -- 320  --  -- --    Measurable Stool (mL) 320 -- 320 -- -- --    Chest Tube  2  102 104  --  -- --    Output (mL) (Chest Tube 1 Right Midaxillary) 2 102 104 -- -- --    Blood  150  -- 150  --  -- --    Est. Blood Loss 150 -- 150 -- -- --    Total Output 9473 073 9634 -- -- --       Net I/O     1291.3 1933.3 3224.7 -- -- --            Intake/Output Summary (Last 24 hours) at 6/12/2020 0833  Last data filed at 6/12/2020 0600  Gross per 24 hour   Intake 5212.92 ml   Output 2427 ml   Net 2785.92 ml            • potassium phosphate ivpb  15 mmol Once   • cefepime  2 g Q8HRS   • hydrocortisone sodium succinate PF  50 mg DAILY   • LR   Continuous   • MD Alert...Adult ICU Electrolyte Replacement per Pharmacy   PHARMACY TO DOSE   • midodrine  15 mg Q8HRS   • levothyroxine  50 mcg AM ES   • oxyCODONE immediate-release  10-15 mg Q4HRS PRN   • phenytoin  300 mg Q12HRS   • linezolid  600 mg Q12HRS   • metroNIDAZOLE (FLAGYL) IV  500 mg Q8HRS   • fentaNYL  25-50 mcg Q2HRS PRN   • levETIRAcetam  1,000 mg BID   • Pharmacy  1 Each PHARMACY TO DOSE   • omeprazole  40 mg DAILY   • thiamine  100 mg DAILY   • multivitamin  1 Tab DAILY   • LORazepam  4 mg Q10 MIN PRN   • senna-docusate  2 Tab BID    And   • polyethylene glycol/lytes  1 Packet QDAY PRN    And   • magnesium hydroxide  30 mL QDAY PRN    And   • bisacodyl  10 mg QDAY PRN   • acetaminophen  650 mg Q6HRS PRN   • labetalol  10 mg Q4HRS PRN   •  Respiratory Therapy Consult   Continuous RT   • ipratropium-albuterol  3 mL Q2HRS PRN (RT)   • lidocaine  1-2 mL Q30 MIN PRN       Assessment and Plan:  Hospital day #8 exposed cranial plate, imaging shows osteomyelitis  POD # 2 removal of exposed cranial plate, attempted wound closure, vac placement  POD #1 thoracotomy/decort  Prophylactic anticoagulation: no -d/c'd lovenox for surgery      Start date/time: tbd    Neuro as above  Cranial wound unable to be closed, vac in place  Cx cranial plate- coag neg staph  abx per ID  Cont wound vac per wound RN team   Dr Guzmán planning for OR monday-Debride bone and placement of integra  Plate

## 2020-06-12 NOTE — ANESTHESIA POSTPROCEDURE EVALUATION
Patient: Vishnu Lechuga Sr.    Procedure Summary     Date:  06/11/20 Room / Location:  [facility] 03 / SURGERY [facility]    Anesthesia Start:  1512 Anesthesia Stop:  1659    Procedures:       THORACOTOMY (Right Chest)      DECORTICATION, LUNG (Right Chest) Diagnosis:  (RIGHT empyema)    Surgeon:  Gómez Lance M.D. Responsible Provider:  Pro Goyal M.D.    Anesthesia Type:  general ASA Status:  4          Final Anesthesia Type: general  Last vitals  BP   Blood Pressure: 115/55    Temp   36.7 °C (98.1 °F)    Pulse   Pulse: 84   Resp   (!) 29    SpO2   99 %      Anesthesia Post Evaluation    Patient location during evaluation: ICU  Patient participation: complete - patient cannot participate  Post-procedure mental status: Sedated Intubated.  Pain score: 0    Anesthetic complications: no  Cardiovascular status: hemodynamically stable  Respiratory status: ventilator  Hydration status: acceptable      patient was unable to participate         Nurse Pain Score: 2 (NPRS)

## 2020-06-12 NOTE — WOUND TEAM
[facility]  Inpatient Services  Initial Wound and Skin Care Evaluation    Admission Date: 5/31/2020     Consult Date: 06/11/2020   HPI, PMH, SH: Reviewed    Unit where seen by Wound Team: [id number]     WOUND CONSULT RELATED TO:  Change of Negative Pressure Wound Therapy (NPWT) dressing    Self Report / Pain Level:  Patient grimacing, writhing head, trying to reach top of head. RN gave more pain medicine.       OBJECTIVE:  Previous dressing was leaking.    WOUND TYPE, LOCATION, CHARACTERISTICS (Pressure Injuries: location, stage, POA or date identified)           Wound 06/10/20 Full Thickness Wound Head Superior Right 1 incision, 1 open wound with incision medial and laterally (Active)   Wound Image    06/11/20 1205   Site Assessment АНДРЕЙ 06/12/20 0400   Periwound Assessment АНДРЕЙ 06/12/20 0400   Margins АНДРЕЙ 06/12/20 0400   Closure АНДРЕЙ 06/12/20 0400   Drainage Amount None 06/12/20 0400   Drainage Description Sanguineous 06/11/20 1205   Treatments Cleansed;Site care 06/11/20 1205   Wound Cleansing Approved Wound Cleanser 06/11/20 1205   Periwound Protectant Benzoin;Paste Ring 06/11/20 1205   Dressing Cleansing/Solutions Not Applicable 06/11/20 1205   Dressing Options Wound Vac 06/12/20 0400   Dressing Changed Observed 06/11/20 2000   Dressing Status Clean;Dry;Intact 06/12/20 0400   Dressing Change/Treatment Frequency Tuesday, Thursday, Saturday, and As Needed 06/11/20 1205   NEXT Dressing Change/Treatment Date 06/13/20 06/11/20 1205   NEXT Weekly Photo (Inpatient Only) 06/18/20 06/11/20 1205   Non-staged Wound Description Full thickness 06/11/20 1205   Wound Length (cm) 4.9 cm 06/11/20 1205   Wound Width (cm) 3 cm 06/11/20 1205   Wound Depth (cm) 1.1 cm 06/11/20 1205   Wound Surface Area (cm^2) 14.7 cm^2 06/11/20 1205   Wound Volume (cm^3) 16.17 cm^3 06/11/20 1205   Wound Bed Granulation (%) 0 % 06/11/20 1205   Wound Bed Epithelium (%) 0 % 06/11/20 1205   Wound Bed Slough (%) 0 % 06/11/20 1205   Wound Bed  Eschar (%) 0 % 06/11/20 1205   Tunneling (cm) 0 cm 06/11/20 1205   Undermining (cm) 0 cm 06/11/20 1205   Shape oval, linear 06/11/20 1205   Wound Odor None 06/11/20 1205   Pulses N/A 06/11/20 1205   Exposed Structures Bone 06/11/20 1205           Vascular:    Dorsal Pedal pulses:  NA not related to BLE or vascular issue  Posterior tib pulses:   NA    PEDRO:      NA    Lab Values:    Lab Results   Component Value Date/Time    WBC 16.3 (H) 06/12/2020 04:20 AM    RBC 3.18 (L) 06/12/2020 04:20 AM    HEMOGLOBIN 10.0 (L) 06/12/2020 04:20 AM    HEMATOCRIT 31.7 (L) 06/12/2020 04:20 AM        Results from last 7 days   Lab Units 06/08/20  0433   C REACTIVE PROTEIN 4596 mg/dL 9.31*             Lab Results   Component Value Date/Time    HBA1C 4.4 08/06/2019 12:32 PM           Culture:   Obtained NA new clean surgical wounds, Results show NA    INTERVENTIONS BY WOUND TEAM:  See Shobha Ochoa RN with Wound Team note about NPWT not working, Wound Team consulted to change wound vac dressing. Used adhesive remover to remove previous NPWT dressing. Irrigated incisions and open wound with wound cleanser. Let moist gauze 4x4s sit over incisions and areas of dried blood to soften dried blood while RN gave patient more pain medication, then cleaned area of blood. Benzoin and paste ring to periwound. 1 piece of white foam over open wound, adaptic over incisions, strips of half thickness black foam over all wounds including over the white foam. 1 white foam, 4 pieces of black foam, 3 pieces of Adaptic over incisions. All this was draped. Hole cut for trac pad over open wound, trac pad applied. Dressing seal achieved at 125 mmHg continuous. Fenestrated a sacral mepilex and placed it around the trac pad and over vac dressing, then floated the tubing onto the mepilex with tape. Did this because of concern that patient's head will roll to right and he will lay on vac tubing so sacral mepilex on head will protect the head from tubing. It covers  the right side of head.    Interdisciplinary consultation: Patient, Bedside RN, wound tech.    EVALUATION: NPWT maintains optimal moisture needed for wound healing. Manages drainage. Lessens bacterial load with closed system. Encourages granulation tissue growth and approximation of incisions. Will keep bone moist.     Goals: Steady decrease in wound area and depth weekly.    NURSING PLAN OF CARE ORDERS (X):  Dressing changes: See Dressing Care orders: X  Skin care: See Skin Care orders: X  Rectal tube care: See Rectal Tube Care orders:        Other orders:                             WOUND TEAM PLAN OF CARE (X):   Dressing changes by wound team:  X        Follow up 1-2 times weekly:               Follow up 3 times weekly:                NPWT change 3 times weekly:   X  Follow up as needed:       Other (explain):     Anticipated discharge plans (X): To be determined  LTACH:        SNF/Rehab:                  Home Care:           Outpatient Wound Center:            Self Care:            Other:

## 2020-06-12 NOTE — CARE PLAN
Problem: Safety  Goal: Will remain free from injury  Outcome: PROGRESSING AS EXPECTED  Note: Bed locked and in lowest position. Pt room close to nurses station     Problem: Skin Integrity  Goal: Risk for impaired skin integrity will decrease  Outcome: PROGRESSING AS EXPECTED  Note: Skin assessed q shift. Pt turned q2 hours to prevent skin breakdown     Problem: Pain Management  Goal: Pain level will decrease to patient's comfort goal  Outcome: PROGRESSING AS EXPECTED

## 2020-06-12 NOTE — PROGRESS NOTES
Infectious Disease Progress Note    Author: Neva Gonzalez M.D. 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 male admitted 2020 who initially presented to [facility] on  complaining of urinary retention and hypotension.  At [facility], on  he required intubation due to seizure activity.  Eventually extubated but developed aspiration pneumonia and increasing encephalopathy. He had an episode of unresponsiveness with bradycardia and a code was called.  Patient received 3 rounds of CPR and 2 episodes of epi before ROSC was achieved.  He was intubated and transferred to [facility] for higher level of care. In ICU since 6/10  6/9  TMax 101.7, improved today , Vent 8/40%, thoracentesis on .  Pt alert and following commands.     Temp 99.7 WBC 15.4  trached-not following commands. Hypotensive and tachy FiO2 0.4 Plan for OR today noted   AF WBC 16.3 s/p thoracotomy -trached, sedated. Possible need for decortication on left as well noted    Review of Systems:  Review of Systems   Unable to perform ROS: Intubated   Constitutional: Negative for fever.       Hemodynamics:  Temp (24hrs), Av.1 °C (98.8 °F), Min:37.1 °C (98.8 °F), Max:37.1 °C (98.8 °F)  Temperature: 37.1 °C (98.8 °F), Monitored Temp: 37.3 °C (99.1 °F)  Pulse  Av.7  Min: 42  Max: 113   Blood Pressure: 113/60       Physical Exam:  Physical Exam  Vitals signs and nursing note reviewed.   Constitutional:       Appearance: He is ill-appearing. He is not toxic-appearing or diaphoretic.   HENT:      Head:      Comments: VAC to scalp     Nose: No rhinorrhea.      Mouth/Throat:      Pharynx: No oropharyngeal exudate.   Eyes:      General:         Right eye: No discharge.         Left eye: No discharge.   Neck:      Comments: trach  Cardiovascular:      Rate  and Rhythm: Normal rate.   Pulmonary:      Effort: Pulmonary effort is normal. No respiratory distress.      Breath sounds: No stridor. Rhonchi present.      Comments: 2 CT right  Abdominal:      General: Bowel sounds are normal. There is distension.      Tenderness: There is no abdominal tenderness. There is no guarding or rebound.      Comments: protuberant   Musculoskeletal:      Right lower leg: Edema present.      Left lower leg: Edema present.   Lymphadenopathy:      Cervical: No cervical adenopathy.   Skin:     General: Skin is warm and dry.   Neurological:      Comments: Eyes open  Not following commands today   Psychiatric:      Comments: Unable to assess         Meds:    Current Facility-Administered Medications:   •  cefepime  •  MD Alert...Adult ICU Electrolyte Replacement per Pharmacy  •  midodrine  •  levothyroxine  •  oxyCODONE immediate-release  •  phenytoin **AND** [DISCONTINUED] phenytoin  •  linezolid  •  metroNIDAZOLE (FLAGYL) IV  •  fentaNYL  •  levETIRAcetam  •  Pharmacy  •  omeprazole  •  thiamine  •  multivitamin  •  LORazepam  •  senna-docusate **AND** polyethylene glycol/lytes **AND** magnesium hydroxide **AND** bisacodyl  •  acetaminophen  •  labetalol  •  Respiratory Therapy Consult  •  ipratropium-albuterol  •  lidocaine    Labs:  Recent Labs     06/10/20  0518 06/11/20  0439 06/12/20  0420   WBC 14.4* 15.4* 16.3*   RBC 2.51* 2.27* 3.18*   HEMOGLOBIN 7.9* 7.2* 10.0*   HEMATOCRIT 26.5* 24.0* 31.7*   .6* 105.7* 99.7*   MCH 31.5 31.7 31.4   RDW 77.3* 74.3* 65.5*   PLATELETCT 323 323 316   MPV 10.4 10.3 9.9   NEUTSPOLYS 79.60* 82.50* 80.00*   LYMPHOCYTES 10.40* 9.30* 9.60*   MONOCYTES 7.00 6.00 5.40   EOSINOPHILS 1.90 1.40 3.90   BASOPHILS 0.50 0.30 0.40   RBCMORPHOLO  --   --  Present     Recent Labs     06/10/20  0518 06/11/20  0439 06/12/20  0420   SODIUM 143 150* 150*   POTASSIUM 3.6 3.5* 3.7   CHLORIDE 105 113* 113*   CO2 29 27 25   GLUCOSE 119* 168* 140*   BUN 37* 34* 31*          Recent Labs     06/10/20  0518 06/11/20  0439 06/12/20  0420   CREATININE 1.53* 1.35 1.14       Imaging:  Ct-chest (thorax) W/o    Result Date: 6/9/2020 6/9/2020 3:48 PM HISTORY/REASON FOR EXAM:  Pleural effusion. TECHNIQUE/EXAM DESCRIPTION: CT scan of the chest without contrast. Noncontrast helical scanning of the chest was obtained from the lung apices through the adrenal glands. Low dose optimization technique was utilized for this CT exam including automated exposure control and adjustment of the mA and/or kV according to patient size. COMPARISON: Plain film from the same day. FINDINGS: Evaluation of parenchymal and vascular structures is limited by the lack of intravenous contrast. Atherosclerotic plaque is seen in the aorta and coronary arteries. There is trace pericardial fluid. There is a small right pleural effusion which is loculated. There is likely pleural thickening. There is a small loculated left pleural effusion. Tracheostomy tube is noted. Enteric tube projects over the stomach. Main pulmonary artery measures 3.9 cm in diameter. Precarinal lymph node measures 1.3 cm in short axis dimension. Evaluation of the shirley is limited by the lack of intravenous contrast and by airspace opacities adjacent to the right hilum. 9 mm para-aortic lymph node is seen. There are small axillary lymph nodes bilaterally. There is septal thickening and groundglass opacities bilaterally. There is subsegmental lingula and left lower lobe atelectasis. There is atelectasis/contusion overlying the right pleural effusion. Limited views were obtained of the upper abdomen. There is trace free fluid in the upper abdomen. There is gynecomastia. Degenerative changes are seen in the spine. Postsurgical changes are seen in the thoracic and lumbar spine. There are remote bilateral rib fractures. There are healing left-sided rib fractures. There are acute fractures of the lateral right fifth, sixth ribs and the left anterior fourth  and lateral fourth, fifth, sixth ribs. There are multiple compression fractures in the thoracic and lumbar spine. There is an old posttraumatic deformity of the sternum. Bones are demineralized.     Small loculated right pleural effusion with overlying atelectasis/consolidation. Mild pleural thickening may be infectious/inflammatory but malignancy is not excluded. Small loculated left pleural effusion with subsegmental lingula and left lower lobe atelectasis. Interlobular septal thickening and groundglass opacities compatible with edema. Infection not excluded. Cardiomegaly. Enlarged mediastinal lymph nodes may be reactive but malignancy is not excluded. Atherosclerotic plaque including coronary artery calcification. Prominence of the main pulmonary artery can be seen in pulmonary arterial hypertension. Trace amount of ascites. Bilateral rib fractures. Multiple fractures in the thoracic spine. Demineralization.     Ct-chest (thorax) With    Result Date: 5/31/2020  HISTORY/REASON FOR EXAM: Respiratory illness, acute (Age > 40y); PE suspected, intermediate prob, positive D-dimer; post ROSC. TECHNIQUE/EXAM DESCRIPTION: CT scan of the chest with contrast, 5/30/2020 10:33 PM. This examination was conducted with Automated Exposure Control and Automated Adjustment of Tube Current based on patient size. Following the administration of IV contrast, helical imaging is performed from the thoracic inlet to the dome of the diaphragm. COMPARISON: 5/23/2020 TOTAL DLP: 'Nam.... mGy*cm FINDINGS: Vasculature: Heart:  The heart is mildly enlarged. Mediastinal lymphadenopathy is again noted. Pretracheal lymph nodes measure 26 x 10 mm in diameter, which is increased from previous measurement of 22 mm in diameter seen on 5/23/2020. Extensive subcarinal lymphadenopathy is again noted. Prominent calcifications are again noted involving the coronary arteries. Lungs:  Extensive reticular and groundglass airspace densities have developed  throughout all lobes of both lungs in the interval from the previous study. Additionally, there is a moderate right pleural effusion now present, which may be loculated anteriorly. There are small loculated collections of fluid noted involving the left pleural space. Additionally, there are prominent areas of consolidation noted involving both lungs, including near complete consolidation of the right lower lobe.     Extensive bilateral airspace opacities of developed in the interval from the previous study, including large areas of consolidation involving the right and left lower lobes. The findings suggest severe bilateral pneumonia. The distribution is uncharacteristic for Covid pneumonia, although the exam should not be considered a rule out for Covid pneumonia. There is no evidence of pulmonary embolus.  Ct-chest (thorax) With    Result Date: 5/23/2020  HISTORY/REASON FOR EXAM:  Pleural effusion; recurrent effusion recently changed to sanguinous appearance w anemia TECHNIQUE/EXAM DESCRIPTION: CT scan of the chest with contrast. 5/23/2020 4:11 AM CT scan of the chest was carried out after the administration of IV contrast in the usual manner. Both automated exposure control and Automated adjustment of tube current based on patient size are utilized. Total DLP: 387 mGy*cm COMPARISON: 10/17/2019. Chest x-ray conducted 5/23/2020 FINDINGS: Moderate loculated right pleural effusion Trace left pleural effusion LUNGS: Focal airspace opacities/consolidation involving the right lower lobe and right middle lobe Scattered groundglass opacities are present throughout the left upper lobe Interlobular septal thickening is identified. Scar versus atelectasis in the left lower lobe. HEART: Normal in size. Coronary artery calcifications AORTA: Normal in caliber. MEDIASTINUM: Mediastinal adenopathy. The largest lymph node measures 2.2 cm Views of the upper abdomen show no acute abnormality. Degenerative and postsurgical changes of  the spine with multiple compression deformities Subacute/old ununited sternal fracture     Moderate size loculated right pleural effusion Right middle lobe and right lower lobe airspace opacities/consolidation concerning for pneumonia Scattered groundglass opacity left upper lobe with interlobular septal thickening. May be secondary to pulmonary edema.  Ct-head W/o    Result Date: 6/1/2020 6/1/2020 12:42 AM HISTORY/REASON FOR EXAM: Altered mental status TECHNIQUE/EXAM DESCRIPTION:  CT of the head without contrast. Sequential axial images were obtained from the vertex to the skull base without contrast. Up to date radiation dose reduction adjustments have been utilized to meet ALARA standards for radiation dose reduction. COMPARISON: October 12, 2016 FINDINGS: There is mild diffuse parenchymal volume loss observed. Periventricular and subcortical white matter low-attenuation changes are seen, most commonly associated with small vessel ischemic disease. Moderate bilateral ventricular dilatation is seen, with radiographic appearance favoring ex vacuo dilatation. No space occupying lesions or areas of acute vascular territory infarctions are identified. There are no abnormal extra axial fluid collections or extra axial hemorrhage identified. The visualized paranasal sinuses and mastoid air cells are well aerated bilaterally. No depressed calvarial fractures are identified. The visualized globes and retrobulbar soft tissues appear within normal limits.  Atherosclerotic intracranial calcifications are seen.     1.  No acute intracranial abnormality is identified, there are nonspecific white matter changes, commonly associated with small vessel ischemic disease.  Associated mild cerebral atrophy is noted. 2.  Atherosclerosis.    Ct-head W/o    Result Date: 5/25/2020  HISTORY/REASON FOR EXAM:  Seizure, abnormal neuro exam; hx craniotomy for subdural, seizure. TECHNIQUE/EXAM DESCRIPTION: CT scan of the brain without  contrast. 5/24/2020 9:36 PM. CT scan of the brain was carried out without contrast in the normal manner. Both automated exposure control and Automated adjustment of  FINDINGS: There is no evidence of mass, mass effect, hemorrhage, or extraaxial fluid collection.  There is no midline shift. Global atrophy. Postsurgical changes of bilateral craniotomies Subcutaneous air surrounds the right muscles of mastication. There is no fracture or other acute bony abnormality seen. Visualized paranasal sinuses: Clear.     No acute intracranial process Global atrophy Subcutaneous air surrounding the right muscles of mastication. Unclear etiology.  [id number] View    [id number] View    Result Date: 5/25/2020  HISTORY/REASON FOR EXAM:  Distention. TECHNIQUE/EXAM DESCRIPTION AND NUMBER OF VIEWS: Abdomen (one view), 5/24/2020 11:58 PM. COMPARISON: 10/8/2019 FINDINGS: Dilated air-filled loops of small bowel. No free air. No abnormal soft tissue masses or calcifications Partially visualized postsurgical changes of the spine     Obstructive bowel gas pattern Raulito Lechuga MD 5/25/2020 8:16 AM    Dx-chest-limited (1 View)    Result Date: 6/8/2020 6/8/2020 1:43 PM HISTORY/REASON FOR EXAM:  Shortness of Breath. TECHNIQUE/EXAM DESCRIPTION AND NUMBER OF VIEWS: Single AP view of the chest. COMPARISON: 6/8/2020 at 0447 hours FINDINGS: Lines/tubes:  Support tubing is unchanged. Fixation hardware is present in the thoracic spine. Lungs:  There is persistent atelectasis or pneumonia in the right lower lobe. A loculated appearing right-sided pleural effusion appears slightly decreased in volume. A small left pleural effusion is present. Pleura:  No pneumothorax. Heart and mediastinum:  The heart silhouette is within normal limits. Bones:  Left thoracotomy changes are present.     1.  Slightly decreased volume of loculated appearing right pleural effusion. 2.  Persistent atelectasis or pneumonia in the left lower lobe. 3.  Unchanged small left  pleural effusion.    Dx-chest-limited (1 View)    Result Date: 5/27/2020  HISTORY/REASON FOR EXAM: Shortness of Breath; f/u pneumonia, pl effusion, pulm edema. f/u pneumonia, pl effusion, pulm edema TECHNIQUE/EXAM DESCRIPTION: Chest x-ray, one view, 5/27/2020 5:16 AM. COMPARISON: 5/26/2020 FINDINGS: Moderate right-sided pleural effusion is again noted. Trace left pleural effusion is again noted. Venous congestion and reticulonodular densities involving both lungs has worsened in the interval the previous study.     Findings are compatible with worsening pulmonary edema/pulmonary venous congestion.    Dx-chest-portable (1 View)    Result Date: 6/10/2020  6/10/2020 4:26 AM HISTORY/REASON FOR EXAM:  For indication of respiratory failure; For indication of respiratory failure. TECHNIQUE/EXAM DESCRIPTION AND NUMBER OF VIEWS: Single portable view of the chest. COMPARISON: One day prior FINDINGS: Lines and tubes are stable. Cardiomediastinal silhouette is stable. Small to moderate loculated right pleural effusion. Small loculated left pleural effusion seen on CT is not well evaluated radiographically. Diffuse interstitial and some hazy airspace opacities likely pulmonary edema. Correlate clinically for infection.  Findings appear mildly worsened since prior radiograph. The bones are unchanged.     Loculated pleural effusions, right greater than left. Interstitial and airspace opacities likely pulmonary edema. Correlate clinically for infection. Findings possibly slightly increased from prior.    Dx-chest-portable (1 View)    Result Date: 6/9/2020 6/9/2020 4:31 AM HISTORY/REASON FOR EXAM: For indication of respiratory failure; For indication of respiratory failure TECHNIQUE/EXAM DESCRIPTION:  Single AP view of the chest. COMPARISON: Yesterday FINDINGS: Position of medical devices appears stable. Cardiomegaly is observed. The mediastinal contour appears within normal limits.  The central  pulmonary vasculature appears  prominent and indistinct. The lungs appear well expanded bilaterally.  Diffuse scattered hazy pulmonary parenchymal opacities are seen. Veil-like opacities are seen in bilateral lung bases, right greater than left. Left rib fractures are noted.     1.  Pulmonary edema and/or infiltrates are identified, which are stable since the prior exam. 2.  Bilateral pleural effusions, right greater than left 3.  Cardiomegaly     Dx-chest-portable (1 View)    Result Date: 6/8/2020 6/8/2020 4:18 AM HISTORY/REASON FOR EXAM: For indication of respiratory failure; For indication of respiratory failure TECHNIQUE/EXAM DESCRIPTION:  Single AP view of the chest. COMPARISON: Yesterday FINDINGS: Endotracheal tube has been exchanged for tracheostomy.   Otherwise medical device position is stable. Cardiomegaly is observed. The mediastinal contour appears within normal limits.  The central  pulmonary vasculature appears prominent and indistinct. The lungs appear well expanded bilaterally.  Diffuse scattered hazy pulmonary parenchymal opacities are seen. Veil-like opacities are seen in bilateral lung bases, right greater than left. Left rib fractures are noted.     1.  Pulmonary edema and/or infiltrates are identified, which are stable since the prior exam. 2.  Bilateral pleural effusions, right greater than left 3.  Cardiomegaly     Dx-chest-portable (1 View)    Result Date: 6/7/2020 6/7/2020 5:09 AM HISTORY/REASON FOR EXAM:  For indication of respiratory failure; For indication of respiratory failure TECHNIQUE/EXAM DESCRIPTION AND NUMBER OF VIEWS: Single portable view of the chest. COMPARISON: 6/6/2020 FINDINGS: Tubes and lines: ET tube, feeding tube and right central venous catheter are redemonstrated. Diffuse interstitial prominence. Patchy right base opacities. Moderate right lateral pleural effusion. No pneumothorax. Stable cardiopericardial silhouette.     1. No significant interval change.    Dx-chest-portable (1 View)    Result  Date: 6/6/2020 6/6/2020 5:13 AM HISTORY/REASON FOR EXAM:  For indication of respiratory failure; For indication of respiratory failure TECHNIQUE/EXAM DESCRIPTION AND NUMBER OF VIEWS: Single portable view of the chest. COMPARISON: 6/5/2020 FINDINGS: Tubes and lines: ET tube, feeding tube and right central venous catheter are redemonstrated. Diffuse interstitial prominence. Patchy right base opacities. Moderate right lateral pleural effusion. No pneumothorax. Stable cardiopericardial silhouette.     1. No significant interval change.    Dx-chest-portable (1 View)    Result Date: 6/5/2020 6/5/2020 12:50 PM HISTORY/REASON FOR EXAM:  Shortness of breath. TECHNIQUE/EXAM DESCRIPTION AND NUMBER OF VIEWS: Single portable view of the chest. COMPARISON: Exam at 5:26 AM FINDINGS: Single portable view of the chest demonstrates a normal cardiac silhouette and mediastinal contours. Right pleural effusion and adjacent airspace opacification are unchanged. Bilateral interstitial prominence is unchanged. No pneumothorax is identified. Lines of support are unchanged.     No significant interval change.    Dx-chest-portable (1 View)    Result Date: 6/5/2020 6/5/2020 5:16 AM HISTORY/REASON FOR EXAM: For indication of respiratory failure; For indication of respiratory failure TECHNIQUE/EXAM DESCRIPTION:  Single AP view of the chest. COMPARISON: Yesterday FINDINGS: Position of medical devices appears stable. Cardiomegaly is observed. The mediastinal contour appears within normal limits.  The central  pulmonary vasculature appears prominent and indistinct. The lungs appear well expanded bilaterally.  Diffuse scattered hazy pulmonary parenchymal opacities are seen. Veil-like opacities are seen in bilateral lung bases, right greater than left. Left rib fractures are noted.     1.  Pulmonary edema and/or infiltrates are identified, which are stable since the prior exam. 2.  Bilateral pleural effusions, right greater than left 3.   Cardiomegaly     Dx-chest-portable (1 View)    Result Date: 6/4/2020 6/4/2020 4:35 AM HISTORY/REASON FOR EXAM: For indication of respiratory failure; For indication of respiratory failure TECHNIQUE/EXAM DESCRIPTION:  Single AP view of the chest. COMPARISON: Yesterday FINDINGS: Position of medical devices appears stable. Cardiomegaly is observed. The mediastinal contour appears within normal limits.  The central  pulmonary vasculature appears prominent and indistinct. The lungs appear well expanded bilaterally.  Diffuse scattered hazy pulmonary parenchymal opacities are seen. Veil-like opacities are seen in bilateral lung bases, right greater than left. Left rib fractures are noted.     1.  Pulmonary edema and/or infiltrates are identified, which are stable since the prior exam. 2.  Bilateral pleural effusions, right greater than left 3.  Cardiomegaly     Dx-chest-portable (1 View)    Result Date: 6/3/2020    6/3/2020 4:48 AM HISTORY/REASON FOR EXAM: For indication of respiratory failure; For indication of respiratory failure TECHNIQUE/EXAM DESCRIPTION:  Single AP view of the chest. COMPARISON: Yesterday FINDINGS: Position of medical devices appears stable. Cardiomegaly is observed. The mediastinal contour appears within normal limits.  The central  pulmonary vasculature appears prominent and indistinct. The lungs appear well expanded bilaterally.  Diffuse scattered hazy pulmonary parenchymal opacities are seen. Veil-like opacities are seen in bilateral lung bases, right greater than left. Left rib fractures are noted.     1.  Pulmonary edema and/or infiltrates are identified, which are stable since the prior exam. 2.  Bilateral pleural effusions, right greater than left 3.  Cardiomegaly     Dx-chest-portable (1 View)    Result Date: 6/2/2020 6/2/2020 4:25 PM HISTORY/REASON FOR EXAM:  Follow-up right thoracentesis TECHNIQUE/EXAM DESCRIPTION AND NUMBER OF VIEWS: Single portable view of the chest. COMPARISON:  6/2/2020 FINDINGS: Scans of postoperative changes seen in the thoracolumbar spine. ET tube and feeding tube are again seen as is a right PICC line. The mediastinal and cardiac silhouette is unremarkable. The pulmonary vascularity is within normal limits. There is peribronchial wall thickening and edema. There is right lower lobe airspace disease. There has been interval decrease in the right pleural effusion status post thoracentesis. There is no visible pneumothorax. There are no acute bony abnormalities.     1.  There is no pneumothorax following right thoracentesis. 2.  The amount of right pleural effusion is decreased. 3.  There is vascular congestion and/or edema. 4.  There is right lower lobe airspace disease which could be atelectasis or pneumonitis.    Dx-chest-portable (1 View)    Result Date: 6/2/2020 6/2/2020 4:24 AM HISTORY/REASON FOR EXAM: For indication of respiratory failure; For indication of respiratory failure TECHNIQUE/EXAM DESCRIPTION:  Single AP view of the chest. COMPARISON: None FINDINGS: Position of medical devices appears stable. Cardiomegaly is observed. The mediastinal contour appears within normal limits.  The central  pulmonary vasculature appears prominent and indistinct. The lungs appear well expanded bilaterally.  Diffuse scattered hazy pulmonary parenchymal opacities are seen. Veil-like opacities are seen in bilateral lung bases, right greater than left. Left rib fractures are noted.     1.  Pulmonary edema and/or infiltrates are identified, which are stable since the prior exam. 2.  Bilateral pleural effusions, right greater than left 3.  Cardiomegaly      Dx-chest-portable (1 View)    Result Date: 5/31/2020  HISTORY/REASON FOR EXAM: intubation. intubation TECHNIQUE/EXAM DESCRIPTION: Chest x-ray, one portable view, 5/30/2020 8:26 PM. COMPARISON: 5/20/2020 FINDINGS: The endotracheal tube terminates above the level the izaiah. NG tube passes caudal to the diaphragm. Right-sided  PICC line remains in stable position. Large right-sided pleural effusion is again noted and appear stable. Increasing areas of consolidation of developed involving the apices of both lungs, as well as the lateral aspect of the left lung.     Extensive bilateral lung opacities are now noted, which could be explained by severe bilateral pneumonia. Stable appearance of right pleural effusion. Supportive lines and tubes appear in appropriate positions.    Dx-chest-portable (1 View)    Result Date: 5/28/2020  HISTORY/REASON FOR EXAM: Shortness of Breath. TECHNIQUE/EXAM DESCRIPTION: Chest x-ray, one portable view, 5/28/2020 7:53 AM. COMPARISON: 5/27/2020 FINDINGS: Pulmonary venous congestion pattern has improved, however, prominent interstitial opacities persist, compatible with pulmonary edema. Right-sided pleural effusion is stable. PICC line has been placed and appears in excellent position.     Persistent right-sided pleural effusion.    Dx-chest-portable (1 View)    Result Date: 5/23/2020  HISTORY/REASON FOR EXAM:  HYPOTENSION. TECHNIQUE/EXAM DESCRIPTION AND NUMBER OF VIEWS: Chest x-ray (one view), 5/23/2020 12:06 AM. COMPARISON: 5/13/2020 FINDINGS: Large loculated right pleural effusion is identified. Ill-defined airspace opacities involving the left lung base. The cardiac and mediastinal silhouette are unremarkable. Postsurgical changes of the thoracic spine.     Large right pleural effusion, loculated Atelectasis versus developing pneumonia in the left lung 5/23/2020 8:48 AM     Ir-thoracentesis Puncture Right    Result Date: 5/13/2020  HISTORY/REASON FOR EXAM:  Recurrent pleural effusion. TECHNIQUE/EXAM DESCRIPTION: Ultrasound-guided thoracentesis, 5/13/2020 8:59 AM. COMPARISON: None. PROCEDURE: The risks, benefits, alternatives and the procedure were discussed with the patient.  The patient's questions were answered to their full satisfaction.  They subsequently agreed to proceed with the proposed procedure and  signed a written consent form. A procedural timeout was performed. Maximum sterile barrier protection mechanisms were employed. The patient's laboratory values, allergies and medication list were reviewed prior to the examination. The patient's thoracic cavity was sonographically evaluated.  An area suitable for thoracentesis was identified.  The overlying skin was prepped and draped in a sterile fashion.  1% lidocaine was infiltrated subcutaneously for local anesthesia. Following this, the thoracic cavity was entered utilizing a Safe-T-Centesis Catheter System.  A total of 900 cc of serosanguineous fluid was subsequently aspirated under sonographic control. The patient tolerated the procedure well and there were no immediate postprocedural complications. Following the procedure, the chest radiograph demonstrated no evidence of pneumothorax and decrease in size of the pleural effusion.     Unremarkable sonographic-guided thoracentesis.    Mr-brain-with & W/o    Result Date: 6/6/2020 6/6/2020 5:00 PM HISTORY/REASON FOR EXAM:  exposed right craniotomy hardware r/o infection. TECHNIQUE/EXAM DESCRIPTION:   MRI of the brain without and with contrast. T1 sagittal, T2 fast spin-echo axial, T1 coronal, FLAIR coronal, diffusion-weighted and apparent diffusion coefficient (ADC map) axial images were obtained of the whole brain. T1 postcontrast axial and T1 postcontrast coronal images were obtained. The study was performed on a Pixelapse Signa 1.5 Deborah MRI scanner. 15 mL ProHance contrast was administered intravenously. COMPARISON:  None. FINDINGS:  There is abnormal contrast enhancement and edema noted involving right frontal craniotomy flap. There is abnormal enhancing extradural tissue noted adjacent to the right frontal bone with abnormal adjacent dural enhancement. There is no evidence of leptomeningeal enhancement. There is no parenchymal contrast enhancement. There is no acute infarct. There is no acute or chronic  parenchymal hemorrhage. There is severe cerebral volume loss. There is mild cerebellar volume loss. There is no intra-axial space-occupying lesion. The midline structures including the pituitary, hypothalamic and pineal regions are unremarkable. The posterior fossa structures are unremarkable. The visualized flow voids of the cerebral vasculature are unremarkable. There is no large lesion identified in the expected course of the intracranial portions of the cranial nerves. There is mucosal thickening in the bilateral mastoid air cells.     1.  There is abnormal contrast enhancement and edema noted involving right frontal craniotomy flap. There is abnormal enhancing extradural tissue noted adjacent to the right frontal bone with abnormal adjacent dural enhancement. 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 There is no evidence of leptomeningeal enhancement. There is no parenchymal contrast enhancement. 2.  Severe cerebral volume loss. 3.  Mild cerebral volume loss.    Us-extremity Artery Upper Bilat    Result Date: 2020   Vascular Laboratory  Conclusions  No hemodynamically significant evidence of arterial disease in the right  upper extremity.  No hemodynamically significant evidence of arterial disease in the left  upper extremity.  LARISSA DURBIN  Exam Date:     2020 09:04  Room #:     Inpatient  Priority:     Routine  Ht (in):             Wt (lb):  Ordering Physician:        JONY HANSEN JR  Referring Physician:       [id number] JR, RICHARD  Sonographer:               Erlinda Pham RVT  Study Type:                Complete Bilateral  Technical Quality:         Adequate  Age:    54    Gender:     M  MRN:    [id number]  :    1965      BSA:  Indications:     Cyanosis  CPT Codes:       25771  ICD Codes:       782.5  History:         Bilateral cyanosis  Limitations:     Challenging  due to patient movement.          RIGHT                                                 LEFT  Waveforms               PSV                              PSV        Waveforms                          (cm/s)                           (cm/s)  Triphasic                77.00       Subclavian          69.00      Triphasic  Bi, non-                 84.00       Axillary            54.00      Bi, non-  reversed                                                            reversed                                       Brachial  Bi, non-                 100.00      Proximal            63.00      Triphasic  reversed                                       Mid  Triphasic                72.00       Distal              38.00      Bi, non-                                                                      reversed                                       Radial  Bi, non-                 77.00       Proximal            61.10      Bi, non-  reversed                                                            reversed                                       Mid  Bi, non-                 47.00       Distal              17.40      Bi, non-  reversed                                                            reversed                                       Ulnar  Bi, non-                 77.00       Proximal            71.30      Bi, non-  reversed                                                            reversed                                       Mid  Bi, non-                 105.00      Distal              63.10      Bi, non-  reversed                                                            reversed  Findings  Right  No hemodynamically significant evidence of arterial disease in the right  upper extremity.  Multiphasic Doppler flow pattern is noted throughout the right upper  extremity.  Left  No hemodynamically significant evidence of arterial disease in the left  upper extremity.  Multiphasic Doppler flow pattern is noted throughout the  left upper  extremity.  The distal radial artery is small with low velocities, but patent.  CesarioLarsangiecheri MARTINEZ To  (Electronically Signed)  Final Date:      06 June 2020                   11:57    Us-thoracentesis Puncture Right    Result Date: 6/3/2020  6/2/2020 1:54 PM HISTORY/REASON FOR EXAM:  Shortness of breath TECHNIQUE/EXAM DESCRIPTION: Ultrasound-guided thoracentesis. Indication:  RIGHT pleural fluid collection. COMPARISON:  Plain film from the same day PROCEDURE:     Informed consent was obtained over the phone from the patient's family. A timeout was taken. A right pleural effusion was localized with real-time ultrasound guidance. The right posterior chest wall was prepped and draped in a sterile manner. Following local anesthesia with 1% lidocaine, a 5 Portuguese Yueh pigtail catheter was advanced into the pleural space with trocar technique and pleural fluid was drained. The patient tolerated the procedure well without evidence of complication. A post thoracentesis chest radiograph is forthcoming. FINDINGS: Fluid was sent to the laboratory. Fluid character: serosanguinous     1. Ultrasound guided right sided diagnostic and therapeutic thoracentesis. 2. 800 mL of fluid withdrawn.    Ec-echocardiogram Complete W/o Cont    Result Date: 6/2/2020  Transthoracic Echo Report Echocardiography Laboratory CONCLUSIONS No prior study is available for comparison. Left ventricular ejection fraction is visually estimated to be 65%. Normal left ventricular systolic function. Mild concentric left ventricular hypertrophy. Grade II diastolic dysfunction. Moderately dilated right ventricle. Mildly dilated left atrium. Mild mitral regurgitation. Estimated right ventricular systolic pressure is 55 mmHg. Trace tricuspid regurgitation. Normal pericardium without effusion. LARISSA DURBIN Exam Date:        LV EF:  65    % FINDINGS Left Ventricle Normal left ventricular chamber size. Mild concentric left ventricular hypertrophy. Normal  left ventricular systolic function. Left ventricular ejection fraction is visually estimated to be 65%. Normal diastolic function. Grade II diastolic dysfunction. Right Ventricle Moderately dilated right ventricle. Right Atrium The right atrium is normal in size. Left Atrium Mildly dilated left atrium. Left atrial volume index is 38 mL/sq m. Mitral Valve Structurally normal mitral valve without significant stenosis. Mild mitral regurgitation. Aortic Valve Structurally normal aortic valve without significant stenosis or regurgitation. Tricuspid Valve Structurally normal tricuspid valve without significant stenosis. Trace tricuspid regurgitation. Estimated right ventricular systolic pressure is 55 mmHg. Right atrial pressure is estimated to be 3 mmHg. Pulmonic Valve Structurally normal pulmonic valve without significant stenosis or regurgitation. Pericardium Normal pericardium without effusion. Aorta The aortic root is normal.  Ascending aorta diameter is 3.0 cm. Pavel Franco M.D. (Electronically Signed) Final Date:     02 June 2020                 15:10    Dx-abdomen For Tube Placement    Result Date: 6/2/2020 6/2/2020 1:06 PM HISTORY/REASON FOR EXAM:  Line evaluation. TECHNIQUE/EXAM DESCRIPTION AND NUMBER OF VIEWS:  1 view(s) of the abdomen. COMPARISON:  None. FINDINGS: Enteric tube has been placed. The tip projects over the left upper quadrant. The bowel gas pattern is within normal limits. There is postoperative change in the thoracolumbar spine.     1.  Feeding tube tip projects over the gastric body.    Ir-picc Line Placement W/guidance < Age 3    Result Date: 5/27/2020  HISTORY/REASON FOR EXAM:  IR picc placement. TECHNIQUE/EXAM DESCRIPTION: ULTRASOUND AND FLUOROSCOPIC-GUIDED PICC LINE INSERTION, 5/27/2020 10:36 AM.. TECHNIQUE:  The procedure was explained to the patient.  The patient was placed supine on the fluoroscopy table.  Ultrasound examination of the right arm was performed to confirm patency of the  basilic vein.  Using all elements of Maximum Sterile Barrier  technique and local anesthesia with ultrasound guidance, the above mentioned examined vein was punctured through a small skin incision.  Continuous real time ultrasound monitoring of the puncture needle entry into the vein was performed and documented. Under fluoroscopic guidance, a 0.018 wire was passed centrally through the needle.  A peel-away sheath was placed over the wire.  A 5 Tamazight double lumen Power PICC line was advanced until the tip was at the SVC-right atrium junction.  The position of the catheter tip was confirmed with fluoroscopy.  The sheath was removed, and the line was secured to the patient's skin.  There were no immediate complications, and the patient tolerated the procedure well. FINDINGS: The tip of the PICC line is at the SVC-right atruim junction . 1 saved images. Fluoroscopic time was 3.2 minutes     Successful placement of a PICC line.  Ready to infuse.,      Micro:  Results     Procedure Component Value Units Date/Time    Anaerobic Culture [[id number]] Collected:  06/10/20 1415    Order Status:  Completed Specimen:  Tissue Updated:  06/12/20 1304     Significant Indicator NEG     Source TISS     Site Cranial Plate     Culture Result Culture in progress.    Narrative:       CALL  [facility]ulido  19 tel. [phone],  CALLED  19 tel. [phone] 06/11/2020, 12:21, RB PERF. RESULTS CALLED TO:[phone]  Surgery Specimen    AFB Culture [[id number]] Collected:  06/10/20 1415    Order Status:  Completed Specimen:  Tissue Updated:  06/12/20 1304     Significant Indicator NEG     Source TISS     Site Cranial Plate     Culture Result Culture in progress.     AFB Smear Results No acid fast bacilli seen.    Narrative:       CALL  Pul[facility]  19 tel. [phone],  CALLED  19 tel. [phone] 06/11/2020, 12:21, RB PERF. RESULTS CALLED TO:[phone]  Surgery Specimen    CULTURE TISSUE W/ GRM STAIN [[id number]]  (Abnormal)  (Susceptibility) Collected:  06/10/20 1415     Order Status:  Completed Specimen:  Tissue Updated:  06/12/20 1304     Significant Indicator POS     Source TISS     Site Cranial Plate     Culture Result -     Gram Stain Result Rare WBCs.  Moderate Gram positive cocci.       Culture Result Staphylococcus epidermidis  Moderate growth      Narrative:       CALL  P[facility]  19 tel. [phone],  CALLED  19 tel. [phone] 06/11/2020, 12:21, RB PERF. RESULTS CALLED TO:[phone]  Surgery Specimen    Susceptibility     Staphylococcus epidermidis (1)     Antibiotic Interpretation Microscan Method Status    Azithromycin Resistant >4 mcg/mL IZABELA Preliminary    Clindamycin Resistant >4 mcg/mL IZABELA Preliminary    Cefazolin Resistant <=8 mcg/mL IZABELA Preliminary    Daptomycin Sensitive <=1 mcg/mL IZABELA Preliminary    Ampicillin/sulbactam Resistant <=8/4 mcg/mL IZABELA Preliminary    Erythromycin Resistant >4 mcg/mL IZABELA Preliminary    Vancomycin Sensitive 1 mcg/mL IZABELA Preliminary    Oxacillin Resistant >2 mcg/mL IZABELA Preliminary    Penicillin Resistant 8 mcg/mL IZABELA Preliminary    Trimeth/Sulfa Resistant >2/38 mcg/mL IZABELA Preliminary    Tetracycline Sensitive <=4 mcg/mL IZABELA Preliminary                   GRAM STAIN [[id number]] Collected:  06/11/20 1600    Order Status:  Completed Specimen:  Body Fluid Updated:  06/11/20 2221     Significant Indicator .     Source BF     Site Pleural Fluid     Gram Stain Result Rare WBCs.  No organisms seen.      Narrative:       Surgery Specimen    FLUID CULTURE W/GRAM STAIN [[id number]] Collected:  06/11/20 1600    Order Status:  Completed Specimen:  Other Updated:  06/11/20 1704    Anaerobic Culture [[id number]] Collected:  06/11/20 1600    Order Status:  Completed Specimen:  Other Updated:  06/11/20 1704    GRAM STAIN [[id number]] Collected:  06/10/20 1415    Order Status:  Completed Specimen:  Tissue Updated:  06/11/20 1623     Significant Indicator .     Source TISS     Site Cranial Plate     Gram Stain Result Rare WBCs.  Moderate Gram positive cocci.       Narrative:       CALL  Pulido  19 tel. [phone],  CALLED  19 tel. [phone] 06/11/2020, 12:21, RB PERF. RESULTS CALLED TO:[phone]  Surgery Specimen    Acid Fast Stain [[id number]] Collected:  06/10/20 1415    Order Status:  Completed Specimen:  Tissue Updated:  06/11/20 1623     Significant Indicator NEG     Source TISS     Site Cranial Plate     AFB Smear Results No acid fast bacilli seen.    Narrative:       CALL  P[facility]  19 tel. [phone],  CALLED  19 tel. [phone] 06/11/2020, 12:21, RB PERF. RESULTS CALLED TO:[phone]  Surgery Specimen    FLUID CULTURE W/GRAM STAIN [[id number]] Collected:  06/08/20 1334    Order Status:  Completed Specimen:  Body Fluid from Thoracentesis Fluid Updated:  06/11/20 0730     Significant Indicator NEG     Source BF     Site Pleural Fluid     Culture Result No growth at 72 hours.     Gram Stain Result No organisms seen.    Narrative:       Collected By:[id number] VUONG  Collected By:[id number] VUONG    AFB CULTURE [[id number]] Collected:  06/08/20 1334    Order Status:  Completed Specimen:  Body Fluid from Thoracentesis Fluid Updated:  06/11/20 0730     Significant Indicator NEG     Source BF     Site Pleural Fluid     Culture Result Culture in progress.     AFB Smear Results No acid fast bacilli seen.    Narrative:       Collected By:[id number] VUONG  Collected By:[id number] JAIMIE VUONG    FUNGAL CULTURE [[id number]] Collected:  06/08/20 1334    Order Status:  Completed Specimen:  Body Fluid from Thoracentesis Fluid Updated:  06/11/20 0730     Significant Indicator NEG     Source BF     Site Pleural Fluid     Culture Result No fungal growth to date.    Narrative:       Collected By:[id number] JAIMIE VUONG  Collected By:[id number] JAIMIE VUONG    Fungal Culture [[id number]] Collected:  06/10/20 1415    Order Status:  Completed Specimen:  Other Updated:  06/10/20 1641    FLUID CULTURE W/GRAM STAIN [[id number]] Collected:  06/02/20 1440    Order Status:  Completed Specimen:   Body Fluid Updated:  06/10/20 1016     Significant Indicator NEG     Source BF     Site Thoracentesis Fluid     Culture Result No growth at 5 days.     Gram Stain Result No organisms seen.    Fungal Culture [[id number]] Collected:  06/02/20 1440    Order Status:  Completed Specimen:  Body Fluid Updated:  06/10/20 1016     Significant Indicator NEG     Source BF     Site Thoracentesis Fluid     Culture Result No fungal growth to date.    AFB Culture [[id number]] Collected:  06/02/20 1440    Order Status:  Completed Specimen:  Body Fluid Updated:  06/10/20 1016     Significant Indicator NEG     Source BF     Site Thoracentesis Fluid     Culture Result Culture in progress.     AFB Smear Results No acid fast bacilli seen.    SARS-CoV-2, PCR (In-House) [[id number]] Collected:  06/10/20 0646    Order Status:  Completed Updated:  06/10/20 0722     SARS-CoV-2 Source NP Swab     SARS-CoV-2 by PCR NotDetected    Narrative:       Collected By:[id number] [employer]  Rapid test for Pre Op surgery today  Is this test for diagnosis or screening?->Screen    COVID/SARS CoV-2 [[id number]] Collected:  06/10/20 0646    Order Status:  Completed Specimen:  Respirate from Nasal Updated:  06/10/20 0651     COVID Order Status Received    Narrative:       Collected By:[id number] [employer]  Rapid test for Pre Op surgery today  Is this test for diagnosis or screening?->Screen    GRAM STAIN [[id number]] Collected:  06/08/20 1334    Order Status:  Completed Specimen:  Body Fluid Updated:  06/09/20 2214     Significant Indicator .     Source BF     Site Pleural Fluid     Gram Stain Result No organisms seen.    Narrative:       Collected By:[id number] JAIMIE MATOS.  Collected By:[id number] JAIMIE VUONG    Acid Fast Stain [[id number]] Collected:  06/08/20 1334    Order Status:  Completed Specimen:  Body Fluid Updated:  06/09/20 2214     Significant Indicator NEG     Source BF     Site Pleural Fluid     AFB Smear Results No acid fast bacilli seen.     "Narrative:       Collected By:[id number] JAIMIE VUONG  Collected By:[id number] JAIMIE VUONG    BLOOD CULTURE [[id number]] Collected:  06/08/20 1825    Order Status:  Completed Specimen:  Blood from Peripheral Updated:  06/09/20 0740     Significant Indicator NEG     Source BLD     Site PERIPHERAL     Culture Result No Growth  Note: Blood cultures are incubated for 5 days and  are monitored continuously.Positive blood cultures  are called to the RN and reported as soon as  they are identified.      Narrative:       Collected By:[id number] TAVARES IBARRA  Per Hospital Policy: Only change Specimen Src: to \"Line\" if  specified by physician order.  Right Forearm/Arm    BLOOD CULTURE [[id number]] Collected:  06/08/20 1830    Order Status:  Completed Specimen:  Blood from Peripheral Updated:  06/09/20 0740     Significant Indicator NEG     Source BLD     Site PERIPHERAL     Culture Result No Growth  Note: Blood cultures are incubated for 5 days and  are monitored continuously.Positive blood cultures  are called to the RN and reported as soon as  they are identified.      Narrative:       Collected By:[id number] TAVARES IBARRA  Per Hospital Policy: Only change Specimen Src: to \"Line\" if  specified by physician order.  Left Forearm/Arm    FLUID CULTURE W/GRAM STAIN [[id number]] Collected:  06/08/20 1334    Order Status:  Sent Specimen:  Body Fluid from Thoracentesis Fluid           Assessment:  Active Hospital Problems    Diagnosis   • PEA (Pulseless electrical activity) ([facility]) [I46.9]   • Shock ([facility]) [R57.9]   • Anemia [D64.9]   • Seizure disorder ([facility]) [G40.909]   • DAMIAN (acute kidney injury) ([facility]) [N17.9]   • Epidural phlegmon [G06.2]   • Osteomyelitis of skull ([facility]) [M86.9]   • Goals of care, counseling/discussion [Z71.89]   • Acute respiratory failure with hypoxia ([facility]) [J96.01]   • Recurrent right pleural effusion [J90]   • Pneumonia [J18.9]   • Ileus ([facility]) [K56.7]   • Hypokalemia [E87.6]      ASSESSMENT/PLAN:   Ventilator " dependent respiratory failure  Recurrent loculated pleural effusion  Hospital-acquired pneumonia-prior course Zosyn and vancomycin  Afebrile  Persistent leukocytosis  Pressors weaned  S/p thoracentesis 6/2 and 6/8-cxs neg WBC and LDH less than 150 but ph 7  S/p Right thoracotomy/decort 6/11-pleural fluid rare WBC, org. 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   h/o bilateral craniotomy with subdural evacuation in 10/2016 due to SDH  MRI on 6/6+ enhancement and edema involving the right frontal craniotomy flap as well as extradural tissue adjacent to the right frontal bone concerning for craniotomy flap osteomyelitis with adjacent epidural phlegmon  S/p OR -removal plates-not able to close due to poor skin integrity. VAC placed  Cxs +CNS ([facility])  On abx as above-plan to switch zyvox to dapto once PNA treated    DAMIAN, improved  Avoid nephrotoxic agents    Encephalopathy  Seizure disorder  On Keppra    Hepatitis C with cirrhosis per chart  Mult VL undetectable from 9186-7214  FU GI if indicated after discharge     Prognosis guarded  Appreciate Palliative Care javieral     I have performed a physical exam and reviewed and updated ROS as of today.  In review of yesterday's note dated  6/11/2020, there are no changes except as documented above.

## 2020-06-12 NOTE — PROGRESS NOTES
"    DATE: 6/12/2020    Post Operative Day 1 Right thoracotomy, decortication    Interval Events:  SP R thoracotomy, decort. On T piece. CTs to suction      PHYSICAL EXAMINATION:  Vital Signs: /62   Pulse 74   Temp 36.7 °C (98.1 °F) (Bladder)   Resp (!) 23   Ht 1.626 m (5' 4.02\")   Wt 74.7 kg (164 lb 10.9 oz)   SpO2 98%     R Chest tubes - no air leak.     ASSESSMENT AND PLAN:   SP R thoracotomy, decort.    Recommend CTs to water seal. Will watch L side as has small area of loculated fluid. May need L sided procedure.    Appreciate ICU and consulting physician care.       ____________________________________     Kat Uribe M.D.    "

## 2020-06-12 NOTE — ANESTHESIA TIME REPORT
Anesthesia Start and Stop Event Times     Date Time Event    6/11/2020 1451 Ready for Procedure     1512 Anesthesia Start     1659 Anesthesia Stop        Responsible Staff  06/11/20    Name Role Begin End    Pro Goyal M.D. Anesth 1512 1659        Preop Diagnosis (Free Text):  Pre-op Diagnosis     RIGHT empyema        Preop Diagnosis (Codes):    Post op Diagnosis  Empyema  Decortication right side    Premium Reason  A. 3PM - 7AM    Comments: Premium QUINTIN

## 2020-06-13 NOTE — PROGRESS NOTES
"Critical Care Progress Note    Date of admission  5/31/2020    Chief Complaint  54 y.o. male admitted 5/31/2020 with Respiratory failure, seizure    Hospital Course    \"All history was obtained from old notes/charts from [facility] as the patient is intubated at the time of my evaluation.     Mr. Lechuga is a 54 year old male with the past medical history of urethral stricture with urinary retention, recurrent right-sided pleural effusion, alcohol abuse, COPD on home O2, hepatitis C with cirrhosis, chronic pain syndrome on narcotics, traumatic brain injury, history of SDH with subsequent seizure disorder who presented to [facility] on 5/22 with the complaint of difficulty urinary for 2 days and low blood pressures.  He apparently was recently hospitalized at [facility] from 5/6 to 5/11/2020 for a COPD exacerbation and reported that he was doing well for until 2 days prior to 5/22 when he developed difficulty urinating and draining his bladder.  He has worsening abdominal pain in the suprapubic region with chills.  No fevers.  No nausea or vomiting.  He has a negative COVID test on 5/6.  The patient was admitted for acute urinary retention, UTI, and hypoxia.  Over the course of the next week, the patient required intubation for seizure activity, but was then extubated.  However, the patient then developed aspiration pneumonia with AMS and was on BiPAP for a night on 5/29.  All during the patient's hospital stay, he has been battling with delirium.  Unfortunately, around 2000 last night the patient the patient became unresponsive with a bradycardic episode and no pulses.  A code was called and he received 3 rounds of CPR and 2 doses of epi before ROSC was achieved.  He was intubated again and stabilized.  He was sent to [facility] for higher level of care due to the growing complexity of his condition as well as critical care management since [facility] does not have critical care specialists on staff.\"      Interval " Problem Update  Reviewed last 24 hour events:  Neuro: not follow moves all oxy 15 mg x1  HR: 70-80's  SBP: 's  Tmax: afebrile  GI: BM, vomited, NG 600ml schedule narcan, reglan   UOP: hold 50ml/hr  Lines: picc saline lock CT 100ml and 160ml on water seal  Resp: t piece  15hrs yesterday rest setting this am 90mins went back on vent today  Vte: lovenox  PPI/H2:ppi home  Antibx: Cefepimine, linezolid, metro  Staph epi in wound  Dec phyention dose  KUB with ileus vs pSBO    Review of Systems  Review of Systems   Unable to perform ROS: Intubated        Vital Signs for last 24 hours   Pulse:  [74-91] 88  Resp:  [10-40] 23  BP: ()/(55-97) 105/65  SpO2:  [88 %-100 %] 98 %    Hemodynamic parameters for last 24 hours       Respiratory Information for the last 24 hours  Vent Mode: APVCMV  Rate (breaths/min): 16  Vt Target (mL): 360  PEEP/CPAP: 8  MAP: 22  Control VTE (exp VT): 363    Physical Exam   Physical Exam  Vitals signs reviewed.   Constitutional:       General: He is not in acute distress.     Appearance: He is well-developed. He is ill-appearing.      Interventions: He is intubated.      Comments: Sitting in bed ill appearing on t piece   HENT:      Head: Normocephalic and atraumatic.      Comments: Scalp wound vac in place     Right Ear: External ear normal.      Left Ear: External ear normal.      Nose: Nose normal.      Comments: Cortrak in place     Mouth/Throat:      Mouth: Mucous membranes are dry.      Pharynx: Oropharynx is clear.   Eyes:      Conjunctiva/sclera: Conjunctivae normal.      Pupils: Pupils are equal, round, and reactive to light.   Neck:      Musculoskeletal: Neck supple.      Vascular: No JVD.      Trachea: No tracheal deviation.      Comments: Trach in place  Cardiovascular:      Rate and Rhythm: Regular rhythm. Bradycardia present.      Pulses: Normal pulses.   Pulmonary:      Effort: He is intubated.      Breath sounds: Rhonchi present. No wheezing or rales.      Comments:  Restless on t piece   Double chest tubes on right chest  Coarse ronchi bilateral r>l  Abdominal:      General: Bowel sounds are normal. There is distension.      Palpations: Abdomen is soft. There is no mass.      Tenderness: There is no abdominal tenderness. There is no guarding.      Hernia: No hernia is present.      Comments: With BMS in place, worsening distention and tympania on percussion   Musculoskeletal:         General: Swelling present. No tenderness.      Right lower leg: Edema present.      Left lower leg: Edema present.   Skin:     General: Skin is warm and dry.      Capillary Refill: Capillary refill takes less than 2 seconds.      Findings: No rash.      Comments: Multiple tattoos  Venous stasis dermatitis   Neurological:      General: No focal deficit present.      Mental Status: He is alert.      Comments: Opens eyes to voice moves extremities not following commands   Psychiatric:      Comments: Unable to assess         Medications  Current Facility-Administered Medications   Medication Dose Route Frequency Provider Last Rate Last Dose   • metoclopramide (REGLAN) tablet 5 mg  5 mg Enteral Tube Q12HRS Oziel Londono M.D.   5 mg at 06/13/20 0918   • Naloxone (NARCAN) 2 mg/2 mL oral syringe 2 mg  2 mg Enteral Tube Q8HRS Oziel Londono M.D.   2 mg at 06/13/20 0919   • [START ON 6/14/2020] phenytoin (DILANTIN) injection 100 mg  100 mg Intravenous Q8HRS Oziel Londono M.D.       • cefepime (MAXIPIME) 2 g in  mL IVPB  2 g Intravenous Q8HRS Oziel Londono M.D.   Stopped at 06/13/20 0624   • metroNIDAZOLE (FLAGYL) tablet 500 mg  500 mg Enteral Tube Q8HRS Oziel Londono M.D.   500 mg at 06/13/20 0554   • MD Alert...ICU Electrolyte Replacement per Pharmacy   Other PHARMACY TO DOSE Oziel Londono M.D.       • midodrine (PROAMATINE) tablet 15 mg  15 mg Enteral Tube Q8HRS Oziel Londono M.D.   15 mg at 06/13/20 0552   • levothyroxine (SYNTHROID) tablet 50 mcg  50 mcg Enteral Tube AM  ES Pavel Orlando Jr. D.O.   50 mcg at 06/13/20 0551   • oxyCODONE immediate release (ROXICODONE) tablet 10-15 mg  10-15 mg Enteral Tube Q4HRS PRN MARRY Olson Jr..O.   15 mg at 06/12/20 2152   • linezolid (ZYVOX) tablet 600 mg  600 mg Enteral Tube Q12HRS Paevl Orlando Jr. D.O.   600 mg at 06/13/20 0552   • fentaNYL (SUBLIMAZE) injection 25-50 mcg  25-50 mcg Intravenous Q2HRS PRN Vishnu Zamora M.D.   50 mcg at 06/12/20 1730   • levETIRAcetam (KEPPRA) tablet 1,000 mg  1,000 mg Enteral Tube BID Pavel Orlando Jr. D.O.   1,000 mg at 06/13/20 0552   • Pharmacy Consult: Enteral tube insertion - review meds/change route/product selection  1 Each Other PHARMACY TO DOSE Pavel Orlando Jr., D.O.       • omeprazole (FIRST-OMEPRAZOLE) 2 mg/mL oral susp 40 mg  40 mg Enteral Tube DAILY Pavel Orlando Jr. D.O.   40 mg at 06/13/20 0550   • thiamine tablet 100 mg  100 mg Enteral Tube DAILY Pavel Orlando Jr. D.O.   100 mg at 06/13/20 0550   • multivitamin (THERAGRAN) tablet 1 Tab  1 Tab Enteral Tube DAILY MARRY Olson Jr..O.   1 Tab at 06/13/20 0552   • LORazepam (ATIVAN) injection 4 mg  4 mg Intravenous Q10 MIN PRN Pavel Orlando Jr., D.O.       • senna-docusate (PERICOLACE or SENOKOT S) 8.6-50 MG per tablet 2 Tab  2 Tab Enteral Tube BID Anisa Hernández M.D.   Stopped at 06/11/20 1800    And   • polyethylene glycol/lytes (MIRALAX) PACKET 1 Packet  1 Packet Enteral Tube QDAY PRN Anisa Hernández M.D.        And   • magnesium hydroxide (MILK OF MAGNESIA) suspension 30 mL  30 mL Enteral Tube QDAY PRN Anisa Hernnádez M.D.        And   • bisacodyl (DULCOLAX) suppository 10 mg  10 mg Rectal QDAY PRN Anisa Hernández M.D.       • acetaminophen (TYLENOL) tablet 650 mg  650 mg Enteral Tube Q6HRS PRN Anisa Hernández M.D.   650 mg at 06/08/20 1644   • labetalol (NORMODYNE/TRANDATE) injection 10 mg  10 mg Intravenous Q4HRS PRN Anisa Hernández M.D.       • Respiratory Therapy  Consult   Nebulization Continuous RT Alexia Khan M.D.       • ipratropium-albuterol (DUONEB) nebulizer solution  3 mL Nebulization Q2HRS PRN (RT) Alexia Khan M.D.   3 mL at 06/12/20 1837   • lidocaine (XYLOCAINE) 1 % injection 1-2 mL  1-2 mL Tracheal Tube Q30 MIN PRN Alexia Khan M.D.           Fluids    Intake/Output Summary (Last 24 hours) at 6/13/2020 1234  Last data filed at 6/13/2020 1200  Gross per 24 hour   Intake 2700 ml   Output 2410 ml   Net 290 ml       Laboratory  Recent Labs     06/11/20  0504 06/12/20  0542 06/13/20  0341   ISTATAPH 7.391* 7.422 7.407   ISTATAPCO2 43.4* 38.8* 43.1*   ISTATAPO2 83 72 72   ISTATATCO2 28 26 28   JDMPRIB0MKU 96 95 94   ISTATARTHCO3 26.3* 25.3* 27.1*   ISTATARTBE 1 1 2   ISTATTEMP 98.8 F 98.8 F 99.0 F   ISTATFIO2 40 40 30   ISTATSPEC Arterial Arterial Arterial   ISTATAPHTC 7.390* 7.421 7.403   [id number] 83 73 73         Recent Labs     06/11/20  0439 06/12/20  0420 06/13/20  0505   SODIUM 150* 150* 144   POTASSIUM 3.5* 3.7 3.6   CHLORIDE 113* 113* 106   CO2 27 25 29   BUN 34* 31* 33*   CREATININE 1.35 1.14 1.14   MAGNESIUM 2.0 2.2 2.0   PHOSPHORUS 2.2* 2.0* 2.1*   CALCIUM 7.4* 7.4* 8.3*     Recent Labs     06/11/20  0439 06/12/20  0420 06/13/20  0505   GLUCOSE 168* 140* 142*     Recent Labs     06/11/20  0439 06/12/20  0420 06/13/20  0505   WBC 15.4* 16.3* 18.1*   NEUTSPOLYS 82.50* 80.00* 79.50*   LYMPHOCYTES 9.30* 9.60* 9.30*   MONOCYTES 6.00 5.40 5.90   EOSINOPHILS 1.40 3.90 3.80   BASOPHILS 0.30 0.40 0.60     Recent Labs     06/11/20  0439 06/12/20  0420 06/13/20  0505   RBC 2.27* 3.18* 3.20*   HEMOGLOBIN 7.2* 10.0* 10.2*   HEMATOCRIT 24.0* 31.7* 31.8*   PLATELETCT 323 316 374       Imaging  X-Ray:  I have personally reviewed the images and compared with prior images. and My impression is: still with worsening right loculated effusion, trach in place  CT:    Reviewed    Assessment/Plan  Shock (HCC)- (present on admission)  Assessment &  Plan  Multifactorial:  PEA/CPR, propofol and infection  Continue antimicrobials  Echo: LVEF 65%. Mild concentric LVH. Grade II diastolic dysfunction. Mild mitral regurgitation. Estimated right ventricular systolic pressure is 55 mmHg  Ongoing titration of Levophed for MAP goals >65  Continue midodrine  Cortisol 11.4, continue Solu-Cortef weaned off steroids 6/12  Resolved     PEA (Pulseless electrical activity) (HCC)- (present on admission)  Assessment & Plan  ? Etiology from [place], likely hypoxia  Continue supportive care  Optimize electrolytes  EKG without ischemic changes  Echocardiogram: LVEF 65%. Mild concentric LVH. Grade II diastolic dysfunction. Mild mitral regurgitation. Estimated right ventricular systolic pressure is 55 mmHg    Recurrent right pleural effusion- (present on admission)  Assessment & Plan  Chronic, recurrent  Thoracenteses multiple, Cx negative exudate  Follow chest x-rays, Ct chest reviewed  Discussed with Dr Lance 6/9 s/p decortication  S/p decortication by Dr Lance for 3 lobe trap lung 6/11  May need left sided preformed as well will continue to follow  CT's on on waterseal    Anemia  Assessment & Plan  Restrictive transfusion strategy hg < 7    DAMIAN (acute kidney injury) ([facility])  Assessment & Plan  Continue strict map goal map > 65  Limit nephrotoxins    Improved continue to hold fluids and monitor function closely  Once able force diuresis    Epidural phlegmon- (present on admission)  Assessment & Plan  MRI with epidural tissue enhancement concerning for epidural phlegmon adjacent to osteomyelitis  Antimicrobials changed from Zosyn/Vanco to Rocephin/Flagyl/Vanco for improved CNS penetration (Pseudomonas considered unlikely, more likely gram-positive or anaerobes)  Neurosurgery and ID following    Osteomyelitis of skull (HCC)- (present on admission)  Assessment & Plan  Scalp wound with exposed neurosurgical hardware -spoke with the patient's sisters they state that it has been exposed for  "several months  Bilateral craniotomy with subdural evacuation in October 2016 by Dr. Vallejo  S/p hardware removal by Dr Vallejo 6/10  Wound care consult and plastic surgery following for closure Dr Reye planned for OR on Monday  Wound with Staph Epi    Goals of care, counseling/discussion  Assessment & Plan  Discussed at length with patient's sister regarding his goals of care given his POLST completed in 2015 says DNR/DNI.   In reviewing the chart, the patient actually stated that he misunderstood the form and this CODE STATUS is not correct, he wanted to be FULL CODE.    Dr. Pimentel' H&P on 6/21/16:      \"Code status is full.  He had signed POLST form in the past, saying he did not want resuscitation and only wanted limited interventions; however, in a discussion with the nurse today at surgery, he clarified that he had misunderstood the form, what he meant was that he did not want to be kept alive on life support, but he would want a trial of  full resuscitation efforts.  Therefore, his code status is full.\"    Acute respiratory failure with hypoxia (HCC)- (present on admission)  Assessment & Plan  Intubated at [facility] on 5/22/2020 after seizure then again on 5/30/20 following cardiac arrest, he was extubated 6/5/2020 here and failed due to encephalopathy and airway protection and addition failed extubation trial requiring trach 6/7  RT/O2 protocols  Daily CXR w/ chest tubes in place  HOB >30 degrees and peridex for VAP prevention  Pepcid for GI prophylaxis  SAT/SBT when able (ABCDEF Bundle)  Early mobility  s/p tracheostomy 6/7   T-piece trials on going      Pneumonia- (present on admission)  Assessment & Plan  Aspiration on cefepime, flagyl and linezolid for CNS osteo      Ileus (HCC)- (present on admission)  Assessment & Plan  Worsening 6/11-6/12 will stop TF  Check KUB start scheduled reglan x2 days -> findings of ileus vs pSBO monitor need for ctabdomen if not improving  Correct electrolyte serial monitor " abdominal exam  Limit narcotic will give PO narcan    Hypokalemia- (present on admission)  Assessment & Plan  Continue to monitor and replete    Seizure disorder (HCC)- (present on admission)  Assessment & Plan  cEEG without evidence of seizure  Neurology has signed off  Continue Keppra 1g BID  Phenytoin level elevated continue to monitor level and adjust  MRI shows skull osteomyelitis and possible epidural phlegmon without leptomeningeal enhancement  Neuro and ID following       VTE:  Lovenox  Ulcer: H2 Antagonist  Lines: Irby Catheter  Ongoing indication addressed    I have performed a physical exam and reviewed and updated ROS and Plan today (6/13/2020). In review of yesterday's note (6/12/2020), there are no changes except as documented above.     Discussed patient condition and risk of morbidity and/or mortality with RN, RT, Pharmacy, Charge nurse / hot rounds and general surgery     The patient remains critically ill on ventilator with active titration.  Critical care time = 38 minutes in directly providing and coordinating critical care and extensive data review.  No time overlap and excludes procedures.

## 2020-06-13 NOTE — CARE PLAN
Problem: Ventilation Defect:  Goal: Ability to achieve and maintain unassisted ventilation or tolerate decreased levels of ventilator support  Outcome: NOT MET   Placed on vent @2115 due to increased WOB.   16/360/+8, 30%

## 2020-06-13 NOTE — PROGRESS NOTES
"    DATE: 6/13/2020    Post Operative Day 2 Right thoracotomy, decortication    Interval Events:  Cont T piece trials. CTs to water seal. Too much out to remove.       PHYSICAL EXAMINATION:  Vital Signs: BP (!) 94/79   Pulse 89   Temp 37.1 °C (98.8 °F) (Bladder)   Resp (!) 28   Ht 1.626 m (5' 4.02\")   Wt 91.1 kg (200 lb 13.4 oz)   SpO2 96%     R Chest tubes - no air leak.     ASSESSMENT AND PLAN:   SP R thoracotomy, decort.    Recommend Cont CTs to water seal. Will watch L side as has small area of loculated fluid. May need L sided procedure.    Appreciate ICU and consulting physician care.       ____________________________________     Kat Uribe M.D.    "

## 2020-06-13 NOTE — PROGRESS NOTES
2 RN skin assessment completed with Ingris ORDONEZ.     Surgical incision on head with wound vac in place with mepilex over top, wound vac functioning.  Generalized flaky, loose skin and bruising.  R sided chest tubes with surgical incision, gauze and hypafix tape dressing in place, C/D/I.  Sacrum and buttocks pink and blanching, BMS in place. No mepilex at this time due to incontinence.    Skin breakdown methods in place

## 2020-06-13 NOTE — PROGRESS NOTES
Neurosurgery Progress Note    Subjective:  No events    Exam:  EO spont, intermittently tracks  WETZEL spont, and symm  Vac intact/sxn on      BP  Min: 94/79  Max: 133/76  Pulse  Av.4  Min: 74  Max: 91  Resp  Av.9  Min: 10  Max: 40  Monitored Temp 2  Av.1 °C (98.8 °F)  Min: 36.6 °C (97.9 °F)  Max: 37.5 °C (99.5 °F)  SpO2  Av.4 %  Min: 88 %  Max: 100 %    No data recorded    Recent Labs     20  0439 20  0420 20  0505   WBC 15.4* 16.3* 18.1*   RBC 2.27* 3.18* 3.20*   HEMOGLOBIN 7.2* 10.0* 10.2*   HEMATOCRIT 24.0* 31.7* 31.8*   .7* 99.7* 99.4*   MCH 31.7 31.4 31.9   MCHC 30.0* 31.5* 32.1*   RDW 74.3* 65.5* 63.5*   PLATELETCT 323 316 374   MPV 10.3 9.9 9.8     Recent Labs     20  0439 20  0420 20  0505   SODIUM 150* 150* 144   POTASSIUM 3.5* 3.7 3.6   CHLORIDE 113* 113* 106   CO2 27 25 29   GLUCOSE 168* 140* 142*   BUN 34* 31* 33*   CREATININE 1.35 1.14 1.14   CALCIUM 7.4* 7.4* 8.3*               Intake/Output       20 - 20 - 20 0659       Total  Total       Intake    I.V.  144.2  -- 144.2  --  -- --    Volume (mL) (lactated ringers infusion) 144.2 -- 144.2 -- -- --    Other  100  100 200  --  -- --    Medications (PO/Enteral Liquids) 100 100 200 -- -- --    NG/GT  780  780 1560  --  -- --    Intake (mL) (Enteral Tube 20 Cortrak - Gastric 10 Fr. Right nare)  -- -- --    IV Piggyback  449.9  200 649.9  --  -- --    Volume (mL) (metroNIDAZOLE (FLAGYL) IVPB 500 mg) 100 -- 100 -- -- --    Volume (mL) (potassium phosphates 15 mmol in D5W 250 mL ivpb) 249.9 -- 249.9 -- -- --    Volume (mL) (cefepime (MAXIPIME) 2 g in  mL IVPB) 100 200 300 -- -- --    Enteral  600  600 1200  --  -- --    Free Water / Tube Flush  -- -- --    Total Intake 2074.1 1680 3754.1 -- -- --       Output    Urine  575  315 890  --  -- --    Output (mL) (Urethral Catheter  Temperature probe 16 Fr.) 575 315 890 -- -- --    Drains  150  90 240  --  -- --    Output (mL) (Closed/Suction Drain 2 Other (Comment);Right Other (Comments)) 150 90 240 -- -- --    Stool  300  700 1000  --  -- --    Measurable Stool (mL) 300 0 300 -- -- --    Rectal Tube Output (Rectal Tube) -- 700 700 -- -- --    Chest Tube  50  260 310  --  -- --    Output (mL) (Chest Tube 1 Right Midaxillary) 50 100 150 -- -- --    Output (mL) (Chest Tube 2 Right Midaxillary) -- 160 160 -- -- --    Total Output 1075 1365 2440 -- -- --       Net I/O     999.1 315 1314.1 -- -- --            Intake/Output Summary (Last 24 hours) at 6/13/2020 0930  Last data filed at 6/13/2020 0624  Gross per 24 hour   Intake 3379.9 ml   Output 2340 ml   Net 1039.9 ml            • enoxaparin (LOVENOX) injection  40 mg DAILY   • potassium phosphate ivpb  15 mmol Once   • metoclopramide  5 mg Q12HRS   • Naloxone  2 mg Q8HRS   • cefepime  2 g Q8HRS   • metroNIDAZOLE  500 mg Q8HRS   • MD Alert...Adult ICU Electrolyte Replacement per Pharmacy   PHARMACY TO DOSE   • midodrine  15 mg Q8HRS   • levothyroxine  50 mcg AM ES   • oxyCODONE immediate-release  10-15 mg Q4HRS PRN   • phenytoin  300 mg Q12HRS   • linezolid  600 mg Q12HRS   • fentaNYL  25-50 mcg Q2HRS PRN   • levETIRAcetam  1,000 mg BID   • Pharmacy  1 Each PHARMACY TO DOSE   • omeprazole  40 mg DAILY   • thiamine  100 mg DAILY   • multivitamin  1 Tab DAILY   • LORazepam  4 mg Q10 MIN PRN   • senna-docusate  2 Tab BID    And   • polyethylene glycol/lytes  1 Packet QDAY PRN    And   • magnesium hydroxide  30 mL QDAY PRN    And   • bisacodyl  10 mg QDAY PRN   • acetaminophen  650 mg Q6HRS PRN   • labetalol  10 mg Q4HRS PRN   • Respiratory Therapy Consult   Continuous RT   • ipratropium-albuterol  3 mL Q2HRS PRN (RT)   • lidocaine  1-2 mL Q30 MIN PRN       Assessment and Plan:  Hospital day #8 exposed cranial plate, imaging shows osteomyelitis  POD #3  removal of exposed cranial plate, attempted wound  closure, vac placement  POD #2 thoracotomy/decort  Prophylactic anticoagulation: no -d/c'd lovenox for surgery      Start date/time: tbd    Neuro as above  Cranial wound unable to be closed, vac in place  Cx cranial plate- coag neg staph  abx per ID  Cont wound vac per wound RN team  Dr Guzmán planning for OR monday-[facility] bone and placement of integra

## 2020-06-13 NOTE — PROGRESS NOTES
Infectious Disease Progress Note    Author: Neva Gonzalez M.D. 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 male admitted 2020 who initially presented to [facility] on  complaining of urinary retention and hypotension.  At [place], on  he required intubation due to seizure activity.  Eventually extubated but developed aspiration pneumonia and increasing encephalopathy. He had an episode of unresponsiveness with bradycardia and a code was called.  Patient received 3 rounds of CPR and 2 episodes of epi before ROSC was achieved.  He was intubated and transferred to [facility] for higher level of care. In ICU since 6/10  6/9  TMax 101.7, improved today , Vent 8/40%, thoracentesis on .  Pt alert and following commands.     Temp 99.7 WBC 15.4  trached-not following commands. Hypotensive and tachy FiO2 0.4 Plan for OR today noted   AF WBC 16.3 s/p thoracotomy -trached, sedated. Possible need for decortication on left as well noted   AF (100)  WBC 18.1 no clinical change. ABd film done for distension    Review of Systems:  Review of Systems   Unable to perform ROS: Intubated   Constitutional: Negative for fever.       Hemodynamics:  No data recorded.  Monitored Temp: 37.8 °C (100 °F)  Pulse  Av  Min: 42  Max: 113   Blood Pressure: 105/65       Physical Exam:  Physical Exam  Vitals signs and nursing note reviewed.   Constitutional:       Appearance: He is ill-appearing. He is not toxic-appearing or diaphoretic.   HENT:      Head:      Comments: VAC to scalp     Nose: No congestion or rhinorrhea.   Eyes:      General:         Right eye: No discharge.         Left eye: No discharge.   Neck:      Comments: trach  Cardiovascular:      Rate and Rhythm: Normal rate.   Pulmonary:      Effort: Pulmonary effort is  normal. No respiratory distress.      Breath sounds: No stridor. No wheezing.      Comments: 2 CT right  Abdominal:      General: Bowel sounds are normal. There is distension.      Tenderness: There is no abdominal tenderness. There is no guarding or rebound.      Comments: protuberant   Musculoskeletal:      Right lower leg: Edema present.      Left lower leg: Edema present.   Lymphadenopathy:      Cervical: No cervical adenopathy.   Skin:     Coloration: Skin is not jaundiced.      Findings: Bruising present.   Neurological:      Comments: sedated   Psychiatric:      Comments: Unable to assess         Meds:    Current Facility-Administered Medications:   •  metoclopramide  •  Naloxone  •  [START ON 6/14/2020] phenytoin  •  cefepime  •  metroNIDAZOLE  •  MD Alert...Adult ICU Electrolyte Replacement per Pharmacy  •  midodrine  •  levothyroxine  •  oxyCODONE immediate-release  •  linezolid  •  fentaNYL  •  levETIRAcetam  •  Pharmacy  •  omeprazole  •  thiamine  •  multivitamin  •  LORazepam  •  senna-docusate **AND** polyethylene glycol/lytes **AND** magnesium hydroxide **AND** bisacodyl  •  acetaminophen  •  labetalol  •  Respiratory Therapy Consult  •  ipratropium-albuterol  •  lidocaine    Labs:  Recent Labs     06/11/20 0439 06/12/20  0420 06/13/20  0505   WBC 15.4* 16.3* 18.1*   RBC 2.27* 3.18* 3.20*   HEMOGLOBIN 7.2* 10.0* 10.2*   HEMATOCRIT 24.0* 31.7* 31.8*   .7* 99.7* 99.4*   MCH 31.7 31.4 31.9   RDW 74.3* 65.5* 63.5*   PLATELETCT 323 316 374   MPV 10.3 9.9 9.8   NEUTSPOLYS 82.50* 80.00* 79.50*   LYMPHOCYTES 9.30* 9.60* 9.30*   MONOCYTES 6.00 5.40 5.90   EOSINOPHILS 1.40 3.90 3.80   BASOPHILS 0.30 0.40 0.60   RBCMORPHOLO  --  Present  --      Recent Labs     06/11/20  0439 06/12/20  0420 06/13/20  0505   SODIUM 150* 150* 144   POTASSIUM 3.5* 3.7 3.6   CHLORIDE 113* 113* 106   CO2 27 25 29   GLUCOSE 168* 140* 142*   BUN 34* 31* 33*     Recent Labs     06/11/20  0439 06/12/20  0420 06/13/20  7360      CREATININE 1.35 1.14 1.14       Imaging:  Ct-chest (thorax) W/o    Result Date: 6/9/2020 6/9/2020 3:48 PM HISTORY/REASON FOR EXAM:  Pleural effusion. TECHNIQUE/EXAM DESCRIPTION: CT scan of the chest without contrast. Noncontrast helical scanning of the chest was obtained from the lung apices through the adrenal glands. Low dose optimization technique was utilized for this CT exam including automated exposure control and adjustment of the mA and/or kV according to patient size. COMPARISON: Plain film from the same day. FINDINGS: Evaluation of parenchymal and vascular structures is limited by the lack of intravenous contrast. Atherosclerotic plaque is seen in the aorta and coronary arteries. There is trace pericardial fluid. There is a small right pleural effusion which is loculated. There is likely pleural thickening. There is a small loculated left pleural effusion. Tracheostomy tube is noted. Enteric tube projects over the stomach. Main pulmonary artery measures 3.9 cm in diameter. Precarinal lymph node measures 1.3 cm in short axis dimension. Evaluation of the shirley is limited by the lack of intravenous contrast and by airspace opacities adjacent to the right hilum. 9 mm para-aortic lymph node is seen. There are small axillary lymph nodes bilaterally. There is septal thickening and groundglass opacities bilaterally. There is subsegmental lingula and left lower lobe atelectasis. There is atelectasis/contusion overlying the right pleural effusion. Limited views were obtained of the upper abdomen. There is trace free fluid in the upper abdomen. There is gynecomastia. Degenerative changes are seen in the spine. Postsurgical changes are seen in the thoracic and lumbar spine. There are remote bilateral rib fractures. There are healing left-sided rib fractures. There are acute fractures of the lateral right fifth, sixth ribs and the left anterior fourth and lateral fourth, fifth, sixth ribs. There are multiple  compression fractures in the thoracic and lumbar spine. There is an old posttraumatic deformity of the sternum. Bones are demineralized.     Small loculated right pleural effusion with overlying atelectasis/consolidation. Mild pleural thickening may be infectious/inflammatory but malignancy is not excluded. Small loculated left pleural effusion with subsegmental lingula and left lower lobe atelectasis. Interlobular septal thickening and groundglass opacities compatible with edema. Infection not excluded. Cardiomegaly. Enlarged mediastinal lymph nodes may be reactive but malignancy is not excluded. Atherosclerotic plaque including coronary artery calcification. Prominence of the main pulmonary artery can be seen in pulmonary arterial hypertension. Trace amount of ascites. Bilateral rib fractures. Multiple fractures in the thoracic spine. Demineralization.     Ct-chest (thorax) With    Result Date: 5/31/2020  HISTORY/REASON FOR EXAM: Respiratory illness, acute (Age > 40y); PE suspected, intermediate prob, positive D-dimer; post ROSC. TECHNIQUE/EXAM DESCRIPTION: CT scan of the chest with contrast, 5/30/2020 10:33 PM. This examination was conducted with Automated Exposure Control and Automated Adjustment of Tube Current based on patient size. Following the administration of IV contrast, helical imaging is performed from the thoracic inlet to the dome of the diaphragm. COMPARISON: 5/23/2020 TOTAL DLP: 'Nam.... mGy*cm FINDINGS: Vasculature: Heart:  The heart is mildly enlarged. Mediastinal lymphadenopathy is again noted. Pretracheal lymph nodes measure 26 x 10 mm in diameter, which is increased from previous measurement of 22 mm in diameter seen on 5/23/2020. Extensive subcarinal lymphadenopathy is again noted. Prominent calcifications are again noted involving the coronary arteries. Lungs:  Extensive reticular and groundglass airspace densities have developed throughout all lobes of both lungs in the interval from the  previous study. Additionally, there is a moderate right pleural effusion now present, which may be loculated anteriorly. There are small loculated collections of fluid noted involving the left pleural space. Additionally, there are prominent areas of consolidation noted involving both lungs, including near complete consolidation of the right lower lobe.     Extensive bilateral airspace opacities of developed in the interval from the previous study, including large areas of consolidation involving the right and left lower lobes. The findings suggest severe bilateral pneumonia. The distribution is uncharacteristic for Covid pneumonia, although the exam should not be considered a rule out for Covid pneumonia. There is no evidence of pulmonary embolus.  Ct-chest (thorax) With    Result Date: 5/23/2020  HISTORY/REASON FOR EXAM:  Pleural effusion; recurrent effusion recently changed to sanguinous appearance w anemia TECHNIQUE/EXAM DESCRIPTION: CT scan of the chest with contrast. 5/23/2020 4:11 AM CT scan of the chest was carried out after the administration of IV contrast in the usual manner. Both automated exposure control and Automated adjustment of tube current based on patient size are utilized. Total DLP: 387 mGy*cm COMPARISON: 10/17/2019. Chest x-ray conducted 5/23/2020 FINDINGS: Moderate loculated right pleural effusion Trace left pleural effusion LUNGS: Focal airspace opacities/consolidation involving the right lower lobe and right middle lobe Scattered groundglass opacities are present throughout the left upper lobe Interlobular septal thickening is identified. Scar versus atelectasis in the left lower lobe. HEART: Normal in size. Coronary artery calcifications AORTA: Normal in caliber. MEDIASTINUM: Mediastinal adenopathy. The largest lymph node measures 2.2 cm Views of the upper abdomen show no acute abnormality. Degenerative and postsurgical changes of the spine with multiple compression deformities  Subacute/old ununited sternal fracture     Moderate size loculated right pleural effusion Right middle lobe and right lower lobe airspace opacities/consolidation concerning for pneumonia Scattered groundglass opacity left upper lobe with interlobular septal thickening. May be secondary to pulmonary edema.  Ct-head W/o    Result Date: 6/1/2020 6/1/2020 12:42 AM HISTORY/REASON FOR EXAM: Altered mental status TECHNIQUE/EXAM DESCRIPTION:  CT of the head without contrast. Sequential axial images were obtained from the vertex to the skull base without contrast. Up to date radiation dose reduction adjustments have been utilized to meet ALARA standards for radiation dose reduction. COMPARISON: October 12, 2016 FINDINGS: There is mild diffuse parenchymal volume loss observed. Periventricular and subcortical white matter low-attenuation changes are seen, most commonly associated with small vessel ischemic disease. Moderate bilateral ventricular dilatation is seen, with radiographic appearance favoring ex vacuo dilatation. No space occupying lesions or areas of acute vascular territory infarctions are identified. There are no abnormal extra axial fluid collections or extra axial hemorrhage identified. The visualized paranasal sinuses and mastoid air cells are well aerated bilaterally. No depressed calvarial fractures are identified. The visualized globes and retrobulbar soft tissues appear within normal limits.  Atherosclerotic intracranial calcifications are seen.     1.  No acute intracranial abnormality is identified, there are nonspecific white matter changes, commonly associated with small vessel ischemic disease.  Associated mild cerebral atrophy is noted. 2.  Atherosclerosis.    Ct-head W/o    Result Date: 5/25/2020  HISTORY/REASON FOR EXAM:  Seizure, abnormal neuro exam; hx craniotomy for subdural, seizure. TECHNIQUE/EXAM DESCRIPTION: CT scan of the brain without contrast. 5/24/2020 9:36 PM. CT scan of the brain was  carried out without contrast in the normal manner. Both automated exposure control and Automated adjustment of  FINDINGS: There is no evidence of mass, mass effect, hemorrhage, or extraaxial fluid collection.  There is no midline shift. Global atrophy. Postsurgical changes of bilateral craniotomies Subcutaneous air surrounds the right muscles of mastication. There is no fracture or other acute bony abnormality seen. Visualized paranasal sinuses: Clear.     No acute intracranial process Global atrophy Subcutaneous air surrounding the right muscles of mastication. Unclear etiology.  [id number] View    [id number] View    Result Date: 5/25/2020  HISTORY/REASON FOR EXAM:  Distention. TECHNIQUE/EXAM DESCRIPTION AND NUMBER OF VIEWS: Abdomen (one view), 5/24/2020 11:58 PM. COMPARISON: 10/8/2019 FINDINGS: Dilated air-filled loops of small bowel. No free air. No abnormal soft tissue masses or calcifications Partially visualized postsurgical changes of the spine     Obstructive bowel gas pattern Raulito Lechuga MD 5/25/2020 8:16 AM    Dx-chest-limited (1 View)    Result Date: 6/8/2020 6/8/2020 1:43 PM HISTORY/REASON FOR EXAM:  Shortness of Breath. TECHNIQUE/EXAM DESCRIPTION AND NUMBER OF VIEWS: Single AP view of the chest. COMPARISON: 6/8/2020 at 0447 hours FINDINGS: Lines/tubes:  Support tubing is unchanged. Fixation hardware is present in the thoracic spine. Lungs:  There is persistent atelectasis or pneumonia in the right lower lobe. A loculated appearing right-sided pleural effusion appears slightly decreased in volume. A small left pleural effusion is present. Pleura:  No pneumothorax. Heart and mediastinum:  The heart silhouette is within normal limits. Bones:  Left thoracotomy changes are present.     1.  Slightly decreased volume of loculated appearing right pleural effusion. 2.  Persistent atelectasis or pneumonia in the left lower lobe. 3.  Unchanged small left pleural effusion.    Dx-chest-limited (1  View)    Result Date: 5/27/2020  HISTORY/REASON FOR EXAM: Shortness of Breath; f/u pneumonia, pl effusion, pulm edema. f/u pneumonia, pl effusion, pulm edema TECHNIQUE/EXAM DESCRIPTION: Chest x-ray, one view, 5/27/2020 5:16 AM. COMPARISON: 5/26/2020 FINDINGS: Moderate right-sided pleural effusion is again noted. Trace left pleural effusion is again noted. Venous congestion and reticulonodular densities involving both lungs has worsened in the interval the previous study.     Findings are compatible with worsening pulmonary edema/pulmonary venous congestion.    Dx-chest-portable (1 View)    Result Date: 6/10/2020  6/10/2020 4:26 AM HISTORY/REASON FOR EXAM:  For indication of respiratory failure; For indication of respiratory failure. TECHNIQUE/EXAM DESCRIPTION AND NUMBER OF VIEWS: Single portable view of the chest. COMPARISON: One day prior FINDINGS: Lines and tubes are stable. Cardiomediastinal silhouette is stable. Small to moderate loculated right pleural effusion. Small loculated left pleural effusion seen on CT is not well evaluated radiographically. Diffuse interstitial and some hazy airspace opacities likely pulmonary edema. Correlate clinically for infection.  Findings appear mildly worsened since prior radiograph. The bones are unchanged.     Loculated pleural effusions, right greater than left. Interstitial and airspace opacities likely pulmonary edema. Correlate clinically for infection. Findings possibly slightly increased from prior.    Dx-chest-portable (1 View)    Result Date: 6/9/2020 6/9/2020 4:31 AM HISTORY/REASON FOR EXAM: For indication of respiratory failure; For indication of respiratory failure TECHNIQUE/EXAM DESCRIPTION:  Single AP view of the chest. COMPARISON: Yesterday FINDINGS: Position of medical devices appears stable. Cardiomegaly is observed. The mediastinal contour appears within normal limits.  The central  pulmonary vasculature appears prominent and indistinct. The lungs appear  well expanded bilaterally.  Diffuse scattered hazy pulmonary parenchymal opacities are seen. Veil-like opacities are seen in bilateral lung bases, right greater than left. Left rib fractures are noted.     1.  Pulmonary edema and/or infiltrates are identified, which are stable since the prior exam. 2.  Bilateral pleural effusions, right greater than left 3.  Cardiomegaly     Dx-chest-portable (1 View)    Result Date: 6/8/2020 6/8/2020 4:18 AM HISTORY/REASON FOR EXAM: For indication of respiratory failure; For indication of respiratory failure TECHNIQUE/EXAM DESCRIPTION:  Single AP view of the chest. COMPARISON: Yesterday FINDINGS: Endotracheal tube has been exchanged for tracheostomy.   Otherwise medical device position is stable. Cardiomegaly is observed. The mediastinal contour appears within normal limits.  The central  pulmonary vasculature appears prominent and indistinct. The lungs appear well expanded bilaterally.  Diffuse scattered hazy pulmonary parenchymal opacities are seen. Veil-like opacities are seen in bilateral lung bases, right greater than left. Left rib fractures are noted.     1.  Pulmonary edema and/or infiltrates are identified, which are stable since the prior exam. 2.  Bilateral pleural effusions, right greater than left 3.  Cardiomegaly     Dx-chest-portable (1 View)    Result Date: 6/7/2020 6/7/2020 5:09 AM HISTORY/REASON FOR EXAM:  For indication of respiratory failure; For indication of respiratory failure TECHNIQUE/EXAM DESCRIPTION AND NUMBER OF VIEWS: Single portable view of the chest. COMPARISON: 6/6/2020 FINDINGS: Tubes and lines: ET tube, feeding tube and right central venous catheter are redemonstrated. Diffuse interstitial prominence. Patchy right base opacities. Moderate right lateral pleural effusion. No pneumothorax. Stable cardiopericardial silhouette.     1. No significant interval change.    Dx-chest-portable (1 View)    Result Date: 6/6/2020 6/6/2020 5:13 AM  HISTORY/REASON FOR EXAM:  For indication of respiratory failure; For indication of respiratory failure TECHNIQUE/EXAM DESCRIPTION AND NUMBER OF VIEWS: Single portable view of the chest. COMPARISON: 6/5/2020 FINDINGS: Tubes and lines: ET tube, feeding tube and right central venous catheter are redemonstrated. Diffuse interstitial prominence. Patchy right base opacities. Moderate right lateral pleural effusion. No pneumothorax. Stable cardiopericardial silhouette.     1. No significant interval change.    Dx-chest-portable (1 View)    Result Date: 6/5/2020 6/5/2020 12:50 PM HISTORY/REASON FOR EXAM:  Shortness of breath. TECHNIQUE/EXAM DESCRIPTION AND NUMBER OF VIEWS: Single portable view of the chest. COMPARISON: Exam at 5:26 AM FINDINGS: Single portable view of the chest demonstrates a normal cardiac silhouette and mediastinal contours. Right pleural effusion and adjacent airspace opacification are unchanged. Bilateral interstitial prominence is unchanged. No pneumothorax is identified. Lines of support are unchanged.     No significant interval change.    Dx-chest-portable (1 View)    Result Date: 6/5/2020 6/5/2020 5:16 AM HISTORY/REASON FOR EXAM: For indication of respiratory failure; For indication of respiratory failure TECHNIQUE/EXAM DESCRIPTION:  Single AP view of the chest. COMPARISON: Yesterday FINDINGS: Position of medical devices appears stable. Cardiomegaly is observed. The mediastinal contour appears within normal limits.  The central  pulmonary vasculature appears prominent and indistinct. The lungs appear well expanded bilaterally.  Diffuse scattered hazy pulmonary parenchymal opacities are seen. Veil-like opacities are seen in bilateral lung bases, right greater than left. Left rib fractures are noted.     1.  Pulmonary edema and/or infiltrates are identified, which are stable since the prior exam. 2.  Bilateral pleural effusions, right greater than left 3.  Cardiomegaly     Dx-chest-portable (1  View)    Result Date: 6/4/2020 6/4/2020 4:35 AM HISTORY/REASON FOR EXAM: For indication of respiratory failure; For indication of respiratory failure TECHNIQUE/EXAM DESCRIPTION:  Single AP view of the chest. COMPARISON: Yesterday FINDINGS: Position of medical devices appears stable. Cardiomegaly is observed. The mediastinal contour appears within normal limits.  The central  pulmonary vasculature appears prominent and indistinct. The lungs appear well expanded bilaterally.  Diffuse scattered hazy pulmonary parenchymal opacities are seen. Veil-like opacities are seen in bilateral lung bases, right greater than left. Left rib fractures are noted.     1.  Pulmonary edema and/or infiltrates are identified, which are stable since the prior exam. 2.  Bilateral pleural effusions, right greater than left 3.  Cardiomegaly     Dx-chest-portable (1 View)    Result Date: 6/3/2020    6/3/2020 4:48 AM HISTORY/REASON FOR EXAM: For indication of respiratory failure; For indication of respiratory failure TECHNIQUE/EXAM DESCRIPTION:  Single AP view of the chest. COMPARISON: Yesterday FINDINGS: Position of medical devices appears stable. Cardiomegaly is observed. The mediastinal contour appears within normal limits.  The central  pulmonary vasculature appears prominent and indistinct. The lungs appear well expanded bilaterally.  Diffuse scattered hazy pulmonary parenchymal opacities are seen. Veil-like opacities are seen in bilateral lung bases, right greater than left. Left rib fractures are noted.     1.  Pulmonary edema and/or infiltrates are identified, which are stable since the prior exam. 2.  Bilateral pleural effusions, right greater than left 3.  Cardiomegaly     Dx-chest-portable (1 View)    Result Date: 6/2/2020 6/2/2020 4:25 PM HISTORY/REASON FOR EXAM:  Follow-up right thoracentesis TECHNIQUE/EXAM DESCRIPTION AND NUMBER OF VIEWS: Single portable view of the chest. COMPARISON: 6/2/2020 FINDINGS: Scans of postoperative  changes seen in the thoracolumbar spine. ET tube and feeding tube are again seen as is a right PICC line. The mediastinal and cardiac silhouette is unremarkable. The pulmonary vascularity is within normal limits. There is peribronchial wall thickening and edema. There is right lower lobe airspace disease. There has been interval decrease in the right pleural effusion status post thoracentesis. There is no visible pneumothorax. There are no acute bony abnormalities.     1.  There is no pneumothorax following right thoracentesis. 2.  The amount of right pleural effusion is decreased. 3.  There is vascular congestion and/or edema. 4.  There is right lower lobe airspace disease which could be atelectasis or pneumonitis.    Dx-chest-portable (1 View)    Result Date: 6/2/2020 6/2/2020 4:24 AM HISTORY/REASON FOR EXAM: For indication of respiratory failure; For indication of respiratory failure TECHNIQUE/EXAM DESCRIPTION:  Single AP view of the chest. COMPARISON: None FINDINGS: Position of medical devices appears stable. Cardiomegaly is observed. The mediastinal contour appears within normal limits.  The central  pulmonary vasculature appears prominent and indistinct. The lungs appear well expanded bilaterally.  Diffuse scattered hazy pulmonary parenchymal opacities are seen. Veil-like opacities are seen in bilateral lung bases, right greater than left. Left rib fractures are noted.     1.  Pulmonary edema and/or infiltrates are identified, which are stable since the prior exam. 2.  Bilateral pleural effusions, right greater than left 3.  Cardiomegaly      Dx-chest-portable (1 View)    Result Date: 5/31/2020  HISTORY/REASON FOR EXAM: intubation. intubation TECHNIQUE/EXAM DESCRIPTION: Chest x-ray, one portable view, 5/30/2020 8:26 PM. COMPARISON: 5/20/2020 FINDINGS: The endotracheal tube terminates above the level the izaiah. NG tube passes caudal to the diaphragm. Right-sided PICC line remains in stable position. Large  right-sided pleural effusion is again noted and appear stable. Increasing areas of consolidation of developed involving the apices of both lungs, as well as the lateral aspect of the left lung.     Extensive bilateral lung opacities are now noted, which could be explained by severe bilateral pneumonia. Stable appearance of right pleural effusion. Supportive lines and tubes appear in appropriate positions.    Dx-chest-portable (1 View)    Result Date: 5/28/2020  HISTORY/REASON FOR EXAM: Shortness of Breath. TECHNIQUE/EXAM DESCRIPTION: Chest x-ray, one portable view, 5/28/2020 7:53 AM. COMPARISON: 5/27/2020 FINDINGS: Pulmonary venous congestion pattern has improved, however, prominent interstitial opacities persist, compatible with pulmonary edema. Right-sided pleural effusion is stable. PICC line has been placed and appears in excellent position.     Persistent right-sided pleural effusion.    Dx-chest-portable (1 View)    Result Date: 5/23/2020  HISTORY/REASON FOR EXAM:  HYPOTENSION. TECHNIQUE/EXAM DESCRIPTION AND NUMBER OF VIEWS: Chest x-ray (one view), 5/23/2020 12:06 AM. COMPARISON: 5/13/2020 FINDINGS: Large loculated right pleural effusion is identified. Ill-defined airspace opacities involving the left lung base. The cardiac and mediastinal silhouette are unremarkable. Postsurgical changes of the thoracic spine.     Large right pleural effusion, loculated Atelectasis versus developing pneumonia in the left lung 5/23/2020 8:48 AM     Ir-thoracentesis Puncture Right    Result Date: 5/13/2020  HISTORY/REASON FOR EXAM:  Recurrent pleural effusion. TECHNIQUE/EXAM DESCRIPTION: Ultrasound-guided thoracentesis, 5/13/2020 8:59 AM. COMPARISON: None. PROCEDURE: The risks, benefits, alternatives and the procedure were discussed with the patient.  The patient's questions were answered to their full satisfaction.  They subsequently agreed to proceed with the proposed procedure and signed a written consent form. A procedural  timeout was performed. Maximum sterile barrier protection mechanisms were employed. The patient's laboratory values, allergies and medication list were reviewed prior to the examination. The patient's thoracic cavity was sonographically evaluated.  An area suitable for thoracentesis was identified.  The overlying skin was prepped and draped in a sterile fashion.  1% lidocaine was infiltrated subcutaneously for local anesthesia. Following this, the thoracic cavity was entered utilizing a Safe-T-Centesis Catheter System.  A total of 900 cc of serosanguineous fluid was subsequently aspirated under sonographic control. The patient tolerated the procedure well and there were no immediate postprocedural complications. Following the procedure, the chest radiograph demonstrated no evidence of pneumothorax and decrease in size of the pleural effusion.     Unremarkable sonographic-guided thoracentesis.    Mr-brain-with & W/o    Result Date: 6/6/2020 6/6/2020 5:00 PM HISTORY/REASON FOR EXAM:  exposed right craniotomy hardware r/o infection. TECHNIQUE/EXAM DESCRIPTION:   MRI of the brain without and with contrast. T1 sagittal, T2 fast spin-echo axial, T1 coronal, FLAIR coronal, diffusion-weighted and apparent diffusion coefficient (ADC map) axial images were obtained of the whole brain. T1 postcontrast axial and T1 postcontrast coronal images were obtained. The study was performed on a Travelatus Signa 1.5 Deborah MRI scanner. 15 mL ProHance contrast was administered intravenously. COMPARISON:  None. FINDINGS:  There is abnormal contrast enhancement and edema noted involving right frontal craniotomy flap. There is abnormal enhancing extradural tissue noted adjacent to the right frontal bone with abnormal adjacent dural enhancement. There is no evidence of leptomeningeal enhancement. There is no parenchymal contrast enhancement. There is no acute infarct. There is no acute or chronic parenchymal hemorrhage. There is severe cerebral  volume loss. There is mild cerebellar volume loss. There is no intra-axial space-occupying lesion. The midline structures including the pituitary, hypothalamic and pineal regions are unremarkable. The posterior fossa structures are unremarkable. The visualized flow voids of the cerebral vasculature are unremarkable. There is no large lesion identified in the expected course of the intracranial portions of the cranial nerves. There is mucosal thickening in the bilateral mastoid air cells.     1.  There is abnormal contrast enhancement and edema noted involving right frontal craniotomy flap. There is abnormal enhancing extradural tissue noted adjacent to the right frontal bone with abnormal adjacent dural enhancement. 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 There is no evidence of leptomeningeal enhancement. There is no parenchymal contrast enhancement. 2.  Severe cerebral volume loss. 3.  Mild cerebral volume loss.    Us-extremity Artery Upper Bilat    Result Date: 2020   Vascular Laboratory  Conclusions  No hemodynamically significant evidence of arterial disease in the right  upper extremity.  No hemodynamically significant evidence of arterial disease in the left  upper extremity.  LARISSA DURBIN  Exam Date:     2020 09:04  Room #:     Inpatient  Priority:     Routine  Ht (in):             Wt (lb):  Ordering Physician:        JONY HANSEN JR  Referring Physician:       [id number] JR, RICHARD  Sonographer:               Erlinda Pham RVT  Study Type:                Complete Bilateral  Technical Quality:         Adequate  Age:    54    Gender:     M  MRN:    [id number]  :    1965      BSA:  Indications:     Cyanosis  CPT Codes:       88971  ICD Codes:       782.5  History:         Bilateral cyanosis  Limitations:     Challenging due to patient movement.          RIGHT                                                  LEFT  Waveforms               PSV                              PSV        Waveforms                          (cm/s)                           (cm/s)  Triphasic                77.00       Subclavian          69.00      Triphasic  Bi, non-                 84.00       Axillary            54.00      Bi, non-  reversed                                                            reversed                                       Brachial  Bi, non-                 100.00      Proximal            63.00      Triphasic  reversed                                       Mid  Triphasic                72.00       Distal              38.00      Bi, non-                                                                      reversed                                       Radial  Bi, non-                 77.00       Proximal            61.10      Bi, non-  reversed                                                            reversed                                       Mid  Bi, non-                 47.00       Distal              17.40      Bi, non-  reversed                                                            reversed                                       Ulnar  Bi, non-                 77.00       Proximal            71.30      Bi, non-  reversed                                                            reversed                                       Mid  Bi, non-                 105.00      Distal              63.10      Bi, non-  reversed                                                            reversed  Findings  Right  No hemodynamically significant evidence of arterial disease in the right  upper extremity.  Multiphasic Doppler flow pattern is noted throughout the right upper  extremity.  Left  No hemodynamically significant evidence of arterial disease in the left  upper extremity.  Multiphasic Doppler flow pattern is noted throughout the left upper  extremity.  The distal radial artery  is small with low velocities, but patent.  Hailecheri MARTINEZ To  (Electronically Signed)  Final Date:      06 June 2020                   11:57    Us-thoracentesis Puncture Right    Result Date: 6/3/2020  6/2/2020 1:54 PM HISTORY/REASON FOR EXAM:  Shortness of breath TECHNIQUE/EXAM DESCRIPTION: Ultrasound-guided thoracentesis. Indication:  RIGHT pleural fluid collection. COMPARISON:  Plain film from the same day PROCEDURE:     Informed consent was obtained over the phone from the patient's family. A timeout was taken. A right pleural effusion was localized with real-time ultrasound guidance. The right posterior chest wall was prepped and draped in a sterile manner. Following local anesthesia with 1% lidocaine, a 5 Guine[place] Yueh pigtail catheter was advanced into the pleural space with trocar technique and pleural fluid was drained. The patient tolerated the procedure well without evidence of complication. A post thoracentesis chest radiograph is forthcoming. FINDINGS: Fluid was sent to the laboratory. Fluid character: serosanguinous     1. Ultrasound guided right sided diagnostic and therapeutic thoracentesis. 2. 800 mL of fluid withdrawn.    Ec-echocardiogram Complete W/o Cont    Result Date: 6/2/2020  Transthoracic Echo Report Echocardiography Laboratory CONCLUSIONS No prior study is available for comparison. Left ventricular ejection fraction is visually estimated to be 65%. Normal left ventricular systolic function. Mild concentric left ventricular hypertrophy. Grade II diastolic dysfunction. Moderately dilated right ventricle. Mildly dilated left atrium. Mild mitral regurgitation. Estimated right ventricular systolic pressure is 55 mmHg. Trace tricuspid regurgitation. Normal pericardium without effusion. LARISSA DURBIN Exam Date:        LV EF:  65    % FINDINGS Left Ventricle Normal left ventricular chamber size. Mild concentric left ventricular hypertrophy. Normal left ventricular systolic function. Left  ventricular ejection fraction is visually estimated to be 65%. Normal diastolic function. Grade II diastolic dysfunction. Right Ventricle Moderately dilated right ventricle. Right Atrium The right atrium is normal in size. Left Atrium Mildly dilated left atrium. Left atrial volume index is 38 mL/sq m. Mitral Valve Structurally normal mitral valve without significant stenosis. Mild mitral regurgitation. Aortic Valve Structurally normal aortic valve without significant stenosis or regurgitation. Tricuspid Valve Structurally normal tricuspid valve without significant stenosis. Trace tricuspid regurgitation. Estimated right ventricular systolic pressure is 55 mmHg. Right atrial pressure is estimated to be 3 mmHg. Pulmonic Valve Structurally normal pulmonic valve without significant stenosis or regurgitation. Pericardium Normal pericardium without effusion. Aorta The aortic root is normal.  Ascending aorta diameter is 3.0 cm. Pavel Franco M.D. (Electronically Signed) Final Date:     02 June 2020                 15:10    Dx-abdomen For Tube Placement    Result Date: 6/2/2020 6/2/2020 1:06 PM HISTORY/REASON FOR EXAM:  Line evaluation. TECHNIQUE/EXAM DESCRIPTION AND NUMBER OF VIEWS:  1 view(s) of the abdomen. COMPARISON:  None. FINDINGS: Enteric tube has been placed. The tip projects over the left upper quadrant. The bowel gas pattern is within normal limits. There is postoperative change in the thoracolumbar spine.     1.  Feeding tube tip projects over the gastric body.    Ir-picc Line Placement W/guidance < Age 3    Result Date: 5/27/2020  HISTORY/REASON FOR EXAM:  IR picc placement. TECHNIQUE/EXAM DESCRIPTION: ULTRASOUND AND FLUOROSCOPIC-GUIDED PICC LINE INSERTION, 5/27/2020 10:36 AM.. TECHNIQUE:  The procedure was explained to the patient.  The patient was placed supine on the fluoroscopy table.  Ultrasound examination of the right arm was performed to confirm patency of the basilic vein.  Using all elements of  Maximum Sterile Barrier  technique and local anesthesia with ultrasound guidance, the above mentioned examined vein was punctured through a small skin incision.  Continuous real time ultrasound monitoring of the puncture needle entry into the vein was performed and documented. Under fluoroscopic guidance, a 0.018 wire was passed centrally through the needle.  A peel-away sheath was placed over the wire.  A 5 Fijian double lumen Power PICC line was advanced until the tip was at the SVC-right atrium junction.  The position of the catheter tip was confirmed with fluoroscopy.  The sheath was removed, and the line was secured to the patient's skin.  There were no immediate complications, and the patient tolerated the procedure well. FINDINGS: The tip of the PICC line is at the SVC-right atruim junction . 1 saved images. Fluoroscopic time was 3.2 minutes     Successful placement of a PICC line.  Ready to infuse.,      Micro:  Results     Procedure Component Value Units Date/Time    AFB Culture [[id number]] Collected:  06/10/20 1415    Order Status:  Completed Specimen:  Tissue Updated:  06/13/20 1115     Significant Indicator NEG     Source TISS     Site Cranial Plate     Culture Result Culture in progress.     AFB Smear Results No acid fast bacilli seen.    Narrative:       CALL  [facility]ido  19 tel. [phone],  CALLED  19 tel. [phone] 06/11/2020, 12:21, RB PERF. RESULTS CALLED TO:[phone]  Surgery Specimen    CULTURE TISSUE W/ GRM STAIN [[id number]]  (Abnormal)  (Susceptibility) Collected:  06/10/20 1415    Order Status:  Completed Specimen:  Tissue Updated:  06/13/20 1115     Significant Indicator POS     Source TISS     Site Cranial Plate     Culture Result -     Gram Stain Result Rare WBCs.  Moderate Gram positive cocci.       Culture Result Staphylococcus epidermidis  Moderate growth      Narrative:       CALL  P[facility]  19 tel. [phone],  CALLED  19 tel. [phone] 06/11/2020, 12:21, RB PERF. RESULTS CALLED TO:[phone]  Surgery  Specimen    Susceptibility     Staphylococcus epidermidis (1)     Antibiotic Interpretation Microscan Method Status    Azithromycin Resistant >4 mcg/mL IZABELA Preliminary    Clindamycin Resistant >4 mcg/mL IZABELA Preliminary    Cefazolin Resistant <=8 mcg/mL IZABELA Preliminary    Daptomycin Sensitive <=1 mcg/mL IZABELA Preliminary    Ampicillin/sulbactam Resistant <=8/4 mcg/mL IZABELA Preliminary    Erythromycin Resistant >4 mcg/mL IZABELA Preliminary    Vancomycin Sensitive 1 mcg/mL IZABELA Preliminary    Oxacillin Resistant >2 mcg/mL IZABELA Preliminary    Penicillin Resistant 8 mcg/mL IZABELA Preliminary    Trimeth/Sulfa Resistant >2/38 mcg/mL IZABELA Preliminary    Tetracycline Sensitive <=4 mcg/mL IZABELA Preliminary                   Anaerobic Culture [[id number]] Collected:  06/10/20 1415    Order Status:  Completed Specimen:  Tissue Updated:  06/13/20 1115     Significant Indicator NEG     Source TISS     Site Cranial Plate     Culture Result Culture in progress.    Narrative:       CALL  [facility]  19 tel. [phone],  CALLED  19 tel. [phone] 06/11/2020, 12:21, RB PERF. RESULTS CALLED TO:[phone]  Surgery Specimen    FLUID CULTURE W/GRAM STAIN [[id number]] Collected:  06/11/20 1600    Order Status:  Completed Specimen:  Body Fluid Updated:  06/12/20 1908     Significant Indicator NEG     Source BF     Site Pleural Fluid     Culture Result No growth at 24 hours.     Gram Stain Result Rare WBCs.  No organisms seen.      Narrative:       Surgery Specimen    Anaerobic Culture [[id number]] Collected:  06/11/20 1600    Order Status:  Completed Specimen:  Body Fluid Updated:  06/12/20 1908     Significant Indicator NEG     Source BF     Site Pleural Fluid     Culture Result Culture in progress.    Narrative:       Surgery Specimen    GRAM STAIN [[id number]] Collected:  06/11/20 1600    Order Status:  Completed Specimen:  Body Fluid Updated:  06/11/20 2221     Significant Indicator .     Source BF     Site Pleural Fluid     Gram Stain Result Rare WBCs.  No  organisms seen.      Narrative:       Surgery Specimen    GRAM STAIN [[id number]] Collected:  06/10/20 1415    Order Status:  Completed Specimen:  Tissue Updated:  06/11/20 1623     Significant Indicator .     Source TISS     Site Cranial Plate     Gram Stain Result Rare WBCs.  Moderate Gram positive cocci.      Narrative:       CALL  P[facility]  19 tel. [phone],  CALLED  19 tel. [phone] 06/11/2020, 12:21, RB PERF. RESULTS CALLED TO:[phone]  Surgery Specimen    Acid Fast Stain [[id number]] Collected:  06/10/20 1415    Order Status:  Completed Specimen:  Tissue Updated:  06/11/20 1623     Significant Indicator NEG     Source TISS     Site Cranial Plate     AFB Smear Results No acid fast bacilli seen.    Narrative:       CALL  [facility]  19 tel. [phone],  CALLED  19 tel. [phone] 06/11/2020, 12:21, RB PERF. RESULTS CALLED TO:[phone]  Surgery Specimen    FLUID CULTURE W/GRAM STAIN [[id number]] Collected:  06/08/20 1334    Order Status:  Completed Specimen:  Body Fluid from Thoracentesis Fluid Updated:  06/11/20 0730     Significant Indicator NEG     Source BF     Site Pleural Fluid     Culture Result No growth at 72 hours.     Gram Stain Result No organisms seen.    Narrative:       Collected By:[id number] MATOS.  Collected By:[id number] MATOS.    AFB CULTURE [[id number]] Collected:  06/08/20 1334    Order Status:  Completed Specimen:  Body Fluid from Thoracentesis Fluid Updated:  06/11/20 0730     Significant Indicator NEG     Source BF     Site Pleural Fluid     Culture Result Culture in progress.     AFB Smear Results No acid fast bacilli seen.    Narrative:       Collected By:[id number] MATOS.  Collected By:[id number] JAIMIE MATOS.    FUNGAL CULTURE [[id number]] Collected:  06/08/20 1334    Order Status:  Completed Specimen:  Body Fluid from Thoracentesis Fluid Updated:  06/11/20 0730     Significant Indicator NEG     Source BF     Site Pleural Fluid     Culture Result No fungal growth to date.     Narrative:       Collected By:[id number] JAIMIE MATOS.  Collected By:[id number] JAIMIE VUONG    Fungal Culture [[id number]] Collected:  06/10/20 1415    Order Status:  Completed Specimen:  Other Updated:  06/10/20 1641    FLUID CULTURE W/GRAM STAIN [[id number]] Collected:  06/02/20 1440    Order Status:  Completed Specimen:  Body Fluid Updated:  06/10/20 1016     Significant Indicator NEG     Source BF     Site Thoracentesis Fluid     Culture Result No growth at 5 days.     Gram Stain Result No organisms seen.    Fungal Culture [[id number]] Collected:  06/02/20 1440    Order Status:  Completed Specimen:  Body Fluid Updated:  06/10/20 1016     Significant Indicator NEG     Source BF     Site Thoracentesis Fluid     Culture Result No fungal growth to date.    AFB Culture [[id number]] Collected:  06/02/20 1440    Order Status:  Completed Specimen:  Body Fluid Updated:  06/10/20 1016     Significant Indicator NEG     Source BF     Site Thoracentesis Fluid     Culture Result Culture in progress.     AFB Smear Results No acid fast bacilli seen.    SARS-CoV-2, PCR (In-House) [[id number]] Collected:  06/10/20 0646    Order Status:  Completed Updated:  06/10/20 0722     SARS-CoV-2 Source NP Swab     SARS-CoV-2 by PCR NotDetected    Narrative:       Collected By:[id number] CARLA RIDDLE  Rapid test for Pre Op surgery today  Is this test for diagnosis or screening?->Screen    COVID/SARS CoV-2 [[id number]] Collected:  06/10/20 0646    Order Status:  Completed Specimen:  Respirate from Nasal Updated:  06/10/20 0651     COVID Order Status Received    Narrative:       Collected By:[id number] CARLA RIDDLE  Rapid test for Pre Op surgery today  Is this test for diagnosis or screening?->Screen    GRAM STAIN [[id number]] Collected:  06/08/20 1334    Order Status:  Completed Specimen:  Body Fluid Updated:  06/09/20 2214     Significant Indicator .     Source BF     Site Pleural Fluid     Gram Stain Result No organisms seen.    Narrative:        "Collected By:[id number] JAIMIE MATOS.  Collected By:[id number] JAIMIE VUONG    Acid Fast Stain [[id number]] Collected:  06/08/20 1334    Order Status:  Completed Specimen:  Body Fluid Updated:  06/09/20 2214     Significant Indicator NEG     Source BF     Site Pleural Fluid     AFB Smear Results No acid fast bacilli seen.    Narrative:       Collected By:[id number] JAIMIE VUONG  Collected By:[id number] JAIMIE VUONG    BLOOD CULTURE [[id number]] Collected:  06/08/20 1825    Order Status:  Completed Specimen:  Blood from Peripheral Updated:  06/09/20 0740     Significant Indicator NEG     Source BLD     Site PERIPHERAL     Culture Result No Growth  Note: Blood cultures are incubated for 5 days and  are monitored continuously.Positive blood cultures  are called to the RN and reported as soon as  they are identified.      Narrative:       Collected By:[id number] TAVARES IBARRA  Per Hospital Policy: Only change Specimen Src: to \"Line\" if  specified by physician order.  Right Forearm/Arm    BLOOD CULTURE [[id number]] Collected:  06/08/20 1830    Order Status:  Completed Specimen:  Blood from Peripheral Updated:  06/09/20 0740     Significant Indicator NEG     Source BLD     Site PERIPHERAL     Culture Result No Growth  Note: Blood cultures are incubated for 5 days and  are monitored continuously.Positive blood cultures  are called to the RN and reported as soon as  they are identified.      Narrative:       Collected By:[id number] TAVARES IBARRA  Per Hospital Policy: Only change Specimen Src: to \"Line\" if  specified by physician order.  Left Forearm/Arm    FLUID CULTURE W/GRAM STAIN [[id number]] Collected:  06/08/20 1334    Order Status:  Sent Specimen:  Body Fluid from Thoracentesis Fluid           Assessment:  Active Hospital Problems    Diagnosis   • PEA (Pulseless electrical activity) ([facility]) [I46.9]   • Shock ([facility]) [R57.9]   • Anemia [D64.9]   • Seizure disorder ([facility]) [G40.909]   • DAMIAN (acute kidney injury) ([facility]) [N17.9]   • " Epidural phlegmon [G06.2]   • Osteomyelitis of skull ([facility]) [M86.9]   • Goals of care, counseling/discussion [Z71.89]   • Acute respiratory failure with hypoxia ([facility]CC) [J96.01]   • Recurrent right pleural effusion [J90]   • Pneumonia [J18.9]   • Ileus (HCC) [K56.7]   • Hypokalemia [E87.6]      ASSESSMENT/PLAN:   Ventilator dependent respiratory failure  Recurrent loculated pleural effusion  Hospital-acquired pneumonia-prior course Zosyn and vancomycin  Afebrile  Persistent leukocytosis  Pressors weaned  S/p thoracentesis 6/2 and 6/8-cxs neg WBC and LDH less than 150 but ph 7  S/p Right thoracotomy/decort 6/11-pleural fluid rare WBC, org. 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   h/o bilateral craniotomy with subdural evacuation in 10/2016 due to SDH  MRI on 6/6+ enhancement and edema involving the right frontal craniotomy flap as well as extradural tissue adjacent to the right frontal bone concerning for craniotomy flap osteomyelitis with adjacent epidural phlegmon  S/p OR -removal plates-not able to close due to poor skin integrity. VAC placed  Cxs +CNS ([facility])  Abx as above-plan to switch zyvox to dapto once PNA treated    Abd distension  Imaging c/w SBO or ileus    DAMIAN, improved  Avoid nephrotoxic agents    Encephalopathy  Seizure disorder  On Keppra    Hepatitis C with cirrhosis per chart  Mult VL undetectable from [id number]  FU GI if indicated after discharge     Prognosis guarded  Appreciate Palliative Care eval     I have performed a physical exam and reviewed and updated ROS as of today.  In review of yesterday's note dated  6/12/2020, there are no changes except as documented above.

## 2020-06-13 NOTE — WOUND TEAM
[facility]  Inpatient Services   Wound and Skin Care Progress Note    Admission Date: 5/31/2020     HPI, PMH, SH: Reviewed    Unit where seen by Wound Team: [id number]     WOUND CONSULT RELATED TO:  VAC dressing change    Subjective: restless, eyes open, no response    Self Report / Pain Level:  Constant moving    OBJECTIVE:     WOUND TYPE, LOCATION, CHARACTERISTICS (Pressure Injuries: location, stage, POA or date identified)      Negative Pressure Wound Therapy Surgical (Active)   NPWT Pump Mode / Pressure Setting 125 mmHg;Continuous 06/13/20 1600   Dressing Type White Foam;Black Foam (Regular) 06/13/20 1600   Number of Foam Pieces Used 3 06/13/20 1600   Canister Changed No 06/13/20 1600     Wound 06/10/20 Full Thickness Wound Head Superior Right 1 incision, 1 open wound with incision medial and laterally (Active)   Wound Image    06/11/20   Site Assessment Yellow;White    Periwound Assessment Intact    Margins Defined edges    Closure Staples    Drainage Amount None    Treatments Cleansed    Wound Cleansing Normal Saline Irrigation    Periwound Protectant Benzoin    Dressing Options Wound Vac    Dressing Changed Changed    Dressing Change/Treatment Frequency OR 6/15/20    Non-staged Wound Description Full thickness    Wound Length (cm) 4.9 cm    Wound Width (cm) 3 cm    Wound Depth (cm) 1.1 cm    Wound Surface Area (cm^2) 14.7 cm^2    Wound Volume (cm^3) 16.17 cm^3    Wound Bed Granulation (%) 0 %    Wound Odor None    Exposed Structures Bone          Lab Values:    Lab Results   Component Value Date/Time    WBC 18.1 (H) 06/13/2020 05:05 AM    RBC 3.20 (L) 06/13/2020 05:05 AM    HEMOGLOBIN 10.2 (L) 06/13/2020 05:05 AM    HEMATOCRIT 31.8 (L) 06/13/2020 05:05 AM        Results from last 7 days   Lab Units 06/08/20  0433   C REACTIVE PROTEIN 4596 mg/dL 9.31*             Lab Results   Component Value Date/Time    HBA1C 4.4 08/06/2019 12:32 PM           Culture:   Obtained NA new clean surgical wounds,  Results show NA    INTERVENTIONS BY WOUND TEAM:  Removed previous dressing and attempted to remove all the excess paste ring that was placed along the periwound. Used stoma powder on the paste ring material to help remove it. cleansed with wound cleanser and NS flush. Benzoin to periwound skin. Placed one white foam over open area with exposed bone, then ran a strip of black foam along the stapled areas side to side of the open wound, and a black round piece of foam for the TRAC pad. Sealed with drape, and resumed VAC at 125 mmHg continuous.  Placed strip of adaptic over posterior incision staples, then drape, in order to maintain VAC seal to forward open wound and incision.    Interdisciplinary consultation: RN    EVALUATION: patient to return to OR day after tomorrow for bone debridement and Integra placement. Wound VAC not needed on the posterior most incision staples, but since it is close enough to VAC site, placing drape helps to maintain seal. Open wound is dry. Granulation is not an expectation at this point.    Goals: Steady decrease in wound area and depth weekly.    NURSING PLAN OF CARE ORDERS (X):  Dressing changes: See Dressing Care orders: X  Skin care: See Skin Care orders: X  Rectal tube care: See Rectal Tube Care orders:        Other orders:                             WOUND TEAM PLAN OF CARE (X):   Dressing changes by wound team:         Follow up 1-2 times weekly:               Follow up 3 times weekly:                NPWT change 3 times weekly:     Follow up as needed:   X    Other (explain):     Anticipated discharge plans (X):  LTACH:        SNF/Rehab:                  Home Care:           Outpatient Wound Center:            Self Care:            Other:   TBD

## 2020-06-14 NOTE — PROGRESS NOTES
2RN skin check with Lora, RN    Posterior head pink but blanching  Wound vac on right side of the head: clean, dry and intact  Bilateral elbows red but blanching  Right buttock with open wound: picture taken and wound consult placed  Coccyx slow but blanching

## 2020-06-14 NOTE — PROGRESS NOTES
2 RN skin assessment completed with Ingris ORDONEZ.     Surgical incision on head with wound vac in place with mepilex over top, wound vac functioning.  Generalized flaky, loose skin and bruising.  R sided chest tubes with surgical incision, gauze and hypafix tape dressing in place, C/D/I.  Sacrum and buttocks pink and blanching, BMS in place. Mepilex in place     Skin breakdown methods in place

## 2020-06-14 NOTE — PROGRESS NOTES
Infectious Disease Progress Note    Author: Neva Gonzalez M.D. 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 male admitted 2020 who initially presented to [facility] on  complaining of urinary retention and hypotension.  At [place], on  he required intubation due to seizure activity.  Eventually extubated but developed aspiration pneumonia and increasing encephalopathy. He had an episode of unresponsiveness with bradycardia and a code was called.  Patient received 3 rounds of CPR and 2 episodes of epi before ROSC was achieved.  He was intubated and transferred to [facility] for higher level of care. In ICU since 6/10  6/9  TMax 101.7, improved today , Vent 8/40%, thoracentesis on .  Pt alert and following commands.     Temp 99.7 WBC 15.4  trached-not following commands. Hypotensive and tachy FiO2 0.4 Plan for OR today noted   AF WBC 16.3 s/p thoracotomy -trached, sedated. Possible need for decortication on left as well noted   AF (100)  WBC 18.1 no clinical change. ABd film done for distension   AF WBC 18.1-unchanged from yesterday. Somnolent VAC to scalp in place. Trached-vent on standby, mild tachypnea    Review of Systems:  Review of Systems   Unable to perform ROS: Intubated   Constitutional: Negative for fever.       Hemodynamics:  No data recorded.  Monitored Temp: 36.4 °C (97.5 °F)  Pulse  Av.5  Min: 42  Max: 113   Blood Pressure: 146/90       Physical Exam:  Physical Exam  Vitals signs and nursing note reviewed.   Constitutional:       General: He is not in acute distress.     Appearance: He is ill-appearing. He is not toxic-appearing or diaphoretic.      Comments: chronically ill   HENT:      Head:      Comments: VAC to scalp     Nose: No congestion or rhinorrhea.      Mouth/Throat:     Pharynx: No oropharyngeal exudate.   Eyes:      General:         Right eye: No discharge.         Left eye: No discharge.   Neck:      Comments: trach  Cardiovascular:      Rate and Rhythm: Normal rate.   Pulmonary:      Effort: Pulmonary effort is normal. No respiratory distress.      Breath sounds: No stridor. No wheezing.      Comments: 2 CT right  Decreased BS  Abdominal:      General: Bowel sounds are normal. There is distension.      Tenderness: There is no abdominal tenderness. There is no guarding or rebound.      Comments: protuberant   Musculoskeletal:      Right lower leg: Edema present.      Left lower leg: Edema present.   Lymphadenopathy:      Cervical: No cervical adenopathy.   Skin:     Coloration: Skin is not jaundiced.      Findings: Bruising present.   Neurological:      Comments: sedated   Psychiatric:      Comments: Unable to assess         Meds:    Current Facility-Administered Medications:   •  D5W  •  potassium chloride (KCL-CENTRAL) IV *Administer in ICU only*  •  metoclopramide  •  Naloxone  •  phenytoin  •  cefepime  •  metroNIDAZOLE  •  MD Alert...Adult ICU Electrolyte Replacement per Pharmacy  •  midodrine  •  levothyroxine  •  oxyCODONE immediate-release  •  linezolid  •  fentaNYL  •  levETIRAcetam  •  Pharmacy  •  omeprazole  •  thiamine  •  multivitamin  •  LORazepam  •  senna-docusate **AND** polyethylene glycol/lytes **AND** magnesium hydroxide **AND** bisacodyl  •  acetaminophen  •  labetalol  •  Respiratory Therapy Consult  •  ipratropium-albuterol  •  lidocaine    Labs:  Recent Labs     06/12/20  0420 06/13/20  0505 06/14/20  0235   WBC 16.3* 18.1* 18.1*   RBC 3.18* 3.20* 3.02*   HEMOGLOBIN 10.0* 10.2* 9.6*   HEMATOCRIT 31.7* 31.8* 30.1*   MCV 99.7* 99.4* 99.7*   MCH 31.4 31.9 31.8   RDW 65.5* 63.5* 60.7*   PLATELETCT 316 374 314   MPV 9.9 9.8 9.8   NEUTSPOLYS 80.00* 79.50* 80.40*   LYMPHOCYTES 9.60* 9.30* 9.40*   MONOCYTES 5.40 5.90 6.00   EOSINOPHILS 3.90 3.80 2.70   BASOPHILS  0.40 0.60 0.60   RBCMORPHOLO Present  --   --      Recent Labs     06/12/20  0420 06/13/20  0505 06/14/20  0235   SODIUM 150* 144 150*   POTASSIUM 3.7 3.6 2.7*   CHLORIDE 113* 106 105   CO2 25 29 29   GLUCOSE 140* 142* 109*   BUN 31* 33* 33*     Recent Labs     06/12/20  0420 06/13/20  0505 06/14/20  0235   CREATININE 1.14 1.14 1.35       Imaging:  Ct-chest (thorax) W/o    Result Date: 6/9/2020 6/9/2020 3:48 PM HISTORY/REASON FOR EXAM:  Pleural effusion. TECHNIQUE/EXAM DESCRIPTION: CT scan of the chest without contrast. Noncontrast helical scanning of the chest was obtained from the lung apices through the adrenal glands. Low dose optimization technique was utilized for this CT exam including automated exposure control and adjustment of the mA and/or kV according to patient size. COMPARISON: Plain film from the same day. FINDINGS: Evaluation of parenchymal and vascular structures is limited by the lack of intravenous contrast. Atherosclerotic plaque is seen in the aorta and coronary arteries. There is trace pericardial fluid. There is a small right pleural effusion which is loculated. There is likely pleural thickening. There is a small loculated left pleural effusion. Tracheostomy tube is noted. Enteric tube projects over the stomach. Main pulmonary artery measures 3.9 cm in diameter. Precarinal lymph node measures 1.3 cm in short axis dimension. Evaluation of the shirley is limited by the lack of intravenous contrast and by airspace opacities adjacent to the right hilum. 9 mm para-aortic lymph node is seen. There are small axillary lymph nodes bilaterally. There is septal thickening and groundglass opacities bilaterally. There is subsegmental lingula and left lower lobe atelectasis. There is atelectasis/contusion overlying the right pleural effusion. Limited views were obtained of the upper abdomen. There is trace free fluid in the upper abdomen. There is gynecomastia. Degenerative changes are seen in the spine.  Postsurgical changes are seen in the thoracic and lumbar spine. There are remote bilateral rib fractures. There are healing left-sided rib fractures. There are acute fractures of the lateral right fifth, sixth ribs and the left anterior fourth and lateral fourth, fifth, sixth ribs. There are multiple compression fractures in the thoracic and lumbar spine. There is an old posttraumatic deformity of the sternum. Bones are demineralized.     Small loculated right pleural effusion with overlying atelectasis/consolidation. Mild pleural thickening may be infectious/inflammatory but malignancy is not excluded. Small loculated left pleural effusion with subsegmental lingula and left lower lobe atelectasis. Interlobular septal thickening and groundglass opacities compatible with edema. Infection not excluded. Cardiomegaly. Enlarged mediastinal lymph nodes may be reactive but malignancy is not excluded. Atherosclerotic plaque including coronary artery calcification. Prominence of the main pulmonary artery can be seen in pulmonary arterial hypertension. Trace amount of ascites. Bilateral rib fractures. Multiple fractures in the thoracic spine. Demineralization.     Ct-chest (thorax) With    Result Date: 5/31/2020  HISTORY/REASON FOR EXAM: Respiratory illness, acute (Age > 40y); PE suspected, intermediate prob, positive D-dimer; post ROSC. TECHNIQUE/EXAM DESCRIPTION: CT scan of the chest with contrast, 5/30/2020 10:33 PM. This examination was conducted with Automated Exposure Control and Automated Adjustment of Tube Current based on patient size. Following the administration of IV contrast, helical imaging is performed from the thoracic inlet to the dome of the diaphragm. COMPARISON: 5/23/2020 TOTAL DLP: 'Nam.... mGy*cm FINDINGS: Vasculature: Heart:  The heart is mildly enlarged. Mediastinal lymphadenopathy is again noted. Pretracheal lymph nodes measure 26 x 10 mm in diameter, which is increased from previous measurement of 22  mm in diameter seen on 5/23/2020. Extensive subcarinal lymphadenopathy is again noted. Prominent calcifications are again noted involving the coronary arteries. Lungs:  Extensive reticular and groundglass airspace densities have developed throughout all lobes of both lungs in the interval from the previous study. Additionally, there is a moderate right pleural effusion now present, which may be loculated anteriorly. There are small loculated collections of fluid noted involving the left pleural space. Additionally, there are prominent areas of consolidation noted involving both lungs, including near complete consolidation of the right lower lobe.     Extensive bilateral airspace opacities of developed in the interval from the previous study, including large areas of consolidation involving the right and left lower lobes. The findings suggest severe bilateral pneumonia. The distribution is uncharacteristic for Covid pneumonia, although the exam should not be considered a rule out for Covid pneumonia. There is no evidence of pulmonary embolus.  Ct-chest (thorax) With    Result Date: 5/23/2020  Chest x-ray conducted 5/23/2020 FINDINGS: Moderate loculated right pleural effusion Trace left pleural effusion LUNGS: Focal airspace opacities/consolidation involving the right lower lobe and right middle lobe Scattered groundglass opacities are present throughout the left upper lobe Interlobular septal thickening is identified. Scar versus atelectasis in the left lower lobe. HEART: Normal in size. Coronary artery calcifications AORTA: Normal in caliber. MEDIASTINUM: Mediastinal adenopathy. The largest lymph node measures 2.2 cm Views of the upper abdomen show no acute abnormality. Degenerative and postsurgical changes of the spine with multiple compression deformities Subacute/old ununited sternal fracture     Moderate size loculated right pleural effusion Right middle lobe and right lower lobe airspace opacities/consolidation  concerning for pneumonia Scattered groundglass opacity left upper lobe with interlobular septal thickening. May be secondary to pulmonary edema.  Ct-head W/o    Result Date: 6/1/2020 6/1/2020 12:42 AM HISTORY/REASON FOR EXAM: Altered mental status TECHNIQUE/EXAM DESCRIPTION:  CT of the head without contrast. Sequential axial images were obtained from the vertex to the skull base without contrast. Up to date radiation dose reduction adjustments have been utilized to meet ALARA standards for radiation dose reduction. COMPARISON: October 12, 2016 FINDINGS: There is mild diffuse parenchymal volume loss observed. Periventricular and subcortical white matter low-attenuation changes are seen, most commonly associated with small vessel ischemic disease. Moderate bilateral ventricular dilatation is seen, with radiographic appearance favoring ex vacuo dilatation. No space occupying lesions or areas of acute vascular territory infarctions are identified. There are no abnormal extra axial fluid collections or extra axial hemorrhage identified. The visualized paranasal sinuses and mastoid air cells are well aerated bilaterally. No depressed calvarial fractures are identified. The visualized globes and retrobulbar soft tissues appear within normal limits.  Atherosclerotic intracranial calcifications are seen.     1.  No acute intracranial abnormality is identified, there are nonspecific white matter changes, commonly associated with small vessel ischemic disease.  Associated mild cerebral atrophy is noted. 2.  Atherosclerosis.    Ct-head W/o    Result Date: 5/25/2020  HISTORY/REASON FOR EXAM:  Seizure, abnormal neuro exam; hx craniotomy for subdural, seizure. TECHNIQUE/EXAM DESCRIPTION: CT scan of the brain without contrast. 5/24/2020 9:36 PM. CT scan of the brain was carried out without contrast in the normal manner. Both automated exposure control and Automated adjustment of  FINDINGS: There is no evidence of mass, mass  effect, hemorrhage, or extraaxial fluid collection.  There is no midline shift. Global atrophy. Postsurgical changes of bilateral craniotomies Subcutaneous air surrounds the right muscles of mastication. There is no fracture or other acute bony abnormality seen. Visualized paranasal sinuses: Clear.     No acute intracranial process Global atrophy Subcutaneous air surrounding the right muscles of mastication. Unclear etiology.  [id number]-2 View    [id number] View    Result Date: 5/25/2020  HISTORY/REASON FOR EXAM:  Distention. TECHNIQUE/EXAM DESCRIPTION AND NUMBER OF VIEWS: Abdomen (one view), 5/24/2020 11:58 PM. COMPARISON: 10/8/2019 FINDINGS: Dilated air-filled loops of small bowel. No free air. No abnormal soft tissue masses or calcifications Partially visualized postsurgical changes of the spine     Obstructive bowel gas pattern Raulito Lechuga MD 5/25/2020 8:16 AM    Dx-chest-limited (1 View)    Result Date: 6/8/2020 6/8/2020 1:43 PM HISTORY/REASON FOR EXAM:  Shortness of Breath. TECHNIQUE/EXAM DESCRIPTION AND NUMBER OF VIEWS: Single AP view of the chest. COMPARISON: 6/8/2020 at 0447 hours FINDINGS: Lines/tubes:  Support tubing is unchanged. Fixation hardware is present in the thoracic spine. Lungs:  There is persistent atelectasis or pneumonia in the right lower lobe. A loculated appearing right-sided pleural effusion appears slightly decreased in volume. A small left pleural effusion is present. Pleura:  No pneumothorax. Heart and mediastinum:  The heart silhouette is within normal limits. Bones:  Left thoracotomy changes are present.     1.  Slightly decreased volume of loculated appearing right pleural effusion. 2.  Persistent atelectasis or pneumonia in the left lower lobe. 3.  Unchanged small left pleural effusion.    Dx-chest-limited (1 View)    Result Date: 5/27/2020  HISTORY/REASON FOR EXAM: Shortness of Breath; f/u pneumonia, pl effusion, pulm edema. f/u pneumonia, pl effusion, pulm edema  TECHNIQUE/EXAM DESCRIPTION: Chest x-ray, one view, 5/27/2020 5:16 AM. COMPARISON: 5/26/2020 FINDINGS: Moderate right-sided pleural effusion is again noted. Trace left pleural effusion is again noted. Venous congestion and reticulonodular densities involving both lungs has worsened in the interval the previous study.     Findings are compatible with worsening pulmonary edema/pulmonary venous congestion.    Dx-chest-portable (1 View)    Result Date: 6/10/2020  6/10/2020 4:26 AM HISTORY/REASON FOR EXAM:  For indication of respiratory failure; For indication of respiratory failure. TECHNIQUE/EXAM DESCRIPTION AND NUMBER OF VIEWS: Single portable view of the chest. COMPARISON: One day prior FINDINGS: Lines and tubes are stable. Cardiomediastinal silhouette is stable. Small to moderate loculated right pleural effusion. Small loculated left pleural effusion seen on CT is not well evaluated radiographically. Diffuse interstitial and some hazy airspace opacities likely pulmonary edema. Correlate clinically for infection.  Findings appear mildly worsened since prior radiograph. The bones are unchanged.     Loculated pleural effusions, right greater than left. Interstitial and airspace opacities likely pulmonary edema. Correlate clinically for infection. Findings possibly slightly increased from prior.    Dx-chest-portable (1 View)    Result Date: 6/9/2020 6/9/2020 4:31 AM HISTORY/REASON FOR EXAM: For indication of respiratory failure; For indication of respiratory failure TECHNIQUE/EXAM DESCRIPTION:  Single AP view of the chest. COMPARISON: Yesterday FINDINGS: Position of medical devices appears stable. Cardiomegaly is observed. The mediastinal contour appears within normal limits.  The central  pulmonary vasculature appears prominent and indistinct. The lungs appear well expanded bilaterally.  Diffuse scattered hazy pulmonary parenchymal opacities are seen. Veil-like opacities are seen in bilateral lung bases, right  greater than left. Left rib fractures are noted.     1.  Pulmonary edema and/or infiltrates are identified, which are stable since the prior exam. 2.  Bilateral pleural effusions, right greater than left 3.  Cardiomegaly     Dx-chest-portable (1 View)    Result Date: 6/8/2020 6/8/2020 4:18 AM HISTORY/REASON FOR EXAM: For indication of respiratory failure; For indication of respiratory failure TECHNIQUE/EXAM DESCRIPTION:  Single AP view of the chest. COMPARISON: Yesterday FINDINGS: Endotracheal tube has been exchanged for tracheostomy.   Otherwise medical device position is stable. Cardiomegaly is observed. The mediastinal contour appears within normal limits.  The central  pulmonary vasculature appears prominent and indistinct. The lungs appear well expanded bilaterally.  Diffuse scattered hazy pulmonary parenchymal opacities are seen. Veil-like opacities are seen in bilateral lung bases, right greater than left. Left rib fractures are noted.     1.  Pulmonary edema and/or infiltrates are identified, which are stable since the prior exam. 2.  Bilateral pleural effusions, right greater than left 3.  Cardiomegaly     Dx-chest-portable (1 View)    Result Date: 6/7/2020 6/7/2020 5:09 AM HISTORY/REASON FOR EXAM:  For indication of respiratory failure; For indication of respiratory failure TECHNIQUE/EXAM DESCRIPTION AND NUMBER OF VIEWS: Single portable view of the chest. COMPARISON: 6/6/2020 FINDINGS: Tubes and lines: ET tube, feeding tube and right central venous catheter are redemonstrated. Diffuse interstitial prominence. Patchy right base opacities. Moderate right lateral pleural effusion. No pneumothorax. Stable cardiopericardial silhouette.     1. No significant interval change.    Dx-chest-portable (1 View)    Result Date: 6/6/2020 6/6/2020 5:13 AM HISTORY/REASON FOR EXAM:  For indication of respiratory failure; For indication of respiratory failure TECHNIQUE/EXAM DESCRIPTION AND NUMBER OF VIEWS: Single  portable view of the chest. COMPARISON: 6/5/2020 FINDINGS: Tubes and lines: ET tube, feeding tube and right central venous catheter are redemonstrated. Diffuse interstitial prominence. Patchy right base opacities. Moderate right lateral pleural effusion. No pneumothorax. Stable cardiopericardial silhouette.     1. No significant interval change.    Dx-chest-portable (1 View)    Result Date: 6/5/2020 6/5/2020 12:50 PM HISTORY/REASON FOR EXAM:  Shortness of breath. TECHNIQUE/EXAM DESCRIPTION AND NUMBER OF VIEWS: Single portable view of the chest. COMPARISON: Exam at 5:26 AM FINDINGS: Single portable view of the chest demonstrates a normal cardiac silhouette and mediastinal contours. Right pleural effusion and adjacent airspace opacification are unchanged. Bilateral interstitial prominence is unchanged. No pneumothorax is identified. Lines of support are unchanged.     No significant interval change.    Dx-chest-portable (1 View)    Result Date: 6/5/2020 6/5/2020 5:16 AM HISTORY/REASON FOR EXAM: For indication of respiratory failure; For indication of respiratory failure TECHNIQUE/EXAM DESCRIPTION:  Single AP view of the chest. COMPARISON: Yesterday FINDINGS: Position of medical devices appears stable. Cardiomegaly is observed. The mediastinal contour appears within normal limits.  The central  pulmonary vasculature appears prominent and indistinct. The lungs appear well expanded bilaterally.  Diffuse scattered hazy pulmonary parenchymal opacities are seen. Veil-like opacities are seen in bilateral lung bases, right greater than left. Left rib fractures are noted.     1.  Pulmonary edema and/or infiltrates are identified, which are stable since the prior exam. 2.  Bilateral pleural effusions, right greater than left 3.  Cardiomegaly     Dx-chest-portable (1 View)    Result Date: 6/4/2020 6/4/2020 4:35 AM HISTORY/REASON FOR EXAM: For indication of respiratory failure; For indication of respiratory failure  TECHNIQUE/EXAM DESCRIPTION:  Single AP view of the chest. COMPARISON: Yesterday FINDINGS: Position of medical devices appears stable. Cardiomegaly is observed. The mediastinal contour appears within normal limits.  The central  pulmonary vasculature appears prominent and indistinct. The lungs appear well expanded bilaterally.  Diffuse scattered hazy pulmonary parenchymal opacities are seen. Veil-like opacities are seen in bilateral lung bases, right greater than left. Left rib fractures are noted.     1.  Pulmonary edema and/or infiltrates are identified, which are stable since the prior exam. 2.  Bilateral pleural effusions, right greater than left 3.  Cardiomegaly     Dx-chest-portable (1 View)    Result Date: 6/3/2020    6/3/2020 4:48 AM HISTORY/REASON FOR EXAM: For indication of respiratory failure; For indication of respiratory failure TECHNIQUE/EXAM DESCRIPTION:  Single AP view of the chest. COMPARISON: Yesterday FINDINGS: Position of medical devices appears stable. Cardiomegaly is observed. The mediastinal contour appears within normal limits.  The central  pulmonary vasculature appears prominent and indistinct. The lungs appear well expanded bilaterally.  Diffuse scattered hazy pulmonary parenchymal opacities are seen. Veil-like opacities are seen in bilateral lung bases, right greater than left. Left rib fractures are noted.     1.  Pulmonary edema and/or infiltrates are identified, which are stable since the prior exam. 2.  Bilateral pleural effusions, right greater than left 3.  Cardiomegaly     Dx-chest-portable (1 View)    Result Date: 6/2/2020 6/2/2020 4:25 PM HISTORY/REASON FOR EXAM:  Follow-up right thoracentesis TECHNIQUE/EXAM DESCRIPTION AND NUMBER OF VIEWS: Single portable view of the chest. COMPARISON: 6/2/2020 FINDINGS: Scans of postoperative changes seen in the thoracolumbar spine. ET tube and feeding tube are again seen as is a right PICC line. The mediastinal and cardiac silhouette is  unremarkable. The pulmonary vascularity is within normal limits. There is peribronchial wall thickening and edema. There is right lower lobe airspace disease. There has been interval decrease in the right pleural effusion status post thoracentesis. There is no visible pneumothorax. There are no acute bony abnormalities.     1.  There is no pneumothorax following right thoracentesis. 2.  The amount of right pleural effusion is decreased. 3.  There is vascular congestion and/or edema. 4.  There is right lower lobe airspace disease which could be atelectasis or pneumonitis.    Dx-chest-portable (1 View)    Result Date: 6/2/2020 6/2/2020 4:24 AM HISTORY/REASON FOR EXAM: For indication of respiratory failure; For indication of respiratory failure TECHNIQUE/EXAM DESCRIPTION:  Single AP view of the chest. COMPARISON: None FINDINGS: Position of medical devices appears stable. Cardiomegaly is observed. The mediastinal contour appears within normal limits.  The central  pulmonary vasculature appears prominent and indistinct. The lungs appear well expanded bilaterally.  Diffuse scattered hazy pulmonary parenchymal opacities are seen. Veil-like opacities are seen in bilateral lung bases, right greater than left. Left rib fractures are noted.     1.  Pulmonary edema and/or infiltrates are identified, which are stable since the prior exam. 2.  Bilateral pleural effusions, right greater than left 3.  Cardiomegaly      Dx-chest-portable (1 View)    Result Date: 5/31/2020  HISTORY/REASON FOR EXAM: intubation. intubation TECHNIQUE/EXAM DESCRIPTION: Chest x-ray, one portable view, 5/30/2020 8:26 PM. COMPARISON: 5/20/2020 FINDINGS: The endotracheal tube terminates above the level the izaiah. NG tube passes caudal to the diaphragm. Right-sided PICC line remains in stable position. Large right-sided pleural effusion is again noted and appear stable. Increasing areas of consolidation of developed involving the apices of both lungs, as  well as the lateral aspect of the left lung.     Extensive bilateral lung opacities are now noted, which could be explained by severe bilateral pneumonia. Stable appearance of right pleural effusion. Supportive lines and tubes appear in appropriate positions.    Dx-chest-portable (1 View)    Result Date: 5/28/2020  HISTORY/REASON FOR EXAM: Shortness of Breath. TECHNIQUE/EXAM DESCRIPTION: Chest x-ray, one portable view, 5/28/2020 7:53 AM. COMPARISON: 5/27/2020 FINDINGS: Pulmonary venous congestion pattern has improved, however, prominent interstitial opacities persist, compatible with pulmonary edema. Right-sided pleural effusion is stable. PICC line has been placed and appears in excellent position.     Persistent right-sided pleural effusion.    Dx-chest-portable (1 View)    Result Date: 5/23/2020  HISTORY/REASON FOR EXAM:  HYPOTENSION. TECHNIQUE/EXAM DESCRIPTION AND NUMBER OF VIEWS: Chest x-ray (one view), 5/23/2020 12:06 AM. COMPARISON: 5/13/2020 FINDINGS: Large loculated right pleural effusion is identified. Ill-defined airspace opacities involving the left lung base. The cardiac and mediastinal silhouette are unremarkable. Postsurgical changes of the thoracic spine.     Large right pleural effusion, loculated Atelectasis versus developing pneumonia in the left lung 5/23/2020 8:48 AM     Ir-thoracentesis Puncture Right    Result Date: 5/13/2020  HISTORY/REASON FOR EXAM:  Recurrent pleural effusion. TECHNIQUE/EXAM DESCRIPTION: Ultrasound-guided thoracentesis, 5/13/2020 8:59 AM. COMPARISON: None. PROCEDURE: The risks, benefits, alternatives and the procedure were discussed with the patient.  The patient's questions were answered to their full satisfaction.  They subsequently agreed to proceed with the proposed procedure and signed a written consent form. A procedural timeout was performed. Maximum sterile barrier protection mechanisms were employed. The patient's laboratory values, allergies and medication list  were reviewed prior to the examination. The patient's thoracic cavity was sonographically evaluated.  An area suitable for thoracentesis was identified.  The overlying skin was prepped and draped in a sterile fashion.  1% lidocaine was infiltrated subcutaneously for local anesthesia. Following this, the thoracic cavity was entered utilizing a Safe-T-Centesis Catheter System.  A total of 900 cc of serosanguineous fluid was subsequently aspirated under sonographic control. The patient tolerated the procedure well and there were no immediate postprocedural complications. Following the procedure, the chest radiograph demonstrated no evidence of pneumothorax and decrease in size of the pleural effusion.     Unremarkable sonographic-guided thoracentesis.    Mr-brain-with & W/o    Result Date: 6/6/2020 6/6/2020 5:00 PM HISTORY/REASON FOR EXAM:  exposed right craniotomy hardware r/o infection. TECHNIQUE/EXAM DESCRIPTION:   MRI of the brain without and with contrast. T1 sagittal, T2 fast spin-echo axial, T1 coronal, FLAIR coronal, diffusion-weighted and apparent diffusion coefficient (ADC map) axial images were obtained of the whole brain. T1 postcontrast axial and T1 postcontrast coronal images were obtained. The study was performed on a [employer] Signa 1.5 Deborah MRI scanner. 15 mL ProHance contrast was administered intravenously. COMPARISON:  None. FINDINGS:  There is abnormal contrast enhancement and edema noted involving right frontal craniotomy flap. There is abnormal enhancing extradural tissue noted adjacent to the right frontal bone with abnormal adjacent dural enhancement. There is no evidence of leptomeningeal enhancement. There is no parenchymal contrast enhancement. There is no acute infarct. There is no acute or chronic parenchymal hemorrhage. There is severe cerebral volume loss. There is mild cerebellar volume loss. There is no intra-axial space-occupying lesion. The midline structures including the pituitary,  hypothalamic and pineal regions are unremarkable. The posterior fossa structures are unremarkable. The visualized flow voids of the cerebral vasculature are unremarkable. There is no large lesion identified in the expected course of the intracranial portions of the cranial nerves. There is mucosal thickening in the bilateral mastoid air cells.     1.  There is abnormal contrast enhancement and edema noted involving right frontal craniotomy flap. There is abnormal enhancing extradural tissue noted adjacent to the right frontal bone with abnormal adjacent dural enhancement. 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 There is no evidence of leptomeningeal enhancement. There is no parenchymal contrast enhancement. 2.  Severe cerebral volume loss. 3.  Mild cerebral volume loss.    Us-extremity Artery Upper Bilat    Result Date: 6/6/2020   Vascular Laboratory  Conclusions  No hemodynamically significant evidence of arterial disease in the right  upper extremity.  No hemodynamically significant evidence of arterial disease in the left  upper extremity.  d  Findings  Right  No hemodynamically significant evidence of arterial disease in the right  upper extremity.  Multiphasic Doppler flow pattern is noted throughout the right upper  extremity.  Left  No hemodynamically significant evidence of arterial disease in the left  upper extremity.  Multiphasic Doppler flow pattern is noted throughout the left upper  extremity.  The distal radial artery is small with low velocities, but patent.  Melody MARTINEZ To  (Electronically Signed)  Final Date:      06 June 2020                   11:57    Us-thoracentesis Puncture Right    Result Date: 6/3/2020  6/2/2020 1:54 PM HISTORY/REASON FOR EXAM:  Shortness of breath TECHNIQUE/EXAM DESCRIPTION: Ultrasound-guided thoracentesis. Indication:  RIGHT pleural fluid collection. COMPARISON:  Plain film from the same day PROCEDURE:      Informed consent was obtained over the phone from the patient's family. A timeout was taken. A right pleural effusion was localized with real-time ultrasound guidance. The right posterior chest wall was prepped and draped in a sterile manner. Following local anesthesia with 1% lidocaine, a 5 Maltese Yueh pigtail catheter was advanced into the pleural space with trocar technique and pleural fluid was drained. The patient tolerated the procedure well without evidence of complication. A post thoracentesis chest radiograph is forthcoming. FINDINGS: Fluid was sent to the laboratory. Fluid character: serosanguinous     1. Ultrasound guided right sided diagnostic and therapeutic thoracentesis. 2. 800 mL of fluid withdrawn.    Ec-echocardiogram Complete W/o Cont    Result Date: 6/2/2020  Transthoracic Echo Report Echocardiography Laboratory CONCLUSIONS No prior study is available for comparison. Left ventricular ejection fraction is visually estimated to be 65%. Normal left ventricular systolic function. Mild concentric left ventricular hypertrophy. Grade II diastolic dysfunction. Moderately dilated right ventricle. Mildly dilated left atrium. Mild mitral regurgitation. Estimated right ventricular systolic pressure is 55 mmHg. Trace tricuspid regurgitation. Normal pericardium without effusion. LARISSA DURBIN Exam Date:        LV EF:  65    % FINDINGS Left Ventricle Normal left ventricular chamber size. Mild concentric left ventricular hypertrophy. Normal left ventricular systolic function. Left ventricular ejection fraction is visually estimated to be 65%. Normal diastolic function. Grade II diastolic dysfunction. Right Ventricle Moderately dilated right ventricle. Right Atrium The right atrium is normal in size. Left Atrium Mildly dilated left atrium. Left atrial volume index is 38 mL/sq m. Mitral Valve Structurally normal mitral valve without significant stenosis. Mild mitral regurgitation. Aortic Valve Structurally  normal aortic valve without significant stenosis or regurgitation. Tricuspid Valve Structurally normal tricuspid valve without significant stenosis. Trace tricuspid regurgitation. Estimated right ventricular systolic pressure is 55 mmHg. Right atrial pressure is estimated to be 3 mmHg. Pulmonic Valve Structurally normal pulmonic valve without significant stenosis or regurgitation. Pericardium Normal pericardium without effusion. Aorta The aortic root is normal.  Ascending aorta diameter is 3.0 cm. Pavel Franco M.D. (Electronically Signed) Final Date:     02 June 2020                 15:10    Dx-abdomen For Tube Placement    Result Date: 6/2/2020 6/2/2020 1:06 PM HISTORY/REASON FOR EXAM:  Line evaluation. TECHNIQUE/EXAM DESCRIPTION AND NUMBER OF VIEWS:  1 view(s) of the abdomen. COMPARISON:  None. FINDINGS: Enteric tube has been placed. The tip projects over the left upper quadrant. The bowel gas pattern is within normal limits. There is postoperative change in the thoracolumbar spine.     1.  Feeding tube tip projects over the gastric body.    Ir-picc Line Placement W/guidance < Age 3    Result Date: 5/27/2020  HISTORY/REASON FOR EXAM:  IR picc placement. TECHNIQUE/EXAM DESCRIPTION: ULTRASOUND AND FLUOROSCOPIC-GUIDED PICC LINE INSERTION, 5/27/2020 10:36 AM.. TECHNIQUE:  The procedure was explained to the patient.  The patient was placed supine on the fluoroscopy table.  Ultrasound examination of the right arm was performed to confirm patency of the basilic vein.  Using all elements of Maximum Sterile Barrier  technique and local anesthesia with ultrasound guidance, the above mentioned examined vein was punctured through a small skin incision.  Continuous real time ultrasound monitoring of the puncture needle entry into the vein was performed and documented. Under fluoroscopic guidance, a 0.018 wire was passed centrally through the needle.  A peel-away sheath was placed over the wire.  A 5 Ivorian double lumen  "Power PICC line was advanced until the tip was at the SVC-right atrium junction.  The position of the catheter tip was confirmed with fluoroscopy.  The sheath was removed, and the line was secured to the patient's skin.  There were no immediate complications, and the patient tolerated the procedure well. FINDINGS: The tip of the PICC line is at the SVC-right atruim junction . 1 saved images. Fluoroscopic time was 3.2 minutes     Successful placement of a PICC line.  Ready to infuse.,      Micro:  Results     Procedure Component Value Units Date/Time    FLUID CULTURE W/GRAM STAIN [[id number]] Collected:  06/11/20 1600    Order Status:  Completed Specimen:  Body Fluid Updated:  06/14/20 0844     Significant Indicator NEG     Source BF     Site Pleural Fluid     Culture Result No growth at 72 hours.     Gram Stain Result Rare WBCs.  No organisms seen.      Narrative:       Surgery Specimen    Anaerobic Culture [[id number]] Collected:  06/11/20 1600    Order Status:  Completed Specimen:  Body Fluid Updated:  06/14/20 0844     Significant Indicator NEG     Source BF     Site Pleural Fluid     Culture Result Culture in progress.    Narrative:       Surgery Specimen    BLOOD CULTURE [[id number]] Collected:  06/08/20 1830    Order Status:  Completed Specimen:  Blood from Peripheral Updated:  06/13/20 2100     Significant Indicator NEG     Source BLD     Site PERIPHERAL     Culture Result No growth after 5 days of incubation.    Narrative:       Collected By:[id number] TAVARES IBARRA  Per Hospital Policy: Only change Specimen Src: to \"Line\" if  specified by physician order.  Left Forearm/Arm    BLOOD CULTURE [[id number]] Collected:  06/08/20 1825    Order Status:  Completed Specimen:  Blood from Peripheral Updated:  06/13/20 2100     Significant Indicator NEG     Source BLD     Site PERIPHERAL     Culture Result No growth after 5 days of incubation.    Narrative:       Collected By:[id number] TAVARES IBARRA  Per Hospital Policy: Only " "change Specimen Src: to \"Line\" if  specified by physician order.  Right Forearm/Arm    AFB Culture [[id number]] Collected:  06/10/20 1415    Order Status:  Completed Specimen:  Tissue Updated:  06/13/20 1115     Significant Indicator NEG     Source TISS     Site Cranial Plate     Culture Result Culture in progress.     AFB Smear Results No acid fast bacilli seen.    Narrative:       CALL  Pulido  19 tel. [phone],  CALLED  19 tel. [phone] 06/11/2020, 12:21, RB PERF. RESULTS CALLED TO:[phone]  Surgery Specimen    CULTURE TISSUE W/ GRM STAIN [[id number]]  (Abnormal)  (Susceptibility) Collected:  06/10/20 1415    Order Status:  Completed Specimen:  Tissue Updated:  06/13/20 1115     Significant Indicator POS     Source TISS     Site Cranial Plate     Culture Result -     Gram Stain Result Rare WBCs.  Moderate Gram positive cocci.       Culture Result Staphylococcus epidermidis  Moderate growth      Narrative:       CALL  [facility]  19 tel. [phone],  CALLED  19 tel. [phone] 06/11/2020, 12:21, RB PERF. RESULTS CALLED TO:[id number]  Surgery Specimen    Susceptibility     Staphylococcus epidermidis (1)     Antibiotic Interpretation Microscan Method Status    Azithromycin Resistant >4 mcg/mL IZABELA Preliminary    Clindamycin Resistant >4 mcg/mL IZABELA Preliminary    Cefazolin Resistant <=8 mcg/mL IZABELA Preliminary    Daptomycin Sensitive <=1 mcg/mL IZABELA Preliminary    Ampicillin/sulbactam Resistant <=8/4 mcg/mL IZABELA Preliminary    Erythromycin Resistant >4 mcg/mL IZABELA Preliminary    Vancomycin Sensitive 1 mcg/mL IZABELA Preliminary    Oxacillin Resistant >2 mcg/mL IZABELA Preliminary    Penicillin Resistant 8 mcg/mL IZABELA Preliminary    Trimeth/Sulfa Resistant >2/38 mcg/mL IZABELA Preliminary    Tetracycline Sensitive <=4 mcg/mL IZABELA Preliminary                   Anaerobic Culture [[id number]] Collected:  06/10/20 1415    Order Status:  Completed Specimen:  Tissue Updated:  06/13/20 1115     Significant Indicator NEG     Source TISS     Site Cranial Plate "     Culture Result Culture in progress.    Narrative:       CALL  [facility]  19 tel. [phone],  CALLED  19 tel. [phone] 06/11/2020, 12:21, RB PERF. RESULTS CALLED TO:[phone]  Surgery Specimen    GRAM STAIN [[id number]] Collected:  06/11/20 1600    Order Status:  Completed Specimen:  Body Fluid Updated:  06/11/20 2221     Significant Indicator .     Source BF     Site Pleural Fluid     Gram Stain Result Rare WBCs.  No organisms seen.      Narrative:       Surgery Specimen    GRAM STAIN [[id number]] Collected:  06/10/20 1415    Order Status:  Completed Specimen:  Tissue Updated:  06/11/20 1623     Significant Indicator .     Source TISS     Site Cranial Plate     Gram Stain Result Rare WBCs.  Moderate Gram positive cocci.      Narrative:       CALL  P[facility]  19 tel. [phone],  CALLED  19 tel. [phone] 06/11/2020, 12:21, RB PERF. RESULTS CALLED TO:[phone]  Surgery Specimen    Acid Fast Stain [[id number]] Collected:  06/10/20 1415    Order Status:  Completed Specimen:  Tissue Updated:  06/11/20 1623     Significant Indicator NEG     Source TISS     Site Cranial Plate     AFB Smear Results No acid fast bacilli seen.    Narrative:       CALL  P[facility]  19 tel. [phone],  CALLED  19 tel. [phone] 06/11/2020, 12:21, RB PERF. RESULTS CALLED TO:[phone]  Surgery Specimen    FLUID CULTURE W/GRAM STAIN [[id number]] Collected:  06/08/20 1334    Order Status:  Completed Specimen:  Body Fluid from Thoracentesis Fluid Updated:  06/11/20 0730     Significant Indicator NEG     Source BF     Site Pleural Fluid     Culture Result No growth at 72 hours.     Gram Stain Result No organisms seen.    Narrative:       Collected By:[id number] MATOS.  Collected By:[id number] JAIMIE VUONG    AFB CULTURE [[id number]] Collected:  06/08/20 1334    Order Status:  Completed Specimen:  Body Fluid from Thoracentesis Fluid Updated:  06/11/20 0730     Significant Indicator NEG     Source BF     Site Pleural Fluid     Culture Result Culture in progress.      AFB Smear Results No acid fast bacilli seen.    Narrative:       Collected By:[id number] JAIMIE VUONG  Collected By:[id number] JAIMIE VUONG    FUNGAL CULTURE [[id number]] Collected:  06/08/20 1334    Order Status:  Completed Specimen:  Body Fluid from Thoracentesis Fluid Updated:  06/11/20 0730     Significant Indicator NEG     Source BF     Site Pleural Fluid     Culture Result No fungal growth to date.    Narrative:       Collected By:[id number] JAIMIE VUONG  Collected By:[id number] JAIMIE VUONG    Fungal Culture [[id number]] Collected:  06/10/20 1415    Order Status:  Completed Specimen:  Other Updated:  06/10/20 1641    FLUID CULTURE W/GRAM STAIN [[id number]] Collected:  06/02/20 1440    Order Status:  Completed Specimen:  Body Fluid Updated:  06/10/20 1016     Significant Indicator NEG     Source BF     Site Thoracentesis Fluid     Culture Result No growth at 5 days.     Gram Stain Result No organisms seen.    Fungal Culture [[id number]] Collected:  06/02/20 1440    Order Status:  Completed Specimen:  Body Fluid Updated:  06/10/20 1016     Significant Indicator NEG     Source BF     Site Thoracentesis Fluid     Culture Result No fungal growth to date.    AFB Culture [[id number]] Collected:  06/02/20 1440    Order Status:  Completed Specimen:  Body Fluid Updated:  06/10/20 1016     Significant Indicator NEG     Source BF     Site Thoracentesis Fluid     Culture Result Culture in progress.     AFB Smear Results No acid fast bacilli seen.    SARS-CoV-2, PCR (In-House) [[id number]] Collected:  06/10/20 0646    Order Status:  Completed Updated:  06/10/20 0722     SARS-CoV-2 Source NP Swab     SARS-CoV-2 by PCR NotDetected    Narrative:       Collected By:[id number] CARLA RIDDLE  Rapid test for Pre Op surgery today  Is this test for diagnosis or screening?->Screen    COVID/SARS CoV-2 [[id number]] Collected:  06/10/20 0646    Order Status:  Completed Specimen:  Respirate from Nasal Updated:  06/10/20 0651     COVID Order  Status Received    Narrative:       Collected By:[id number] CARLA RIDDLE  Rapid test for Pre Op surgery today  Is this test for diagnosis or screening?->Screen    GRAM STAIN [[id number]] Collected:  06/08/20 1334    Order Status:  Completed Specimen:  Body Fluid Updated:  06/09/20 2214     Significant Indicator .     Source BF     Site Pleural Fluid     Gram Stain Result No organisms seen.    Narrative:       Collected By:[id number] JAIMIE VUONG  Collected By:[id number] VUONG    Acid Fast Stain [[id number]] Collected:  06/08/20 1334    Order Status:  Completed Specimen:  Body Fluid Updated:  06/09/20 2214     Significant Indicator NEG     Source BF     Site Pleural Fluid     AFB Smear Results No acid fast bacilli seen.    Narrative:       Collected By:[id number] JAIMIE MATOS.  Collected By:[id number] JAIMIE VUONG    FLUID CULTURE W/GRAM STAIN [[id number]] Collected:  06/08/20 1334    Order Status:  Sent Specimen:  Body Fluid from Thoracentesis Fluid           Assessment:  Active Hospital Problems    Diagnosis   • PEA (Pulseless electrical activity) ([facility]) [I46.9]   • Shock ([facility]) [R57.9]   • Anemia [D64.9]   • Seizure disorder ([facility]) [G40.909]   • DAMIAN (acute kidney injury) ([facility]) [N17.9]   • Epidural phlegmon [G06.2]   • Osteomyelitis of skull ([facility]) [M86.9]   • Goals of care, counseling/discussion [Z71.89]   • Acute respiratory failure with hypoxia ([facility]) [J96.01]   • Recurrent right pleural effusion [J90]   • Pneumonia [J18.9]   • Ileus ([facility]) [K56.7]   • Hypokalemia [E87.6]      ASSESSMENT/PLAN:   Ventilator dependent respiratory failure  Recurrent loculated pleural effusion  Hospital-acquired pneumonia-prior course Zosyn and vancomycin  Afebrile  Persistent leukocytosis  Pressors weaned  S/p thoracentesis 6/2 and 6/8-cxs neg WBC and LDH less than 150 but ph 7  S/p Right thoracotomy/decort 6/11-pleural fluid rare WBC, org. 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   h/o bilateral craniotomy with subdural evacuation in 10/2016 due to SDH  MRI on 6/6+ enhancement and edema involving the right frontal craniotomy flap as well as extradural tissue adjacent to the right frontal bone concerning for craniotomy flap osteomyelitis with adjacent epidural phlegmon  S/p OR -removal plates-not able to close due to poor skin integrity. VAC placed  Cxs +CNS ([facility])  Abx as above-plan to switch zyvox to dapto once PNA treated  Plan for OR tomorrow for bone debridement and Integra placement    Abd distension  Imaging c/w SBO or ileus    DAMIAN, improved  Avoid nephrotoxic agents    Encephalopathy  Seizure disorder  On Keppra    Hepatitis C with cirrhosis per chart  Mult VL undetectable from 3661-8157  FU GI if indicated after discharge     Prognosis guarded  Appreciate Palliative Care eval     I have performed a physical exam and reviewed and updated ROS as of today.  In review of yesterday's note dated  6/13/2020, there are no changes except as documented above.

## 2020-06-14 NOTE — CARE PLAN
Problem: Safety  Goal: Will remain free from injury  Note: Room near nursing station, bedalarm activated and restraints applied appropriately to ensure pt safety.     Problem: Skin Integrity  Goal: Risk for impaired skin integrity will decrease  Note: Pt turned every 2hrs, pressure points floated using pillows and appropriate wound prevention measures implemented.

## 2020-06-14 NOTE — PROGRESS NOTES
0820 x1 episode of tan, tube feed colored emesis ~700ml. Oral suction performed. Dr. Londono at bedside. NG tube ordered for decompression, reglan scheduled, and abd xray ordered    1345 x1 episode of brown/ green colored emesis ~600ml. Dr. Londono notified. reglan 10mg ordered at this time

## 2020-06-14 NOTE — PROGRESS NOTES
Neurosurgery Progress Note    Subjective:  No events    Exam:  EO spont, intermittently tracks  WETZEL spont, and symm  Vac intact/sxn on      BP  Min: 84/54  Max: 127/68  Pulse  Av  Min: 75  Max: 95  Resp  Av.5  Min: 13  Max: 92  Monitored Temp 2  Av.4 °C (99.3 °F)  Min: 36.5 °C (97.7 °F)  Max: 38.2 °C (100.8 °F)  SpO2  Av.4 %  Min: 94 %  Max: 100 %    No data recorded    Recent Labs     20  0420 20  0505 20  0235   WBC 16.3* 18.1* 18.1*   RBC 3.18* 3.20* 3.02*   HEMOGLOBIN 10.0* 10.2* 9.6*   HEMATOCRIT 31.7* 31.8* 30.1*   MCV 99.7* 99.4* 99.7*   MCH 31.4 31.9 31.8   MCHC 31.5* 32.1* 31.9*   RDW 65.5* 63.5* 60.7*   PLATELETCT 316 374 314   MPV 9.9 9.8 9.8     Recent Labs     20  0420 20  0505 20  0235   SODIUM 150* 144 150*   POTASSIUM 3.7 3.6 2.7*   CHLORIDE 113* 106 105   CO2 25 29 29   GLUCOSE 140* 142* 109*   BUN 31* 33* 33*   CREATININE 1.14 1.14 1.35   CALCIUM 7.4* 8.3* 8.1*               Intake/Output       20 - 2059 20 - 06/15/20 0659       Total [id number] Total       Intake    P.O.  --  -- --  0  -- 0    P.O. -- -- -- 0 -- 0    I.V.  --  86.7 86.7  40  -- 40    Volume (mL) (1/2 NS infusion) -- 86.7 86.7 -- -- --    Volume (mL) (D5W infusion) -- -- -- 40 -- 40    Other  --  290 290  --  -- --    Medications (PO/Enteral Liquids) -- 290 290 -- -- --    NG/GT  130  0 130  --  -- --    Intake (mL) (Enteral Tube 20 Cortrak - Gastric 10 Fr. Right nare) 130 0 130 -- -- --    IV Piggyback  644.6  200 844.6  13.9  -- 13.9    Volume (mL) (cefepime (MAXIPIME) 2 g in  mL IVPB) 100 200 300 -- -- --    Volume (mL) (potassium phosphates 15 mmol in  mL ivpb) 544.6 -- 544.6 -- -- --    Volume (mL) (potassium phosphates 15 mmol in D5W 250 mL ivpb) -- -- -- 13.9 -- 13.9    Enteral  200  200 400  --  -- --    Free Water / Tube Flush 200 200 400 -- -- --    Total Intake 974.6 776.7 1751.2 53.9 --  53.9       Output    Urine  445  255 700  50  -- 50    Output (mL) (Urethral Catheter Temperature probe 16 Fr.) 445 255 700 50 -- 50    Emesis  1300  -- 1300  --  -- --    Emesis 1300 -- 1300 -- -- --    Emesis - Number of Times -- 1 x 1 x -- -- --    Drains  --  0 0  --  -- --    Output (mL) (Closed/Suction Drain 2 Other (Comment);Right Other (Comments)) -- 0 0 -- -- --    Stool  --  300 300  --  -- --    Rectal Tube Output (Rectal Tube) -- 300 300 -- -- --    Emesis/NG output  500  2100 2600  --  -- --    Output (mL) (Enteral Tube 06/02/20 Cortrak - Gastric 10 Fr. Right nare) -- 300 300 -- -- --    Output (mL) ([REMOVED] Enteral Tube 06/13/20 Nasogastric Left nare) 500 400 900 -- -- --    Output (mL) (Enteral Tube 06/13/20 16 Fr. Right nare) -- 1400 1400 -- -- --    Chest Tube  70  130 200  --  -- --    Output (mL) (Chest Tube 1 Right Midaxillary) 40 70 110 -- -- --    Output (mL) (Chest Tube 2 Right Midaxillary) 30 60 90 -- -- --    Total Output 2315 2785 5100 50 -- 50       Net I/O     -1340.5 -2008.3 -3348.8 3.9 -- 3.9            Intake/Output Summary (Last 24 hours) at 6/14/2020 0934  Last data filed at 6/14/2020 0800  Gross per 24 hour   Intake 1675.1 ml   Output 4350 ml   Net -2674.9 ml            • D5W   Continuous   • potassium phosphate ivpb  15 mmol Once   • potassium chloride (KCL-CENTRAL) IV *Administer in ICU only*  40 mEq Once   • metoclopramide  5 mg Q12HRS   • Naloxone  2 mg Q8HRS   • phenytoin  100 mg Q8HRS   • cefepime  2 g Q8HRS   • metroNIDAZOLE  500 mg Q8HRS   • MD Alert...Adult ICU Electrolyte Replacement per Pharmacy   PHARMACY TO DOSE   • midodrine  15 mg Q8HRS   • levothyroxine  50 mcg AM ES   • oxyCODONE immediate-release  10-15 mg Q4HRS PRN   • linezolid  600 mg Q12HRS   • fentaNYL  25-50 mcg Q2HRS PRN   • levETIRAcetam  1,000 mg BID   • Pharmacy  1 Each PHARMACY TO DOSE   • omeprazole  40 mg DAILY   • thiamine  100 mg DAILY   • multivitamin  1 Tab DAILY   • LORazepam  4 mg Q10 MIN PRN    • senna-docusate  2 Tab BID    And   • polyethylene glycol/lytes  1 Packet QDAY PRN    And   • magnesium hydroxide  30 mL QDAY PRN    And   • bisacodyl  10 mg QDAY PRN   • acetaminophen  650 mg Q6HRS PRN   • labetalol  10 mg Q4HRS PRN   • Respiratory Therapy Consult   Continuous RT   • ipratropium-albuterol  3 mL Q2HRS PRN (RT)   • lidocaine  1-2 mL Q30 MIN PRN       Assessment and Plan:  Hospital day #9 exposed cranial plate, imaging shows osteomyelitis  POD #4  removal of exposed cranial plate, attempted wound closure, vac placement  POD #3 thoracotomy/decort  Prophylactic anticoagulation: no -d/c'd lovenox for surgery      Start date/time: tbd    Neuro as above  Cranial wound unable to be closed, vac in place  Cx cranial plate- coag neg staph  abx per ID  Cont wound vac per wound RN team  Dr Guzmán planning for OR monday-Debride bone and placement of integra

## 2020-06-14 NOTE — CARE PLAN
Problem: Ventilation Defect:  Goal: Ability to achieve and maintain unassisted ventilation or tolerate decreased levels of ventilator support  Outcome: NOT MET   Vent day 14  Trache day 8  8.0 Portex, cuffed  16/360/+8, 30%  Emesis noted overnight  Suctioned small green/brown secretions via trahe X1 and subglottic X2.

## 2020-06-14 NOTE — PROGRESS NOTES
Dr. Nandini dumont . Updated on low urine output total in four hours 50 ml . Current labs NA ++ 150 and K++ 2.7. NGT output total since shift change 950 ml ( from 1900 to 0400 ) with a coloration of brown/red/green output. Orders given. Pt care continued.

## 2020-06-14 NOTE — PROGRESS NOTES
"Critical Care Progress Note    Date of admission  5/31/2020    Chief Complaint  54 y.o. male admitted 5/31/2020 with Respiratory failure, seizure    Hospital Course    \"All history was obtained from old notes/charts from [facility] as the patient is intubated at the time of my evaluation.     Mr. Lechuga is a 54 year old male with the past medical history of urethral stricture with urinary retention, recurrent right-sided pleural effusion, alcohol abuse, COPD on home O2, hepatitis C with cirrhosis, chronic pain syndrome on narcotics, traumatic brain injury, history of SDH with subsequent seizure disorder who presented to [facility] on 5/22 with the complaint of difficulty urinary for 2 days and low blood pressures.  He apparently was recently hospitalized at [place] from 5/6 to 5/11/2020 for a COPD exacerbation and reported that he was doing well for until 2 days prior to 5/22 when he developed difficulty urinating and draining his bladder.  He has worsening abdominal pain in the suprapubic region with chills.  No fevers.  No nausea or vomiting.  He has a negative COVID test on 5/6.  The patient was admitted for acute urinary retention, UTI, and hypoxia.  Over the course of the next week, the patient required intubation for seizure activity, but was then extubated.  However, the patient then developed aspiration pneumonia with AMS and was on BiPAP for a night on 5/29.  All during the patient's hospital stay, he has been battling with delirium.  Unfortunately, around 2000 last night the patient the patient became unresponsive with a bradycardic episode and no pulses.  A code was called and he received 3 rounds of CPR and 2 doses of epi before ROSC was achieved.  He was intubated again and stabilized.  He was sent to [facility] for higher level of care due to the growing complexity of his condition as well as critical care management since [place] does not have critical care specialists on staff.\"      Interval " Problem Update  Reviewed last 24 hour events:  Neuro: opens eyes moves all  HR: 80's  SBP: 90's  Tmax: afebrile  GI: NPO NG 1200 still distended  UOP: shy on urine output  Lines: picc line tpa 2mg, powers, trach cortrac chest tube, trach   Resp: trach day 8 peep fio30% t piece    Vte: held for or  PPI/H2:home ppi  Antibx: cefepimine, metro, linezolid    D5W at 50ml/hr  K replace -> IV  Ct abdomen -> SBO with mechanical obstruction surgical consultation discussed with Dr Fulton   Called sister spoke person for patient she agrees no surgery is going to be here shortly to talk next steps    Review of Systems  Review of Systems   Unable to perform ROS: Intubated        Vital Signs for last 24 hours   Pulse:  [68-92] 79  Resp:  [13-92] 27  BP: ()/(49-91) 103/52  SpO2:  [95 %-100 %] 95 %    Hemodynamic parameters for last 24 hours       Respiratory Information for the last 24 hours  Vent Mode: APVCMV  Rate (breaths/min): 16  Vt Target (mL): 360  PEEP/CPAP: 8  MAP: 15  Control VTE (exp VT): 362    Physical Exam   Physical Exam  Vitals signs reviewed.   Constitutional:       General: He is not in acute distress.     Appearance: He is well-developed. He is ill-appearing.      Interventions: He is intubated.      Comments: Sitting in bed ill appearing on t piece   HENT:      Head: Normocephalic and atraumatic.      Comments: Scalp wound vac in place     Right Ear: External ear normal.      Left Ear: External ear normal.      Nose: Nose normal.      Comments: Cortrak in place     Mouth/Throat:      Mouth: Mucous membranes are dry.      Pharynx: Oropharynx is clear.   Eyes:      Conjunctiva/sclera: Conjunctivae normal.      Pupils: Pupils are equal, round, and reactive to light.   Neck:      Musculoskeletal: Neck supple.      Vascular: No JVD.      Trachea: No tracheal deviation.      Comments: Trach in place  Cardiovascular:      Rate and Rhythm: Regular rhythm. Bradycardia present.      Pulses: Normal pulses.    Pulmonary:      Effort: He is intubated.      Breath sounds: Rhonchi present. No wheezing or rales.      Comments: Restless on t piece   Double chest tubes on right chest  Coarse ronchi bilateral r>l  Abdominal:      General: Bowel sounds are normal. There is distension.      Palpations: Abdomen is soft. There is no mass.      Tenderness: There is no abdominal tenderness. There is no guarding.      Hernia: No hernia is present.      Comments: With BMS in place, improve distention and tympania on percussion from day prior   Musculoskeletal:         General: Swelling present. No tenderness.      Right lower leg: Edema present.      Left lower leg: Edema present.   Skin:     General: Skin is warm and dry.      Capillary Refill: Capillary refill takes less than 2 seconds.      Findings: No rash.      Comments: Multiple tattoos  Venous stasis dermatitis   Neurological:      General: No focal deficit present.      Mental Status: He is alert.      Comments: Opens eyes to voice moves extremities not following commands   Psychiatric:      Comments: Unable to assess         Medications  Current Facility-Administered Medications   Medication Dose Route Frequency Provider Last Rate Last Dose   • D5W infusion   Intravenous Continuous Oziel Londono M.D. 50 mL/hr at 06/14/20 0712     • norepinephrine (Levophed) infusion 8 mg/250 mL (premix)  0-30 mcg/min Intravenous Continuous Oziel Londono M.D.   Stopped at 06/14/20 1500   • Linezolid (ZYVOX) premix 600 mg  600 mg Intravenous Q12HRS Oziel Londono M.D.       • metroNIDAZOLE (FLAGYL) IVPB 500 mg  500 mg Intravenous Q8HRS Oziel Londono M.D.       • levETIRAcetam (KEPPRA) 1,000 mg in  mL IVPB  1,000 mg Intravenous Q12HRS Oziel Londono M.D.       • metoclopramide (REGLAN) tablet 5 mg  5 mg Enteral Tube Q12HRS Oziel Londono M.D.   5 mg at 06/14/20 0658   • Naloxone (NARCAN) 2 mg/2 mL oral syringe 2 mg  2 mg Enteral Tube Q8HRS Oziel Londono M.D.   2  mg at 06/14/20 1425   • phenytoin (DILANTIN) injection 100 mg  100 mg Intravenous Q8HRS Oziel Londono M.D.   100 mg at 06/14/20 1424   • cefepime (MAXIPIME) 2 g in  mL IVPB  2 g Intravenous Q8HRS Neva Gonzalez M.D.   Stopped at 06/14/20 1452   • MD Alert...ICU Electrolyte Replacement per Pharmacy   Other PHARMACY TO DOSE Oziel Londono M.D.       • midodrine (PROAMATINE) tablet 15 mg  15 mg Enteral Tube Q8HRS Oziel Londono M.D.   15 mg at 06/14/20 1424   • levothyroxine (SYNTHROID) tablet 50 mcg  50 mcg Enteral Tube AM ES Pavel Orlando Jr., D.O.   50 mcg at 06/14/20 0655   • oxyCODONE immediate release (ROXICODONE) tablet 10-15 mg  10-15 mg Enteral Tube Q4HRS PRN Pavel Orlando Jr. D.OJam   15 mg at 06/12/20 2152   • fentaNYL (SUBLIMAZE) injection 25-50 mcg  25-50 mcg Intravenous Q2HRS PRN Vishnu Zamora M.D.   50 mcg at 06/12/20 1730   • Pharmacy Consult: Enteral tube insertion - review meds/change route/product selection  1 Each Other PHARMACY TO DOSE Pavel Orlando Jr., D.O.       • omeprazole (FIRST-OMEPRAZOLE) 2 mg/mL oral susp 40 mg  40 mg Enteral Tube DAILY Pavel Orlando Jr., D.O.   40 mg at 06/14/20 0653   • thiamine tablet 100 mg  100 mg Enteral Tube DAILY Pavel Orlando Jr. D.O.   100 mg at 06/14/20 0653   • multivitamin (THERAGRAN) tablet 1 Tab  1 Tab Enteral Tube DAILY Pavel Oralndo Jr. D.O.   1 Tab at 06/14/20 0653   • LORazepam (ATIVAN) injection 4 mg  4 mg Intravenous Q10 MIN PRN Pavel Orlando Jr., D.O.       • senna-docusate (PERICOLACE or SENOKOT S) 8.6-50 MG per tablet 2 Tab  2 Tab Enteral Tube BID Anisa Hernández M.D.   Stopped at 06/11/20 1800    And   • polyethylene glycol/lytes (MIRALAX) PACKET 1 Packet  1 Packet Enteral Tube QDAY PRN Anisa Hernández M.D.        And   • magnesium hydroxide (MILK OF MAGNESIA) suspension 30 mL  30 mL Enteral Tube QDAY PRN Anisa Hernández M.D.        And   • bisacodyl (DULCOLAX) suppository 10 mg  10 mg  Rectal QDAY PRN Anisa Hernández M.D.       • acetaminophen (TYLENOL) tablet 650 mg  650 mg Enteral Tube Q6HRS PRN Anisa Hernández M.D.   650 mg at 06/08/20 1644   • labetalol (NORMODYNE/TRANDATE) injection 10 mg  10 mg Intravenous Q4HRS PRN Anisa Hernández M.D.       • Respiratory Therapy Consult   Nebulization Continuous RT Alexia Khan M.D.       • ipratropium-albuterol (DUONEB) nebulizer solution  3 mL Nebulization Q2HRS PRN (RT) Alexia Khan M.D.   3 mL at 06/12/20 1837   • lidocaine (XYLOCAINE) 1 % injection 1-2 mL  1-2 mL Tracheal Tube Q30 MIN PRN Alexia Khan M.D.           Fluids    Intake/Output Summary (Last 24 hours) at 6/14/2020 1741  Last data filed at 6/14/2020 1600  Gross per 24 hour   Intake 2266.57 ml   Output 3865 ml   Net -1598.43 ml       Laboratory  Recent Labs     06/12/20  0542 06/13/20  0341 06/14/20  0440   ISTATAPH 7.422 7.407 7.534*   ISTATAPCO2 38.8* 43.1* 38.3*   ISTATAPO2 72 72 74   ISTATATCO2 26 28 33   [id number] 95 94 96   ISTATARTHCO3 25.3* 27.1* 32.3*   ISTATARTBE 1 2 9*   ISTATTEMP 98.8 F 99.0 F 98.4 F   ISTATFIO2 40 30 30   ISTATSPEC Arterial Arterial Arterial   ISTATAPHTC 7.421 7.403 7.536*   [id number] 73 73 74         Recent Labs     06/12/20  0420 06/13/20  0505 06/14/20  0235 06/14/20  1550   SODIUM 150* 144 150*  --    POTASSIUM 3.7 3.6 2.7* 3.9   CHLORIDE 113* 106 105  --    CO2 25 29 29  --    BUN 31* 33* 33*  --    CREATININE 1.14 1.14 1.35  --    MAGNESIUM 2.2 2.0 2.0  --    PHOSPHORUS 2.0* 2.1* 2.4*  --    CALCIUM 7.4* 8.3* 8.1*  --      Recent Labs     06/12/20 0420 06/13/20  0505 06/14/20  0235   GLUCOSE 140* 142* 109*     Recent Labs     06/12/20 0420 06/13/20  0505 06/14/20  0235   WBC 16.3* 18.1* 18.1*   NEUTSPOLYS 80.00* 79.50* 80.40*   LYMPHOCYTES 9.60* 9.30* 9.40*   MONOCYTES 5.40 5.90 6.00   EOSINOPHILS 3.90 3.80 2.70   BASOPHILS 0.40 0.60 0.60     Recent Labs     06/12/20 0420 06/13/20  0505 06/14/20  0235   RBC 3.18*  3.20* 3.02*   HEMOGLOBIN 10.0* 10.2* 9.6*   HEMATOCRIT 31.7* 31.8* 30.1*   PLATELETCT 316 374 314       Imaging  X-Ray:  I have personally reviewed the images and compared with prior images. and My impression is: still with worsening right loculated effusion, trach in place  CT:    Reviewed    Assessment/Plan  Shock (HCC)- (present on admission)  Assessment & Plan  Multifactorial:  PEA/CPR, propofol and infection  Continue antimicrobials  Echo: LVEF 65%. Mild concentric LVH. Grade II diastolic dysfunction. Mild mitral regurgitation. Estimated right ventricular systolic pressure is 55 mmHg  Ongoing titration of Levophed for MAP goals >65  Continue midodrine  Cortisol 11.4, continue Solu-Cortef weaned off steroids 6/12  Resolved     PEA (Pulseless electrical activity) ([facility])- (present on admission)  Assessment & Plan  ? Etiology from [place], likely hypoxia  Continue supportive care  Optimize electrolytes  EKG without ischemic changes  Echocardiogram: LVEF 65%. Mild concentric LVH. Grade II diastolic dysfunction. Mild mitral regurgitation. Estimated right ventricular systolic pressure is 55 mmHg    Recurrent right pleural effusion- (present on admission)  Assessment & Plan  Chronic, recurrent  Thoracenteses multiple, Cx negative exudate  Follow chest x-rays, Ct chest reviewed  Discussed with Dr Lance 6/9 s/p decortication  S/p decortication by Dr Lance for 3 lobe trap lung 6/11  May need left sided preformed as well will continue to follow  CT's on on waterseal    Anemia  Assessment & Plan  Restrictive transfusion strategy hg < 7    DAMIAN (acute kidney injury) ([facility])  Assessment & Plan  Continue strict map goal map > 65  Limit nephrotoxins    Improved continue to hold fluids and monitor function closely  Once able force diuresis    Epidural phlegmon- (present on admission)  Assessment & Plan  MRI with epidural tissue enhancement concerning for epidural phlegmon adjacent to osteomyelitis  Antimicrobials changed from  "Zosyn/Vanco to Rocephin/Flagyl/Vanco for improved CNS penetration (Pseudomonas considered unlikely, more likely gram-positive or anaerobes)  Neurosurgery and ID following    Osteomyelitis of skull (HCC)- (present on admission)  Assessment & Plan  Scalp wound with exposed neurosurgical hardware -spoke with the patient's sisters they state that it has been exposed for several months  Bilateral craniotomy with subdural evacuation in October 2016 by Dr. Vallejo  S/p hardware removal by Dr Vallejo 6/10  Wound care consult and plastic surgery following for closure Dr Reye planned for OR on Monday  Wound with Staph Epi    Goals of care, counseling/discussion  Assessment & Plan  Discussed at length with patient's sister regarding his goals of care given his POLST completed in 2015 says DNR/DNI.   In reviewing the chart, the patient actually stated that he misunderstood the form and this CODE STATUS is not correct, he wanted to be FULL CODE.    Dr. Pimentel' H&P on 6/21/16:      \"Code status is full.  He had signed POLST form in the past, saying he did not want resuscitation and only wanted limited interventions; however, in a discussion with the nurse today at surgery, he clarified that he had misunderstood the form, what he meant was that he did not want to be kept alive on life support, but he would want a trial of  full resuscitation efforts.  Therefore, his code status is full.\"      Acute respiratory failure with hypoxia (HCC)- (present on admission)  Assessment & Plan  Intubated at [place] on 5/22/2020 after seizure then again on 5/30/20 following cardiac arrest, he was extubated 6/5/2020 here and failed due to encephalopathy and airway protection and addition failed extubation trial requiring trach 6/7  RT/O2 protocols  Daily CXR w/ chest tubes in place  HOB >30 degrees and peridex for VAP prevention  Pepcid for GI prophylaxis  SAT/SBT when able (ABCDEF Bundle)  Early mobility  s/p tracheostomy 6/7   T-piece trials on " going      Pneumonia- (present on admission)  Assessment & Plan  Aspiration on cefepime, flagyl and linezolid for CNS osteo      Ileus (HCC)- (present on admission)  Assessment & Plan  Worsening 6/11-6/12 will stop TF  Check KUB start scheduled reglan x2 days -> findings of ileus vs pSBO monitor need for ctabdomen if not improving  Correct electrolyte serial monitor abdominal exam  Limit narcotic will give PO narcan    Ct abdomen today with mechanical obstruction seen stat surgery consultation    Hypokalemia- (present on admission)  Assessment & Plan  Continue to monitor and replete    Seizure disorder (HCC)- (present on admission)  Assessment & Plan  cEEG without evidence of seizure  Neurology has signed off  Continue Keppra 1g BID  Phenytoin level elevated continue to monitor level and adjust  MRI shows skull osteomyelitis and possible epidural phlegmon without leptomeningeal enhancement  Neuro and ID following       VTE:  held for surgery  Ulcer: H2 Antagonist  Lines: Irby Catheter  Ongoing indication addressed    I have performed a physical exam and reviewed and updated ROS and Plan today (6/14/2020). In review of yesterday's note (6/13/2020), there are no changes except as documented above.     Discussed patient condition and risk of morbidity and/or mortality with RN, RT, Pharmacy, Charge nurse / hot rounds and general surgery     The patient remains critically ill on ventilator with active titration ventilator, acute findings of SBO with mechanical obstruction with stat surgery consultation and long goals of care discussion at bedside with family members.  Critical care time = 120 minutes in directly providing and coordinating critical care and extensive data review.  No time overlap and excludes procedures.

## 2020-06-14 NOTE — RESPIRATORY CARE
COPD EDUCATION by COPD CLINICAL EDUCATOR  6/14/2020 at 8:42 AM by Norma Reed, RRT     Patient reviewed by COPD education team. Patient does not qualify for the COPD program at  This time. We will be happy to see and follow when condition changes and/or patient able to engage in discussion

## 2020-06-14 NOTE — CARE PLAN
Problem: Venous Thromboembolism (VTW)/Deep Vein Thrombosis (DVT) Prevention:  Goal: Patient will participate in Venous Thrombosis (VTE)/Deep Vein Thrombosis (DVT)Prevention Measures  Intervention: Ensure patient wears graduated elastic stockings (DWAYNE hose) and/or SCDs, if ordered, when in bed or chair (Remove at least once per shift for skin check)  Flowsheets (Taken 6/13/2020 2822)  Mechanical Prophylaxis: SCDs, Sequential Compression Device  SCDs, Sequential Compression Device: On     Problem: Skin Integrity  Goal: Risk for impaired skin integrity will decrease  Intervention: Implement precautions to protect skin integrity in collaboration with the interdisciplinary team  Flowsheets (Taken 6/13/2020 5447)  Skin Preventative Measures:   Heel Protectors in Use   Pillows in Use for Support / Positioning   Heel Float Boots in Use  Bed Types: Low Air Loss Mattress  Friction Interventions: Draw Sheet / Pad Used for Repositioning  Protocols:   Incontinence Associated Dermatitis Protocol in Place   Pressure Ulcer Prevention / Intervention Protocol in Place   Appropriate Wound Protocols in Place

## 2020-06-14 NOTE — PROGRESS NOTES
"    DATE: 6/14/2020    Post Operative Day 3 Right thoracotomy, decortication    Interval Events:  Cont T piece trials. CTs to water seal but still too much out to remove.   Having emesis - Abd CT pending.    PHYSICAL EXAMINATION:  Vital Signs: /55   Pulse 85   Temp 37.1 °C (98.8 °F) (Bladder)   Resp 20   Ht 1.626 m (5' 4.02\")   Wt 79.4 kg (175 lb 0.7 oz)   SpO2 99%     R Chest tubes - no air leak.     ASSESSMENT AND PLAN:   SP R thoracotomy, decort.    Recommend Cont CTs to water seal. Will watch L side as has small area of loculated fluid. May need L sided procedure. Follow up on Abdominal CT    Appreciate ICU and consulting physician care.       ____________________________________     Kat Uribe M.D.    "

## 2020-06-15 NOTE — PROGRESS NOTES
Discussed comfort care process with the patient's sister, Jess.  She verbalized understanding and states that she will be back with her sister and not to remove ventilator until then. Charge RN updated on plan of care.

## 2020-06-15 NOTE — PROGRESS NOTES
"Long discussion with sister at bedside of patient care and new findings SBO with mechanical obstruction and need for more surgeries. She describes that he would never want to be maintained on machines. He has had horrible back pain for his prior back surgery and doesn't want him to suffer and he wouldn't want this either. She was here at the bedside described what he liked and and enjoyed. We discussed what I thought his next couple weeks and months would look like best case scenario. She said \"he wouldn't want this.\" Described what comfort focused treatment was and she would like to alleviate her brothers pain and suffering and focus on his comfort only.     Comfort cares ordered placed, updated nurse of plan of care.     Oziel Londono MD  Critical Care Medicine    "

## 2020-06-15 NOTE — PROGRESS NOTES
Pt's sister, Jess Santiago, called informed of her brothers expiration. Time of death given to pt's sister.

## 2020-06-15 NOTE — PROGRESS NOTES
Pt's sister on the phone . Updated on pt's status. Pt's sister Jess stated she wanted the o2 ( T-Piece) to be discontinued for her brother.

## 2020-06-15 NOTE — PROGRESS NOTES
Pt in PEA that transitioned to Asystole. Pt had no rise or fall of chest/ no breath sounds to auscultation/ CV no heart tones to auscultation / pupils 6 left and right fixed / nonreactive

## 2020-06-15 NOTE — CARE PLAN
Problem: Venous Thromboembolism (VTW)/Deep Vein Thrombosis (DVT) Prevention:  Goal: Patient will participate in Venous Thrombosis (VTE)/Deep Vein Thrombosis (DVT)Prevention Measures  Intervention: Ensure patient wears graduated elastic stockings (DWAYNE hose) and/or SCDs, if ordered, when in bed or chair (Remove at least once per shift for skin check)  Flowsheets (Taken 6/14/2020 1950)  Mechanical Prophylaxis: SCDs, Sequential Compression Device  SCDs, Sequential Compression Device: On     Problem: Pain Management  Goal: Pain level will decrease to patient's comfort goal  Intervention: Follow pain managment plan developed in collaboration with patient and Interdisciplinary Team  Note: Pain assessed q2 hours

## 2020-06-16 LAB
BACTERIA SPEC ANAEROBE CULT: NORMAL
SIGNIFICANT IND 70042: NORMAL
SITE SITE: NORMAL
SOURCE SOURCE: NORMAL

## 2020-06-17 NOTE — OP REPORT
DATE OF OPERATION: 6/11/2020     PREOPERATIVE DIAGNOSES: Empyema, right side. Loculated right pleural effusion.    POSTOPERATIVE DIAGNOSES: Same.     PROCEDURES PERFORMED: Right posterolateral thoracotomy. Right pulmonary decortication, total.     SURGEON: Gómez Lance M.D.     ASSISTANT: MICHELLE Zarate.     ANESTHESIOLOGIST: rPo Goyal M.D..     ANESTHESIA: General endotracheal anesthesia.    ASA CLASSIFICATION: IV.    INDICATIONS: The patient is a 54 y.o. male with a right Loculated right pleural effusion. Preoperative CT imaging demonstrated A thickened pleural process. He is taken to the operating room for thoracotomy and decortication.     FINDINGS: 400 mL of yellow serous effusion. Extremely thick right pleural peel of the right upper, middle, and lower lobes with complete entrapment of the right lung.    WOUND CLASSIFICATION: Class IV, Dirty or Infected. Infection present at the time of surgery (PATOS).    SPECIMEN: Pleural fluid for Gram stain, culture, and sensitivity.     ESTIMATED BLOOD LOSS: 100 mL.     PROCEDURE: Following informed consent consent, the patient was properly identified, taken to the operating room and placed in supine position where general endotracheal anesthesia was administered. Intravenous antibiotics were administered in the preoperative setting within the correct time interval. Sequential compression devices were employed. The patient was repositioned into a lateral decubitus position with careful attention to padding of the extremities and placement of an axillary roll. The right chest wall was prepped and draped into a sterile field.     A right posterolateral thoracotomy incision was made in the 6th intercostal space. The soft tissue layers were divided with electrocautery. The rib was divided posteriorly. A  Finochietto retractor was used to facilitate exposure. The loculated effusion was broken down bluntly. The pleural space was developed and the lung  fully mobilized. Decortication was then carried out upon the right right upper lobe, right middle lobe, right lower lobe, right parietal pleura and diaphragm. Specimen was obtained for culture.     The operative field was copiously irrigated. A pair of 24-Malagasy flexible Mariusz drains were placed into the dependent portions of the thoracic cavity and secured to the skin at separate inferior stab wound sites with 2-0 silk sutures.     The ribs were approximated with multiple interrupted #1 Vicryl sutures. The soft tissues were closed in layers with running 2-0 Vicryl sutures and the skin was approximated with staples. A sterile dressing was applied. The chest tubes were connected to closed suction drainage.     Multiple intercostal nerve blocks were administered using 0.5% bupivacaine with epinephrine.    The patient tolerated the procedure well, and there were no apparent complications. All sponge, needle, and instrument counts were correct on 2 separate occasions. The patient was transferred to to the surgical intensive care unit (SICU) in hemodynamically stable condition.       ____________________________________     Gómez Lance M.D.    DD: 6/11/2020  4:32 PM

## 2020-06-23 NOTE — OP REPORT
DATE OF SERVICE:  06/10/2020    PREOPERATIVE DIAGNOSES:  Prior craniotomy 4 years ago with exposed hardware in   the right superior craniotomy without evidence of epidural collection or   subdural collection.    POSTOPERATIVE DIAGNOSES:  Prior craniotomy 4 years ago with exposed hardware   in the right superior craniotomy without evidence of epidural collection or   subdural collection.    PROCEDURES:  1.  Removal of plates, the patient's exposed bone.  2.  Attempted closure of patient's open wound on the scalp.  3.  Placement of wound VAC.  4.  Use of modifier 22 for extensive difficulty with dissection and closure.    SURGEON:  Mian Vallejo MD    ASSISTANT:  TAVON Veliz    ANESTHESIA:  General.    COMPLICATIONS:  Unable to close the wound, so placement of wound VAC.    ESTIMATED BLOOD LOSS:  Minimal.    DESCRIPTION OF PROCEDURE:  The patient was brought to the operating room,   identified in the usual fashion.  General endotracheal anesthesia was induced   by the anesthesia team.  Patient was then placed supine on the operating room   table.  All pressure points were meticulously padded.  Head was placed in a   horseshoe.  Surgical clippers were used to clip the patient's hair.  We then   planned out a very large elliptical incision along the same axis as the   previous incision extending inferiorly through the previous incision and then   superiorly and medially 7-8 cm past it as well.  This was a total of about 15   cm incision that we plan to cover a defect of about 1 cm.  The patient was   then prepped and draped in the usual sterile fashion.  We then used a 10 blade   to incise the skin down to bone.  We had a very narrow long ellipse that we   had taken out.  This was removed and we then removed the cranial plate and   then just locally debrided it.  The plate was sent off for culture.    We then spent an extended amount of time undermining anteriorly, posteriorly,   laterally and medially  underneath the incision about 8 cm anteriorly, 8 cm   posteriorly.  The skin was thin and fairly tight, so we then used a 2-0 nylon   FSLX needle, vertical mattress sutures progressively from either side to try   to close it toward the center .  Despite having a significant amount of   undermining, we were unable to close it, as the skin started to fall apart   when we placed sutures, because it was very thin and friable.  We then   attempted to make a posterior relaxing incision that did not give us any   significant benefit.  We then closed the relaxing incision with staples and   elected to place a wound VAC at this point, as we were unable to adequately   close the incision.  We placed a wound VAC and then patient was sent to the   recovery room in stable condition.       ____________________________________     REID MILLIGAN MD    CPD / NTS    DD:  06/11/2020 09:56:30  DT:  06/11/2020 11:28:05    D#:  [id number]  Job#:  [id number]

## 2020-06-24 NOTE — DISCHARGE SUMMARY
Death Summary    Cause of Death  Cirrhosis due to alcohol abuse and hepatitis C which was complicated by bilateral pneumonia, malnutrition, recurrent bilateral loculated pleural effusions.    Comorbid Conditions at the Time of Death  Active Problems:    Recurrent right pleural effusion POA: Yes    PEA (Pulseless electrical activity) (HCC) POA: Yes    Shock (HCC) POA: Yes    Anemia POA: Unknown    Hypokalemia POA: Yes    Ileus (HCC) POA: Yes    Pneumonia POA: Yes    Acute respiratory failure with hypoxia (HCC) POA: Yes    Goals of care, counseling/discussion POA: Unknown    Osteomyelitis of skull (HCC) POA: Yes    Epidural phlegmon POA: Yes    DAMIAN (acute kidney injury) (HCC) POA: Unknown    Seizure disorder (HCC) (Chronic) POA: Yes  Resolved Problems:    * No resolved hospital problems. *      History of Presenting Illness and Hospital Course  This is a 54 y.o. male admitted 5/31/2020 with multiple underlying medical conditions such as alcohol abuse, cirrhosis, hepatitis C, SDH, seizure disorder, COPD, that was admitted to [facility] on May 22. He was being treated for bilateral pneumonia and recurrent pleural effusion suffered a cardiac arrest and was transferred to our facility for higher level of care on May 31. He was continued to have respiratory failure with many failed attempts at extubation requiring tracheostomy. He was also found to have osteomyelitis and phlegmon on prior cranial surgical site with exposed hardware that required revision. He was encephalopathic for most of his stay with negative work up with CT, MRI, EEG and was likely related to metabolic related in the background of his chronic TBI. He suffered damian and volume overload due to his poor nutrition status and ileus and later found to have SBO from a mechanical obstructions. He was in septic shock and relative adrenal insufficiency which were treated. ID was following and giving recommendation on antibiotic treatment and in addition to  above was found to have bilateral loculated effusions right greater then left requiring decortication. With the continued additions of burdens of illness and need for acute abdominal surgery and after long discussions with his Sister appointed patient advocate was very clear that this type of care was not what Vishnu would of wanted and we transitioned to comfort focused care. He  without pain and suffering or anxiety at 615am on 6/15/2020.     Death Date: 06/15/20   Death Time: 615         Pronounced By (RN1): Analisa Schneider  Pronounced By (RN2): Anel Nunn

## 2020-07-01 LAB
FUNGUS SPEC CULT: NORMAL
SIGNIFICANT IND 70042: NORMAL
SITE SITE: NORMAL
SOURCE SOURCE: NORMAL

## 2020-07-08 LAB
FUNGUS SPEC CULT: NORMAL
FUNGUS SPEC CULT: NORMAL
SIGNIFICANT IND 70042: NORMAL
SIGNIFICANT IND 70042: NORMAL
SITE SITE: NORMAL
SITE SITE: NORMAL
SOURCE SOURCE: NORMAL
SOURCE SOURCE: NORMAL

## 2020-07-28 LAB
MYCOBACTERIUM SPEC CULT: NORMAL
RHODAMINE-AURAMINE STN SPEC: NORMAL
SIGNIFICANT IND 70042: NORMAL
SITE SITE: NORMAL
SOURCE SOURCE: NORMAL

## 2021-07-26 NOTE — PROGRESS NOTES
Dr. Londono notified of FSBGs and interventions. Order to hold second D50 and obtain serum glucose.   normal...

## 2021-09-07 NOTE — ANESTHESIA QCDR
2019 [facility] Clinical Data Registry (for Quality Improvement)     Postoperative nausea/vomiting risk protocol (Adult = 18 yrs and Pediatric 3-17 yrs)- (430 and 463)  General inhalation anesthetic (NOT TIVA) with PONV risk factors: Yes  Provision of anti-emetic therapy with at least 2 different classes of agents: Yes   Patient DID NOT receive anti-emetic therapy and reason is documented in Medical Record:  N/A    Multimodal Pain Management- (477)  Non-emergent surgery AND patient age >= 18:   Use of Multimodal Pain Management, two or more drugs and/or interventions, NOT including systemic opioids:   Exception: Documented allergy to multiple classes of analgesics:     Smoking Abstinence (404)  Patient is current smoker (cigarette, pipe, e-cig, marijuanna):   Elective Surgery:   Abstinence instructions provided prior to day of surgery:   Patient abstained from smoking on day of surgery:     Pre-Op Beta-Blocker in Isolated CABG (44)  Isolated CABG AND patient age >= 18:   Beta-blocker admin within 24 hours of surgical incision:   Exception:of medical reason(s) for not administering beta blocker within 24 hours prior to surgical incision (e.g., not  indicated,other medical reason):     PACU assessment of acute postoperative pain prior to Anesthesia Care End- Applies to Patients Age = 18- (ABG7)  Initial PACU pain score is which of the following: < 7/10  Patient unable to report pain score: N/A    Post-anesthetic transfer of care checklist/protocol to PACU/ICU- (426 and 427)  Upon conclusion of case, patient transferred to which of the following locations: PACU/Non-ICU  Use of transfer checklist/protocol: Yes  Exclusion: Service Performed in Patient Hospital Room (and thus did not require transfer): N/A  Unplanned admission to ICU related to anesthesia service up through end of PACU care- (MD51)  Unplanned admission to ICU (not initially anticipated at anesthesia start time): No         
symmetrical

## (undated) DEVICE — Device

## (undated) DEVICE — CLIP SM INTNL HRZN TI ESCP LGT - (24EA/PK 25PK/BX)

## (undated) DEVICE — SPONGE GAUZESTER 4 X 4 4PLY - (128PK/CA)

## (undated) DEVICE — DRAPE STRLE REG TOWEL 18X24 - (10/BX 4BX/CA)"

## (undated) DEVICE — NEPTUNE 4 PORT MANIFOLD - (20/PK)

## (undated) DEVICE — LACTATED RINGERS INJ. 500 ML - (24EA/CA)

## (undated) DEVICE — KIT ANESTHESIA W/CIRCUIT & 3/LT BAG W/FILTER (20EA/CA)

## (undated) DEVICE — PEN SKIN MARKER W/RULER - (50EA/BX)

## (undated) DEVICE — DISSECT TOOL MIDAS F2/8TA23

## (undated) DEVICE — DRESSING KIT V.A.C. SENSA T.R.A.C. SMALL (10EA/CA)

## (undated) DEVICE — BLADE CLIPPER FITS 2501 CLIPPER (BLUE)  (20EA/CA)

## (undated) DEVICE — HEMOSTAT SURG ABSORBABLE - 4 X 8 IN SURGICEL (24EA/CA)

## (undated) DEVICE — PROTECTOR ULNA NERVE - (36PR/CA)

## (undated) DEVICE — ELECTRODE DUAL RETURN W/ CORD - (50/PK)

## (undated) DEVICE — SET LEADWIRE 5 LEAD BEDSIDE DISPOSABLE ECG (1SET OF 5/EA)

## (undated) DEVICE — PERFORATER DISP TIP DGR-0

## (undated) DEVICE — VAC CANISTER W/GEL 500ML - FITS NEW MACHINES (10EA/CA)

## (undated) DEVICE — SUTURE 0 VICRYL PLUS CT-2 - 8 X 18 INCH (12/BX)

## (undated) DEVICE — GLOVE SZ 6.5 BIOGEL PI MICRO - PF LF (50PR/BX)

## (undated) DEVICE — PACK CRANI - (1EA/CA)

## (undated) DEVICE — SUTURE 2 ETHILON LR D/A - (24/BX) 30 INCH

## (undated) DEVICE — CLIP MED INTNL HRZN TI ESCP - (25/BX)

## (undated) DEVICE — SURGIFOAM (SIZE 100) - (6EA/CA)

## (undated) DEVICE — MASK ANESTHESIA ADULT  - (100/CA)

## (undated) DEVICE — STAPLER SKIN DISP - (6/BX 10BX/CA) VISISTAT

## (undated) DEVICE — SUTURE 0 SILK TIES (36PK/BX)

## (undated) DEVICE — GOWN SURGEONS X-LARGE - DISP. (30/CA)

## (undated) DEVICE — KIT SURGIFLO W/OUT THROMBIN - (6EA/CA)

## (undated) DEVICE — ELECTRODE 850 FOAM ADHESIVE - HYDROGEL RADIOTRNSPRNT (50/PK)

## (undated) DEVICE — SUTURE CV

## (undated) DEVICE — COVER FOOT UNIVERSAL DISP. - (25EA/CA)

## (undated) DEVICE — GOWN WARMING STANDARD FLEX - (30/CA)

## (undated) DEVICE — GLOVE BIOGEL SZ 7 SURGICAL PF LTX - (50PR/BX 4BX/CA)

## (undated) DEVICE — MIDAS LUBRICATOR DIFFUSER PACK (4EA/CA)

## (undated) DEVICE — BLADE SURGICAL #15 - (50/BX 3BX/CA)

## (undated) DEVICE — DRSG THK1CM SM 10X7.5CM NADH

## (undated) DEVICE — BOVIE  BLADE 6 EXTENDED - (50/PK)

## (undated) DEVICE — SUTURE 2-0 SILK 12 X 18" (36PK/BX)"

## (undated) DEVICE — FORCEP BIPOLAR ISOCOOL 8.5 1.0MM TIP"

## (undated) DEVICE — GLOVE BIOGEL SZ 8.5 SURGICAL PF LTX - (50PR/BX 4BX/CA)

## (undated) DEVICE — SODIUM CHL IRRIGATION 0.9% 1000ML (12EA/CA)

## (undated) DEVICE — CONNECTOR Y TBG CRTY 5 IN 1 STERILE (50EA/CA)

## (undated) DEVICE — BOVIE BLADE COATED &INSULATED - 25/PK

## (undated) DEVICE — PATTIES SURG X-RAYCOTTONOID - 1 X 3 IN (200/CA)

## (undated) DEVICE — GOWN SURGICAL XX-LARGE - (28EA/CA) SIRUS NON REINFORCED

## (undated) DEVICE — SUTURE ETHILON 2-0 FSLX 30 (36PK/BX)"

## (undated) DEVICE — SPONGE KITTNER DISSECTORS - (5EA/PK 50PK/CA)

## (undated) DEVICE — SUTURE 3-0 SILK SH 30 (36PK/BX)

## (undated) DEVICE — PACK MAJOR BASIN - (2EA/CA)

## (undated) DEVICE — TOWELS CLOTH SURGICAL - (4/PK 20PK/CA)

## (undated) DEVICE — SET EXTENSION WITH 2 PORTS (48EA/CA) ***PART #2C8610 IS A SUBSTITUTE*****

## (undated) DEVICE — TRAY CATHETER FOLEY URINE METER W/STATLOCK 350ML (10EA/CA)

## (undated) DEVICE — GOWN SURGICAL X-LARGE ULTRA - FILM-REINFORCED (20/CA)

## (undated) DEVICE — SUTURE 0 SILK CT-1 (36PK/BX)

## (undated) DEVICE — DRESSING XEROFORM 1X8 - (50/BX 4BX/CA)

## (undated) DEVICE — CORDS BIPOLAR COAGULATION - 12FT STERILE DISP. (10EA/BX)

## (undated) DEVICE — GLOVE BIOGEL SZ 7.5 SURGICAL PF LTX - (50PR/BX 4BX/CA)

## (undated) DEVICE — SLEEVE, VASO, THIGH, MED

## (undated) DEVICE — SOD. CHL. INJ. 0.9% 1000 ML - (14EA/CA 60CA/PF)

## (undated) DEVICE — PATTIES SURG X-RAYCOTTONOID - 1/2 X 3 IN (200/CA)

## (undated) DEVICE — SYRINGE 10 ML CONTROL LL (25EA/BX 4BX/CA)

## (undated) DEVICE — SUTURE GENERAL

## (undated) DEVICE — TUBING CLEARLINK DUO-VENT - C-FLO (48EA/CA)

## (undated) DEVICE — SUCTION INSTRUMENT YANKAUER BULBOUS TIP W/O VENT (50EA/CA)

## (undated) DEVICE — STERI STRIP COMPOUND BENZOIN - TINCTURE 0.6ML WITH APPLICATOR (40EA/BX)

## (undated) DEVICE — SENSOR SPO2 NEO LNCS ADHESIVE (20/BX) SEE USER NOTES

## (undated) DEVICE — SUTURE 2-0 VICRYL PLUS CT-1 36 (36PK/BX)"

## (undated) DEVICE — SUTURE 1 VICRYL PLUS CT-1 - 18 INCH (12/BX)

## (undated) DEVICE — DRAPE IOBAN II INCISE 23X17 - (10EA/BX 4BX/CA)

## (undated) DEVICE — SUTURE 4-0 NUROLON CR/8 TF - (12/BX) ETHICON

## (undated) DEVICE — CLIP MED LG INTNL HRZN TI ESCP - (20/BX)

## (undated) DEVICE — CLIP LG INTNL HRZN TI ESCP LGT - (20/BX)

## (undated) DEVICE — DRAPE CHEST/BREAST - (12EA/CA)

## (undated) DEVICE — SUTURE 3-0 VICRYL PLUS FS-1 27 (36PK/BX)"

## (undated) DEVICE — CHLORAPREP 26 ML APPLICATOR - ORANGE TINT(25/CA)

## (undated) DEVICE — LACTATED RINGERS INJ 1000 ML - (14EA/CA 60CA/PF)

## (undated) DEVICE — GLOVE BIOGEL PI INDICATOR SZ 6.5 SURGICAL PF LF - (50/BX 4BX/CA)

## (undated) DEVICE — HEAD HOLDER JUNIOR/ADULT

## (undated) DEVICE — GLOVE BIOGEL INDICATOR SZ 7.5 SURGICAL PF LTX - (50PR/BX 4BX/CA)

## (undated) DEVICE — CANISTER SUCTION 3000ML MECHANICAL FILTER AUTO SHUTOFF MEDI-VAC NONSTERILE LF DISP  (40EA/CA)